# Patient Record
Sex: FEMALE | Race: WHITE | NOT HISPANIC OR LATINO | Employment: OTHER | URBAN - METROPOLITAN AREA
[De-identification: names, ages, dates, MRNs, and addresses within clinical notes are randomized per-mention and may not be internally consistent; named-entity substitution may affect disease eponyms.]

---

## 2023-05-30 ENCOUNTER — HOSPITAL ENCOUNTER (OUTPATIENT)
Dept: DATA CONVERSION | Facility: HOSPITAL | Age: 58
End: 2023-05-30
Attending: INTERNAL MEDICINE | Admitting: INTERNAL MEDICINE
Payer: COMMERCIAL

## 2023-05-30 DIAGNOSIS — R13.10 DYSPHAGIA, UNSPECIFIED: ICD-10-CM

## 2023-05-30 DIAGNOSIS — Z53.8 PROCEDURE AND TREATMENT NOT CARRIED OUT FOR OTHER REASONS: ICD-10-CM

## 2023-06-19 ENCOUNTER — HOSPITAL ENCOUNTER (OUTPATIENT)
Dept: DATA CONVERSION | Facility: HOSPITAL | Age: 58
End: 2023-06-19
Attending: INTERNAL MEDICINE | Admitting: INTERNAL MEDICINE
Payer: COMMERCIAL

## 2023-06-19 DIAGNOSIS — E78.5 HYPERLIPIDEMIA, UNSPECIFIED: ICD-10-CM

## 2023-06-19 DIAGNOSIS — Z79.82 LONG TERM (CURRENT) USE OF ASPIRIN: ICD-10-CM

## 2023-06-19 DIAGNOSIS — G47.33 OBSTRUCTIVE SLEEP APNEA (ADULT) (PEDIATRIC): ICD-10-CM

## 2023-06-19 DIAGNOSIS — I10 ESSENTIAL (PRIMARY) HYPERTENSION: ICD-10-CM

## 2023-06-19 DIAGNOSIS — R13.10 DYSPHAGIA, UNSPECIFIED: ICD-10-CM

## 2023-06-19 DIAGNOSIS — C16.0 MALIGNANT NEOPLASM OF CARDIA (MULTI): ICD-10-CM

## 2023-06-26 LAB
COMPLETE PATHOLOGY REPORT: NORMAL
CONVERTED ADDENDUM DIAGNOSIS 2: NORMAL
CONVERTED CLINICAL DIAGNOSIS-HISTORY: NORMAL
CONVERTED FINAL DIAGNOSIS: NORMAL
CONVERTED FINAL REPORT PDF LINK TO COPY AND PASTE: NORMAL
CONVERTED GROSS DESCRIPTION: NORMAL

## 2023-07-17 ENCOUNTER — HOSPITAL ENCOUNTER (OUTPATIENT)
Dept: DATA CONVERSION | Facility: HOSPITAL | Age: 58
End: 2023-07-17
Attending: STUDENT IN AN ORGANIZED HEALTH CARE EDUCATION/TRAINING PROGRAM | Admitting: STUDENT IN AN ORGANIZED HEALTH CARE EDUCATION/TRAINING PROGRAM
Payer: COMMERCIAL

## 2023-07-17 DIAGNOSIS — K76.89 OTHER SPECIFIED DISEASES OF LIVER: ICD-10-CM

## 2023-07-17 DIAGNOSIS — C15.9 MALIGNANT NEOPLASM OF ESOPHAGUS, UNSPECIFIED (MULTI): ICD-10-CM

## 2023-07-17 DIAGNOSIS — I10 ESSENTIAL (PRIMARY) HYPERTENSION: ICD-10-CM

## 2023-07-17 DIAGNOSIS — E78.00 PURE HYPERCHOLESTEROLEMIA, UNSPECIFIED: ICD-10-CM

## 2023-07-17 DIAGNOSIS — Z79.82 LONG TERM (CURRENT) USE OF ASPIRIN: ICD-10-CM

## 2023-07-17 DIAGNOSIS — R59.9 ENLARGED LYMPH NODES, UNSPECIFIED: ICD-10-CM

## 2023-07-17 DIAGNOSIS — K22.89 OTHER SPECIFIED DISEASE OF ESOPHAGUS: ICD-10-CM

## 2023-07-17 DIAGNOSIS — C16.0 MALIGNANT NEOPLASM OF CARDIA (MULTI): ICD-10-CM

## 2023-08-07 LAB
ALANINE AMINOTRANSFERASE (SGPT) (U/L) IN SER/PLAS: 16 U/L (ref 7–45)
ALBUMIN (G/DL) IN SER/PLAS: 4.3 G/DL (ref 3.4–5)
ALKALINE PHOSPHATASE (U/L) IN SER/PLAS: 113 U/L (ref 33–110)
ANION GAP IN SER/PLAS: 13 MMOL/L (ref 10–20)
ASPARTATE AMINOTRANSFERASE (SGOT) (U/L) IN SER/PLAS: 19 U/L (ref 9–39)
BILIRUBIN TOTAL (MG/DL) IN SER/PLAS: 0.5 MG/DL (ref 0–1.2)
CALCIUM (MG/DL) IN SER/PLAS: 9.7 MG/DL (ref 8.6–10.6)
CARBON DIOXIDE, TOTAL (MMOL/L) IN SER/PLAS: 28 MMOL/L (ref 21–32)
CHLORIDE (MMOL/L) IN SER/PLAS: 106 MMOL/L (ref 98–107)
CREATININE (MG/DL) IN SER/PLAS: 0.98 MG/DL (ref 0.5–1.05)
GFR FEMALE: 66 ML/MIN/1.73M2
GLUCOSE (MG/DL) IN SER/PLAS: 113 MG/DL (ref 74–99)
POTASSIUM (MMOL/L) IN SER/PLAS: 4.7 MMOL/L (ref 3.5–5.3)
PROTEIN TOTAL: 6.4 G/DL (ref 6.4–8.2)
SODIUM (MMOL/L) IN SER/PLAS: 142 MMOL/L (ref 136–145)
UREA NITROGEN (MG/DL) IN SER/PLAS: 15 MG/DL (ref 6–23)

## 2023-09-29 VITALS — BODY MASS INDEX: 45.23 KG/M2 | WEIGHT: 230.38 LBS | HEIGHT: 60 IN

## 2023-09-29 PROBLEM — C16.0 ADENOCARCINOMA OF GASTROESOPHAGEAL JUNCTION (MULTI): Status: ACTIVE | Noted: 2023-09-29

## 2023-09-29 RX ORDER — HEPARIN SODIUM,PORCINE/PF 10 UNIT/ML
50 SYRINGE (ML) INTRAVENOUS AS NEEDED
Status: CANCELLED | OUTPATIENT
Start: 2023-10-02

## 2023-09-29 RX ORDER — HEPARIN 100 UNIT/ML
500 SYRINGE INTRAVENOUS AS NEEDED
Status: CANCELLED | OUTPATIENT
Start: 2023-10-02

## 2023-10-02 RX ORDER — DULOXETIN HYDROCHLORIDE 60 MG/1
60 CAPSULE, DELAYED RELEASE ORAL
COMMUNITY
Start: 2023-09-02

## 2023-10-02 RX ORDER — ATORVASTATIN CALCIUM 20 MG/1
20 TABLET, FILM COATED ORAL NIGHTLY
COMMUNITY

## 2023-10-02 RX ORDER — ONDANSETRON HYDROCHLORIDE 8 MG/1
8 TABLET, FILM COATED ORAL EVERY 8 HOURS
COMMUNITY
Start: 2023-09-15 | End: 2023-11-06 | Stop reason: SDUPTHER

## 2023-10-02 RX ORDER — ONDANSETRON 4 MG/1
8 TABLET, ORALLY DISINTEGRATING ORAL EVERY 8 HOURS PRN
Status: ON HOLD | COMMUNITY
Start: 2023-06-26 | End: 2024-01-11 | Stop reason: ALTCHOICE

## 2023-10-02 RX ORDER — METOPROLOL SUCCINATE 50 MG/1
50 TABLET, EXTENDED RELEASE ORAL
COMMUNITY

## 2023-10-02 RX ORDER — METOCLOPRAMIDE 10 MG/1
1 TABLET ORAL
COMMUNITY
Start: 2023-09-25 | End: 2023-11-06 | Stop reason: ALTCHOICE

## 2023-10-02 RX ORDER — SPIRONOLACTONE 25 MG/1
25 TABLET ORAL
COMMUNITY

## 2023-10-02 RX ORDER — MIRTAZAPINE 15 MG/1
15 TABLET, FILM COATED ORAL NIGHTLY
COMMUNITY
Start: 2023-09-25 | End: 2023-10-02

## 2023-10-02 RX ORDER — ASPIRIN 81 MG/1
1 TABLET ORAL DAILY
COMMUNITY
Start: 2020-03-19 | End: 2023-11-06 | Stop reason: ALTCHOICE

## 2023-10-02 RX ORDER — MULTIVIT/IRON SULF/FOLIC ACID 15MG-0.4MG
1 TABLET ORAL DAILY
COMMUNITY
End: 2023-11-06 | Stop reason: SDUPTHER

## 2023-10-02 NOTE — TELEPHONE ENCOUNTER
Patient phoned stating that both the Reglan and Remeron are making her too sleepy. Advised to stop the nightly Remeron and cut down Reglan to 2 x daily instead of 3 x daily.  She will follow-up with me later this week and let me know how it's help, if at all.

## 2023-10-05 ENCOUNTER — NUTRITION (OUTPATIENT)
Dept: HEMATOLOGY/ONCOLOGY | Facility: CLINIC | Age: 58
End: 2023-10-05
Payer: MEDICAID

## 2023-10-05 ENCOUNTER — APPOINTMENT (OUTPATIENT)
Dept: RADIOLOGY | Facility: HOSPITAL | Age: 58
End: 2023-10-05
Payer: MEDICAID

## 2023-10-05 NOTE — PROGRESS NOTES
NUTRITION Follow-up NOTE  Reason for Visit:  Shruthi Ramos is a 58 y.o. 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 Plan is 5-FU, leucovorin, oxaliplatin, Taxotere with possible surgery.     RD consult for nutrition assessment and recommendations.     Visit Type: Clinical Nutrition, Oncology, Telephone Visit:  A telephone visit (audio only) between the patient (at the originating site) and the provider (at the distant site) was utilized to provide this telehealth service.    Verbal Consent for Encounter: Verbal consent was requested and obtained from patient on this date for a telehealth visit.    Problem List:        Additional Dx:   History of hysterectomy:    Adenocarcinoma of gastric cardia:    Abnormal findings on esophagogastroduodenoscopy (EGD):    Abnormal CT scan:    Adenocarcinoma of gastroesophageal junction:     Anthropometrics:    Wt Readings from Last 10 Encounters:   10/10/23 105 kg (231 lb 0.7 oz)   05/16/23 108 kg (239 lb)   05/26/20 122 kg (268 lb 15.4 oz)   03/19/20 122 kg (269 lb)   105.3kg on 7/7/23  104.8kg on 9/15/23    Labs:     Last CMP:     Lab Results   Component Value Date/Time    GLUCOSE 113 (H) 08/07/2023 1537     08/07/2023 1537    K 4.7 08/07/2023 1537     08/07/2023 1537    CO2 28 08/07/2023 1537    ANIONGAP 13 08/07/2023 1537    BUN 15 08/07/2023 1537    CREATININE 0.98 08/07/2023 1537    CALCIUM 9.7 08/07/2023 1537    ALBUMIN 4.3 08/07/2023 1537    ALKPHOS 113 (H) 08/07/2023 1537    PROT 6.4 08/07/2023 1537    AST 19 08/07/2023 1537    BILITOT 0.5 08/07/2023 1537    ALT 16 08/07/2023 1537          Food And Nutrient Intake:  Food and Nutrient History  Food and Nutrient History: Patient feels that appetite is improving. She was put on Remeron and Reglan, however, had to d/c remeron due to severe fatigue. Feels reglan is working well.  Fluid Intake: 2-30oz water bottles/day  Food Allergy: NKFA  GI Symptoms:  "nausea, early satiety (better with Remeron)  GI Symptoms greater than 2 weeks: intermittent  Oral Problems: dysphagia (cold sensitivities)     Food Intake  Meal 1: oatmeal  Meal 2: spaghetti (2 servings)  Meal 3: poptart  Snacks: chocolate covered pretzels    Food Preparation  Cooking: Patient  Grocery Shopping: Patient  Dining Out: 1 to 3 times a week      Micronutrient Intake  Vitamin Intake: Multivitamin    Food Supplement Intake  Oral Nutrition Supplements: Premier Protein (daily)    Medication and Complementary/Alternative Medicine Use  Prescription Medication Use: Reglan, zofran      Nutrition Focused Physical Exam:  Unable to assess 2/2 phone visit, have yet to meet patient in person.  Subcutaneous Fat Loss  Orbital Fat Pads: Defer  Buccal Fat Pads: Defer  Triceps: Defer  Ribs: Defer  Muscle Wasting  Temporalis: Defer  Pectoralis (Clavicular Region): Defer  Deltoid/Trapezius: Defer  Interosseous: Defer  Trapezius/Infraspinatus/Supraspinatus (Scapular Region): Defer  Quadriceps: Defer  Gastrocnemius: Defer        Energy Needs  Calculated Energy Needs Using Equations  Height: 155 cm (5' 1.02\")  Estimated Energy Needs  Total Energy Estimated Needs (kCal): 2119 kCal  Total Estimated Energy Need per Day (kCal/kg): 30 kCal/kg  Method for Estimating Needs: ABW  Estimated Fluid Needs  Total Fluid Estimated Needs (mL): 2119 mL  Total Fluid Estimated Needs (mL/kg): 30 mL/kg  Method for Estimating Needs: ABW  Estimated Protein Needs  Total Protein Estimated Needs (g): 99 g  Total Protein Estimated Needs (g/kg): 1.4 g/kg  Type of Protein Needed: ABW        Diagnosis      Nutrition Diagnosis  Patient has Nutrition Diagnosis: Yes  Diagnosis Status (1): Ongoing  Nutrition Diagnosis 1: Swallowing difficulity  Related to (1): GE junction tumor  As Evidenced by (1): patient reports of dysphagia with hard to swallow foods like bread/chicken  Additional Nutrition Diagnosis: Diagnosis 2  Diagnosis Status (2): Ongoing  Nutrition " Diagnosis 2: Inadequate oral intake (improving)  Related to (2): catabolic disease and upcoming treatment  As Evidenced by (2): PO intake meeting less than 75% estimated nutrient needs for greater that 1 week    Interventions/Recommendations   Food and Nutrition Delivery  Meals & Snacks: Energy-modified diet  Goals: increased nutrient needs in the setting of cancer  Medical Food Supplement: Premier Protein  Goals: BID  Nutrition Education  Nutrition Education Content: Content related nutrition education  Goals: patient will understand impact of diet on success during treatment, in the healing process, and for survivorship  Nutrition Counseling  Theoretical Basis/Approach: Nutrition counseling based on cognitive behavioral theory approach  Goals: contemplation-->action  Strategies: Nutrition counseling based on motivational interviewing strategy  Coordination of Nutrition Care by a Nutrition Professional  Collaboration and referral of nutrition care: Collaboration by nutrition professional with other providers  Team centered approach for best outcomes possible      Monitoring and Evaluation   Food/Nutrient Related History Monitoring  Monitoring and Evaluation Plan: Energy intake  Energy Intake: Estimated energy intake  Criteria: meet needs calculated by RD  Knowledge/Beliefs/Attitudes  Monitoring and Evaluation Plan: Food and nutrition knowledge  Food and nutrition knowledge: Nutrition knowledge of individual client  Criteria: will continue to work with patient on diet for cancer      Readiness to Change : Good  Level of Understanding : Good  Anticipated Compliant : Good

## 2023-10-10 ENCOUNTER — ANESTHESIA EVENT (OUTPATIENT)
Dept: OPERATING ROOM | Facility: HOSPITAL | Age: 58
End: 2023-10-10
Payer: MEDICAID

## 2023-10-10 VITALS — BODY MASS INDEX: 43.62 KG/M2 | HEIGHT: 61 IN | WEIGHT: 231.04 LBS

## 2023-10-10 NOTE — H&P
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 Today we discussed the general approach to gastric cancer which includes a sandwich treatment of chemotherapy, surgery, chemotherapy. In her case surgery would include a robotic total gastrectomy. We discussed this generally but not in detail. She is scheduled to start chemotherapy with Dr. Torres in mid October. I recommended that we [place] diagnostic [place] and [place] [facility] [place] After discussing the risk benefits the patient and her  elected to proceed. I will see her back following preoperative chemotherapy to discuss surgery.     Greater than 60 minutes were spent reviewing this case with the patient and other providers.     Zack Chavarria MD  Assistant Professor of Surgery  Division of Surgical Oncology  [email or web address]        Chief Complaint     GE junction cancer      History of Present IllnessShruthi is a 58-year-old woman who presents to our [facility] clinic today, referred by Ollie Torres for GE junction cancer.     The patient had an esophagram followed by EGD with diagnosis of a GE junction mass that returned poorly differentiated adenocarcinoma with signet ring cell features. She then underwent CT scan, PET, and EUS that suggest a T4N2 cancer starting at the GE junction and extending to the cardia with no evidence of metastatic disease.     All other systems have been reviewed and are negative except as noted in the HPI.     PMH is significant for hypertension, hyperlipidemia, anxiety and depression.  PSH is significant for hysterectomy, ankle surgery, cholecystectomy,  x2.     I personally reviewed all necessary laboratory results, pathology reports, and radiologic images for this patient.      Active Problems     · Dysphagia (787.20) (R13.10)   · Encounter for monitoring cardiotoxic  drug therapy (V58.83,V58.69) (Z51.81,Z79.899)   · H. pylori infection (041.86) (A04.8)   · Lower abdominal pain (789.09) (R10.30)   · Screening for colon cancer (V76.51) (Z12.11)     Allergies     · No Known Drug Allergies   Recorded By: Jeniffer Cabrera; 10/19/2018 12:57:06 PM     Current Meds     Medication Name Instruction   Aspir-Low 81 MG TBEC TAKE 1 TABLET DAILY.   Atorvastatin Calcium 20 MG Oral Tablet TAKE 1 TABLET BY MOUTH DAILY   B12-Active 1 MG Oral Tablet Chewable     Dicyclomine HCl - 10 MG Oral Capsule TAKE 1 CAPSULE EVERY 6 HOURS AS NEEDED.   Metoprolol Succinate ER 50 MG Oral Tablet Extended Release 24 Hour TAKE 1 TABLET BY MOUTH DAILY   Spironolactone 25 MG Oral Tablet TAKE 1 TABLET BY MOUTH DAILY   Tab-A-Tong/Iron Oral Tablet TAKE 1 TABLET ONCE DAILY.   Vitamin D2 50 MCG (2000 UT) Oral Tablet        Physical Exam  General: no acute distress, well-nourished  Eyes: intact EOM, no scleral icterus  ENT: hearing intact, no drainage  Respiratory: symmetric chest rise, no cough  Cardiovascular: intact distal pulses, no pitting edema  Abdominal: soft, nontender, nondistended  Musculoskeletal: no deformities, intact strength  Integumentary: warm, dry, no lymphadenopathy  Neuro: no focal deficits, sensation intact  Psych: normal mood and affect

## 2023-10-11 ENCOUNTER — HOSPITAL ENCOUNTER (OUTPATIENT)
Facility: HOSPITAL | Age: 58
Setting detail: OUTPATIENT SURGERY
Discharge: HOME | End: 2023-10-11
Attending: STUDENT IN AN ORGANIZED HEALTH CARE EDUCATION/TRAINING PROGRAM | Admitting: STUDENT IN AN ORGANIZED HEALTH CARE EDUCATION/TRAINING PROGRAM
Payer: MEDICAID

## 2023-10-11 ENCOUNTER — ANESTHESIA (OUTPATIENT)
Dept: OPERATING ROOM | Facility: HOSPITAL | Age: 58
End: 2023-10-11
Payer: MEDICAID

## 2023-10-11 ENCOUNTER — APPOINTMENT (OUTPATIENT)
Dept: HEMATOLOGY/ONCOLOGY | Facility: CLINIC | Age: 58
End: 2023-10-11
Payer: MEDICAID

## 2023-10-11 ENCOUNTER — APPOINTMENT (OUTPATIENT)
Dept: RADIOLOGY | Facility: HOSPITAL | Age: 58
End: 2023-10-11
Payer: MEDICAID

## 2023-10-11 ENCOUNTER — TELEPHONE (OUTPATIENT)
Dept: HEMATOLOGY/ONCOLOGY | Facility: CLINIC | Age: 58
End: 2023-10-11

## 2023-10-11 VITALS
OXYGEN SATURATION: 98 % | DIASTOLIC BLOOD PRESSURE: 62 MMHG | TEMPERATURE: 96.8 F | RESPIRATION RATE: 16 BRPM | HEART RATE: 60 BPM | BODY MASS INDEX: 43.58 KG/M2 | SYSTOLIC BLOOD PRESSURE: 118 MMHG | WEIGHT: 230.82 LBS | HEIGHT: 61 IN

## 2023-10-11 DIAGNOSIS — C16.0 MALIGNANT NEOPLASM OF CARDIA (MULTI): ICD-10-CM

## 2023-10-11 DIAGNOSIS — C16.0 ADENOCARCINOMA OF GASTROESOPHAGEAL JUNCTION (MULTI): Primary | ICD-10-CM

## 2023-10-11 PROBLEM — F41.9 ANXIETY: Status: ACTIVE | Noted: 2023-10-11

## 2023-10-11 PROBLEM — R11.2 PONV (POSTOPERATIVE NAUSEA AND VOMITING): Status: ACTIVE | Noted: 2023-10-11

## 2023-10-11 PROBLEM — F32.A DEPRESSION: Status: ACTIVE | Noted: 2023-10-11

## 2023-10-11 PROBLEM — Z98.890 PONV (POSTOPERATIVE NAUSEA AND VOMITING): Status: ACTIVE | Noted: 2023-10-11

## 2023-10-11 LAB
ABO GROUP (TYPE) IN BLOOD: NORMAL
ANTIBODY SCREEN: NORMAL
RH FACTOR (ANTIGEN D): NORMAL

## 2023-10-11 PROCEDURE — 2500000005 HC RX 250 GENERAL PHARMACY W/O HCPCS: Mod: SE

## 2023-10-11 PROCEDURE — 2500000005 HC RX 250 GENERAL PHARMACY W/O HCPCS: Mod: SE | Performed by: STUDENT IN AN ORGANIZED HEALTH CARE EDUCATION/TRAINING PROGRAM

## 2023-10-11 PROCEDURE — 7100000009 HC PHASE TWO TIME - INITIAL BASE CHARGE: Performed by: STUDENT IN AN ORGANIZED HEALTH CARE EDUCATION/TRAINING PROGRAM

## 2023-10-11 PROCEDURE — 36415 COLL VENOUS BLD VENIPUNCTURE: CPT | Performed by: STUDENT IN AN ORGANIZED HEALTH CARE EDUCATION/TRAINING PROGRAM

## 2023-10-11 PROCEDURE — 88112 CYTOPATH CELL ENHANCE TECH: CPT | Mod: TC,MCY,CMCLAB | Performed by: STUDENT IN AN ORGANIZED HEALTH CARE EDUCATION/TRAINING PROGRAM

## 2023-10-11 PROCEDURE — 3700000002 HC GENERAL ANESTHESIA TIME - EACH INCREMENTAL 1 MINUTE: Performed by: STUDENT IN AN ORGANIZED HEALTH CARE EDUCATION/TRAINING PROGRAM

## 2023-10-11 PROCEDURE — 7100000010 HC PHASE TWO TIME - EACH INCREMENTAL 1 MINUTE: Performed by: STUDENT IN AN ORGANIZED HEALTH CARE EDUCATION/TRAINING PROGRAM

## 2023-10-11 PROCEDURE — 2780000003 HC OR 278 NO HCPCS: Performed by: STUDENT IN AN ORGANIZED HEALTH CARE EDUCATION/TRAINING PROGRAM

## 2023-10-11 PROCEDURE — 49321 LAPAROSCOPY BIOPSY: CPT | Performed by: STUDENT IN AN ORGANIZED HEALTH CARE EDUCATION/TRAINING PROGRAM

## 2023-10-11 PROCEDURE — 3600000004 HC OR TIME - INITIAL BASE CHARGE - PROCEDURE LEVEL FOUR: Performed by: STUDENT IN AN ORGANIZED HEALTH CARE EDUCATION/TRAINING PROGRAM

## 2023-10-11 PROCEDURE — 2500000004 HC RX 250 GENERAL PHARMACY W/ HCPCS (ALT 636 FOR OP/ED): Mod: SE | Performed by: STUDENT IN AN ORGANIZED HEALTH CARE EDUCATION/TRAINING PROGRAM

## 2023-10-11 PROCEDURE — 3600000009 HC OR TIME - EACH INCREMENTAL 1 MINUTE - PROCEDURE LEVEL FOUR: Performed by: STUDENT IN AN ORGANIZED HEALTH CARE EDUCATION/TRAINING PROGRAM

## 2023-10-11 PROCEDURE — 96372 THER/PROPH/DIAG INJ SC/IM: CPT | Mod: 59 | Performed by: STUDENT IN AN ORGANIZED HEALTH CARE EDUCATION/TRAINING PROGRAM

## 2023-10-11 PROCEDURE — 2720000007 HC OR 272 NO HCPCS: Performed by: STUDENT IN AN ORGANIZED HEALTH CARE EDUCATION/TRAINING PROGRAM

## 2023-10-11 PROCEDURE — 3700000001 HC GENERAL ANESTHESIA TIME - INITIAL BASE CHARGE: Performed by: STUDENT IN AN ORGANIZED HEALTH CARE EDUCATION/TRAINING PROGRAM

## 2023-10-11 PROCEDURE — 71045 X-RAY EXAM CHEST 1 VIEW: CPT | Performed by: RADIOLOGY

## 2023-10-11 PROCEDURE — C1788 PORT, INDWELLING, IMP: HCPCS | Performed by: STUDENT IN AN ORGANIZED HEALTH CARE EDUCATION/TRAINING PROGRAM

## 2023-10-11 PROCEDURE — 88112 CYTOPATH CELL ENHANCE TECH: CPT | Performed by: PATHOLOGY

## 2023-10-11 PROCEDURE — 76000 FLUOROSCOPY <1 HR PHYS/QHP: CPT | Mod: 59

## 2023-10-11 PROCEDURE — A36561 PR INSERT TUNNELED CV CATH WITH PORT: Performed by: ANESTHESIOLOGY

## 2023-10-11 PROCEDURE — 36561 INSERT TUNNELED CV CATH: CPT | Performed by: STUDENT IN AN ORGANIZED HEALTH CARE EDUCATION/TRAINING PROGRAM

## 2023-10-11 PROCEDURE — 76937 US GUIDE VASCULAR ACCESS: CPT | Performed by: STUDENT IN AN ORGANIZED HEALTH CARE EDUCATION/TRAINING PROGRAM

## 2023-10-11 PROCEDURE — 7100000002 HC RECOVERY ROOM TIME - EACH INCREMENTAL 1 MINUTE: Performed by: STUDENT IN AN ORGANIZED HEALTH CARE EDUCATION/TRAINING PROGRAM

## 2023-10-11 PROCEDURE — 86901 BLOOD TYPING SEROLOGIC RH(D): CPT | Performed by: STUDENT IN AN ORGANIZED HEALTH CARE EDUCATION/TRAINING PROGRAM

## 2023-10-11 PROCEDURE — 7100000001 HC RECOVERY ROOM TIME - INITIAL BASE CHARGE: Performed by: STUDENT IN AN ORGANIZED HEALTH CARE EDUCATION/TRAINING PROGRAM

## 2023-10-11 PROCEDURE — A4217 STERILE WATER/SALINE, 500 ML: HCPCS | Mod: SE | Performed by: STUDENT IN AN ORGANIZED HEALTH CARE EDUCATION/TRAINING PROGRAM

## 2023-10-11 PROCEDURE — 76000 FLUOROSCOPY <1 HR PHYS/QHP: CPT | Performed by: STUDENT IN AN ORGANIZED HEALTH CARE EDUCATION/TRAINING PROGRAM

## 2023-10-11 PROCEDURE — 2580000001 HC RX 258 IV SOLUTIONS: Mod: SE | Performed by: STUDENT IN AN ORGANIZED HEALTH CARE EDUCATION/TRAINING PROGRAM

## 2023-10-11 PROCEDURE — 71045 X-RAY EXAM CHEST 1 VIEW: CPT | Mod: 59,FY

## 2023-10-11 DEVICE — POWERPORT SLIM IMPLANTABLE PORT WITH ATTACHABLE 6F CHRONOFLEX OPEN-ENDED SINGLE-LUMEN VENOUS CATHETER INTERMEDIATE KIT (WITHOUT SUTURE PLUGS)
Type: IMPLANTABLE DEVICE | Site: CHEST | Status: FUNCTIONAL
Brand: POWERPORT, CHRONOFLEX

## 2023-10-11 RX ORDER — SUCCINYLCHOLINE CHLORIDE 20 MG/ML
INJECTION INTRAMUSCULAR; INTRAVENOUS AS NEEDED
Status: DISCONTINUED | OUTPATIENT
Start: 2023-10-11 | End: 2023-10-11

## 2023-10-11 RX ORDER — ACETAMINOPHEN 325 MG/1
650 TABLET ORAL EVERY 4 HOURS PRN
Status: DISCONTINUED | OUTPATIENT
Start: 2023-10-11 | End: 2023-10-11 | Stop reason: HOSPADM

## 2023-10-11 RX ORDER — LIDOCAINE HYDROCHLORIDE 20 MG/ML
INJECTION, SOLUTION INFILTRATION; PERINEURAL AS NEEDED
Status: DISCONTINUED | OUTPATIENT
Start: 2023-10-11 | End: 2023-10-11

## 2023-10-11 RX ORDER — SODIUM CHLORIDE 0.9 G/100ML
IRRIGANT IRRIGATION AS NEEDED
Status: DISCONTINUED | OUTPATIENT
Start: 2023-10-11 | End: 2023-10-11 | Stop reason: HOSPADM

## 2023-10-11 RX ORDER — SODIUM CHLORIDE, SODIUM LACTATE, POTASSIUM CHLORIDE, CALCIUM CHLORIDE 600; 310; 30; 20 MG/100ML; MG/100ML; MG/100ML; MG/100ML
INJECTION, SOLUTION INTRAVENOUS CONTINUOUS PRN
Status: DISCONTINUED | OUTPATIENT
Start: 2023-10-11 | End: 2023-10-11

## 2023-10-11 RX ORDER — ONDANSETRON HYDROCHLORIDE 2 MG/ML
INJECTION, SOLUTION INTRAVENOUS AS NEEDED
Status: DISCONTINUED | OUTPATIENT
Start: 2023-10-11 | End: 2023-10-11

## 2023-10-11 RX ORDER — DEXAMETHASONE SODIUM PHOSPHATE 4 MG/ML
INJECTION, SOLUTION INTRA-ARTICULAR; INTRALESIONAL; INTRAMUSCULAR; INTRAVENOUS; SOFT TISSUE AS NEEDED
Status: DISCONTINUED | OUTPATIENT
Start: 2023-10-11 | End: 2023-10-11

## 2023-10-11 RX ORDER — CEFAZOLIN SODIUM 2 G/100ML
2 INJECTION, SOLUTION INTRAVENOUS EVERY 8 HOURS
Status: DISCONTINUED | OUTPATIENT
Start: 2023-10-11 | End: 2023-10-11 | Stop reason: HOSPADM

## 2023-10-11 RX ORDER — NEOSTIGMINE METHYLSULFATE 1 MG/ML
INJECTION, SOLUTION INTRAVENOUS AS NEEDED
Status: DISCONTINUED | OUTPATIENT
Start: 2023-10-11 | End: 2023-10-11

## 2023-10-11 RX ORDER — SODIUM CHLORIDE, SODIUM LACTATE, POTASSIUM CHLORIDE, CALCIUM CHLORIDE 600; 310; 30; 20 MG/100ML; MG/100ML; MG/100ML; MG/100ML
100 INJECTION, SOLUTION INTRAVENOUS CONTINUOUS
Status: DISCONTINUED | OUTPATIENT
Start: 2023-10-11 | End: 2023-10-11 | Stop reason: HOSPADM

## 2023-10-11 RX ORDER — MIDAZOLAM HYDROCHLORIDE 1 MG/ML
INJECTION INTRAMUSCULAR; INTRAVENOUS AS NEEDED
Status: DISCONTINUED | OUTPATIENT
Start: 2023-10-11 | End: 2023-10-11

## 2023-10-11 RX ORDER — FENTANYL CITRATE 50 UG/ML
INJECTION, SOLUTION INTRAMUSCULAR; INTRAVENOUS AS NEEDED
Status: DISCONTINUED | OUTPATIENT
Start: 2023-10-11 | End: 2023-10-11

## 2023-10-11 RX ORDER — BUPIVACAINE HYDROCHLORIDE 5 MG/ML
INJECTION, SOLUTION PERINEURAL AS NEEDED
Status: DISCONTINUED | OUTPATIENT
Start: 2023-10-11 | End: 2023-10-11 | Stop reason: HOSPADM

## 2023-10-11 RX ORDER — GLYCOPYRROLATE 0.2 MG/ML
INJECTION INTRAMUSCULAR; INTRAVENOUS AS NEEDED
Status: DISCONTINUED | OUTPATIENT
Start: 2023-10-11 | End: 2023-10-11

## 2023-10-11 RX ORDER — ROCURONIUM BROMIDE 10 MG/ML
INJECTION, SOLUTION INTRAVENOUS AS NEEDED
Status: DISCONTINUED | OUTPATIENT
Start: 2023-10-11 | End: 2023-10-11

## 2023-10-11 RX ORDER — TRAMADOL HYDROCHLORIDE 50 MG/1
50 TABLET ORAL 2 TIMES DAILY PRN
Qty: 5 TABLET | Refills: 0 | Status: SHIPPED | OUTPATIENT
Start: 2023-10-11 | End: 2023-10-16

## 2023-10-11 RX ORDER — ONDANSETRON HYDROCHLORIDE 2 MG/ML
4 INJECTION, SOLUTION INTRAVENOUS ONCE AS NEEDED
Status: DISCONTINUED | OUTPATIENT
Start: 2023-10-11 | End: 2023-10-11 | Stop reason: HOSPADM

## 2023-10-11 RX ORDER — HYDRALAZINE HYDROCHLORIDE 20 MG/ML
5 INJECTION INTRAMUSCULAR; INTRAVENOUS EVERY 30 MIN PRN
Status: DISCONTINUED | OUTPATIENT
Start: 2023-10-11 | End: 2023-10-11 | Stop reason: HOSPADM

## 2023-10-11 RX ORDER — TRAMADOL HYDROCHLORIDE 50 MG/1
50 TABLET ORAL 2 TIMES DAILY PRN
Qty: 5 TABLET | Refills: 0 | Status: SHIPPED | OUTPATIENT
Start: 2023-10-11 | End: 2023-10-11 | Stop reason: SDUPTHER

## 2023-10-11 RX ORDER — HEPARIN SODIUM (PORCINE) LOCK FLUSH IV SOLN 100 UNIT/ML 100 UNIT/ML
SOLUTION INTRAVENOUS AS NEEDED
Status: DISCONTINUED | OUTPATIENT
Start: 2023-10-11 | End: 2023-10-11 | Stop reason: HOSPADM

## 2023-10-11 RX ORDER — TRAMADOL HYDROCHLORIDE 50 MG/1
50 TABLET ORAL 2 TIMES DAILY PRN
Qty: 5 TABLET | Refills: 0 | Status: SHIPPED | OUTPATIENT
Start: 2023-10-11 | End: 2023-10-11 | Stop reason: HOSPADM

## 2023-10-11 RX ORDER — LIDOCAINE HYDROCHLORIDE 10 MG/ML
INJECTION INFILTRATION; PERINEURAL AS NEEDED
Status: DISCONTINUED | OUTPATIENT
Start: 2023-10-11 | End: 2023-10-11 | Stop reason: HOSPADM

## 2023-10-11 RX ORDER — PROPOFOL 10 MG/ML
INJECTION, EMULSION INTRAVENOUS AS NEEDED
Status: DISCONTINUED | OUTPATIENT
Start: 2023-10-11 | End: 2023-10-11

## 2023-10-11 RX ORDER — HEPARIN SODIUM 1000 [USP'U]/ML
INJECTION, SOLUTION INTRAVENOUS; SUBCUTANEOUS AS NEEDED
Status: DISCONTINUED | OUTPATIENT
Start: 2023-10-11 | End: 2023-10-11 | Stop reason: HOSPADM

## 2023-10-11 RX ADMIN — GLYCOPYRROLATE 0.4 MG: 0.2 INJECTION INTRAMUSCULAR; INTRAVENOUS at 10:09

## 2023-10-11 RX ADMIN — SODIUM CHLORIDE, POTASSIUM CHLORIDE, SODIUM LACTATE AND CALCIUM CHLORIDE: 600; 310; 30; 20 INJECTION, SOLUTION INTRAVENOUS at 08:35

## 2023-10-11 RX ADMIN — FENTANYL CITRATE 50 MCG: 50 INJECTION, SOLUTION INTRAMUSCULAR; INTRAVENOUS at 08:45

## 2023-10-11 RX ADMIN — SUCCINYLCHOLINE CHLORIDE 120 MG: 20 INJECTION, SOLUTION INTRAMUSCULAR; INTRAVENOUS at 08:45

## 2023-10-11 RX ADMIN — ROCURONIUM BROMIDE 10 MG: 10 INJECTION, SOLUTION INTRAVENOUS at 09:51

## 2023-10-11 RX ADMIN — ROCURONIUM BROMIDE 50 MG: 10 INJECTION, SOLUTION INTRAVENOUS at 08:52

## 2023-10-11 RX ADMIN — NEOSTIGMINE METHYLSULFATE 2 MG: 1 INJECTION INTRAVENOUS at 10:09

## 2023-10-11 RX ADMIN — LIDOCAINE HYDROCHLORIDE 40 MG: 20 INJECTION, SOLUTION INFILTRATION; PERINEURAL at 08:45

## 2023-10-11 RX ADMIN — PROPOFOL 150 MG: 10 INJECTION, EMULSION INTRAVENOUS at 08:45

## 2023-10-11 RX ADMIN — GLYCOPYRROLATE 1 MG: 0.2 INJECTION INTRAMUSCULAR; INTRAVENOUS at 10:01

## 2023-10-11 RX ADMIN — ONDANSETRON 4 MG: 2 INJECTION INTRAMUSCULAR; INTRAVENOUS at 10:00

## 2023-10-11 RX ADMIN — DEXAMETHASONE SODIUM PHOSPHATE 4 MG: 4 INJECTION, SOLUTION INTRA-ARTICULAR; INTRALESIONAL; INTRAMUSCULAR; INTRAVENOUS; SOFT TISSUE at 09:15

## 2023-10-11 RX ADMIN — NEOSTIGMINE METHYLSULFATE 5 MG: 1 INJECTION INTRAVENOUS at 10:01

## 2023-10-11 RX ADMIN — MIDAZOLAM HYDROCHLORIDE 2 MG: 1 INJECTION, SOLUTION INTRAMUSCULAR; INTRAVENOUS at 08:35

## 2023-10-11 RX ADMIN — FENTANYL CITRATE 50 MCG: 50 INJECTION, SOLUTION INTRAMUSCULAR; INTRAVENOUS at 09:55

## 2023-10-11 RX ADMIN — SODIUM CHLORIDE, POTASSIUM CHLORIDE, SODIUM LACTATE AND CALCIUM CHLORIDE 100 ML/HR: 600; 310; 30; 20 INJECTION, SOLUTION INTRAVENOUS at 10:54

## 2023-10-11 RX ADMIN — ACETAMINOPHEN 650 MG: 325 TABLET ORAL at 11:50

## 2023-10-11 RX ADMIN — FENTANYL CITRATE 50 MCG: 50 INJECTION, SOLUTION INTRAMUSCULAR; INTRAVENOUS at 09:14

## 2023-10-11 RX ADMIN — PROPOFOL 50 MG: 10 INJECTION, EMULSION INTRAVENOUS at 09:51

## 2023-10-11 RX ADMIN — SUGAMMADEX 200 MG: 100 INJECTION, SOLUTION INTRAVENOUS at 10:13

## 2023-10-11 RX ADMIN — CEFAZOLIN SODIUM 2 G: 2 INJECTION, SOLUTION INTRAVENOUS at 08:51

## 2023-10-11 ASSESSMENT — PAIN SCALES - GENERAL
PAIN_LEVEL: 0
PAINLEVEL_OUTOF10: 0 - NO PAIN
PAINLEVEL_OUTOF10: 2
PAINLEVEL_OUTOF10: 0 - NO PAIN
PAINLEVEL_OUTOF10: 2
PAINLEVEL_OUTOF10: 0 - NO PAIN

## 2023-10-11 ASSESSMENT — COLUMBIA-SUICIDE SEVERITY RATING SCALE - C-SSRS
2. HAVE YOU ACTUALLY HAD ANY THOUGHTS OF KILLING YOURSELF?: NO
1. IN THE PAST MONTH, HAVE YOU WISHED YOU WERE DEAD OR WISHED YOU COULD GO TO SLEEP AND NOT WAKE UP?: NO
6. HAVE YOU EVER DONE ANYTHING, STARTED TO DO ANYTHING, OR PREPARED TO DO ANYTHING TO END YOUR LIFE?: NO

## 2023-10-11 NOTE — ANESTHESIA POSTPROCEDURE EVALUATION
Patient: Shruthi Ramos    Procedure Summary       Date: 10/11/23 Room / Location: [facility] OR 22 / Virtual [facility] OR    Anesthesia Start: 0836 Anesthesia Stop: 1023    Procedures:       Port Placement (Chest)      Diagnostic Laparoscopy, Intraoperative Ultrasound (Abdomen) Diagnosis:       Malignant neoplasm of cardia (CMS/HCC)      (Malignant neoplasm of cardia (CMS/HCC) [C16.0])    Surgeons: Zack Chavarria MD Responsible Provider: Dnony Dubois MD    Anesthesia Type: general ASA Status: 3            Anesthesia Type: general    Vitals Value Taken Time   /78 10/11/23 1035   Temp 36 10/11/23 1035   Pulse 78 10/11/23 1027   Resp 13 10/11/23 1027   SpO2 100 % 10/11/23 1027   Vitals shown include unvalidated device data.    Anesthesia Post Evaluation    Patient location during evaluation: PACU  Patient participation: complete - patient participated  Level of consciousness: sleepy but conscious  Pain score: 0  Pain management: adequate  Cardiovascular status: acceptable and hemodynamically stable  Respiratory status: acceptable  Hydration status: acceptable        There were no known notable events for this encounter.    Earline Duque PGY-4, Anesthesia    Patient is somnolent but arousable.  No current complaints;  PACU nurse at bedside.  Pain reasonably controlled.  Euvolemic and hemodynamically stable.  Stable respiratory status;  no current nausea or emesis.  Indicated PACU care given.    Donny Dubois MD

## 2023-10-11 NOTE — ANESTHESIA PROCEDURE NOTES
Peripheral IV  Date/Time: 10/11/2023 8:10 AM  Inserted by: Earline Duque MD    Placement  Needle size: 20 G  Laterality: left  Location: hand  Local anesthetic: injectable  Site prep: alcohol  Technique: anatomical landmarks  Attempts: 1  Difficult Venous Access: Yes         Refill approved as requested.

## 2023-10-11 NOTE — ANESTHESIA PROCEDURE NOTES
Peripheral IV  Date/Time: 10/11/2023 8:48 AM  Inserted by: Earline Duque MD    Placement  Needle size: 18 G  Laterality: right  Location: hand  Local anesthetic: none  Site prep: alcohol  Technique: anatomical landmarks  Attempts: 3  Difficult Venous Access: Yes  Unsuccessful Attempt Location(s): right hand and right forearm

## 2023-10-11 NOTE — DISCHARGE INSTRUCTIONS
Outpatient Surgery Discharge Instructions    Diet  No diet restrictions***    Pain control  For baseline pain control: Tylenol (acetaminophen) 1,000 mg every 8 hours for one week  For mild pain: ibuprofen 600 mg every 8 hours with food as needed  For moderate to severe pain: Tramadol as prescribed    Activity  No specific lifting or activity restrictions / No lifting more than 5 pounds for 6 weeks after surgery  In general, listen to your body and do not overly stress your surgical sites    Incisions  Your incisions are covered with skin glue. This is a sterile dressing placed in the operating room. Do not pick or peel it off. This will fall off in 2-3 weeks.  No dressing changes or wound care is needed. Do not use any ointments or cleaning agents.  You have multiple layers of sutures under the skin that will dissolve.   If you have sutures through the skin they will be removed at your postoperative appointment in 3 weeks.  You may shower the day after surgery. Let soap and water wash over the incision and pat it dry.   No going underwater (bath, pool, lake, ocean, etc.) for 2 weeks.  If you notice any of the following, please call Belem Bhakta ([phone]) or Dr. Chavarria's office ([phone]).  Swelling under the incision like a golf ball or larger.  Redness of the skin that is spreading away from the incision.  Drainage from the incision.  Fevers, chills, or any other concerning symptoms.    Follow-up  Call Eduardo Castillo at [phone] for an office visit in 2 months near the end of chemotherapy.

## 2023-10-11 NOTE — OP NOTE
Port Placement, Diagnostic Laparoscopy, Intraoperative Ultrasound Operative Note     Date: 10/11/2023  OR Location: [facility] OR    Name: Shruthi Ramos, : 1965, Age: 58 y.o., MRN: [id number], Sex: female    Diagnosis  Pre-op Diagnosis     * Malignant neoplasm of cardia (CMS/HCC) [C16.0] Post-op Diagnosis     * Malignant neoplasm of cardia (CMS/HCC) [C16.0]     Procedures  Diagnostic laparoscopy and port placement    Surgeons      * Zack Chavarria - Primary    Resident/Fellow/Other Assistant:  No surgical staff documented.    Procedure Summary  Anesthesia: General  ASA: III  Anesthesia Staff: Anesthesiologist: Donny Dubois MD  Anesthesia Resident: Earline Duque MD  Estimated Blood Loss: 10mL  Intra-op Medications:   Medication Name Total Dose   ceFAZolin in dextrose (iso-os) (Ancef) IVPB 2 g 2 g              Anesthesia Record               Intraprocedure I/O Totals          Intake    Autologous Blood 0 mL    Cell Saver 0 mL    Total Intake 0 mL       Output    Urine 0 mL    Est. Blood Loss 5 mL    Total Output 5 mL       Net    Net Volume -5 mL          Specimen:   ID Type Source Tests Collected by Time   1 : PERITONEAL FLUID FOR CYTOLOGY Non-Gynecologic Cytology PERITONEAL WASH CYTOLOGY CONSULTATION (NON-GYNECOLOGIC) Zack Chavarria MD 10/11/2023 1006        Staff:   Circulator: Alice Pierce RN; Paulino Fernandes RN  Scrub Person: Josefina Haley               Implants:  Implants       Type Name Action Serial No.      Implant POWER PORT, SLIM, TI DEVICE W/6FR - [id number] Implanted                Findings: no macroscopic peritoneal disease    Indications for surgery: Shruthi Ramos is a 58 y.o. year old female who was diagnosed with gastric cancer. In clinic we discussed the treatment algorithm that includes perioperative chemotherapy and surgery. To complete staging I recommended a diagnostic laparoscopy to rule out peritoneal disease. After discussing the risks and benefits she  agreed to proceed with diagnostic laparoscopy and port placement.    Details of procedure: The patient arrived to the preoperative area at [facility] for the aforementioned procedure. History and physical was performed, consent was verified, questions were answered, and she was moved into the operating room. A timeout was performed and she was induced under general anesthesia. The chest and abdomen were prepped and draped in the usual sterile fashion.  An ultrasound was used to identify his right internal jugular vein.  Entry was obtained with an access needle and a wire was placed into the IJ and SVC with position confirmed with fluoroscopy.  A stab incision was made at this entry point.  A 3 cm incision was made on the right upper chest and a subcutaneous pocket was created for the port.  A 6F power port was used. The port and catheter were connected and flushed with saline.  The catheter was tunneled antegrade from the pocket to the access incision.  The port was secured in the pocket with two 3-0 Prolene sutures.  Under fluoroscopic guidance that she had and dilator were inserted into the IJ and SVC over the wire.  The wire and dilator were removed.  The catheter was measured and cut externally with the tip 2 vertebral bodies below the anthony.  This was then inserted through the sheath and the length was adjusted so that the tip was just below the anthony.  The sheath was then cracked, catheter advanced and stabilized, and sheath was removed.  Final position of the catheter was confirmed with fluoroscopy.  The catheter had a gentle curve at the top and the tip was 2 vertebral bodies below the anthony.  The port aspirated and flushed with saline easily and was then instilled with 2 mL of dilute heparinized saline.  The port incision was closed with deep dermal 3-0 Vicryl's.  Both incisions were dressed with Dermabond.    Next we performed diagnostic laparoscopy.  The left upper quadrant was entered with a  5 mm port using the direct Optiview technique at [place] [place].  The abdomen was insufflated and a separate 5 mm port was placed.  Upon inspection there was no evidence of metastatic disease.  1,000 mL of saline was irrigated throughout the abdomen.  The patient was rotated circumferentially.  The saline was aspirated and sent for cytology and 2 different traps.  The rest of the saline was removed.  The abdomen was desufflated.  The skin was closed with deep dermal 3-0 Vicryl's and dressed with Dermabond.  The patient was then awoken having tolerated procedure well and returned to the PACU where chest x-ray will be performed prior to discharge.  I was present and scrubbed and directed all operative decision-making.    All sponge and needle counts were correct at the end of the case. The patient was awoken from the procedure and moved to the PACU for recovery. I was present and scrubbed and directed all operative decision making.    Zack Chavarria MD  Assistant Professor of Surgery  Division of Surgical Oncology  [phone]  [email or web address]

## 2023-10-11 NOTE — ANESTHESIA PROCEDURE NOTES
Airway  Date/Time: 10/11/2023 8:46 AM  Urgency: elective    Airway not difficult    Staffing  Performed: resident   Authorized by: Donny Dubois MD    Performed by: Earline Duque MD  Patient location during procedure: OR    Indications and Patient Condition  Indications for airway management: anesthesia  Spontaneous Ventilation: absent  Preoxygenated: yes  Patient position: sniffing  MILS not maintained throughout  Mask difficulty assessment: 0 - not attempted  Planned trial extubation    Final Airway Details  Final airway type: endotracheal airway      Successful airway: ETT  Cuffed: yes   Successful intubation technique: direct laryngoscopy  Facilitating devices/methods: cricoid pressure  Endotracheal tube insertion site: oral  Blade: Stephanie  Blade size: #3  ETT size (mm): 7.5  Cormack-Lehane Classification: grade IIa - partial view of glottis  Placement verified by: capnometry   Measured from: lips  Number of attempts at approach: 1  Ventilation between attempts: none    Additional Comments  RSI  No emesis

## 2023-10-11 NOTE — ANESTHESIA PREPROCEDURE EVALUATION
Patient: Shruthi Ramos    Procedure Information       Date/Time: 10/11/23 0815    Procedures:       Port Placement      Diagnostic Laparoscopy, Intraoperative Ultrasound    Location: [facility] OR 22 / Virtual [facility] OR    Surgeons: Zack Chavarria MD            Relevant Problems   Anesthesia   (+) PONV (postoperative nausea and vomiting)      GI   (+) Adenocarcinoma of gastroesophageal junction (CMS/HCC)      /Renal (within normal limits)      Neuro/Psych   (+) Anxiety   (+) Depression      Pulmonary (within normal limits)      GI/Hepatic   (+) Adenocarcinoma of gastroesophageal junction (CMS/HCC)      Hematology (within normal limits)      Musculoskeletal (within normal limits)      Eyes, Ears, Nose, and Throat (within normal limits)      Infectious Disease (within normal limits)       Clinical information reviewed:   Tobacco  Allergies  Meds   Med Hx  Surg Hx  OB Status  Fam Hx  Soc   Hx        NPO Detail:  NPO/Void Status  Carbonhydrate Drink Given Prior to Surgery? : N  Date of Last Liquid: 10/11/23  Time of Last Liquid: 0500  Date of Last Solid: 10/10/23  Time of Last Solid: 2030  Last Intake Type: Clear fluids  Time of Last Void: 0600         Physical Exam    Airway  Mallampati: III  TM distance: >3 FB  Neck ROM: full     Cardiovascular   Rhythm: regular  Rate: normal  Comments: Distant heart sounds   Dental   (+) upper dentures, lower dentures  Comments: edentulous   Pulmonary   Breath sounds clear to auscultation     Abdominal   (+) obese             Anesthesia Plan    ASA 3     general     intravenous induction   Anesthetic plan and risks discussed with patient.  Use of blood products discussed with patient who consented to blood products.    Plan discussed with attending.    Earline Duque PGY-4 Anesthesia    Plan general endotracheal anesthesia with peripheral IV placement and ASA standard noninvasive monitors.  Plan RSI.  The possibility of blood product transfusion was also described  in detail.  Risks, benefits, alternatives of this plan were described in detail to the patient, who indicated understanding and agreed to proceed.    Donny Dubois MD

## 2023-10-11 NOTE — TELEPHONE ENCOUNTER
Message received from Dr. Chavarria / Dr. Torres to see if we could move up patient's treatment start date from 10.25.2023.  Discussed with charge RN and patient rescheduled to get blood draw on 10.13 and start treatment on 10.16.2023.  Rescheduled by Jessica Reyes; also emailed patient copy of the schedule at email address [email or web address] and left her a VM to please call me back and let me know she is aware of the change.

## 2023-10-12 ENCOUNTER — APPOINTMENT (OUTPATIENT)
Dept: HEMATOLOGY/ONCOLOGY | Facility: CLINIC | Age: 58
End: 2023-10-12
Payer: MEDICAID

## 2023-10-12 PROBLEM — N18.30 CKD (CHRONIC KIDNEY DISEASE) STAGE 3, GFR 30-59 ML/MIN (MULTI): Status: ACTIVE | Noted: 2023-10-12

## 2023-10-12 PROBLEM — R73.9 HYPERGLYCEMIA: Status: ACTIVE | Noted: 2023-10-12

## 2023-10-12 PROBLEM — N30.20 CHRONIC CYSTITIS: Status: ACTIVE | Noted: 2023-10-12

## 2023-10-12 PROBLEM — N39.0 RECURRENT UTI: Status: ACTIVE | Noted: 2023-10-12

## 2023-10-12 PROBLEM — G47.61 PERIODIC LIMB MOVEMENT DISORDER: Status: ACTIVE | Noted: 2017-01-04

## 2023-10-12 PROBLEM — R06.09 DOE (DYSPNEA ON EXERTION): Status: ACTIVE | Noted: 2023-10-12

## 2023-10-12 PROBLEM — E66.01 OBESITY, CLASS III, BMI 40-49.9 (MORBID OBESITY) (MULTI): Status: ACTIVE | Noted: 2022-05-13

## 2023-10-12 PROBLEM — G25.81 RLS (RESTLESS LEGS SYNDROME): Status: ACTIVE | Noted: 2023-10-12

## 2023-10-12 PROBLEM — E66.813 OBESITY, CLASS III, BMI 40-49.9 (MORBID OBESITY): Status: ACTIVE | Noted: 2022-05-13

## 2023-10-12 PROBLEM — F32.1 MODERATE MAJOR DEPRESSION (MULTI): Status: ACTIVE | Noted: 2023-10-12

## 2023-10-12 PROBLEM — N95.2 VAGINAL ATROPHY: Status: ACTIVE | Noted: 2023-10-12

## 2023-10-12 PROBLEM — G47.00 INSOMNIA: Status: ACTIVE | Noted: 2022-05-03

## 2023-10-12 PROBLEM — R13.10 DYSPHAGIA: Status: ACTIVE | Noted: 2023-10-12

## 2023-10-12 PROBLEM — M79.7 FIBROMYALGIA: Status: ACTIVE | Noted: 2023-10-12

## 2023-10-12 PROBLEM — E55.9 VITAMIN D DEFICIENCY: Status: ACTIVE | Noted: 2023-10-12

## 2023-10-12 PROBLEM — N35.919 URETHRAL MEATAL STENOSIS: Status: ACTIVE | Noted: 2023-10-12

## 2023-10-12 RX ORDER — PROCHLORPERAZINE EDISYLATE 5 MG/ML
10 INJECTION INTRAMUSCULAR; INTRAVENOUS EVERY 6 HOURS PRN
Status: CANCELLED | OUTPATIENT
Start: 2023-10-16

## 2023-10-12 RX ORDER — ALBUTEROL SULFATE 0.83 MG/ML
3 SOLUTION RESPIRATORY (INHALATION) AS NEEDED
Status: CANCELLED | OUTPATIENT
Start: 2023-10-20

## 2023-10-12 RX ORDER — VENLAFAXINE HYDROCHLORIDE 75 MG/1
75 CAPSULE, EXTENDED RELEASE ORAL DAILY
COMMUNITY
Start: 2018-12-11 | End: 2023-11-06 | Stop reason: ALTCHOICE

## 2023-10-12 RX ORDER — VENLAFAXINE HYDROCHLORIDE 150 MG/1
150 CAPSULE, EXTENDED RELEASE ORAL DAILY
COMMUNITY
Start: 2018-12-11 | End: 2023-11-06 | Stop reason: ALTCHOICE

## 2023-10-12 RX ORDER — ROPINIROLE 0.5 MG/1
0.25 TABLET, FILM COATED ORAL DAILY
COMMUNITY
Start: 2018-12-11 | End: 2023-11-06

## 2023-10-12 RX ORDER — FAMOTIDINE 10 MG/ML
20 INJECTION INTRAVENOUS ONCE AS NEEDED
Status: CANCELLED | OUTPATIENT
Start: 2023-10-16

## 2023-10-12 RX ORDER — PROCHLORPERAZINE MALEATE 10 MG
10 TABLET ORAL EVERY 6 HOURS PRN
Status: CANCELLED | OUTPATIENT
Start: 2023-10-16

## 2023-10-12 RX ORDER — LANOLIN ALCOHOL/MO/W.PET/CERES
1 CREAM (GRAM) TOPICAL DAILY
COMMUNITY
Start: 2022-03-30 | End: 2024-04-17

## 2023-10-12 RX ORDER — TRAZODONE HYDROCHLORIDE 50 MG/1
1 TABLET ORAL NIGHTLY
COMMUNITY
Start: 2022-05-03 | End: 2024-04-16 | Stop reason: ENTERED-IN-ERROR

## 2023-10-12 RX ORDER — CHOLECALCIFEROL (VITAMIN D3) 25 MCG
1000 TABLET ORAL EVERY MORNING
COMMUNITY
Start: 2022-03-30 | End: 2024-04-17

## 2023-10-12 RX ORDER — DIPHENHYDRAMINE HYDROCHLORIDE 50 MG/ML
50 INJECTION INTRAMUSCULAR; INTRAVENOUS AS NEEDED
Status: CANCELLED | OUTPATIENT
Start: 2023-10-20

## 2023-10-12 RX ORDER — ESTRADIOL 0.1 MG/G
3 CREAM VAGINAL
Status: ON HOLD | COMMUNITY
Start: 2022-09-19 | End: 2024-01-11 | Stop reason: WASHOUT

## 2023-10-12 RX ORDER — ALBUTEROL SULFATE 0.83 MG/ML
3 SOLUTION RESPIRATORY (INHALATION) AS NEEDED
Status: CANCELLED | OUTPATIENT
Start: 2023-10-16

## 2023-10-12 RX ORDER — TIZANIDINE 4 MG/1
4 TABLET ORAL DAILY PRN
COMMUNITY
End: 2023-11-06 | Stop reason: ALTCHOICE

## 2023-10-12 RX ORDER — PALONOSETRON 0.05 MG/ML
0.25 INJECTION, SOLUTION INTRAVENOUS ONCE
Status: CANCELLED | OUTPATIENT
Start: 2023-10-16

## 2023-10-12 RX ORDER — POLYETHYLENE GLYCOL 3350 17 G/17G
17 POWDER, FOR SOLUTION ORAL DAILY
COMMUNITY
Start: 2020-03-19 | End: 2023-11-06 | Stop reason: ALTCHOICE

## 2023-10-12 RX ORDER — FAMOTIDINE 10 MG/ML
20 INJECTION INTRAVENOUS ONCE AS NEEDED
Status: CANCELLED | OUTPATIENT
Start: 2023-10-20

## 2023-10-12 RX ORDER — LEVALBUTEROL TARTRATE 45 UG/1
AEROSOL, METERED ORAL
COMMUNITY
Start: 2022-05-11 | End: 2023-11-06 | Stop reason: ALTCHOICE

## 2023-10-12 RX ORDER — LORAZEPAM 2 MG/ML
1 INJECTION INTRAMUSCULAR AS NEEDED
Status: CANCELLED | OUTPATIENT
Start: 2023-10-16

## 2023-10-12 RX ORDER — FERROUS GLUCONATE 324(38)MG
324 TABLET ORAL DAILY
COMMUNITY
Start: 2023-06-26 | End: 2023-11-06 | Stop reason: ALTCHOICE

## 2023-10-12 RX ORDER — ALBUTEROL SULFATE 90 UG/1
2 AEROSOL, METERED RESPIRATORY (INHALATION) EVERY 4 HOURS PRN
COMMUNITY
End: 2023-11-06 | Stop reason: ALTCHOICE

## 2023-10-12 RX ORDER — EPINEPHRINE 0.3 MG/.3ML
0.3 INJECTION SUBCUTANEOUS EVERY 5 MIN PRN
Status: CANCELLED | OUTPATIENT
Start: 2023-10-20

## 2023-10-12 RX ORDER — DIPHENHYDRAMINE HYDROCHLORIDE 50 MG/ML
50 INJECTION INTRAMUSCULAR; INTRAVENOUS AS NEEDED
Status: CANCELLED | OUTPATIENT
Start: 2023-10-16

## 2023-10-12 RX ORDER — EPINEPHRINE 0.3 MG/.3ML
0.3 INJECTION SUBCUTANEOUS EVERY 5 MIN PRN
Status: CANCELLED | OUTPATIENT
Start: 2023-10-16

## 2023-10-12 RX ORDER — DIPHENHYDRAMINE HCL 25 MG
25 CAPSULE ORAL ONCE
Status: CANCELLED | OUTPATIENT
Start: 2023-10-16

## 2023-10-13 ENCOUNTER — APPOINTMENT (OUTPATIENT)
Dept: HEMATOLOGY/ONCOLOGY | Facility: CLINIC | Age: 58
End: 2023-10-13
Payer: MEDICAID

## 2023-10-13 ENCOUNTER — INFUSION (OUTPATIENT)
Dept: HEMATOLOGY/ONCOLOGY | Facility: CLINIC | Age: 58
End: 2023-10-13
Payer: MEDICAID

## 2023-10-13 DIAGNOSIS — C16.0 ADENOCARCINOMA OF GASTROESOPHAGEAL JUNCTION (MULTI): Primary | ICD-10-CM

## 2023-10-13 LAB
ALBUMIN SERPL BCP-MCNC: 3.9 G/DL (ref 3.4–5)
ALP SERPL-CCNC: 90 U/L (ref 33–110)
ALT SERPL W P-5'-P-CCNC: 11 U/L (ref 7–45)
ANION GAP SERPL CALC-SCNC: 12 MMOL/L (ref 10–20)
AST SERPL W P-5'-P-CCNC: 15 U/L (ref 9–39)
BASOPHILS # BLD AUTO: 0.07 X10*3/UL (ref 0–0.1)
BASOPHILS NFR BLD AUTO: 0.6 %
BILIRUB SERPL-MCNC: 0.5 MG/DL (ref 0–1.2)
BUN SERPL-MCNC: 18 MG/DL (ref 6–23)
CALCIUM SERPL-MCNC: 8.6 MG/DL (ref 8.6–10.3)
CHLORIDE SERPL-SCNC: 106 MMOL/L (ref 98–107)
CO2 SERPL-SCNC: 26 MMOL/L (ref 21–32)
CREAT SERPL-MCNC: 0.96 MG/DL (ref 0.5–1.05)
EOSINOPHIL # BLD AUTO: 0.34 X10*3/UL (ref 0–0.7)
EOSINOPHIL NFR BLD AUTO: 2.9 %
ERYTHROCYTE [DISTWIDTH] IN BLOOD BY AUTOMATED COUNT: 15.3 % (ref 11.5–14.5)
GFR SERPL CREATININE-BSD FRML MDRD: 68 ML/MIN/1.73M*2
GLUCOSE SERPL-MCNC: 69 MG/DL (ref 74–99)
HCT VFR BLD AUTO: 40.7 % (ref 36–46)
HGB BLD-MCNC: 12.8 G/DL (ref 12–16)
IMM GRANULOCYTES # BLD AUTO: 0.03 X10*3/UL (ref 0–0.7)
IMM GRANULOCYTES NFR BLD AUTO: 0.3 % (ref 0–0.9)
LYMPHOCYTES # BLD AUTO: 3.35 X10*3/UL (ref 1.2–4.8)
LYMPHOCYTES NFR BLD AUTO: 28.6 %
MCH RBC QN AUTO: 27.6 PG (ref 26–34)
MCHC RBC AUTO-ENTMCNC: 31.4 G/DL (ref 32–36)
MCV RBC AUTO: 88 FL (ref 80–100)
MONOCYTES # BLD AUTO: 1.11 X10*3/UL (ref 0.1–1)
MONOCYTES NFR BLD AUTO: 9.5 %
NEUTROPHILS # BLD AUTO: 6.82 X10*3/UL (ref 1.2–7.7)
NEUTROPHILS NFR BLD AUTO: 58.1 %
NRBC BLD-RTO: 0 /100 WBCS (ref 0–0)
PLATELET # BLD AUTO: 227 X10*3/UL (ref 150–450)
PMV BLD AUTO: 10.9 FL (ref 7.5–11.5)
POTASSIUM SERPL-SCNC: 4 MMOL/L (ref 3.5–5.3)
PROT SERPL-MCNC: 6.2 G/DL (ref 6.4–8.2)
RBC # BLD AUTO: 4.64 X10*6/UL (ref 4–5.2)
SODIUM SERPL-SCNC: 140 MMOL/L (ref 136–145)
WBC # BLD AUTO: 11.7 X10*3/UL (ref 4.4–11.3)

## 2023-10-13 PROCEDURE — 36593 DECLOT VASCULAR DEVICE: CPT

## 2023-10-13 PROCEDURE — 85025 COMPLETE CBC W/AUTO DIFF WBC: CPT

## 2023-10-13 PROCEDURE — 36591 DRAW BLOOD OFF VENOUS DEVICE: CPT

## 2023-10-13 PROCEDURE — 80053 COMPREHEN METABOLIC PANEL: CPT

## 2023-10-13 PROCEDURE — 2500000004 HC RX 250 GENERAL PHARMACY W/ HCPCS (ALT 636 FOR OP/ED): Mod: JZ | Performed by: INTERNAL MEDICINE

## 2023-10-13 PROCEDURE — 96523 IRRIG DRUG DELIVERY DEVICE: CPT

## 2023-10-13 RX ORDER — HEPARIN 100 UNIT/ML
500 SYRINGE INTRAVENOUS AS NEEDED
Status: DISCONTINUED | OUTPATIENT
Start: 2023-10-13 | End: 2023-10-13 | Stop reason: HOSPADM

## 2023-10-13 RX ORDER — HEPARIN 100 UNIT/ML
500 SYRINGE INTRAVENOUS AS NEEDED
Status: CANCELLED | OUTPATIENT
Start: 2023-10-13

## 2023-10-13 RX ORDER — HEPARIN SODIUM,PORCINE/PF 10 UNIT/ML
50 SYRINGE (ML) INTRAVENOUS AS NEEDED
Status: CANCELLED | OUTPATIENT
Start: 2023-10-13

## 2023-10-13 RX ORDER — HEPARIN SODIUM,PORCINE/PF 10 UNIT/ML
50 SYRINGE (ML) INTRAVENOUS AS NEEDED
Status: DISCONTINUED | OUTPATIENT
Start: 2023-10-13 | End: 2023-10-13 | Stop reason: HOSPADM

## 2023-10-13 RX ADMIN — ALTEPLASE 2 MG: 2.2 INJECTION, POWDER, LYOPHILIZED, FOR SOLUTION INTRAVENOUS at 10:45

## 2023-10-13 RX ADMIN — HEPARIN 500 UNITS: 100 SYRINGE at 10:07

## 2023-10-16 ENCOUNTER — NUTRITION (OUTPATIENT)
Dept: HEMATOLOGY/ONCOLOGY | Facility: CLINIC | Age: 58
End: 2023-10-16
Payer: MEDICAID

## 2023-10-16 ENCOUNTER — INFUSION (OUTPATIENT)
Dept: HEMATOLOGY/ONCOLOGY | Facility: CLINIC | Age: 58
End: 2023-10-16
Payer: MEDICAID

## 2023-10-16 ENCOUNTER — APPOINTMENT (OUTPATIENT)
Dept: HEMATOLOGY/ONCOLOGY | Facility: CLINIC | Age: 58
End: 2023-10-16
Payer: MEDICAID

## 2023-10-16 VITALS — HEIGHT: 61 IN | BODY MASS INDEX: 44.95 KG/M2 | WEIGHT: 238.1 LBS

## 2023-10-16 VITALS
WEIGHT: 237.88 LBS | RESPIRATION RATE: 18 BRPM | HEART RATE: 81 BPM | DIASTOLIC BLOOD PRESSURE: 96 MMHG | OXYGEN SATURATION: 98 % | BODY MASS INDEX: 44.95 KG/M2 | TEMPERATURE: 98.6 F | SYSTOLIC BLOOD PRESSURE: 141 MMHG

## 2023-10-16 DIAGNOSIS — C16.0 ADENOCARCINOMA OF GASTROESOPHAGEAL JUNCTION (MULTI): ICD-10-CM

## 2023-10-16 PROCEDURE — 2500000001 HC RX 250 WO HCPCS SELF ADMINISTERED DRUGS (ALT 637 FOR MEDICARE OP): Performed by: INTERNAL MEDICINE

## 2023-10-16 PROCEDURE — 96523 IRRIG DRUG DELIVERY DEVICE: CPT

## 2023-10-16 PROCEDURE — 96415 CHEMO IV INFUSION ADDL HR: CPT

## 2023-10-16 PROCEDURE — 96375 TX/PRO/DX INJ NEW DRUG ADDON: CPT

## 2023-10-16 PROCEDURE — 96413 CHEMO IV INFUSION 1 HR: CPT

## 2023-10-16 PROCEDURE — 96365 THER/PROPH/DIAG IV INF INIT: CPT | Mod: INF

## 2023-10-16 PROCEDURE — 96366 THER/PROPH/DIAG IV INF ADDON: CPT

## 2023-10-16 PROCEDURE — 96411 CHEMO IV PUSH ADDL DRUG: CPT

## 2023-10-16 PROCEDURE — 2500000004 HC RX 250 GENERAL PHARMACY W/ HCPCS (ALT 636 FOR OP/ED): Performed by: INTERNAL MEDICINE

## 2023-10-16 PROCEDURE — 96417 CHEMO IV INFUS EACH ADDL SEQ: CPT

## 2023-10-16 PROCEDURE — 96368 THER/DIAG CONCURRENT INF: CPT

## 2023-10-16 RX ORDER — LORAZEPAM 2 MG/ML
1 INJECTION INTRAMUSCULAR AS NEEDED
Status: DISCONTINUED | OUTPATIENT
Start: 2023-10-16 | End: 2023-10-16 | Stop reason: HOSPADM

## 2023-10-16 RX ORDER — PROCHLORPERAZINE MALEATE 10 MG
10 TABLET ORAL EVERY 6 HOURS PRN
Status: DISCONTINUED | OUTPATIENT
Start: 2023-10-16 | End: 2023-10-16 | Stop reason: HOSPADM

## 2023-10-16 RX ORDER — FAMOTIDINE 10 MG/ML
20 INJECTION INTRAVENOUS ONCE AS NEEDED
Status: DISCONTINUED | OUTPATIENT
Start: 2023-10-16 | End: 2023-10-16 | Stop reason: HOSPADM

## 2023-10-16 RX ORDER — HEPARIN SODIUM,PORCINE/PF 10 UNIT/ML
50 SYRINGE (ML) INTRAVENOUS AS NEEDED
Status: CANCELLED | OUTPATIENT
Start: 2023-10-16

## 2023-10-16 RX ORDER — DIPHENHYDRAMINE HYDROCHLORIDE 50 MG/ML
50 INJECTION INTRAMUSCULAR; INTRAVENOUS AS NEEDED
Status: DISCONTINUED | OUTPATIENT
Start: 2023-10-16 | End: 2023-10-16 | Stop reason: HOSPADM

## 2023-10-16 RX ORDER — DIPHENHYDRAMINE HCL 25 MG
25 CAPSULE ORAL ONCE
Status: COMPLETED | OUTPATIENT
Start: 2023-10-16 | End: 2023-10-16

## 2023-10-16 RX ORDER — HEPARIN 100 UNIT/ML
500 SYRINGE INTRAVENOUS AS NEEDED
Status: CANCELLED | OUTPATIENT
Start: 2023-10-16

## 2023-10-16 RX ORDER — PROCHLORPERAZINE EDISYLATE 5 MG/ML
10 INJECTION INTRAMUSCULAR; INTRAVENOUS EVERY 6 HOURS PRN
Status: DISCONTINUED | OUTPATIENT
Start: 2023-10-16 | End: 2023-10-16 | Stop reason: HOSPADM

## 2023-10-16 RX ORDER — PALONOSETRON 0.05 MG/ML
0.25 INJECTION, SOLUTION INTRAVENOUS ONCE
Status: COMPLETED | OUTPATIENT
Start: 2023-10-16 | End: 2023-10-16

## 2023-10-16 RX ORDER — HEPARIN 100 UNIT/ML
500 SYRINGE INTRAVENOUS AS NEEDED
Status: DISCONTINUED | OUTPATIENT
Start: 2023-10-16 | End: 2023-10-16 | Stop reason: HOSPADM

## 2023-10-16 RX ORDER — ALBUTEROL SULFATE 0.83 MG/ML
3 SOLUTION RESPIRATORY (INHALATION) AS NEEDED
Status: DISCONTINUED | OUTPATIENT
Start: 2023-10-16 | End: 2023-10-16 | Stop reason: HOSPADM

## 2023-10-16 RX ORDER — HEPARIN SODIUM,PORCINE/PF 10 UNIT/ML
50 SYRINGE (ML) INTRAVENOUS AS NEEDED
Status: DISCONTINUED | OUTPATIENT
Start: 2023-10-16 | End: 2023-10-16 | Stop reason: HOSPADM

## 2023-10-16 RX ORDER — EPINEPHRINE 0.3 MG/.3ML
0.3 INJECTION SUBCUTANEOUS EVERY 5 MIN PRN
Status: DISCONTINUED | OUTPATIENT
Start: 2023-10-16 | End: 2023-10-16 | Stop reason: HOSPADM

## 2023-10-16 RX ADMIN — OXALIPLATIN 180 MG: 5 INJECTION, SOLUTION INTRAVENOUS at 13:44

## 2023-10-16 RX ADMIN — DIPHENHYDRAMINE HYDROCHLORIDE 25 MG: 25 CAPSULE ORAL at 10:31

## 2023-10-16 RX ADMIN — FLUOROURACIL 5550 MG: 50 INJECTION, SOLUTION INTRAVENOUS at 16:07

## 2023-10-16 RX ADMIN — DOCETAXEL 110 MG: 20 INJECTION, SOLUTION, CONCENTRATE INTRAVENOUS at 11:56

## 2023-10-16 RX ADMIN — LEUCOVORIN CALCIUM 420 MG: 500 INJECTION, POWDER, LYOPHILIZED, FOR SOLUTION INTRAMUSCULAR; INTRAVENOUS at 13:40

## 2023-10-16 RX ADMIN — PALONOSETRON 250 MCG: 0.05 INJECTION, SOLUTION INTRAVENOUS at 10:32

## 2023-10-16 RX ADMIN — DEXAMETHASONE SODIUM PHOSPHATE 12 MG: 10 INJECTION, SOLUTION INTRAMUSCULAR; INTRAVENOUS at 10:37

## 2023-10-16 ASSESSMENT — PATIENT HEALTH QUESTIONNAIRE - PHQ9
2. FEELING DOWN, DEPRESSED, IRRITABLE, OR HOPELESS: NOT AT ALL
8. MOVING OR SPEAKING SO SLOWLY THAT OTHER PEOPLE COULD HAVE NOTICED. OR THE OPPOSITE, BEING SO FIGETY OR RESTLESS THAT YOU HAVE BEEN MOVING AROUND A LOT MORE THAN USUAL: NOT AT ALL
4. FEELING TIRED OR HAVING LITTLE ENERGY: NOT AT ALL
7. TROUBLE CONCENTRATING ON THINGS, SUCH AS READING THE NEWSPAPER OR WATCHING TELEVISION: NOT AT ALL
6. FEELING BAD ABOUT YOURSELF - OR THAT YOU ARE A FAILURE OR HAVE LET YOURSELF OR YOUR FAMILY DOWN: 0
9. THOUGHTS THAT YOU WOULD BE BETTER OFF DEAD, OR OF HURTING YOURSELF: 0
10. IF YOU CHECKED OFF ANY PROBLEMS, HOW DIFFICULT HAVE THESE PROBLEMS MADE IT FOR YOU TO DO YOUR WORK, TAKE CARE OF THINGS AT HOME, OR GET ALONG WITH OTHER PEOPLE: NOT DIFFICULT AT ALL
2. FEELING DOWN, DEPRESSED, IRRITABLE, OR HOPELESS: 0
4. FEELING TIRED OR HAVING LITTLE ENERGY: 0
2. FEELING DOWN, DEPRESSED, IRRITABLE, OR HOPELESS: NOT AT ALL
6. FEELING BAD ABOUT YOURSELF - OR THAT YOU ARE A FAILURE OR HAVE LET YOURSELF OR YOUR FAMILY DOWN: NOT AT ALL
2. FEELING DOWN, DEPRESSED OR HOPELESS: NOT AT ALL
SUM OF ALL RESPONSES TO PHQ9 QUESTIONS 1 AND 2: 0
3. TROUBLE FALLING OR STAYING ASLEEP OR SLEEPING TOO MUCH: NOT AT ALL
SUM OF ALL RESPONSES TO PHQ QUESTIONS 1-9: 0
SUM OF ALL RESPONSES TO PHQ QUESTIONS 1-9: 0
3. TROUBLE FALLING OR STAYING ASLEEP OR SLEEPING TOO MUCH: 0
7. TROUBLE CONCENTRATING ON THINGS, SUCH AS READING THE NEWSPAPER OR WATCHING TELEVISION: NOT AT ALL
5. POOR APPETITE OR OVEREATING: NOT AT ALL
10. IF YOU CHECKED OFF ANY PROBLEMS, HOW DIFFICULT HAVE THESE PROBLEMS MADE IT FOR YOU TO DO YOUR WORK, TAKE CARE OF THINGS AT HOME, OR GET ALONG WITH OTHER PEOPLE: NOT DIFFICULT AT ALL
9. THOUGHTS THAT YOU WOULD BE BETTER OFF DEAD, OR OF HURTING YOURSELF: NOT AT ALL
7. TROUBLE CONCENTRATING ON THINGS, SUCH AS READING THE NEWSPAPER OR WATCHING TELEVISION: 0
1. LITTLE INTEREST OR PLEASURE IN DOING THINGS: NOT AT ALL
8. MOVING OR SPEAKING SO SLOWLY THAT OTHER PEOPLE COULD HAVE NOTICED. OR THE OPPOSITE, BEING SO FIGETY OR RESTLESS THAT YOU HAVE BEEN MOVING AROUND A LOT MORE THAN USUAL: NOT AT ALL
8. MOVING OR SPEAKING SO SLOWLY THAT OTHER PEOPLE COULD HAVE NOTICED. OR THE OPPOSITE, BEING SO FIGETY OR RESTLESS THAT YOU HAVE BEEN MOVING AROUND A LOT MORE THAN USUAL: NOT AT ALL
9. THOUGHTS THAT YOU WOULD BE BETTER OFF DEAD, OR OF HURTING YOURSELF: NOT AT ALL
9. THOUGHTS THAT YOU WOULD BE BETTER OFF DEAD, OR OF HURTING YOURSELF: NOT AT ALL
2. FEELING DOWN, DEPRESSED OR HOPELESS: NOT AT ALL
3. TROUBLE FALLING OR STAYING ASLEEP OR SLEEPING TOO MUCH: NOT AT ALL
SUM OF ALL RESPONSES TO PHQ QUESTIONS 1-9: 0
9. THOUGHTS THAT YOU WOULD BE BETTER OFF DEAD, OR OF HURTING YOURSELF: 0
4. FEELING TIRED OR HAVING LITTLE ENERGY: NOT AT ALL
9. THOUGHTS THAT YOU WOULD BE BETTER OFF DEAD, OR OF HURTING YOURSELF: NOT AT ALL
10. IF YOU CHECKED OFF ANY PROBLEMS, HOW DIFFICULT HAVE THESE PROBLEMS MADE IT FOR YOU TO DO YOUR WORK, TAKE CARE OF THINGS AT HOME, OR GET ALONG WITH OTHER PEOPLE: NOT DIFFICULT AT ALL
5. POOR APPETITE OR OVEREATING: NOT AT ALL
8. MOVING OR SPEAKING SO SLOWLY THAT OTHER PEOPLE COULD HAVE NOTICED. OR THE OPPOSITE, BEING SO FIGETY OR RESTLESS THAT YOU HAVE BEEN MOVING AROUND A LOT MORE THAN USUAL: NOT AT ALL
3. TROUBLE FALLING OR STAYING ASLEEP OR SLEEPING TOO MUCH: 0
6. FEELING BAD ABOUT YOURSELF - OR THAT YOU ARE A FAILURE OR HAVE LET YOURSELF OR YOUR FAMILY DOWN: NOT AT ALL
5. POOR APPETITE OR OVEREATING: NOT AT ALL
8. MOVING OR SPEAKING SO SLOWLY THAT OTHER PEOPLE COULD HAVE NOTICED. OR THE OPPOSITE, BEING SO FIGETY OR RESTLESS THAT YOU HAVE BEEN MOVING AROUND A LOT MORE THAN USUAL: 0
8. MOVING OR SPEAKING SO SLOWLY THAT OTHER PEOPLE COULD HAVE NOTICED. OR THE OPPOSITE, BEING SO FIGETY OR RESTLESS THAT YOU HAVE BEEN MOVING AROUND A LOT MORE THAN USUAL: 0
5. POOR APPETITE OR OVEREATING: 0
4. FEELING TIRED OR HAVING LITTLE ENERGY: NOT AT ALL
6. FEELING BAD ABOUT YOURSELF - OR THAT YOU ARE A FAILURE OR HAVE LET YOURSELF OR YOUR FAMILY DOWN: 0
10. IF YOU CHECKED OFF ANY PROBLEMS, HOW DIFFICULT HAVE THESE PROBLEMS MADE IT FOR YOU TO DO YOUR WORK, TAKE CARE OF THINGS AT HOME, OR GET ALONG WITH OTHER PEOPLE: NOT DIFFICULT AT ALL
5. POOR APPETITE OR OVEREATING: NOT AT ALL
7. TROUBLE CONCENTRATING ON THINGS, SUCH AS READING THE NEWSPAPER OR WATCHING TELEVISION: NOT AT ALL
5. POOR APPETITE OR OVEREATING: 0
1. LITTLE INTEREST OR PLEASURE IN DOING THINGS: NOT AT ALL
1. LITTLE INTEREST OR PLEASURE IN DOING THINGS: NOT AT ALL
4. FEELING TIRED OR HAVING LITTLE ENERGY: NOT AT ALL
1. LITTLE INTEREST OR PLEASURE IN DOING THINGS: NOT AT ALL
2. FEELING DOWN, DEPRESSED, IRRITABLE, OR HOPELESS: 0
2. FEELING DOWN, DEPRESSED OR HOPELESS: NOT AT ALL
SUM OF ALL RESPONSES TO PHQ QUESTIONS 1-9: 0
1. LITTLE INTEREST OR PLEASURE IN DOING THINGS: 0
1. LITTLE INTEREST OR PLEASURE IN DOING THINGS: 0
3. TROUBLE FALLING OR STAYING ASLEEP OR SLEEPING TOO MUCH: NOT AT ALL
1. LITTLE INTEREST OR PLEASURE IN DOING THINGS: NOT AT ALL
6. FEELING BAD ABOUT YOURSELF - OR THAT YOU ARE A FAILURE OR HAVE LET YOURSELF OR YOUR FAMILY DOWN: NOT AT ALL
7. TROUBLE CONCENTRATING ON THINGS, SUCH AS READING THE NEWSPAPER OR WATCHING TELEVISION: 0
6. FEELING BAD ABOUT YOURSELF - OR THAT YOU ARE A FAILURE OR HAVE LET YOURSELF OR YOUR FAMILY DOWN: NOT AT ALL
7. TROUBLE CONCENTRATING ON THINGS, SUCH AS READING THE NEWSPAPER OR WATCHING TELEVISION: NOT AT ALL
4. FEELING TIRED OR HAVING LITTLE ENERGY: 0
3. TROUBLE FALLING OR STAYING ASLEEP OR SLEEPING TOO MUCH: NOT AT ALL

## 2023-10-16 ASSESSMENT — PAIN SCALES - GENERAL: PAINLEVEL: 0-NO PAIN

## 2023-10-16 ASSESSMENT — COLUMBIA-SUICIDE SEVERITY RATING SCALE - C-SSRS
6. HAVE YOU EVER DONE ANYTHING, STARTED TO DO ANYTHING, OR PREPARED TO DO ANYTHING TO END YOUR LIFE?: NO
2. HAVE YOU ACTUALLY HAD ANY THOUGHTS OF KILLING YOURSELF?: NO
1. IN THE PAST MONTH, HAVE YOU WISHED YOU WERE DEAD OR WISHED YOU COULD GO TO SLEEP AND NOT WAKE UP?: NO

## 2023-10-16 NOTE — SIGNIFICANT EVENT
10/16/23 0943   Prechemo Checklist   Has the patient been in the hospital, ED, or urgent care since last date of service No   Chemo/Immuno Consent Signed Yes   Protocol/Indications Verified Yes   Confirmed to previous date/time of medication N/A   Compared to previous dose N/A   All medications are dated accurately Yes   Pregnancy Test Negative Yes  (TSH BSO)   Parameters Met Yes   BSA/Weight-Height Verified Yes   Dose Calculations Verified Yes

## 2023-10-16 NOTE — PROGRESS NOTES
"NUTRITION Follow-up NOTE  Reason for Visit:  Shruthi Ramos is a 58 y.o. 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 Plan is 5-FU, leucovorin, oxaliplatin, Taxotere with possible surgery.     Follow up: Patient reports to doing very well. She has been trying to keep up with calories and protein. Was told she is getting too much protein due to port being troublesome, however, serum protein markers do not indicate this.     Problem List:        Additional Dx:   History of hysterectomy:    Adenocarcinoma of gastric cardia:    Abnormal findings on esophagogastroduodenoscopy (EGD):    Abnormal CT scan:    Adenocarcinoma of gastroesophageal junction:     Anthropometrics:    Wt Readings from Last 10 Encounters:   10/16/23 108 kg (238 lb 1.6 oz)   10/16/23 108 kg (237 lb 14 oz)   10/11/23 105 kg (230 lb 13.2 oz)   10/10/23 105 kg (231 lb 0.7 oz)   05/16/23 108 kg (239 lb)   05/26/20 122 kg (268 lb 15.4 oz)   03/19/20 122 kg (269 lb)     Height: 154.9 cm (5' 0.98\")  Weight: 108 kg (238 lb 1.6 oz)  BMI (Calculated): 45.01  Weight Change  Weight History / % Weight Change: weight clinically stable, did have slight weight loss before beginning treatment, but appears to have rebounded.  Significant Weight Loss: No    Labs:   Last CMP:     Lab Results   Component Value Date/Time    GLUCOSE 69 (L) 10/13/2023 1235     10/13/2023 1235    K 4.0 10/13/2023 1235     10/13/2023 1235    CO2 26 10/13/2023 1235    ANIONGAP 12 10/13/2023 1235    BUN 18 10/13/2023 1235    CREATININE 0.96 10/13/2023 1235    EGFR 68 10/13/2023 1235    CALCIUM 8.6 10/13/2023 1235    ALBUMIN 3.9 10/13/2023 1235    ALKPHOS 90 10/13/2023 1235    PROT 6.2 (L) 10/13/2023 1235    AST 15 10/13/2023 1235    BILITOT 0.5 10/13/2023 1235    ALT 11 10/13/2023 1235     Food And Nutrient Intake:  Food and Nutrient History  Food and Nutrient History: Patient with decreased intake previously due to mild " "dysphagia. States this is improving, she has been eating more normally and taking ONS BID. Goes between Muscle Milk and Ensure currently. Patient does a lot of home cristóbal, home cooking. Tries to eat meat from local vendors.  Energy Intake: Good > 75 %  Fluid Intake: Good, 64+oz/day  Food Allergy: NKFA  GI Symptoms: constipation (RN recommending Senna, patient accepting to trial)  Oral Problems: dysphagia   Food Intake  Amount of Food: Did not provide specific diet recall, but reports to eating better.  Food Preparation  Cooking: Patient, Spouse/Significant Other  Grocery Shopping: Patient, Spouse/Significant Other    Micronutrient Intake  Vitamin Intake: Multivitamin, B12, D  Medication and Complementary/Alternative Medicine Use  Prescription Medication Use: atrovastatin, metoprolol, ondansetron    Nutrition Focused Physical Exam:  Subcutaneous Fat Loss  Orbital Fat Pads: Well nourshed (slightly bulging fat pads)  Buccal Fat Pads: Well nourished (full, rounded cheeks)  Triceps: Well nourished (ample fat tissue)  Ribs: Well nourished (full chest, ribs do not protrude)  Muscle Wasting  Temporalis: Well nourished (well-defined muscle)  Pectoralis (Clavicular Region): Well nourished (clavicle not visible)  Deltoid/Trapezius: Well nourished (rounded appearance at arm, shoulder, neck)  Interosseous: Well nourished (muscle bulges)  Trapezius/Infraspinatus/Supraspinatus (Scapular Region): Well nourished (bones not prominent, muscle taut)  Quadriceps: Well nourished (well developed, well rounded)  Gastrocnemius: Well nourished (well developed bulbous muscle)    Estimated Daily Nutrient Needs:   Calculated Energy Needs Using Equations  Height: 154.9 cm (5' 0.98\")  Estimated Energy Needs  Total Energy Estimated Needs (kCal): 2119 kCal  Total Estimated Energy Need per Day (kCal/kg): 30 kCal/kg  Method for Estimating Needs: ABW  Estimated Fluid Needs  Total Fluid Estimated Needs (mL): 2119 mL  Total Fluid Estimated Needs " (mL/kg): 30 mL/kg  Method for Estimating Needs: ABW  Estimated Protein Needs  Total Protein Estimated Needs (g): 99 g  Total Protein Estimated Needs (g/kg): 1.4 g/kg  Type of Protein Needed: ABW     Diagnosis  Nutrition Diagnosis  Patient has Nutrition Diagnosis: Yes  Diagnosis Status (1): Ongoing  Nutrition Diagnosis 1: Swallowing difficulity  Related to (1): GE junction tumor  As Evidenced by (1): patient reports of dysphagia with hard to swallow foods like bread/chicken  Additional Nutrition Diagnosis: Diagnosis 2  Diagnosis Status (2): Ongoing  Nutrition Diagnosis 2: Inadequate oral intake (improving)  Related to (2): catabolic disease and upcoming treatment  As Evidenced by (2): PO intake meeting less than 75% estimated nutrient needs for greater that 1 week    Interventions/Recommendations (continue as previous)  Food and Nutrition Delivery  Meals & Snacks: Energy-modified diet  Goals: increased nutrient needs in the setting of cancer  Medical Food Supplement: Premier Protein  Goals: BID  Nutrition Education  Nutrition Education Content: Content related nutrition education  Goals: patient will understand impact of diet on success during treatment, in the healing process, and for survivorship  Nutrition Counseling  Theoretical Basis/Approach: Nutrition counseling based on cognitive behavioral theory approach  Goals: contemplation-->action  Strategies: Nutrition counseling based on motivational interviewing strategy  Coordination of Nutrition Care by a Nutrition Professional  Collaboration and referral of nutrition care: Collaboration by nutrition professional with other providers  Team centered approach for best outcomes possible      Monitoring and Evaluation (continue as previous)  Food/Nutrient Related History Monitoring  Monitoring and Evaluation Plan: Energy intake  Energy Intake: Estimated energy intake  Criteria: meet needs calculated by RD  Knowledge/Beliefs/Attitudes  Monitoring and Evaluation Plan:  Food and nutrition knowledge  Food and nutrition knowledge: Nutrition knowledge of individual client  Criteria: will continue to work with patient on diet for cancer      Readiness to Change : Good  Level of Understanding : Good  Anticipated Compliant : Good

## 2023-10-17 ENCOUNTER — INFUSION (OUTPATIENT)
Dept: HEMATOLOGY/ONCOLOGY | Facility: CLINIC | Age: 58
End: 2023-10-17
Payer: MEDICAID

## 2023-10-17 VITALS
TEMPERATURE: 98.2 F | OXYGEN SATURATION: 96 % | RESPIRATION RATE: 20 BRPM | DIASTOLIC BLOOD PRESSURE: 86 MMHG | HEART RATE: 75 BPM | SYSTOLIC BLOOD PRESSURE: 179 MMHG

## 2023-10-17 DIAGNOSIS — C16.0 ADENOCARCINOMA OF GASTROESOPHAGEAL JUNCTION (MULTI): ICD-10-CM

## 2023-10-17 LAB
LABORATORY COMMENT REPORT: NORMAL
LABORATORY COMMENT REPORT: NORMAL
PATH REPORT.FINAL DX SPEC: NORMAL
PATH REPORT.GROSS SPEC: NORMAL
PATH REPORT.RELEVANT HX SPEC: NORMAL
PATH REPORT.TOTAL CANCER: NORMAL

## 2023-10-17 PROCEDURE — 96372 THER/PROPH/DIAG INJ SC/IM: CPT

## 2023-10-17 PROCEDURE — 96523 IRRIG DRUG DELIVERY DEVICE: CPT | Mod: CCI

## 2023-10-17 PROCEDURE — 2500000004 HC RX 250 GENERAL PHARMACY W/ HCPCS (ALT 636 FOR OP/ED): Mod: JZ | Performed by: INTERNAL MEDICINE

## 2023-10-17 RX ORDER — HEPARIN 100 UNIT/ML
500 SYRINGE INTRAVENOUS AS NEEDED
Status: CANCELLED | OUTPATIENT
Start: 2023-10-17

## 2023-10-17 RX ORDER — HEPARIN 100 UNIT/ML
500 SYRINGE INTRAVENOUS AS NEEDED
Status: DISCONTINUED | OUTPATIENT
Start: 2023-10-17 | End: 2023-10-17 | Stop reason: HOSPADM

## 2023-10-17 RX ORDER — HEPARIN SODIUM,PORCINE/PF 10 UNIT/ML
50 SYRINGE (ML) INTRAVENOUS AS NEEDED
Status: CANCELLED | OUTPATIENT
Start: 2023-10-17

## 2023-10-17 RX ADMIN — PEGFILGRASTIM-CBQV 6 MG: 6 INJECTION, SOLUTION SUBCUTANEOUS at 16:20

## 2023-10-17 RX ADMIN — HEPARIN 500 UNITS: 100 SYRINGE at 16:15

## 2023-10-17 ASSESSMENT — COLUMBIA-SUICIDE SEVERITY RATING SCALE - C-SSRS
1. IN THE PAST MONTH, HAVE YOU WISHED YOU WERE DEAD OR WISHED YOU COULD GO TO SLEEP AND NOT WAKE UP?: NO
6. HAVE YOU EVER DONE ANYTHING, STARTED TO DO ANYTHING, OR PREPARED TO DO ANYTHING TO END YOUR LIFE?: NO
2. HAVE YOU ACTUALLY HAD ANY THOUGHTS OF KILLING YOURSELF?: NO

## 2023-10-17 ASSESSMENT — PATIENT HEALTH QUESTIONNAIRE - PHQ9
SUM OF ALL RESPONSES TO PHQ9 QUESTIONS 1 AND 2: 0
2. FEELING DOWN, DEPRESSED OR HOPELESS: NOT AT ALL
1. LITTLE INTEREST OR PLEASURE IN DOING THINGS: NOT AT ALL

## 2023-10-17 ASSESSMENT — PAIN SCALES - GENERAL: PAINLEVEL: 0-NO PAIN

## 2023-10-17 NOTE — PROGRESS NOTES
1615 Pt arrived, chemo pump disconnected & port flushed, pt aliza well.  1625 Pt disch amb w/her .

## 2023-10-25 ENCOUNTER — APPOINTMENT (OUTPATIENT)
Dept: HEMATOLOGY/ONCOLOGY | Facility: CLINIC | Age: 58
End: 2023-10-25
Payer: MEDICAID

## 2023-10-25 ENCOUNTER — TELEPHONE (OUTPATIENT)
Dept: HEMATOLOGY/ONCOLOGY | Facility: CLINIC | Age: 58
End: 2023-10-25
Payer: MEDICAID

## 2023-10-25 DIAGNOSIS — C16.0 ADENOCARCINOMA OF GASTROESOPHAGEAL JUNCTION (MULTI): Primary | ICD-10-CM

## 2023-10-25 RX ORDER — FAMOTIDINE 10 MG/ML
20 INJECTION INTRAVENOUS ONCE AS NEEDED
Status: CANCELLED | OUTPATIENT
Start: 2023-10-30

## 2023-10-25 RX ORDER — PROCHLORPERAZINE EDISYLATE 5 MG/ML
10 INJECTION INTRAMUSCULAR; INTRAVENOUS EVERY 6 HOURS PRN
Status: CANCELLED | OUTPATIENT
Start: 2023-10-30

## 2023-10-25 RX ORDER — EPINEPHRINE 0.3 MG/.3ML
0.3 INJECTION SUBCUTANEOUS EVERY 5 MIN PRN
Status: CANCELLED | OUTPATIENT
Start: 2023-10-30

## 2023-10-25 RX ORDER — PALONOSETRON 0.05 MG/ML
0.25 INJECTION, SOLUTION INTRAVENOUS ONCE
Status: CANCELLED | OUTPATIENT
Start: 2023-10-30

## 2023-10-25 RX ORDER — DIPHENHYDRAMINE HCL 25 MG
25 CAPSULE ORAL ONCE
Status: CANCELLED | OUTPATIENT
Start: 2023-10-30

## 2023-10-25 RX ORDER — ALBUTEROL SULFATE 0.83 MG/ML
3 SOLUTION RESPIRATORY (INHALATION) AS NEEDED
Status: CANCELLED | OUTPATIENT
Start: 2023-10-30

## 2023-10-25 RX ORDER — DIPHENHYDRAMINE HYDROCHLORIDE 50 MG/ML
50 INJECTION INTRAMUSCULAR; INTRAVENOUS AS NEEDED
Status: CANCELLED | OUTPATIENT
Start: 2023-10-31

## 2023-10-25 RX ORDER — FAMOTIDINE 10 MG/ML
20 INJECTION INTRAVENOUS ONCE AS NEEDED
Status: CANCELLED | OUTPATIENT
Start: 2023-10-31

## 2023-10-25 RX ORDER — EPINEPHRINE 0.3 MG/.3ML
0.3 INJECTION SUBCUTANEOUS EVERY 5 MIN PRN
Status: CANCELLED | OUTPATIENT
Start: 2023-10-31

## 2023-10-25 RX ORDER — PROCHLORPERAZINE MALEATE 10 MG
10 TABLET ORAL EVERY 6 HOURS PRN
Status: CANCELLED | OUTPATIENT
Start: 2023-10-30

## 2023-10-25 RX ORDER — LORAZEPAM 2 MG/ML
1 INJECTION INTRAMUSCULAR AS NEEDED
Status: CANCELLED | OUTPATIENT
Start: 2023-10-30

## 2023-10-25 RX ORDER — DIPHENHYDRAMINE HYDROCHLORIDE 50 MG/ML
50 INJECTION INTRAMUSCULAR; INTRAVENOUS AS NEEDED
Status: CANCELLED | OUTPATIENT
Start: 2023-10-30

## 2023-10-25 RX ORDER — ALBUTEROL SULFATE 0.83 MG/ML
3 SOLUTION RESPIRATORY (INHALATION) AS NEEDED
Status: CANCELLED | OUTPATIENT
Start: 2023-10-31

## 2023-10-25 NOTE — TELEPHONE ENCOUNTER
Pts spouse called to inform staff that pt has had diarrhea since  Thursday and today has had 5-6 diarrhea stools. Reviewed use of Imodium and BRAT diet. Dr. Torres aware and order obtained for IV hydration to be given tomorrow. Pts spouse aware and is agreeable.

## 2023-10-26 ENCOUNTER — INFUSION (OUTPATIENT)
Dept: HEMATOLOGY/ONCOLOGY | Facility: CLINIC | Age: 58
End: 2023-10-26
Payer: MEDICAID

## 2023-10-26 ENCOUNTER — LAB (OUTPATIENT)
Dept: LAB | Facility: CLINIC | Age: 58
End: 2023-10-26
Payer: MEDICAID

## 2023-10-26 ENCOUNTER — APPOINTMENT (OUTPATIENT)
Dept: HEMATOLOGY/ONCOLOGY | Facility: CLINIC | Age: 58
End: 2023-10-26
Payer: MEDICAID

## 2023-10-26 VITALS
TEMPERATURE: 98.1 F | RESPIRATION RATE: 18 BRPM | SYSTOLIC BLOOD PRESSURE: 106 MMHG | DIASTOLIC BLOOD PRESSURE: 75 MMHG | OXYGEN SATURATION: 97 % | HEART RATE: 84 BPM

## 2023-10-26 DIAGNOSIS — C16.0 ADENOCARCINOMA OF GASTROESOPHAGEAL JUNCTION (MULTI): ICD-10-CM

## 2023-10-26 LAB
ALBUMIN SERPL BCP-MCNC: 3.9 G/DL (ref 3.4–5)
ALP SERPL-CCNC: 115 U/L (ref 33–110)
ALT SERPL W P-5'-P-CCNC: 16 U/L (ref 7–45)
ANION GAP SERPL CALC-SCNC: 16 MMOL/L (ref 10–20)
AST SERPL W P-5'-P-CCNC: 21 U/L (ref 9–39)
BASOPHILS # BLD MANUAL: 0.07 X10*3/UL (ref 0–0.1)
BASOPHILS NFR BLD MANUAL: 1 %
BILIRUB SERPL-MCNC: 0.5 MG/DL (ref 0–1.2)
BUN SERPL-MCNC: 11 MG/DL (ref 6–23)
BURR CELLS BLD QL SMEAR: ABNORMAL
CALCIUM SERPL-MCNC: 8.7 MG/DL (ref 8.6–10.3)
CHLORIDE SERPL-SCNC: 102 MMOL/L (ref 98–107)
CO2 SERPL-SCNC: 23 MMOL/L (ref 21–32)
CREAT SERPL-MCNC: 1.01 MG/DL (ref 0.5–1.05)
EOSINOPHIL # BLD MANUAL: 0 X10*3/UL (ref 0–0.7)
EOSINOPHIL NFR BLD MANUAL: 0 %
ERYTHROCYTE [DISTWIDTH] IN BLOOD BY AUTOMATED COUNT: 15 % (ref 11.5–14.5)
GFR SERPL CREATININE-BSD FRML MDRD: 64 ML/MIN/1.73M*2
GLUCOSE SERPL-MCNC: 110 MG/DL (ref 74–99)
HCT VFR BLD AUTO: 44.9 % (ref 36–46)
HGB BLD-MCNC: 14.6 G/DL (ref 12–16)
IMM GRANULOCYTES # BLD AUTO: 0.66 X10*3/UL (ref 0–0.7)
IMM GRANULOCYTES NFR BLD AUTO: 9 % (ref 0–0.9)
LYMPHOCYTES # BLD MANUAL: 2.37 X10*3/UL (ref 1.2–4.8)
LYMPHOCYTES NFR BLD MANUAL: 32 %
MCH RBC QN AUTO: 27.5 PG (ref 26–34)
MCHC RBC AUTO-ENTMCNC: 32.5 G/DL (ref 32–36)
MCV RBC AUTO: 85 FL (ref 80–100)
MONOCYTES # BLD MANUAL: 0.96 X10*3/UL (ref 0.1–1)
MONOCYTES NFR BLD MANUAL: 13 %
MYELOCYTES # BLD MANUAL: 0.15 X10*3/UL
MYELOCYTES NFR BLD MANUAL: 2 %
NEUTROPHILS # BLD MANUAL: 3.85 X10*3/UL (ref 1.2–7.7)
NEUTS BAND # BLD MANUAL: 1.41 X10*3/UL (ref 0–0.7)
NEUTS BAND NFR BLD MANUAL: 19 %
NEUTS SEG # BLD MANUAL: 2.44 X10*3/UL (ref 1.2–7)
NEUTS SEG NFR BLD MANUAL: 33 %
NRBC BLD-RTO: 1.1 /100 WBCS (ref 0–0)
PLATELET # BLD AUTO: 214 X10*3/UL (ref 150–450)
PLATELET CLUMP BLD QL SMEAR: PRESENT
PMV BLD AUTO: 10.5 FL (ref 7.5–11.5)
POLYCHROMASIA BLD QL SMEAR: ABNORMAL
POTASSIUM SERPL-SCNC: 3.5 MMOL/L (ref 3.5–5.3)
PROT SERPL-MCNC: 5.9 G/DL (ref 6.4–8.2)
RBC # BLD AUTO: 5.3 X10*6/UL (ref 4–5.2)
RBC MORPH BLD: ABNORMAL
SODIUM SERPL-SCNC: 137 MMOL/L (ref 136–145)
TOTAL CELLS COUNTED BLD: 100
WBC # BLD AUTO: 7.4 X10*3/UL (ref 4.4–11.3)

## 2023-10-26 PROCEDURE — 80053 COMPREHEN METABOLIC PANEL: CPT

## 2023-10-26 PROCEDURE — 85007 BL SMEAR W/DIFF WBC COUNT: CPT

## 2023-10-26 PROCEDURE — 85027 COMPLETE CBC AUTOMATED: CPT | Performed by: INTERNAL MEDICINE

## 2023-10-26 PROCEDURE — 36415 COLL VENOUS BLD VENIPUNCTURE: CPT

## 2023-10-26 PROCEDURE — 85027 COMPLETE CBC AUTOMATED: CPT

## 2023-10-26 PROCEDURE — 85007 BL SMEAR W/DIFF WBC COUNT: CPT | Performed by: INTERNAL MEDICINE

## 2023-10-26 PROCEDURE — 96360 HYDRATION IV INFUSION INIT: CPT | Mod: INF

## 2023-10-26 PROCEDURE — 2500000004 HC RX 250 GENERAL PHARMACY W/ HCPCS (ALT 636 FOR OP/ED): Performed by: INTERNAL MEDICINE

## 2023-10-26 PROCEDURE — 96523 IRRIG DRUG DELIVERY DEVICE: CPT

## 2023-10-26 PROCEDURE — 36591 DRAW BLOOD OFF VENOUS DEVICE: CPT

## 2023-10-26 RX ORDER — DIPHENHYDRAMINE HYDROCHLORIDE 50 MG/ML
50 INJECTION INTRAMUSCULAR; INTRAVENOUS AS NEEDED
Status: CANCELLED | OUTPATIENT
Start: 2023-10-26

## 2023-10-26 RX ORDER — EPINEPHRINE 0.3 MG/.3ML
0.3 INJECTION SUBCUTANEOUS EVERY 5 MIN PRN
Status: CANCELLED | OUTPATIENT
Start: 2023-10-26

## 2023-10-26 RX ORDER — EPINEPHRINE 0.3 MG/.3ML
0.3 INJECTION SUBCUTANEOUS EVERY 5 MIN PRN
Status: DISCONTINUED | OUTPATIENT
Start: 2023-10-26 | End: 2023-10-26 | Stop reason: HOSPADM

## 2023-10-26 RX ORDER — FAMOTIDINE 10 MG/ML
20 INJECTION INTRAVENOUS ONCE AS NEEDED
Status: DISCONTINUED | OUTPATIENT
Start: 2023-10-26 | End: 2023-10-26 | Stop reason: HOSPADM

## 2023-10-26 RX ORDER — HEPARIN 100 UNIT/ML
500 SYRINGE INTRAVENOUS AS NEEDED
Status: DISCONTINUED | OUTPATIENT
Start: 2023-10-26 | End: 2023-10-26 | Stop reason: HOSPADM

## 2023-10-26 RX ORDER — HEPARIN SODIUM,PORCINE/PF 10 UNIT/ML
50 SYRINGE (ML) INTRAVENOUS AS NEEDED
Status: CANCELLED | OUTPATIENT
Start: 2023-10-26

## 2023-10-26 RX ORDER — FAMOTIDINE 10 MG/ML
20 INJECTION INTRAVENOUS ONCE AS NEEDED
Status: CANCELLED | OUTPATIENT
Start: 2023-10-26

## 2023-10-26 RX ORDER — DIPHENHYDRAMINE HYDROCHLORIDE 50 MG/ML
50 INJECTION INTRAMUSCULAR; INTRAVENOUS AS NEEDED
Status: DISCONTINUED | OUTPATIENT
Start: 2023-10-26 | End: 2023-10-26 | Stop reason: HOSPADM

## 2023-10-26 RX ORDER — ALBUTEROL SULFATE 0.83 MG/ML
3 SOLUTION RESPIRATORY (INHALATION) AS NEEDED
Status: CANCELLED | OUTPATIENT
Start: 2023-10-26

## 2023-10-26 RX ORDER — HEPARIN 100 UNIT/ML
500 SYRINGE INTRAVENOUS AS NEEDED
Status: CANCELLED | OUTPATIENT
Start: 2023-10-26

## 2023-10-26 RX ORDER — ALBUTEROL SULFATE 0.83 MG/ML
3 SOLUTION RESPIRATORY (INHALATION) AS NEEDED
Status: DISCONTINUED | OUTPATIENT
Start: 2023-10-26 | End: 2023-10-26 | Stop reason: HOSPADM

## 2023-10-26 RX ADMIN — SODIUM CHLORIDE 500 ML: 9 INJECTION, SOLUTION INTRAVENOUS at 10:46

## 2023-10-26 RX ADMIN — HEPARIN 500 UNITS: 100 SYRINGE at 11:40

## 2023-10-26 ASSESSMENT — PATIENT HEALTH QUESTIONNAIRE - PHQ9
8. MOVING OR SPEAKING SO SLOWLY THAT OTHER PEOPLE COULD HAVE NOTICED. OR THE OPPOSITE, BEING SO FIGETY OR RESTLESS THAT YOU HAVE BEEN MOVING AROUND A LOT MORE THAN USUAL: NOT AT ALL
5. POOR APPETITE OR OVEREATING: 0
5. POOR APPETITE OR OVEREATING: NOT AT ALL
5. POOR APPETITE OR OVEREATING: 0
SUM OF ALL RESPONSES TO PHQ QUESTIONS 1-9: 0
1. LITTLE INTEREST OR PLEASURE IN DOING THINGS: 0
9. THOUGHTS THAT YOU WOULD BE BETTER OFF DEAD, OR OF HURTING YOURSELF: NOT AT ALL
6. FEELING BAD ABOUT YOURSELF - OR THAT YOU ARE A FAILURE OR HAVE LET YOURSELF OR YOUR FAMILY DOWN: NOT AT ALL
7. TROUBLE CONCENTRATING ON THINGS, SUCH AS READING THE NEWSPAPER OR WATCHING TELEVISION: 0
10. IF YOU CHECKED OFF ANY PROBLEMS, HOW DIFFICULT HAVE THESE PROBLEMS MADE IT FOR YOU TO DO YOUR WORK, TAKE CARE OF THINGS AT HOME, OR GET ALONG WITH OTHER PEOPLE: NOT DIFFICULT AT ALL
4. FEELING TIRED OR HAVING LITTLE ENERGY: 0
SUM OF ALL RESPONSES TO PHQ QUESTIONS 1-9: 0
5. POOR APPETITE OR OVEREATING: NOT AT ALL
2. FEELING DOWN, DEPRESSED OR HOPELESS: NOT AT ALL
8. MOVING OR SPEAKING SO SLOWLY THAT OTHER PEOPLE COULD HAVE NOTICED. OR THE OPPOSITE, BEING SO FIGETY OR RESTLESS THAT YOU HAVE BEEN MOVING AROUND A LOT MORE THAN USUAL: 0
9. THOUGHTS THAT YOU WOULD BE BETTER OFF DEAD, OR OF HURTING YOURSELF: NOT AT ALL
4. FEELING TIRED OR HAVING LITTLE ENERGY: 0
7. TROUBLE CONCENTRATING ON THINGS, SUCH AS READING THE NEWSPAPER OR WATCHING TELEVISION: 0
3. TROUBLE FALLING OR STAYING ASLEEP OR SLEEPING TOO MUCH: 0
3. TROUBLE FALLING OR STAYING ASLEEP OR SLEEPING TOO MUCH: NOT AT ALL
7. TROUBLE CONCENTRATING ON THINGS, SUCH AS READING THE NEWSPAPER OR WATCHING TELEVISION: NOT AT ALL
6. FEELING BAD ABOUT YOURSELF - OR THAT YOU ARE A FAILURE OR HAVE LET YOURSELF OR YOUR FAMILY DOWN: NOT AT ALL
1. LITTLE INTEREST OR PLEASURE IN DOING THINGS: 0
10. IF YOU CHECKED OFF ANY PROBLEMS, HOW DIFFICULT HAVE THESE PROBLEMS MADE IT FOR YOU TO DO YOUR WORK, TAKE CARE OF THINGS AT HOME, OR GET ALONG WITH OTHER PEOPLE: NOT DIFFICULT AT ALL
4. FEELING TIRED OR HAVING LITTLE ENERGY: NOT AT ALL
10. IF YOU CHECKED OFF ANY PROBLEMS, HOW DIFFICULT HAVE THESE PROBLEMS MADE IT FOR YOU TO DO YOUR WORK, TAKE CARE OF THINGS AT HOME, OR GET ALONG WITH OTHER PEOPLE: NOT DIFFICULT AT ALL
7. TROUBLE CONCENTRATING ON THINGS, SUCH AS READING THE NEWSPAPER OR WATCHING TELEVISION: 0
4. FEELING TIRED OR HAVING LITTLE ENERGY: NOT AT ALL
4. FEELING TIRED OR HAVING LITTLE ENERGY: 0
2. FEELING DOWN, DEPRESSED, IRRITABLE, OR HOPELESS: 0
1. LITTLE INTEREST OR PLEASURE IN DOING THINGS: NOT AT ALL
9. THOUGHTS THAT YOU WOULD BE BETTER OFF DEAD, OR OF HURTING YOURSELF: NOT AT ALL
3. TROUBLE FALLING OR STAYING ASLEEP OR SLEEPING TOO MUCH: NOT AT ALL
7. TROUBLE CONCENTRATING ON THINGS, SUCH AS READING THE NEWSPAPER OR WATCHING TELEVISION: 0
7. TROUBLE CONCENTRATING ON THINGS, SUCH AS READING THE NEWSPAPER OR WATCHING TELEVISION: NOT AT ALL
2. FEELING DOWN, DEPRESSED OR HOPELESS: NOT AT ALL
7. TROUBLE CONCENTRATING ON THINGS, SUCH AS READING THE NEWSPAPER OR WATCHING TELEVISION: NOT AT ALL
8. MOVING OR SPEAKING SO SLOWLY THAT OTHER PEOPLE COULD HAVE NOTICED. OR THE OPPOSITE, BEING SO FIGETY OR RESTLESS THAT YOU HAVE BEEN MOVING AROUND A LOT MORE THAN USUAL: NOT AT ALL
7. TROUBLE CONCENTRATING ON THINGS, SUCH AS READING THE NEWSPAPER OR WATCHING TELEVISION: 0
6. FEELING BAD ABOUT YOURSELF - OR THAT YOU ARE A FAILURE OR HAVE LET YOURSELF OR YOUR FAMILY DOWN: 0
7. TROUBLE CONCENTRATING ON THINGS, SUCH AS READING THE NEWSPAPER OR WATCHING TELEVISION: NOT AT ALL
SUM OF ALL RESPONSES TO PHQ QUESTIONS 1-9: 0
5. POOR APPETITE OR OVEREATING: NOT AT ALL
9. THOUGHTS THAT YOU WOULD BE BETTER OFF DEAD, OR OF HURTING YOURSELF: 0
6. FEELING BAD ABOUT YOURSELF - OR THAT YOU ARE A FAILURE OR HAVE LET YOURSELF OR YOUR FAMILY DOWN: NOT AT ALL
6. FEELING BAD ABOUT YOURSELF - OR THAT YOU ARE A FAILURE OR HAVE LET YOURSELF OR YOUR FAMILY DOWN: NOT AT ALL
1. LITTLE INTEREST OR PLEASURE IN DOING THINGS: NOT AT ALL
8. MOVING OR SPEAKING SO SLOWLY THAT OTHER PEOPLE COULD HAVE NOTICED. OR THE OPPOSITE, BEING SO FIGETY OR RESTLESS THAT YOU HAVE BEEN MOVING AROUND A LOT MORE THAN USUAL: NOT AT ALL
9. THOUGHTS THAT YOU WOULD BE BETTER OFF DEAD, OR OF HURTING YOURSELF: NOT AT ALL
8. MOVING OR SPEAKING SO SLOWLY THAT OTHER PEOPLE COULD HAVE NOTICED. OR THE OPPOSITE, BEING SO FIGETY OR RESTLESS THAT YOU HAVE BEEN MOVING AROUND A LOT MORE THAN USUAL: 0
9. THOUGHTS THAT YOU WOULD BE BETTER OFF DEAD, OR OF HURTING YOURSELF: NOT AT ALL
6. FEELING BAD ABOUT YOURSELF - OR THAT YOU ARE A FAILURE OR HAVE LET YOURSELF OR YOUR FAMILY DOWN: NOT AT ALL
7. TROUBLE CONCENTRATING ON THINGS, SUCH AS READING THE NEWSPAPER OR WATCHING TELEVISION: NOT AT ALL
SUM OF ALL RESPONSES TO PHQ QUESTIONS 1-9: 0
4. FEELING TIRED OR HAVING LITTLE ENERGY: 0
9. THOUGHTS THAT YOU WOULD BE BETTER OFF DEAD, OR OF HURTING YOURSELF: NOT AT ALL
6. FEELING BAD ABOUT YOURSELF - OR THAT YOU ARE A FAILURE OR HAVE LET YOURSELF OR YOUR FAMILY DOWN: NOT AT ALL
5. POOR APPETITE OR OVEREATING: NOT AT ALL
3. TROUBLE FALLING OR STAYING ASLEEP OR SLEEPING TOO MUCH: NOT AT ALL
1. LITTLE INTEREST OR PLEASURE IN DOING THINGS: 0
1. LITTLE INTEREST OR PLEASURE IN DOING THINGS: NOT AT ALL
3. TROUBLE FALLING OR STAYING ASLEEP OR SLEEPING TOO MUCH: NOT AT ALL
1. LITTLE INTEREST OR PLEASURE IN DOING THINGS: NOT AT ALL
2. FEELING DOWN, DEPRESSED, IRRITABLE, OR HOPELESS: 0
3. TROUBLE FALLING OR STAYING ASLEEP OR SLEEPING TOO MUCH: 0
8. MOVING OR SPEAKING SO SLOWLY THAT OTHER PEOPLE COULD HAVE NOTICED. OR THE OPPOSITE, BEING SO FIGETY OR RESTLESS THAT YOU HAVE BEEN MOVING AROUND A LOT MORE THAN USUAL: 0
8. MOVING OR SPEAKING SO SLOWLY THAT OTHER PEOPLE COULD HAVE NOTICED. OR THE OPPOSITE, BEING SO FIGETY OR RESTLESS THAT YOU HAVE BEEN MOVING AROUND A LOT MORE THAN USUAL: NOT AT ALL
3. TROUBLE FALLING OR STAYING ASLEEP OR SLEEPING TOO MUCH: 0
9. THOUGHTS THAT YOU WOULD BE BETTER OFF DEAD, OR OF HURTING YOURSELF: 0
4. FEELING TIRED OR HAVING LITTLE ENERGY: NOT AT ALL
8. MOVING OR SPEAKING SO SLOWLY THAT OTHER PEOPLE COULD HAVE NOTICED. OR THE OPPOSITE, BEING SO FIGETY OR RESTLESS THAT YOU HAVE BEEN MOVING AROUND A LOT MORE THAN USUAL: NOT AT ALL
2. FEELING DOWN, DEPRESSED, IRRITABLE, OR HOPELESS: 0
2. FEELING DOWN, DEPRESSED OR HOPELESS: NOT AT ALL
10. IF YOU CHECKED OFF ANY PROBLEMS, HOW DIFFICULT HAVE THESE PROBLEMS MADE IT FOR YOU TO DO YOUR WORK, TAKE CARE OF THINGS AT HOME, OR GET ALONG WITH OTHER PEOPLE: NOT DIFFICULT AT ALL
5. POOR APPETITE OR OVEREATING: 0
3. TROUBLE FALLING OR STAYING ASLEEP OR SLEEPING TOO MUCH: NOT AT ALL
7. TROUBLE CONCENTRATING ON THINGS, SUCH AS READING THE NEWSPAPER OR WATCHING TELEVISION: NOT AT ALL
4. FEELING TIRED OR HAVING LITTLE ENERGY: 0
1. LITTLE INTEREST OR PLEASURE IN DOING THINGS: NOT AT ALL
1. LITTLE INTEREST OR PLEASURE IN DOING THINGS: NOT AT ALL
8. MOVING OR SPEAKING SO SLOWLY THAT OTHER PEOPLE COULD HAVE NOTICED. OR THE OPPOSITE, BEING SO FIGETY OR RESTLESS THAT YOU HAVE BEEN MOVING AROUND A LOT MORE THAN USUAL: NOT AT ALL
SUM OF ALL RESPONSES TO PHQ QUESTIONS 1-9: 0
9. THOUGHTS THAT YOU WOULD BE BETTER OFF DEAD, OR OF HURTING YOURSELF: NOT AT ALL
7. TROUBLE CONCENTRATING ON THINGS, SUCH AS READING THE NEWSPAPER OR WATCHING TELEVISION: NOT AT ALL
6. FEELING BAD ABOUT YOURSELF - OR THAT YOU ARE A FAILURE OR HAVE LET YOURSELF OR YOUR FAMILY DOWN: 0
8. MOVING OR SPEAKING SO SLOWLY THAT OTHER PEOPLE COULD HAVE NOTICED. OR THE OPPOSITE, BEING SO FIGETY OR RESTLESS THAT YOU HAVE BEEN MOVING AROUND A LOT MORE THAN USUAL: NOT AT ALL
SUM OF ALL RESPONSES TO PHQ QUESTIONS 1-9: 0
5. POOR APPETITE OR OVEREATING: 0
2. FEELING DOWN, DEPRESSED, IRRITABLE, OR HOPELESS: NOT AT ALL
8. MOVING OR SPEAKING SO SLOWLY THAT OTHER PEOPLE COULD HAVE NOTICED. OR THE OPPOSITE, BEING SO FIGETY OR RESTLESS THAT YOU HAVE BEEN MOVING AROUND A LOT MORE THAN USUAL: 0
6. FEELING BAD ABOUT YOURSELF - OR THAT YOU ARE A FAILURE OR HAVE LET YOURSELF OR YOUR FAMILY DOWN: 0
8. MOVING OR SPEAKING SO SLOWLY THAT OTHER PEOPLE COULD HAVE NOTICED. OR THE OPPOSITE, BEING SO FIGETY OR RESTLESS THAT YOU HAVE BEEN MOVING AROUND A LOT MORE THAN USUAL: NOT AT ALL
4. FEELING TIRED OR HAVING LITTLE ENERGY: NOT AT ALL
3. TROUBLE FALLING OR STAYING ASLEEP OR SLEEPING TOO MUCH: 0
2. FEELING DOWN, DEPRESSED, IRRITABLE, OR HOPELESS: NOT AT ALL
4. FEELING TIRED OR HAVING LITTLE ENERGY: NOT AT ALL
3. TROUBLE FALLING OR STAYING ASLEEP OR SLEEPING TOO MUCH: NOT AT ALL
5. POOR APPETITE OR OVEREATING: NOT AT ALL
5. POOR APPETITE OR OVEREATING: NOT AT ALL
2. FEELING DOWN, DEPRESSED, IRRITABLE, OR HOPELESS: NOT AT ALL
10. IF YOU CHECKED OFF ANY PROBLEMS, HOW DIFFICULT HAVE THESE PROBLEMS MADE IT FOR YOU TO DO YOUR WORK, TAKE CARE OF THINGS AT HOME, OR GET ALONG WITH OTHER PEOPLE: NOT DIFFICULT AT ALL
1. LITTLE INTEREST OR PLEASURE IN DOING THINGS: NOT AT ALL
7. TROUBLE CONCENTRATING ON THINGS, SUCH AS READING THE NEWSPAPER OR WATCHING TELEVISION: NOT AT ALL
7. TROUBLE CONCENTRATING ON THINGS, SUCH AS READING THE NEWSPAPER OR WATCHING TELEVISION: NOT AT ALL
9. THOUGHTS THAT YOU WOULD BE BETTER OFF DEAD, OR OF HURTING YOURSELF: 0
SUM OF ALL RESPONSES TO PHQ QUESTIONS 1-9: 0
9. THOUGHTS THAT YOU WOULD BE BETTER OFF DEAD, OR OF HURTING YOURSELF: NOT AT ALL
2. FEELING DOWN, DEPRESSED, IRRITABLE, OR HOPELESS: NOT AT ALL
6. FEELING BAD ABOUT YOURSELF - OR THAT YOU ARE A FAILURE OR HAVE LET YOURSELF OR YOUR FAMILY DOWN: 0
SUM OF ALL RESPONSES TO PHQ QUESTIONS 1-9: 0
SUM OF ALL RESPONSES TO PHQ9 QUESTIONS 1 AND 2: 0
5. POOR APPETITE OR OVEREATING: 0
1. LITTLE INTEREST OR PLEASURE IN DOING THINGS: NOT AT ALL
4. FEELING TIRED OR HAVING LITTLE ENERGY: NOT AT ALL
1. LITTLE INTEREST OR PLEASURE IN DOING THINGS: NOT AT ALL
2. FEELING DOWN, DEPRESSED OR HOPELESS: NOT AT ALL
3. TROUBLE FALLING OR STAYING ASLEEP OR SLEEPING TOO MUCH: NOT AT ALL
9. THOUGHTS THAT YOU WOULD BE BETTER OFF DEAD, OR OF HURTING YOURSELF: 0
9. THOUGHTS THAT YOU WOULD BE BETTER OFF DEAD, OR OF HURTING YOURSELF: 0
2. FEELING DOWN, DEPRESSED, IRRITABLE, OR HOPELESS: 0
8. MOVING OR SPEAKING SO SLOWLY THAT OTHER PEOPLE COULD HAVE NOTICED. OR THE OPPOSITE, BEING SO FIGETY OR RESTLESS THAT YOU HAVE BEEN MOVING AROUND A LOT MORE THAN USUAL: NOT AT ALL
10. IF YOU CHECKED OFF ANY PROBLEMS, HOW DIFFICULT HAVE THESE PROBLEMS MADE IT FOR YOU TO DO YOUR WORK, TAKE CARE OF THINGS AT HOME, OR GET ALONG WITH OTHER PEOPLE: NOT DIFFICULT AT ALL
6. FEELING BAD ABOUT YOURSELF - OR THAT YOU ARE A FAILURE OR HAVE LET YOURSELF OR YOUR FAMILY DOWN: 0
6. FEELING BAD ABOUT YOURSELF - OR THAT YOU ARE A FAILURE OR HAVE LET YOURSELF OR YOUR FAMILY DOWN: NOT AT ALL
5. POOR APPETITE OR OVEREATING: NOT AT ALL
9. THOUGHTS THAT YOU WOULD BE BETTER OFF DEAD, OR OF HURTING YOURSELF: NOT AT ALL
6. FEELING BAD ABOUT YOURSELF - OR THAT YOU ARE A FAILURE OR HAVE LET YOURSELF OR YOUR FAMILY DOWN: NOT AT ALL
4. FEELING TIRED OR HAVING LITTLE ENERGY: NOT AT ALL
9. THOUGHTS THAT YOU WOULD BE BETTER OFF DEAD, OR OF HURTING YOURSELF: NOT AT ALL
3. TROUBLE FALLING OR STAYING ASLEEP OR SLEEPING TOO MUCH: 0
7. TROUBLE CONCENTRATING ON THINGS, SUCH AS READING THE NEWSPAPER OR WATCHING TELEVISION: NOT AT ALL
1. LITTLE INTEREST OR PLEASURE IN DOING THINGS: NOT AT ALL
6. FEELING BAD ABOUT YOURSELF - OR THAT YOU ARE A FAILURE OR HAVE LET YOURSELF OR YOUR FAMILY DOWN: NOT AT ALL
1. LITTLE INTEREST OR PLEASURE IN DOING THINGS: 0
10. IF YOU CHECKED OFF ANY PROBLEMS, HOW DIFFICULT HAVE THESE PROBLEMS MADE IT FOR YOU TO DO YOUR WORK, TAKE CARE OF THINGS AT HOME, OR GET ALONG WITH OTHER PEOPLE: NOT DIFFICULT AT ALL
SUM OF ALL RESPONSES TO PHQ QUESTIONS 1-9: 0
SUM OF ALL RESPONSES TO PHQ QUESTIONS 1-9: 0
1. LITTLE INTEREST OR PLEASURE IN DOING THINGS: 0
2. FEELING DOWN, DEPRESSED OR HOPELESS: NOT AT ALL
6. FEELING BAD ABOUT YOURSELF - OR THAT YOU ARE A FAILURE OR HAVE LET YOURSELF OR YOUR FAMILY DOWN: NOT AT ALL
5. POOR APPETITE OR OVEREATING: NOT AT ALL
2. FEELING DOWN, DEPRESSED, IRRITABLE, OR HOPELESS: NOT AT ALL
10. IF YOU CHECKED OFF ANY PROBLEMS, HOW DIFFICULT HAVE THESE PROBLEMS MADE IT FOR YOU TO DO YOUR WORK, TAKE CARE OF THINGS AT HOME, OR GET ALONG WITH OTHER PEOPLE: NOT DIFFICULT AT ALL
8. MOVING OR SPEAKING SO SLOWLY THAT OTHER PEOPLE COULD HAVE NOTICED. OR THE OPPOSITE, BEING SO FIGETY OR RESTLESS THAT YOU HAVE BEEN MOVING AROUND A LOT MORE THAN USUAL: 0
2. FEELING DOWN, DEPRESSED, IRRITABLE, OR HOPELESS: 0
10. IF YOU CHECKED OFF ANY PROBLEMS, HOW DIFFICULT HAVE THESE PROBLEMS MADE IT FOR YOU TO DO YOUR WORK, TAKE CARE OF THINGS AT HOME, OR GET ALONG WITH OTHER PEOPLE: NOT DIFFICULT AT ALL
4. FEELING TIRED OR HAVING LITTLE ENERGY: NOT AT ALL
2. FEELING DOWN, DEPRESSED OR HOPELESS: NOT AT ALL
5. POOR APPETITE OR OVEREATING: NOT AT ALL
5. POOR APPETITE OR OVEREATING: NOT AT ALL
3. TROUBLE FALLING OR STAYING ASLEEP OR SLEEPING TOO MUCH: NOT AT ALL
8. MOVING OR SPEAKING SO SLOWLY THAT OTHER PEOPLE COULD HAVE NOTICED. OR THE OPPOSITE, BEING SO FIGETY OR RESTLESS THAT YOU HAVE BEEN MOVING AROUND A LOT MORE THAN USUAL: NOT AT ALL
1. LITTLE INTEREST OR PLEASURE IN DOING THINGS: NOT AT ALL
10. IF YOU CHECKED OFF ANY PROBLEMS, HOW DIFFICULT HAVE THESE PROBLEMS MADE IT FOR YOU TO DO YOUR WORK, TAKE CARE OF THINGS AT HOME, OR GET ALONG WITH OTHER PEOPLE: NOT DIFFICULT AT ALL

## 2023-10-26 ASSESSMENT — PAIN SCALES - GENERAL: PAINLEVEL: 0-NO PAIN

## 2023-10-27 ENCOUNTER — APPOINTMENT (OUTPATIENT)
Dept: HEMATOLOGY/ONCOLOGY | Facility: CLINIC | Age: 58
End: 2023-10-27
Payer: MEDICAID

## 2023-10-27 ENCOUNTER — OFFICE VISIT (OUTPATIENT)
Dept: HEMATOLOGY/ONCOLOGY | Facility: CLINIC | Age: 58
End: 2023-10-27
Payer: MEDICAID

## 2023-10-27 DIAGNOSIS — C16.0 ADENOCARCINOMA OF GASTROESOPHAGEAL JUNCTION (MULTI): ICD-10-CM

## 2023-10-27 PROCEDURE — 1036F TOBACCO NON-USER: CPT | Performed by: INTERNAL MEDICINE

## 2023-10-27 PROCEDURE — 99214 OFFICE O/P EST MOD 30 MIN: CPT | Mod: 95 | Performed by: INTERNAL MEDICINE

## 2023-10-27 PROCEDURE — 99214 OFFICE O/P EST MOD 30 MIN: CPT | Performed by: INTERNAL MEDICINE

## 2023-10-27 RX ORDER — MIRTAZAPINE 15 MG/1
TABLET, FILM COATED ORAL
COMMUNITY
Start: 2023-10-19 | End: 2023-11-06 | Stop reason: ALTCHOICE

## 2023-10-27 NOTE — PROGRESS NOTES
LOCATION:  Phone visit note - [facility].     HEMATOLOGY & ONCOLOGY PROBLEMS:  1.  Localized gastric adenocarcinoma.       a.  Neoadjuvant chemotherapy with FLOT beginning October 2023.    CHIEF COMPLAINT:    The patient had a telephonic visit for follow-up of GE junction adenocarcinoma and for continuation of treatment and management of therapy related effects.                   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  She was having problem with gradually worsening dysphagia with further GI work-up revealing gastric mass with the biopsy confirming  poorly differentiated adenocarcinoma in July 2023.  Prior to that she had a barium esophagogram which was essentially unremarkable.  Patient does have a previous history of GERD and had one time was treated for H. pylori gastritis.  EUS done by Dr. London on 7/17/2023 showed malignancy starting near the GE junction and measuring 2.2 x 1.5 cm towards the cardia along  the lesser curvature / anterior wall with malignant appearing features.  There is loss of interface between the mass and liver along a 7 mm region, concerning for probable  early hepatic involvement.  Enlarged lymph nodes were noted in the diaphragmatic region, and gastrohepatic ligament.  CT chest abdomen pelvis with contrast on 8/9/2023 showed thickening of the distal esophagus extending into the anterior portion of the  gastric cardia.  There is a small amount of liver present directly adjacent to the area of the tumor but there is no altered enhancement pattern in the liver parenchyma.  Slightly inferior to the cardia there  were lymph nodes visible in the adjacent mesentery that were not visible previously measuring up to 6 mm in short axis. PET/CT on 8/31/2023 showed a hypermetabolic focus at the GE junction  with extension into the gastric cardia, no evidence of hypermetabolic regional or distant metastatic disease. Brain MRI  negative for metastasis.  She is currently being treated with neoadjuvant chemotherapy with FLOT protocol beginning 2023.     INTERVAL HISTORY:  She had issues with persistent diarrhea after last dose of chemotherapy.  Symptoms are much improved with the use of Imodium.  She was also given IV hydration in the office.  Follow-up blood work from yesterday is all normal.  Today patient is feeling better and as per her she is all set to start her next round of chemo in the next few days.       PAST MEDICAL HISTORY:   1.  GE junction adenocarcinoma as detailed above.   2.  Hypertension   3.  Hyperlipidemia   4.  Anxiety/depression   5.  History of complete hysterectomy, ankle surgery, cholecystectomy and 2  procedures     SOCIAL HISTORY:    and lives in [facility].  Non-smoker and nonalcoholic.  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  Born and raised in [place].     FAMILY HISTORY:    Father  at age 76 from myocardial infarction mother  from stroke at age 78.  She had 8 siblings.  1 brother  from MI another committed suicide.  A sister also  from myocardial infarction.   She had 2 children.  Her son  from AML.  No other specific history of bleeding, clotting or malignant disorder in the family.     REVIEW OF SYSTEMS:  Pertinent finding as per the history above.  All other systems have been reviewed and generally negative and noncontributory.     PHYSICAL EXAMINATION:    Detailed physical examination not done      Allergies and Intolerances:       Allergies:         influenza virus vaccine, live: Drug, Rash, Active         Tape - Adhesive, Bandaids, Paper: Environment, Blistering Dis.,  Active       Intolerances:         morphine: Drug, Nausea/Vomiting, Active     Outpatient Medication Profile:  * Patient Currently Takes Medications as of 15-Sep-2023 16:03 documented in Structured Notes         ondansetron 8 mg oral tablet : 1 tab(s) orally every 8 hours , Start Date:  15-Sep-2023         Aspir-Low 81 mg oral delayed release tablet: Last Dose Taken:  , 1 tab(s)  orally once a day         atorvastatin 20 mg oral tablet: Last Dose Taken:  , 1 tab(s) orally once  a day         Metoprolol Succinate ER 50 mg oral tablet, extended release: Last Dose  Taken:  , 1 tab(s) orally once a day         spironolactone 25 mg oral tablet: Last Dose Taken:  , 1 tab(s) orally  once a day         Tab-A-Tong with Iron oral tablet: Last Dose Taken:  , 1 tab(s) orally  once a day         DULoxetine: Last Dose Taken:  , cap(s) orally once a day         Vitamin B12 1000 mcg oral tablet: Last Dose Taken:  , 1 tab(s) orally  once a day         Vitamin D3 50 mcg (2000 intl units) oral tablet: Last Dose Taken:  ,  1 tab(s) orally once a day         traZODone 50 mg oral tablet: Last Dose Taken:  , orally once a day (at  bedtime)     Lab Results:  CBC and CMP were unremarkable on 10/26/2023.     Radiology Result:   PET/CT Esophageal Initial [Aug 31 2023  1:12PM]  Impression:  1. Hypermetabolic focus localized at gastroesophageal junction with extension to the gastric cardia compatible with biopsy-proven gastric adenocarcinoma.  2. No evidence of hypermetabolic regional or distant metastatic disease.  3. Status post cholecystectomy with demonstration of localized pneumobilia.      MRI Brain w/wo Contrast [Aug 29 2023 12:28PM]   Impression:  There is no evidence of mass, infarction or hemorrhage.     Pathology Results:  Surgical Pathology [Jul 3 2023 5:08PM] ([id number])    Specimens: MASS AT GE JUNCTION, DISTAL TO ESOPHAGUS     Name KIRSTIN GARDINERVaughn     Accession #: [id number]   Pathologist: KEI YORK MD   Date of Procedure: 6/19/2023   Submitting Physician: ISRAEL HERNANDEZ MD   Location:  [facility]   Copy To/Referring/Attending:   BOB GRAHAM, DO Other External #     FINAL DIAGNOSIS   A. MASS AT GE JUNCTION, DISTAL TO ESOPHAGUS:   -- POORLY DIFFERENTIATED  ADENOCARCINOMA WITH SIGNET RING CELL  FEATURES. SEE   COMMENT.   Addendum Diagnosis   MISMATCH REPAIR PROTEIN EXPRESSION:     Tumor type/ specimen source: GE junction mass, distal  esophagus   Paraffin block number: [id number], A1     Protein: Result:   MLH-1 Intact Nuclear Expression   PMS-2 Intact Nuclear Expression   MSH-2 Intact Nuclear Expression   MSH-6 Intact Nuclear Expression     INTERPRETATION: Neoplasm with normal mismatch repair protein expression.      Assessment and Plan:   1.  GE junction poorly differentiated adenocarcinoma.  Please refer to the details as outlined above.  In summary patient with few month history  of gradually worsening dysphagia to both solid and liquid.  Initial barium esophagogram was normal but EGD showed a GE junction mass in July 2023.  Biopsy results are confirmatory of poorly differentiated adenocarcinoma with normal mismatch repair gene  expression. EUS revealed a GE junction lesion with concern regarding direct extension to liver and enlarged pathological lymph nodes.  CT scan showed stomach involvement with local regional lymphadenopathy but no distance organ involvement.  A follow-up  PET scan results were confirmatory of increased metabolic activity starting at the GE junction with extension into cardia.  There was no finding of any hypermetabolic activity in the liver or any other local or distant metastatic lesions.  Brain MRI was  unremarkable.  After multidisciplinary evaluation of tumor is considered more gastric than GE junction and she has been advised evaluation for neoadjuvant chemotherapy followed by surgical evaluation.  She is currently being treated with neoadjuvant chemotherapy with FLOT recall beginning October 2023.    She will continue with the treatment at the current dosing schedule. Probable side effect of myelosuppression, cold-induced neuropathy, weakness, fatigue, diarrhea, loss stomatitis etc. were explained to them in detail.  Informed  consent was obtained.  We will request  interventional radiology for emergent Mediport placement.  We will also try to schedule her for initial surgical evaluation with Dr. De Los Santos.       2.  Chemotherapy-induced diarrhea.  She is advised to take Imodium proactively.  If needed will also support her with intermittent IV hydration.    3.  Follow-up.  Follow-up with me in few weeks.  In the meantime she will come to the clinic for continuation of the treatment as per the protocol.  Advised to contact us immediately if there are any new questions or concerns.

## 2023-10-30 ENCOUNTER — INFUSION (OUTPATIENT)
Dept: HEMATOLOGY/ONCOLOGY | Facility: CLINIC | Age: 58
End: 2023-10-30
Payer: MEDICAID

## 2023-10-30 ENCOUNTER — APPOINTMENT (OUTPATIENT)
Dept: HEMATOLOGY/ONCOLOGY | Facility: CLINIC | Age: 58
End: 2023-10-30
Payer: MEDICAID

## 2023-10-30 ENCOUNTER — SOCIAL WORK (OUTPATIENT)
Dept: HEMATOLOGY/ONCOLOGY | Facility: CLINIC | Age: 58
End: 2023-10-30
Payer: MEDICAID

## 2023-10-30 ENCOUNTER — NUTRITION (OUTPATIENT)
Dept: HEMATOLOGY/ONCOLOGY | Facility: CLINIC | Age: 58
End: 2023-10-30
Payer: MEDICAID

## 2023-10-30 VITALS
OXYGEN SATURATION: 94 % | DIASTOLIC BLOOD PRESSURE: 71 MMHG | TEMPERATURE: 98.2 F | BODY MASS INDEX: 43.59 KG/M2 | RESPIRATION RATE: 18 BRPM | HEART RATE: 81 BPM | SYSTOLIC BLOOD PRESSURE: 128 MMHG | WEIGHT: 230.6 LBS

## 2023-10-30 VITALS — HEIGHT: 61 IN | WEIGHT: 230.6 LBS | BODY MASS INDEX: 43.54 KG/M2

## 2023-10-30 DIAGNOSIS — C16.0 ADENOCARCINOMA OF GASTROESOPHAGEAL JUNCTION (MULTI): ICD-10-CM

## 2023-10-30 PROCEDURE — 96416 CHEMO PROLONG INFUSE W/PUMP: CPT

## 2023-10-30 PROCEDURE — 2500000004 HC RX 250 GENERAL PHARMACY W/ HCPCS (ALT 636 FOR OP/ED): Performed by: INTERNAL MEDICINE

## 2023-10-30 PROCEDURE — 96368 THER/DIAG CONCURRENT INF: CPT

## 2023-10-30 PROCEDURE — 96375 TX/PRO/DX INJ NEW DRUG ADDON: CPT | Mod: INF

## 2023-10-30 PROCEDURE — 96415 CHEMO IV INFUSION ADDL HR: CPT

## 2023-10-30 PROCEDURE — 96417 CHEMO IV INFUS EACH ADDL SEQ: CPT

## 2023-10-30 PROCEDURE — 96366 THER/PROPH/DIAG IV INF ADDON: CPT | Mod: INF

## 2023-10-30 PROCEDURE — 96523 IRRIG DRUG DELIVERY DEVICE: CPT

## 2023-10-30 PROCEDURE — 96367 TX/PROPH/DG ADDL SEQ IV INF: CPT

## 2023-10-30 PROCEDURE — 96413 CHEMO IV INFUSION 1 HR: CPT

## 2023-10-30 PROCEDURE — 2500000001 HC RX 250 WO HCPCS SELF ADMINISTERED DRUGS (ALT 637 FOR MEDICARE OP): Performed by: INTERNAL MEDICINE

## 2023-10-30 RX ORDER — HEPARIN 100 UNIT/ML
500 SYRINGE INTRAVENOUS AS NEEDED
Status: DISCONTINUED | OUTPATIENT
Start: 2023-10-30 | End: 2023-10-30 | Stop reason: HOSPADM

## 2023-10-30 RX ORDER — HEPARIN SODIUM,PORCINE/PF 10 UNIT/ML
50 SYRINGE (ML) INTRAVENOUS AS NEEDED
Status: CANCELLED | OUTPATIENT
Start: 2023-10-30

## 2023-10-30 RX ORDER — ALBUTEROL SULFATE 0.83 MG/ML
3 SOLUTION RESPIRATORY (INHALATION) AS NEEDED
Status: DISCONTINUED | OUTPATIENT
Start: 2023-10-30 | End: 2023-10-30 | Stop reason: HOSPADM

## 2023-10-30 RX ORDER — LORAZEPAM 2 MG/ML
1 INJECTION INTRAMUSCULAR AS NEEDED
Status: DISCONTINUED | OUTPATIENT
Start: 2023-10-30 | End: 2023-10-30 | Stop reason: HOSPADM

## 2023-10-30 RX ORDER — FAMOTIDINE 10 MG/ML
20 INJECTION INTRAVENOUS ONCE AS NEEDED
Status: DISCONTINUED | OUTPATIENT
Start: 2023-10-30 | End: 2023-10-30 | Stop reason: HOSPADM

## 2023-10-30 RX ORDER — DIPHENHYDRAMINE HYDROCHLORIDE 50 MG/ML
50 INJECTION INTRAMUSCULAR; INTRAVENOUS AS NEEDED
Status: DISCONTINUED | OUTPATIENT
Start: 2023-10-30 | End: 2023-10-30 | Stop reason: HOSPADM

## 2023-10-30 RX ORDER — DIPHENHYDRAMINE HCL 25 MG
25 CAPSULE ORAL ONCE
Status: COMPLETED | OUTPATIENT
Start: 2023-10-30 | End: 2023-10-30

## 2023-10-30 RX ORDER — EPINEPHRINE 0.3 MG/.3ML
0.3 INJECTION SUBCUTANEOUS EVERY 5 MIN PRN
Status: DISCONTINUED | OUTPATIENT
Start: 2023-10-30 | End: 2023-10-30 | Stop reason: HOSPADM

## 2023-10-30 RX ORDER — PALONOSETRON 0.05 MG/ML
0.25 INJECTION, SOLUTION INTRAVENOUS ONCE
Status: COMPLETED | OUTPATIENT
Start: 2023-10-30 | End: 2023-10-30

## 2023-10-30 RX ORDER — HEPARIN 100 UNIT/ML
500 SYRINGE INTRAVENOUS AS NEEDED
Status: CANCELLED | OUTPATIENT
Start: 2023-10-30

## 2023-10-30 RX ADMIN — OXALIPLATIN 180 MG: 5 INJECTION, SOLUTION INTRAVENOUS at 12:48

## 2023-10-30 RX ADMIN — DIPHENHYDRAMINE HYDROCHLORIDE 25 MG: 25 CAPSULE ORAL at 10:45

## 2023-10-30 RX ADMIN — DEXAMETHASONE SODIUM PHOSPHATE 12 MG: 10 INJECTION, SOLUTION INTRAMUSCULAR; INTRAVENOUS at 10:47

## 2023-10-30 RX ADMIN — DOCETAXEL 110 MG: 20 INJECTION, SOLUTION, CONCENTRATE INTRAVENOUS at 11:35

## 2023-10-30 RX ADMIN — PALONOSETRON 250 MCG: 0.05 INJECTION, SOLUTION INTRAVENOUS at 10:43

## 2023-10-30 RX ADMIN — FLUOROURACIL 5550 MG: 50 INJECTION, SOLUTION INTRAVENOUS at 15:04

## 2023-10-30 RX ADMIN — LEUCOVORIN CALCIUM 430 MG: 500 INJECTION, POWDER, LYOPHILIZED, FOR SOLUTION INTRAMUSCULAR; INTRAVENOUS at 12:44

## 2023-10-30 ASSESSMENT — PATIENT HEALTH QUESTIONNAIRE - PHQ9
2. FEELING DOWN, DEPRESSED OR HOPELESS: NOT AT ALL
1. LITTLE INTEREST OR PLEASURE IN DOING THINGS: NOT AT ALL
SUM OF ALL RESPONSES TO PHQ9 QUESTIONS 1 AND 2: 0

## 2023-10-30 ASSESSMENT — PAIN SCALES - GENERAL: PAINLEVEL: 0-NO PAIN

## 2023-10-30 ASSESSMENT — COLUMBIA-SUICIDE SEVERITY RATING SCALE - C-SSRS
1. IN THE PAST MONTH, HAVE YOU WISHED YOU WERE DEAD OR WISHED YOU COULD GO TO SLEEP AND NOT WAKE UP?: NO
2. HAVE YOU ACTUALLY HAD ANY THOUGHTS OF KILLING YOURSELF?: NO
6. HAVE YOU EVER DONE ANYTHING, STARTED TO DO ANYTHING, OR PREPARED TO DO ANYTHING TO END YOUR LIFE?: NO

## 2023-10-30 NOTE — PROGRESS NOTES
NUTRITION Follow-up NOTE  Reason for Visit:  Shruthi Ramos is a 58 y.o. 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 Plan is 5-FU, leucovorin, oxaliplatin, Taxotere with possible surgery.     Follow up: Patient continues to do well. Brief visit, reports no nutrition concerns.     Problem List:        Additional Dx:   History of hysterectomy:    Adenocarcinoma of gastric cardia:    Abnormal findings on esophagogastroduodenoscopy (EGD):    Abnormal CT scan:    Adenocarcinoma of gastroesophageal junction:     Anthropometrics:    Wt Readings from Last 10 Encounters:   10/30/23 105 kg (230 lb 9.6 oz)   10/16/23 108 kg (238 lb 1.6 oz)   10/16/23 108 kg (237 lb 14 oz)   10/11/23 105 kg (230 lb 13.2 oz)   10/10/23 105 kg (231 lb 0.7 oz)   05/16/23 108 kg (239 lb)   05/26/20 122 kg (268 lb 15.4 oz)   03/19/20 122 kg (269 lb)       Labs:   Lab Results   Component Value Date/Time    GLUCOSE 110 (H) 10/26/2023 1158     10/26/2023 1158    K 3.5 10/26/2023 1158     10/26/2023 1158    CO2 23 10/26/2023 1158    ANIONGAP 16 10/26/2023 1158    BUN 11 10/26/2023 1158    CREATININE 1.01 10/26/2023 1158    EGFR 64 10/26/2023 1158    CALCIUM 8.7 10/26/2023 1158    ALBUMIN 3.9 10/26/2023 1158    ALKPHOS 115 (H) 10/26/2023 1158    PROT 5.9 (L) 10/26/2023 1158    AST 21 10/26/2023 1158    BILITOT 0.5 10/26/2023 1158    ALT 16 10/26/2023 1158     Food And Nutrient Intake:  Food and Nutrient History  Food and Nutrient History: Doing well now. Per previous note: Patient with decreased intake previously due to mild dysphagia. States this is improving, she has been eating more normally and taking ONS BID. Goes between Muscle Milk and Ensure currently. Patient does a lot of home cristóbal, home cooking. Tries to eat meat from local vendors.  Energy Intake: Good > 75 %  Fluid Intake: Good, 64+oz/day  Food Allergy: NKFA  GI Symptoms: constipation (RN recommending Senna,  "patient accepting to trial)  Oral Problems: dysphagia   Food Intake  Amount of Food: Did not provide specific diet recall, but reports to eating better.  Food Preparation  Cooking: Patient, Spouse/Significant Other  Grocery Shopping: Patient, Spouse/Significant Other    Micronutrient Intake  Vitamin Intake: Multivitamin, B12, D  Medication and Complementary/Alternative Medicine Use  Prescription Medication Use: atrovastatin, metoprolol, ondansetron    Nutrition Focused Physical Exam (10/16/23):  Subcutaneous Fat Loss  Orbital Fat Pads: Well nourshed (slightly bulging fat pads)  Buccal Fat Pads: Well nourished (full, rounded cheeks)  Triceps: Well nourished (ample fat tissue)  Ribs: Well nourished (full chest, ribs do not protrude)  Muscle Wasting  Temporalis: Well nourished (well-defined muscle)  Pectoralis (Clavicular Region): Well nourished (clavicle not visible)  Deltoid/Trapezius: Well nourished (rounded appearance at arm, shoulder, neck)  Interosseous: Well nourished (muscle bulges)  Trapezius/Infraspinatus/Supraspinatus (Scapular Region): Well nourished (bones not prominent, muscle taut)  Quadriceps: Well nourished (well developed, well rounded)  Gastrocnemius: Well nourished (well developed bulbous muscle)    Estimated Daily Nutrient Needs (Continue as previous):   Calculated Energy Needs Using Equations  Height: 154.9 cm (5' 0.98\")  Estimated Energy Needs  Total Energy Estimated Needs (kCal): 2119 kCal  Total Estimated Energy Need per Day (kCal/kg): 30 kCal/kg  Method for Estimating Needs: ABW  Estimated Fluid Needs  Total Fluid Estimated Needs (mL): 2119 mL  Total Fluid Estimated Needs (mL/kg): 30 mL/kg  Method for Estimating Needs: ABW  Estimated Protein Needs  Total Protein Estimated Needs (g): 99 g  Total Protein Estimated Needs (g/kg): 1.4 g/kg  Type of Protein Needed: ABW     Diagnosis  Nutrition Diagnosis  Patient has Nutrition Diagnosis: Yes  Diagnosis Status (1): Ongoing  Nutrition Diagnosis 1: " Swallowing difficulity  Related to (1): GE junction tumor  As Evidenced by (1): patient reports of dysphagia with hard to swallow foods like bread/chicken  Additional Nutrition Diagnosis: Diagnosis 2  Diagnosis Status (2): Ongoing  Nutrition Diagnosis 2: Inadequate oral intake (improving)  Related to (2): catabolic disease and upcoming treatment  As Evidenced by (2): PO intake meeting less than 75% estimated nutrient needs for greater that 1 week    Interventions/Recommendations (continue as previous)  Food and Nutrition Delivery  Meals & Snacks: Energy-modified diet  Goals: increased nutrient needs in the setting of cancer  Medical Food Supplement: Premier Protein  Goals: BID  Nutrition Education  Nutrition Education Content: Content related nutrition education  Goals: patient will understand impact of diet on success during treatment, in the healing process, and for survivorship  Nutrition Counseling  Theoretical Basis/Approach: Nutrition counseling based on cognitive behavioral theory approach  Goals: contemplation-->action  Strategies: Nutrition counseling based on motivational interviewing strategy  Coordination of Nutrition Care by a Nutrition Professional  Collaboration and referral of nutrition care: Collaboration by nutrition professional with other providers  Team centered approach for best outcomes possible      Monitoring and Evaluation (continue as previous)  Food/Nutrient Related History Monitoring  Monitoring and Evaluation Plan: Energy intake  Energy Intake: Estimated energy intake  Criteria: meet needs calculated by RD  Knowledge/Beliefs/Attitudes  Monitoring and Evaluation Plan: Food and nutrition knowledge  Food and nutrition knowledge: Nutrition knowledge of individual client  Criteria: will continue to work with patient on diet for cancer    Time Spent  Prep time on day of patient encounter: 15 minutes  Time spent directly with patient, family or caregiver: 15 minutes  Documentation Time: 5  minutes      Readiness to Change : Good  Level of Understanding : Good  Anticipated Compliant : Good

## 2023-10-30 NOTE — PROGRESS NOTES
ONCOLOGY/HEMATOLOGY PSYCHOSOCIAL ASSESSMENT     Demographic Information  Shruthi Ramos  1965  [id number]  Assessment Type:    Date of assessment: 10/30/23  Person(s) present during assessment: Patient and her   Did patient participate in assessment: yes  If no, why not:  Primary language: English  Interpretive services used: no  Medical Oncologist: Dr. Torres  Diagnosis: ge junction cancer                Marital Status / Family / Household  Status:    Support systems: Congregation and daughter  Primary caregiver:  self  Spirituality / Evangelical / Culture:   calls them daily to check in    history: No  background  Environmental Supports  Current living situation:   house  Patient's home environment: lives on one floor with no steps     Functional Status   Functional status:  Independent   What supports are in place to assist the patient: Pt has bath seat in shower area  What community resources have been offered: Congregation offering great deal of support  Transportation:   is driving her  Psychosocial risk factors impacting the patient:   Having some second thoughts about treatment after having severe diarrhea side effects after first treatment     Assistive Devices  Patient has the following equipment: no devices at this time  Patient currently ambulates:  independently     Finances/Insurance  Primary insurance: [facility] community plan  Secondary insurance:   Prescription insurance:  Does the patient have any pending insurance applications: No   Patient's current income source:    Does the patient have any financial concerns:  not at this time    Comments:      Advance Directives  Nothing on file     Adventist or Spiritual Identity  Comments: heavily involved in her Congregation in [facility]    Mental Health  Mental health history and status details:  none reported  How does the patient describe their current health status: coping after first treatment and with fibromyalgia  What does the  patient do for enjoyment: reads      Social Determinants of Health     Tobacco Use: Low Risk  (10/11/2023)    Patient History     Smoking Tobacco Use: Never     Smokeless Tobacco Use: Never     Passive Exposure: Not on file   Alcohol Use: Not on file   Financial Resource Strain: Not on file   Food Insecurity: Not on file   Transportation Needs: Not on file   Physical Activity: Not on file   Stress: Not on file   Social Connections: Not on file   Intimate Partner Violence: Not on file   Depression: Not at risk (10/30/2023)    PHQ-2     PHQ-2 Score: 0   Housing Stability: Not on file   Utilities: Not on file   Digital Equity: Not on file       Assessment/Plan    Pt is a 57 y/o female dx with  adenocarcinoma of the ge junction. Pt is receiving her second dose of taxotere and oxaliplatin and 5 fu pump today. Pt had trouble with diarrhea and dehydration after last treatment. Pt did come in for fluids and it seemed to help her. Pt has been managing at home but per  she does a great deal of sitting. Pt also has a hx of fibromyalgia. Pt has learned to adapt and deal with this as well over the past 10 years. Pt has a dtr who lives 4 hours away. Pt mentions she has siblings as well who would drive if she needed anything. Per couple no one lives really close in the area though. Pt's  indicated they have a dedicated family within their [employer] that is offering a great deal of support where they live in [place]. They also indicate their  checks in with them daily. Pt's  complains that pt does not get up and get things to drink of eat if he is not there. Pt's  indicated he is  for the [employer]. Pt does feel she can get up and grab something if she needed to. Pt's  seems to disagree with her. At this time pt has a bath seat in shower area and per  he is there to assist her for safety reasons if needed. Introduced Onc KIRBYW-S supportive role and reviewed resources. Pt is not  interested in massage program. Gave her supportive programs at [facility] and also the info on the [employer] salon. Pt cut her hair because it was falling out. Pt had very long hair she cut off and wants to donate it. Pt mentioned she is not used to having her shorter hair and is still grabbing for it. Left a card for them and told them to call with any needs. Let them know Onc MARGARITA would be checking in with them throughout care and treatment for pt to ensure she is coping and managing well.

## 2023-10-30 NOTE — SIGNIFICANT EVENT
10/30/23 1005   Prechemo Checklist   Has the patient been in the hospital, ED, or urgent care since last date of service No   Chemo/Immuno Consent Signed Yes   Protocol/Indications Verified Yes   Confirmed to previous date/time of medication Yes   Compared to previous dose Yes   All medications are dated accurately Yes   Pregnancy Test Negative Not applicable   Parameters Met Yes   BSA/Weight-Height Verified Yes   Dose Calculations Verified Yes

## 2023-10-31 ENCOUNTER — INFUSION (OUTPATIENT)
Dept: HEMATOLOGY/ONCOLOGY | Facility: CLINIC | Age: 58
End: 2023-10-31
Payer: MEDICAID

## 2023-10-31 VITALS
RESPIRATION RATE: 20 BRPM | TEMPERATURE: 99.7 F | SYSTOLIC BLOOD PRESSURE: 157 MMHG | HEART RATE: 74 BPM | DIASTOLIC BLOOD PRESSURE: 80 MMHG | OXYGEN SATURATION: 97 %

## 2023-10-31 DIAGNOSIS — C16.0 ADENOCARCINOMA OF GASTROESOPHAGEAL JUNCTION (MULTI): ICD-10-CM

## 2023-10-31 PROCEDURE — 96372 THER/PROPH/DIAG INJ SC/IM: CPT

## 2023-10-31 PROCEDURE — 96523 IRRIG DRUG DELIVERY DEVICE: CPT

## 2023-10-31 PROCEDURE — 2500000004 HC RX 250 GENERAL PHARMACY W/ HCPCS (ALT 636 FOR OP/ED): Mod: JZ | Performed by: INTERNAL MEDICINE

## 2023-10-31 RX ORDER — HEPARIN 100 UNIT/ML
500 SYRINGE INTRAVENOUS AS NEEDED
Status: CANCELLED | OUTPATIENT
Start: 2023-10-31

## 2023-10-31 RX ORDER — HEPARIN SODIUM,PORCINE/PF 10 UNIT/ML
50 SYRINGE (ML) INTRAVENOUS AS NEEDED
Status: CANCELLED | OUTPATIENT
Start: 2023-10-31

## 2023-10-31 RX ORDER — HEPARIN 100 UNIT/ML
500 SYRINGE INTRAVENOUS AS NEEDED
Status: DISCONTINUED | OUTPATIENT
Start: 2023-10-31 | End: 2023-10-31 | Stop reason: HOSPADM

## 2023-10-31 RX ORDER — HEPARIN SODIUM,PORCINE/PF 10 UNIT/ML
50 SYRINGE (ML) INTRAVENOUS AS NEEDED
Status: DISCONTINUED | OUTPATIENT
Start: 2023-10-31 | End: 2023-10-31 | Stop reason: HOSPADM

## 2023-10-31 RX ADMIN — HEPARIN 500 UNITS: 100 SYRINGE at 16:04

## 2023-10-31 RX ADMIN — PEGFILGRASTIM-CBQV 6 MG: 6 INJECTION, SOLUTION SUBCUTANEOUS at 15:40

## 2023-10-31 ASSESSMENT — PATIENT HEALTH QUESTIONNAIRE - PHQ9
2. FEELING DOWN, DEPRESSED OR HOPELESS: NOT AT ALL
SUM OF ALL RESPONSES TO PHQ9 QUESTIONS 1 AND 2: 0
1. LITTLE INTEREST OR PLEASURE IN DOING THINGS: NOT AT ALL

## 2023-10-31 ASSESSMENT — PAIN SCALES - GENERAL: PAINLEVEL: 0-NO PAIN

## 2023-10-31 NOTE — PROGRESS NOTES
Pt presented this pm at 1500 for pump discontinuation after 24 hr 5 FU home infusion,  states pump was alarming at 1 pm for occlusion, pt called technical support and was instructed to stop the pump.  20 ml remains in the bag at this hour for 2 hr infusion at 10 ml/hr    Per dr Gray it is OK to discontinue pump now and discharge pt home

## 2023-11-01 ENCOUNTER — APPOINTMENT (OUTPATIENT)
Dept: HEMATOLOGY/ONCOLOGY | Facility: CLINIC | Age: 58
End: 2023-11-01
Payer: MEDICAID

## 2023-11-06 ENCOUNTER — HOSPITAL ENCOUNTER (INPATIENT)
Facility: HOSPITAL | Age: 58
LOS: 1 days | Discharge: HOME | End: 2023-11-12
Attending: STUDENT IN AN ORGANIZED HEALTH CARE EDUCATION/TRAINING PROGRAM | Admitting: INTERNAL MEDICINE
Payer: MEDICAID

## 2023-11-06 ENCOUNTER — APPOINTMENT (OUTPATIENT)
Dept: RADIOLOGY | Facility: HOSPITAL | Age: 58
End: 2023-11-06
Payer: MEDICAID

## 2023-11-06 ENCOUNTER — TELEPHONE (OUTPATIENT)
Dept: HEMATOLOGY/ONCOLOGY | Facility: CLINIC | Age: 58
End: 2023-11-06
Payer: MEDICAID

## 2023-11-06 DIAGNOSIS — E86.0 DEHYDRATION: ICD-10-CM

## 2023-11-06 DIAGNOSIS — R00.0 TACHYCARDIA: ICD-10-CM

## 2023-11-06 DIAGNOSIS — R11.2 NAUSEA AND VOMITING, UNSPECIFIED VOMITING TYPE: Primary | ICD-10-CM

## 2023-11-06 DIAGNOSIS — N39.0 URINARY TRACT INFECTION WITHOUT HEMATURIA, SITE UNSPECIFIED: ICD-10-CM

## 2023-11-06 DIAGNOSIS — R19.7 DIARRHEA, UNSPECIFIED TYPE: ICD-10-CM

## 2023-11-06 DIAGNOSIS — E87.6 HYPOKALEMIA: ICD-10-CM

## 2023-11-06 DIAGNOSIS — C16.0 ADENOCARCINOMA OF GASTROESOPHAGEAL JUNCTION (MULTI): ICD-10-CM

## 2023-11-06 LAB
ALBUMIN SERPL BCP-MCNC: 3.5 G/DL (ref 3.4–5)
ALP SERPL-CCNC: 107 U/L (ref 33–110)
ALT SERPL W P-5'-P-CCNC: 48 U/L (ref 7–45)
ANION GAP SERPL CALC-SCNC: 13 MMOL/L (ref 10–20)
APPEARANCE UR: ABNORMAL
AST SERPL W P-5'-P-CCNC: 50 U/L (ref 9–39)
BACTERIA #/AREA URNS AUTO: ABNORMAL /HPF
BASOPHILS # BLD MANUAL: 0.11 X10*3/UL (ref 0–0.1)
BASOPHILS NFR BLD MANUAL: 6 %
BILIRUB SERPL-MCNC: 1.3 MG/DL (ref 0–1.2)
BILIRUB UR STRIP.AUTO-MCNC: NEGATIVE MG/DL
BUN SERPL-MCNC: 11 MG/DL (ref 6–23)
BURR CELLS BLD QL SMEAR: ABNORMAL
CALCIUM SERPL-MCNC: 7.9 MG/DL (ref 8.6–10.3)
CHLORIDE SERPL-SCNC: 104 MMOL/L (ref 98–107)
CO2 SERPL-SCNC: 23 MMOL/L (ref 21–32)
COLOR UR: ABNORMAL
CREAT SERPL-MCNC: 0.59 MG/DL (ref 0.5–1.05)
DACRYOCYTES BLD QL SMEAR: ABNORMAL
EOSINOPHIL # BLD MANUAL: 0 X10*3/UL (ref 0–0.7)
EOSINOPHIL NFR BLD MANUAL: 0 %
ERYTHROCYTE [DISTWIDTH] IN BLOOD BY AUTOMATED COUNT: 15.8 % (ref 11.5–14.5)
GFR SERPL CREATININE-BSD FRML MDRD: >90 ML/MIN/1.73M*2
GLUCOSE SERPL-MCNC: 125 MG/DL (ref 74–99)
GLUCOSE UR STRIP.AUTO-MCNC: NEGATIVE MG/DL
HCT VFR BLD AUTO: 42.5 % (ref 36–46)
HGB BLD-MCNC: 13.9 G/DL (ref 12–16)
IMM GRANULOCYTES # BLD AUTO: 0.01 X10*3/UL (ref 0–0.7)
IMM GRANULOCYTES NFR BLD AUTO: 0.6 % (ref 0–0.9)
KETONES UR STRIP.AUTO-MCNC: ABNORMAL MG/DL
LACTATE SERPL-SCNC: 1.9 MMOL/L (ref 0.4–2)
LEUKOCYTE ESTERASE UR QL STRIP.AUTO: ABNORMAL
LYMPHOCYTES # BLD MANUAL: 0.92 X10*3/UL (ref 1.2–4.8)
LYMPHOCYTES NFR BLD MANUAL: 51 %
MAGNESIUM SERPL-MCNC: 1.76 MG/DL (ref 1.6–2.4)
MCH RBC QN AUTO: 27.9 PG (ref 26–34)
MCHC RBC AUTO-ENTMCNC: 32.7 G/DL (ref 32–36)
MCV RBC AUTO: 85 FL (ref 80–100)
MONOCYTES # BLD MANUAL: 0.4 X10*3/UL (ref 0.1–1)
MONOCYTES NFR BLD MANUAL: 22 %
MUCOUS THREADS #/AREA URNS AUTO: ABNORMAL /LPF
NEUTROPHILS # BLD MANUAL: 0.36 X10*3/UL (ref 1.2–7.7)
NEUTS BAND # BLD MANUAL: 0.04 X10*3/UL (ref 0–0.7)
NEUTS BAND NFR BLD MANUAL: 2 %
NEUTS SEG # BLD MANUAL: 0.32 X10*3/UL (ref 1.2–7)
NEUTS SEG NFR BLD MANUAL: 18 %
NITRITE UR QL STRIP.AUTO: POSITIVE
NRBC BLD-RTO: 0 /100 WBCS (ref 0–0)
PH UR STRIP.AUTO: 5 [PH]
PLATELET # BLD AUTO: 87 X10*3/UL (ref 150–450)
POTASSIUM SERPL-SCNC: 3.2 MMOL/L (ref 3.5–5.3)
PROT SERPL-MCNC: 5.4 G/DL (ref 6.4–8.2)
PROT UR STRIP.AUTO-MCNC: ABNORMAL MG/DL
RBC # BLD AUTO: 4.99 X10*6/UL (ref 4–5.2)
RBC # UR STRIP.AUTO: ABNORMAL /UL
RBC #/AREA URNS AUTO: ABNORMAL /HPF
RBC MORPH BLD: ABNORMAL
SARS-COV-2 RNA RESP QL NAA+PROBE: NOT DETECTED
SCHISTOCYTES BLD QL SMEAR: ABNORMAL
SODIUM SERPL-SCNC: 137 MMOL/L (ref 136–145)
SP GR UR STRIP.AUTO: 1.03
SQUAMOUS #/AREA URNS AUTO: ABNORMAL /HPF
TOTAL CELLS COUNTED BLD: 100
UROBILINOGEN UR STRIP.AUTO-MCNC: <2 MG/DL
VARIANT LYMPHS # BLD MANUAL: 0.02 X10*3/UL (ref 0–0.5)
VARIANT LYMPHS NFR BLD: 1 %
WBC # BLD AUTO: 1.8 X10*3/UL (ref 4.4–11.3)
WBC #/AREA URNS AUTO: ABNORMAL /HPF

## 2023-11-06 PROCEDURE — 85007 BL SMEAR W/DIFF WBC COUNT: CPT | Performed by: STUDENT IN AN ORGANIZED HEALTH CARE EDUCATION/TRAINING PROGRAM

## 2023-11-06 PROCEDURE — 96365 THER/PROPH/DIAG IV INF INIT: CPT

## 2023-11-06 PROCEDURE — 83735 ASSAY OF MAGNESIUM: CPT | Performed by: STUDENT IN AN ORGANIZED HEALTH CARE EDUCATION/TRAINING PROGRAM

## 2023-11-06 PROCEDURE — 87086 URINE CULTURE/COLONY COUNT: CPT | Mod: PARLAB | Performed by: NURSE PRACTITIONER

## 2023-11-06 PROCEDURE — 99222 1ST HOSP IP/OBS MODERATE 55: CPT | Performed by: NURSE PRACTITIONER

## 2023-11-06 PROCEDURE — 85027 COMPLETE CBC AUTOMATED: CPT | Performed by: STUDENT IN AN ORGANIZED HEALTH CARE EDUCATION/TRAINING PROGRAM

## 2023-11-06 PROCEDURE — 96366 THER/PROPH/DIAG IV INF ADDON: CPT

## 2023-11-06 PROCEDURE — 80053 COMPREHEN METABOLIC PANEL: CPT | Performed by: STUDENT IN AN ORGANIZED HEALTH CARE EDUCATION/TRAINING PROGRAM

## 2023-11-06 PROCEDURE — 71046 X-RAY EXAM CHEST 2 VIEWS: CPT | Performed by: RADIOLOGY

## 2023-11-06 PROCEDURE — G0378 HOSPITAL OBSERVATION PER HR: HCPCS

## 2023-11-06 PROCEDURE — 71046 X-RAY EXAM CHEST 2 VIEWS: CPT | Mod: FY

## 2023-11-06 PROCEDURE — 87635 SARS-COV-2 COVID-19 AMP PRB: CPT | Performed by: STUDENT IN AN ORGANIZED HEALTH CARE EDUCATION/TRAINING PROGRAM

## 2023-11-06 PROCEDURE — 83605 ASSAY OF LACTIC ACID: CPT | Performed by: STUDENT IN AN ORGANIZED HEALTH CARE EDUCATION/TRAINING PROGRAM

## 2023-11-06 PROCEDURE — 2500000004 HC RX 250 GENERAL PHARMACY W/ HCPCS (ALT 636 FOR OP/ED): Performed by: STUDENT IN AN ORGANIZED HEALTH CARE EDUCATION/TRAINING PROGRAM

## 2023-11-06 PROCEDURE — 99285 EMERGENCY DEPT VISIT HI MDM: CPT | Mod: 25 | Performed by: STUDENT IN AN ORGANIZED HEALTH CARE EDUCATION/TRAINING PROGRAM

## 2023-11-06 PROCEDURE — C9113 INJ PANTOPRAZOLE SODIUM, VIA: HCPCS | Performed by: NURSE PRACTITIONER

## 2023-11-06 PROCEDURE — 2500000004 HC RX 250 GENERAL PHARMACY W/ HCPCS (ALT 636 FOR OP/ED): Performed by: NURSE PRACTITIONER

## 2023-11-06 PROCEDURE — 96376 TX/PRO/DX INJ SAME DRUG ADON: CPT

## 2023-11-06 PROCEDURE — 81001 URINALYSIS AUTO W/SCOPE: CPT | Performed by: NURSE PRACTITIONER

## 2023-11-06 PROCEDURE — 87186 SC STD MICRODIL/AGAR DIL: CPT | Mod: PARLAB | Performed by: NURSE PRACTITIONER

## 2023-11-06 PROCEDURE — 96375 TX/PRO/DX INJ NEW DRUG ADDON: CPT

## 2023-11-06 RX ORDER — ACETAMINOPHEN 650 MG/1
650 SUPPOSITORY RECTAL EVERY 4 HOURS PRN
Status: DISCONTINUED | OUTPATIENT
Start: 2023-11-06 | End: 2023-11-12 | Stop reason: HOSPADM

## 2023-11-06 RX ORDER — POTASSIUM CHLORIDE 14.9 MG/ML
20 INJECTION INTRAVENOUS ONCE
Status: COMPLETED | OUTPATIENT
Start: 2023-11-06 | End: 2023-11-06

## 2023-11-06 RX ORDER — LANOLIN ALCOHOL/MO/W.PET/CERES
1000 CREAM (GRAM) TOPICAL DAILY
Status: DISCONTINUED | OUTPATIENT
Start: 2023-11-06 | End: 2023-11-12 | Stop reason: HOSPADM

## 2023-11-06 RX ORDER — SODIUM CHLORIDE 9 MG/ML
100 INJECTION, SOLUTION INTRAVENOUS CONTINUOUS
Status: DISCONTINUED | OUTPATIENT
Start: 2023-11-06 | End: 2023-11-12

## 2023-11-06 RX ORDER — DULOXETIN HYDROCHLORIDE 30 MG/1
60 CAPSULE, DELAYED RELEASE ORAL
Status: DISCONTINUED | OUTPATIENT
Start: 2023-11-07 | End: 2023-11-12 | Stop reason: HOSPADM

## 2023-11-06 RX ORDER — CHOLECALCIFEROL (VITAMIN D3) 25 MCG
1000 TABLET ORAL EVERY MORNING
Status: DISCONTINUED | OUTPATIENT
Start: 2023-11-07 | End: 2023-11-12 | Stop reason: HOSPADM

## 2023-11-06 RX ORDER — MAGNESIUM SULFATE HEPTAHYDRATE 40 MG/ML
2 INJECTION, SOLUTION INTRAVENOUS ONCE
Status: COMPLETED | OUTPATIENT
Start: 2023-11-06 | End: 2023-11-06

## 2023-11-06 RX ORDER — ESTRADIOL 0.1 MG/G
3 CREAM VAGINAL
Status: DISCONTINUED | OUTPATIENT
Start: 2023-11-06 | End: 2023-11-12 | Stop reason: HOSPADM

## 2023-11-06 RX ORDER — TRAZODONE HYDROCHLORIDE 50 MG/1
50 TABLET ORAL NIGHTLY
Status: DISCONTINUED | OUTPATIENT
Start: 2023-11-06 | End: 2023-11-12 | Stop reason: HOSPADM

## 2023-11-06 RX ORDER — ACETAMINOPHEN 325 MG/1
650 TABLET ORAL EVERY 4 HOURS PRN
Status: DISCONTINUED | OUTPATIENT
Start: 2023-11-06 | End: 2023-11-12 | Stop reason: HOSPADM

## 2023-11-06 RX ORDER — METOPROLOL SUCCINATE 50 MG/1
50 TABLET, EXTENDED RELEASE ORAL
Status: DISCONTINUED | OUTPATIENT
Start: 2023-11-07 | End: 2023-11-12 | Stop reason: HOSPADM

## 2023-11-06 RX ORDER — ACETAMINOPHEN 160 MG/5ML
650 SOLUTION ORAL EVERY 4 HOURS PRN
Status: DISCONTINUED | OUTPATIENT
Start: 2023-11-06 | End: 2023-11-12 | Stop reason: HOSPADM

## 2023-11-06 RX ORDER — ATORVASTATIN CALCIUM 20 MG/1
20 TABLET, FILM COATED ORAL NIGHTLY
Status: DISCONTINUED | OUTPATIENT
Start: 2023-11-06 | End: 2023-11-12 | Stop reason: HOSPADM

## 2023-11-06 RX ORDER — MULTIVIT-MIN/IRON FUM/FOLIC AC 7.5 MG-4
1 TABLET ORAL DAILY
Status: DISCONTINUED | OUTPATIENT
Start: 2023-11-06 | End: 2023-11-12 | Stop reason: HOSPADM

## 2023-11-06 RX ORDER — PANTOPRAZOLE SODIUM 40 MG/10ML
40 INJECTION, POWDER, LYOPHILIZED, FOR SOLUTION INTRAVENOUS DAILY
Status: DISCONTINUED | OUTPATIENT
Start: 2023-11-06 | End: 2023-11-12 | Stop reason: HOSPADM

## 2023-11-06 RX ORDER — ONDANSETRON HYDROCHLORIDE 2 MG/ML
4 INJECTION, SOLUTION INTRAVENOUS EVERY 6 HOURS PRN
Status: DISCONTINUED | OUTPATIENT
Start: 2023-11-06 | End: 2023-11-12 | Stop reason: HOSPADM

## 2023-11-06 RX ORDER — L. ACIDOPHILUS/L.BULGARICUS 1MM CELL
1 TABLET ORAL DAILY
Status: DISCONTINUED | OUTPATIENT
Start: 2023-11-07 | End: 2023-11-12 | Stop reason: HOSPADM

## 2023-11-06 RX ADMIN — POTASSIUM CHLORIDE 20 MEQ: 14.9 INJECTION, SOLUTION INTRAVENOUS at 17:05

## 2023-11-06 RX ADMIN — SODIUM CHLORIDE 100 ML/HR: 9 INJECTION, SOLUTION INTRAVENOUS at 19:34

## 2023-11-06 RX ADMIN — PANTOPRAZOLE SODIUM 40 MG: 40 INJECTION, POWDER, FOR SOLUTION INTRAVENOUS at 19:27

## 2023-11-06 RX ADMIN — ONDANSETRON 4 MG: 2 INJECTION INTRAMUSCULAR; INTRAVENOUS at 21:24

## 2023-11-06 RX ADMIN — MAGNESIUM SULFATE HEPTAHYDRATE 2 G: 40 INJECTION, SOLUTION INTRAVENOUS at 19:27

## 2023-11-06 RX ADMIN — SODIUM CHLORIDE 1000 ML: 9 INJECTION, SOLUTION INTRAVENOUS at 12:45

## 2023-11-06 SDOH — SOCIAL STABILITY: SOCIAL INSECURITY: DO YOU FEEL ANYONE HAS EXPLOITED OR TAKEN ADVANTAGE OF YOU FINANCIALLY OR OF YOUR PERSONAL PROPERTY?: NO

## 2023-11-06 SDOH — SOCIAL STABILITY: SOCIAL INSECURITY: DO YOU FEEL UNSAFE GOING BACK TO THE PLACE WHERE YOU ARE LIVING?: NO

## 2023-11-06 SDOH — SOCIAL STABILITY: SOCIAL INSECURITY: ARE THERE ANY APPARENT SIGNS OF INJURIES/BEHAVIORS THAT COULD BE RELATED TO ABUSE/NEGLECT?: NO

## 2023-11-06 SDOH — SOCIAL STABILITY: SOCIAL INSECURITY: ABUSE: ADULT

## 2023-11-06 SDOH — SOCIAL STABILITY: SOCIAL INSECURITY: WERE YOU ABLE TO COMPLETE ALL THE BEHAVIORAL HEALTH SCREENINGS?: YES

## 2023-11-06 SDOH — SOCIAL STABILITY: SOCIAL INSECURITY: HAVE YOU HAD THOUGHTS OF HARMING ANYONE ELSE?: NO

## 2023-11-06 SDOH — SOCIAL STABILITY: SOCIAL INSECURITY: ARE YOU OR HAVE YOU BEEN THREATENED OR ABUSED PHYSICALLY, EMOTIONALLY, OR SEXUALLY BY ANYONE?: NO

## 2023-11-06 SDOH — SOCIAL STABILITY: SOCIAL INSECURITY: DOES ANYONE TRY TO KEEP YOU FROM HAVING/CONTACTING OTHER FRIENDS OR DOING THINGS OUTSIDE YOUR HOME?: NO

## 2023-11-06 SDOH — SOCIAL STABILITY: SOCIAL INSECURITY: HAS ANYONE EVER THREATENED TO HURT YOUR FAMILY OR YOUR PETS?: NO

## 2023-11-06 ASSESSMENT — COLUMBIA-SUICIDE SEVERITY RATING SCALE - C-SSRS
1. IN THE PAST MONTH, HAVE YOU WISHED YOU WERE DEAD OR WISHED YOU COULD GO TO SLEEP AND NOT WAKE UP?: NO
2. HAVE YOU ACTUALLY HAD ANY THOUGHTS OF KILLING YOURSELF?: NO
2. HAVE YOU ACTUALLY HAD ANY THOUGHTS OF KILLING YOURSELF?: NO
6. HAVE YOU EVER DONE ANYTHING, STARTED TO DO ANYTHING, OR PREPARED TO DO ANYTHING TO END YOUR LIFE?: NO
6. HAVE YOU EVER DONE ANYTHING, STARTED TO DO ANYTHING, OR PREPARED TO DO ANYTHING TO END YOUR LIFE?: NO
1. IN THE PAST MONTH, HAVE YOU WISHED YOU WERE DEAD OR WISHED YOU COULD GO TO SLEEP AND NOT WAKE UP?: NO

## 2023-11-06 ASSESSMENT — LIFESTYLE VARIABLES
SKIP TO QUESTIONS 9-10: 1
HOW MANY STANDARD DRINKS CONTAINING ALCOHOL DO YOU HAVE ON A TYPICAL DAY: PATIENT DOES NOT DRINK
HOW OFTEN DO YOU HAVE A DRINK CONTAINING ALCOHOL: NEVER
AUDIT-C TOTAL SCORE: 0
SUBSTANCE_ABUSE_PAST_12_MONTHS: NO
HOW OFTEN DO YOU HAVE 6 OR MORE DRINKS ON ONE OCCASION: NEVER
AUDIT-C TOTAL SCORE: 0
PRESCIPTION_ABUSE_PAST_12_MONTHS: NO

## 2023-11-06 ASSESSMENT — ACTIVITIES OF DAILY LIVING (ADL)
DRESSING YOURSELF: INDEPENDENT
TOILETING: INDEPENDENT
WALKS IN HOME: NEEDS ASSISTANCE
JUDGMENT_ADEQUATE_SAFELY_COMPLETE_DAILY_ACTIVITIES: YES
HEARING - LEFT EAR: FUNCTIONAL
GROOMING: INDEPENDENT
ADEQUATE_TO_COMPLETE_ADL: YES
LACK_OF_TRANSPORTATION: NO
BATHING: INDEPENDENT
HEARING - RIGHT EAR: FUNCTIONAL
PATIENT'S MEMORY ADEQUATE TO SAFELY COMPLETE DAILY ACTIVITIES?: YES
FEEDING YOURSELF: INDEPENDENT

## 2023-11-06 ASSESSMENT — PATIENT HEALTH QUESTIONNAIRE - PHQ9
SUM OF ALL RESPONSES TO PHQ9 QUESTIONS 1 & 2: 0
2. FEELING DOWN, DEPRESSED OR HOPELESS: NOT AT ALL
1. LITTLE INTEREST OR PLEASURE IN DOING THINGS: NOT AT ALL

## 2023-11-06 ASSESSMENT — COGNITIVE AND FUNCTIONAL STATUS - GENERAL
CLIMB 3 TO 5 STEPS WITH RAILING: A LITTLE
PATIENT BASELINE BEDBOUND: NO
DAILY ACTIVITIY SCORE: 24
MOBILITY SCORE: 22
WALKING IN HOSPITAL ROOM: A LITTLE
WALKING IN HOSPITAL ROOM: A LITTLE
MOBILITY SCORE: 22
CLIMB 3 TO 5 STEPS WITH RAILING: A LITTLE
DAILY ACTIVITIY SCORE: 24

## 2023-11-06 ASSESSMENT — PAIN SCALES - GENERAL
PAINLEVEL_OUTOF10: 0 - NO PAIN
PAINLEVEL_OUTOF10: 2

## 2023-11-06 ASSESSMENT — PAIN DESCRIPTION - LOCATION: LOCATION: THROAT

## 2023-11-06 ASSESSMENT — PAIN - FUNCTIONAL ASSESSMENT
PAIN_FUNCTIONAL_ASSESSMENT: 0-10
PAIN_FUNCTIONAL_ASSESSMENT: 0-10

## 2023-11-06 NOTE — H&P
History Of Present Illness  Shruthi Ramos is a 58 y.o. female with pmhx HTN, HLD, gerd with previous h. Pylori, anxiety/depression, and adenocarcinoma of GE junction currently on chemo who presented to the ED with c/o nausea, vomiting, and diarrhea.  Patient's  is at bedside.  Patient is currently on chemotherapy with last sessions on 10/30 and 10/31/2023.  Patient states she woke up yesterday and felt nauseated and vomited after drinking water.  States she did not eat much for the rest of the day.  States today she woke up to having diarrhea.  States she tried some Imodium and Zofran however that did not help and she had several episodes of diarrhea and emesis before presenting to the emergency department.  She denies hematochezia, melena or hematemesis.  States she had a fever around 99 today.  She denies chest pain.  States she is short of breath chronically short of breath however it is no worse than normal. She complains of dysuria without hematuria.  PCP is Dr. Cheung.  Oncologist is Dr. Torres.      In the emergency department today, lab work revealed sodium 137, potassium 3.2, chloride 104, bicarbonate 23, BUN 11, creatinine 0.59, glucose 125.  Calcium 7.9, albumin 3.5, total protein 5.4.  Alkaline phosphatase 107, ALT 48, AST 50, total bilirubin 1.3.  Magnesium 1.76.  Lactic 1.9.  WBC 8.1, H/H13.9/42.5, platelet 87.  COVID-19 not detected.  Patient received potassium and normal saline in the emergency department.    Patient will be observed on the medical floor by Dr. Gaudencio Burks who will continue to care for this patient.  I was asked to do the initial orders and H&P.  Consult to oncology, Dr. Torres, for chemo related n/v/d, leukopenia and thrombocytopenia.      Past Medical History  Past Medical History:   Diagnosis Date    Depression     Hyperlipidemia     Hypertension     Osteoarthritis     PONV (postoperative nausea and vomiting)     Status post cholecystectomy        Surgical  "History  Past Surgical History:   Procedure Laterality Date    ANKLE FRACTURE SURGERY       SECTION, CLASSIC      x2        Social History  She reports that she has never smoked. She has never used smokeless tobacco. She reports that she does not drink alcohol and does not use drugs.  Denies etoh or tobacco  Lives at home with     Family History  No family history on file.   Cancer, CAD     Allergies  Morphine, Sutures, and Other    Review of Systems  10 point ROS negative except what is states in ROS.     Physical Exam  HENT:      Head: Normocephalic and atraumatic.      Nose: Nose normal.      Mouth/Throat:      Mouth: Mucous membranes are dry.   Eyes:      Conjunctiva/sclera: Conjunctivae normal.      Pupils: Pupils are equal, round, and reactive to light.   Cardiovascular:      Rate and Rhythm: Tachycardia present.      Pulses: Normal pulses.      Comments: Mildy tachycardia  Bedside tele show -110s  Pulmonary:      Effort: Pulmonary effort is normal.      Breath sounds: Normal breath sounds.      Comments: Dim t/o, non labored breaths   Abdominal:      General: Bowel sounds are normal.      Palpations: Abdomen is soft.      Tenderness: There is no abdominal tenderness.   Genitourinary:     Comments: defer  Musculoskeletal:         General: No swelling or tenderness. Normal range of motion.      Cervical back: Normal range of motion.   Skin:     General: Skin is warm and dry.   Neurological:      General: No focal deficit present.      Mental Status: She is alert and oriented to person, place, and time. Mental status is at baseline.   Psychiatric:         Mood and Affect: Mood normal.         Behavior: Behavior normal.         Thought Content: Thought content normal.         Judgment: Judgment normal.            Last Recorded Vitals  Blood pressure 166/90, pulse (!) 112, temperature 37.6 °C (99.6 °F), temperature source Oral, resp. rate 22, height 1.549 m (5' 1\"), weight 102 kg (225 lb), " SpO2 99 %.    Medications   Scheduled medications  magnesium sulfate, 2 g, intravenous, Once  potassium chloride, 20 mEq, intravenous, Once      Continuous medications     PRN medications     Relevant Results  Results for orders placed or performed during the hospital encounter of 11/06/23 (from the past 24 hour(s))   Lactate   Result Value Ref Range    Lactate 1.9 0.4 - 2.0 mmol/L   SARS-CoV-2 RT PCR   Result Value Ref Range    Coronavirus 2019, PCR Not Detected Not Detected   Comprehensive metabolic panel   Result Value Ref Range    Glucose 125 (H) 74 - 99 mg/dL    Sodium 137 136 - 145 mmol/L    Potassium 3.2 (L) 3.5 - 5.3 mmol/L    Chloride 104 98 - 107 mmol/L    Bicarbonate 23 21 - 32 mmol/L    Anion Gap 13 10 - 20 mmol/L    Urea Nitrogen 11 6 - 23 mg/dL    Creatinine 0.59 0.50 - 1.05 mg/dL    eGFR >90 >60 mL/min/1.73m*2    Calcium 7.9 (L) 8.6 - 10.3 mg/dL    Albumin 3.5 3.4 - 5.0 g/dL    Alkaline Phosphatase 107 33 - 110 U/L    Total Protein 5.4 (L) 6.4 - 8.2 g/dL    AST 50 (H) 9 - 39 U/L    Bilirubin, Total 1.3 (H) 0.0 - 1.2 mg/dL    ALT 48 (H) 7 - 45 U/L   Magnesium   Result Value Ref Range    Magnesium 1.76 1.60 - 2.40 mg/dL   CBC and Auto Differential   Result Value Ref Range    WBC 1.8 (L) 4.4 - 11.3 x10*3/uL    nRBC 0.0 0.0 - 0.0 /100 WBCs    RBC 4.99 4.00 - 5.20 x10*6/uL    Hemoglobin 13.9 12.0 - 16.0 g/dL    Hematocrit 42.5 36.0 - 46.0 %    MCV 85 80 - 100 fL    MCH 27.9 26.0 - 34.0 pg    MCHC 32.7 32.0 - 36.0 g/dL    RDW 15.8 (H) 11.5 - 14.5 %    Platelets 87 (L) 150 - 450 x10*3/uL    Immature Granulocytes %, Automated 0.6 0.0 - 0.9 %    Immature Granulocytes Absolute, Automated 0.01 0.00 - 0.70 x10*3/uL   Manual Differential   Result Value Ref Range    Neutrophils %, Manual 18.0 40.0 - 80.0 %    Bands %, Manual 2.0 0.0 - 5.0 %    Lymphocytes %, Manual 51.0 13.0 - 44.0 %    Monocytes %, Manual 22.0 2.0 - 10.0 %    Eosinophils %, Manual 0.0 0.0 - 6.0 %    Basophils %, Manual 6.0 0.0 - 2.0 %    Atypical  Lymphocytes %, Manual 1.0 0.0 - 2.0 %    Seg Neutrophils Absolute, Manual 0.32 (L) 1.20 - 7.00 x10*3/uL    Bands Absolute, Manual 0.04 0.00 - 0.70 x10*3/uL    Lymphocytes Absolute, Manual 0.92 (L) 1.20 - 4.80 x10*3/uL    Monocytes Absolute, Manual 0.40 0.10 - 1.00 x10*3/uL    Eosinophils Absolute, Manual 0.00 0.00 - 0.70 x10*3/uL    Basophils Absolute, Manual 0.11 (H) 0.00 - 0.10 x10*3/uL    Atypical Lymphs Absolute, Manual 0.02 0.00 - 0.50 x10*3/uL    Total Cells Counted 100     Neutrophils Absolute, Manual 0.36 (L) 1.20 - 7.70 x10*3/uL    RBC Morphology See Below     RBC Fragments Few     Teardrop Cells Few     Olden Cells Few       No imaging results found.          Assessment/Plan   Principal Problem:    Nausea & vomiting  Nausea, vomiting, diarrhea, dehydration   Labs reviewed  -observe on RMF  -check cxr  -con't NS @ 100cc/hr  -PRN zofran  -Protonix 40mg IV daily   -CLD if can tolerate   -check UA  -cbc, cmp, mag in am    Abnormal electrolytes  K 3.2  Mag 1.73  -kcl given in ED  -give mag 2gram IV  -cmp, mag in am    Leukopenia, thrombocytopenia   WBC 1.8, previously WNL  Ptls 87, previously WNL  Last chemo 10/30 + 10/31  - Oncology consult as above     Mildly elevated LFTs  -monitor    HTN, HLD  -con't home meds: Atorvastatin and Toprol-XL  -hold spironolactone 2/2 #1    DVT prophylaxis   -oob with assist  -scds  -no AC d/t thrombocytopenia        I spent 45 minutes in the professional and overall care of this patient.      Geovanni Holder, APRN-CNP

## 2023-11-06 NOTE — ED TRIAGE NOTES
Pt undergoing tx for esophageal/gastric CA, pt last chemo tx was Monday/Tuesday. Pt with nausea and diarrhea since, worsening since tx. Pt took dose of immodium at home with some relief to diarrhea. Pt tachycardic in triage, BP normotensive. Pt denies any abdominal pain, pt endorses pain to throat.

## 2023-11-06 NOTE — TELEPHONE ENCOUNTER
Dr. Torres patient. Patients  called stating that patient had her 2nd treatment of chemo and for the last three days can't keep any water or food down and wanted to know what to do. I talked to the nurse partner  Rosa and under the direction of the nurse told the  to take the patient to the emergency room.  voiced understanding and will take her now.

## 2023-11-06 NOTE — ED PROVIDER NOTES
HPI   Chief Complaint   Patient presents with    Nausea    Diarrhea       58-year-old female past medical history of no carcinoma of the gastroesophageal junction, hypertension and hyperglycemia chronic kidney disease presented to the emergency department for nausea vomiting and not being able to tolerate oral intake.  She states that this occurred after she started her chemotherapy infusion.  This is her second round.  She denies any chest pain abdominal pain fever chills or any other muscle aches and pains she has no other history provided at this time.                          Dragan Coma Scale Score: 15                  Patient History   Past Medical History:   Diagnosis Date    Depression     Hyperlipidemia     Hypertension     Osteoarthritis     PONV (postoperative nausea and vomiting)     Status post cholecystectomy      Past Surgical History:   Procedure Laterality Date    ANKLE FRACTURE SURGERY       SECTION, CLASSIC      x2     No family history on file.  Social History     Tobacco Use    Smoking status: Never    Smokeless tobacco: Never   Vaping Use    Vaping Use: Not on file   Substance Use Topics    Alcohol use: Never    Drug use: Never       Physical Exam   ED Triage Vitals [23 1108]   Temp Heart Rate Resp BP   36.3 °C (97.3 °F) (!) 140 18 118/90      SpO2 Temp Source Heart Rate Source Patient Position   96 % Temporal -- --      BP Location FiO2 (%)     -- --       Physical Exam  Vitals reviewed.   Constitutional:       Appearance: Normal appearance.   HENT:      Head: Normocephalic and atraumatic.      Nose: Nose normal.      Mouth/Throat:      Mouth: Mucous membranes are dry.   Eyes:      Extraocular Movements: Extraocular movements intact.      Conjunctiva/sclera: Conjunctivae normal.   Cardiovascular:      Rate and Rhythm: Normal rate and regular rhythm.      Pulses: Normal pulses.      Heart sounds: Normal heart sounds.   Pulmonary:      Effort: Pulmonary effort is normal.       Breath sounds: Normal breath sounds.   Abdominal:      General: Abdomen is flat. Bowel sounds are normal.      Palpations: Abdomen is soft.   Musculoskeletal:         General: Normal range of motion.   Skin:     General: Skin is warm and dry.      Capillary Refill: Capillary refill takes less than 2 seconds.   Neurological:      Mental Status: She is alert and oriented to person, place, and time. Mental status is at baseline.      Cranial Nerves: Cranial nerves 2-12 are intact.      Sensory: Sensation is intact.      Motor: Motor function is intact.   Psychiatric:         Mood and Affect: Mood normal.         ED Course & MDM   ED Course as of 11/06/23 1650 Mon Nov 06, 2023   1244 58-year-old history of adenocarcinoma of the gastroesophageal junction, hypertension hyperglycemia, chronic kidney disease presenting to the emergency department for nausea vomiting.  On examination patient was tachycardic in the 120s otherwise hemodynamically stable.  She is afebrile.  Equal breath sounds bilaterally abdomen nontender dry mucous membranes.  Differential diagnosis includes electrolyte abnormality such as low potassium, low magnesium, dehydration.  IV fluids will be given and patient will be routinely reevaluate. [ZS]   1550 CMP shows mildly decreased potassium 3.2 lactate 1.9 mag 1.7 tachycardia is improving with IV fluids patient will be admitted CBC still pending awaiting that result for admission. [ZS]   1649 Patient has low blood cell count was 1.8 which is consistent with her recent chemotherapy.  She will be admitted to medicine at this time [ZS]      ED Course User Index  [ZS] Macho Hammond MD         Diagnoses as of 11/06/23 1650   Nausea and vomiting, unspecified vomiting type   Dehydration   Hypokalemia   Tachycardia     EKG shows sinus tachycardia ventricular 146  QRS 60 Cutimed to C 532.  Medical Decision Making      Procedure  Procedures     Macho Hammond MD  11/06/23 1700       Macho Hammond,  MD  12/13/23 0809

## 2023-11-07 LAB
ALBUMIN SERPL BCP-MCNC: 3.3 G/DL (ref 3.4–5)
ALP SERPL-CCNC: 100 U/L (ref 33–110)
ALT SERPL W P-5'-P-CCNC: 53 U/L (ref 7–45)
ANION GAP SERPL CALC-SCNC: 16 MMOL/L (ref 10–20)
AST SERPL W P-5'-P-CCNC: 49 U/L (ref 9–39)
BILIRUB SERPL-MCNC: 1.1 MG/DL (ref 0–1.2)
BUN SERPL-MCNC: 7 MG/DL (ref 6–23)
CALCIUM SERPL-MCNC: 7.9 MG/DL (ref 8.6–10.3)
CHLORIDE SERPL-SCNC: 108 MMOL/L (ref 98–107)
CO2 SERPL-SCNC: 19 MMOL/L (ref 21–32)
CREAT SERPL-MCNC: 0.78 MG/DL (ref 0.5–1.05)
ERYTHROCYTE [DISTWIDTH] IN BLOOD BY AUTOMATED COUNT: 16 % (ref 11.5–14.5)
GFR SERPL CREATININE-BSD FRML MDRD: 88 ML/MIN/1.73M*2
GLUCOSE SERPL-MCNC: 117 MG/DL (ref 74–99)
HCT VFR BLD AUTO: 41.2 % (ref 36–46)
HGB BLD-MCNC: 13.1 G/DL (ref 12–16)
MAGNESIUM SERPL-MCNC: 2.1 MG/DL (ref 1.6–2.4)
MCH RBC QN AUTO: 27.6 PG (ref 26–34)
MCHC RBC AUTO-ENTMCNC: 31.8 G/DL (ref 32–36)
MCV RBC AUTO: 87 FL (ref 80–100)
NRBC BLD-RTO: 0 /100 WBCS (ref 0–0)
PLATELET # BLD AUTO: 126 X10*3/UL (ref 150–450)
POTASSIUM SERPL-SCNC: 4 MMOL/L (ref 3.5–5.3)
PROT SERPL-MCNC: 4.7 G/DL (ref 6.4–8.2)
RBC # BLD AUTO: 4.75 X10*6/UL (ref 4–5.2)
SODIUM SERPL-SCNC: 139 MMOL/L (ref 136–145)
WBC # BLD AUTO: 2.7 X10*3/UL (ref 4.4–11.3)

## 2023-11-07 PROCEDURE — C9113 INJ PANTOPRAZOLE SODIUM, VIA: HCPCS | Performed by: NURSE PRACTITIONER

## 2023-11-07 PROCEDURE — 87493 C DIFF AMPLIFIED PROBE: CPT | Mod: PARLAB

## 2023-11-07 PROCEDURE — 2500000001 HC RX 250 WO HCPCS SELF ADMINISTERED DRUGS (ALT 637 FOR MEDICARE OP): Performed by: NURSE PRACTITIONER

## 2023-11-07 PROCEDURE — 85027 COMPLETE CBC AUTOMATED: CPT | Performed by: INTERNAL MEDICINE

## 2023-11-07 PROCEDURE — 80053 COMPREHEN METABOLIC PANEL: CPT | Performed by: INTERNAL MEDICINE

## 2023-11-07 PROCEDURE — 2500000001 HC RX 250 WO HCPCS SELF ADMINISTERED DRUGS (ALT 637 FOR MEDICARE OP): Performed by: INTERNAL MEDICINE

## 2023-11-07 PROCEDURE — 97165 OT EVAL LOW COMPLEX 30 MIN: CPT | Mod: GO

## 2023-11-07 PROCEDURE — 2500000004 HC RX 250 GENERAL PHARMACY W/ HCPCS (ALT 636 FOR OP/ED): Performed by: NURSE PRACTITIONER

## 2023-11-07 PROCEDURE — 2500000005 HC RX 250 GENERAL PHARMACY W/O HCPCS

## 2023-11-07 PROCEDURE — G0378 HOSPITAL OBSERVATION PER HR: HCPCS

## 2023-11-07 PROCEDURE — 83735 ASSAY OF MAGNESIUM: CPT | Performed by: INTERNAL MEDICINE

## 2023-11-07 PROCEDURE — 87506 IADNA-DNA/RNA PROBE TQ 6-11: CPT

## 2023-11-07 RX ORDER — METOPROLOL TARTRATE 1 MG/ML
5 INJECTION, SOLUTION INTRAVENOUS ONCE
Status: COMPLETED | OUTPATIENT
Start: 2023-11-07 | End: 2023-11-07

## 2023-11-07 RX ADMIN — ONDANSETRON 4 MG: 2 INJECTION INTRAMUSCULAR; INTRAVENOUS at 05:20

## 2023-11-07 RX ADMIN — METOPROLOL TARTRATE 5 MG: 5 INJECTION INTRAVENOUS at 05:32

## 2023-11-07 RX ADMIN — CHOLECALCIFEROL TAB 25 MCG (1000 UNIT) 1000 UNITS: 25 TAB at 09:00

## 2023-11-07 RX ADMIN — Medication 1 TABLET: at 09:00

## 2023-11-07 RX ADMIN — PANTOPRAZOLE SODIUM 40 MG: 40 INJECTION, POWDER, FOR SOLUTION INTRAVENOUS at 09:00

## 2023-11-07 RX ADMIN — ATORVASTATIN CALCIUM 20 MG: 20 TABLET, FILM COATED ORAL at 20:42

## 2023-11-07 RX ADMIN — ONDANSETRON 4 MG: 2 INJECTION INTRAMUSCULAR; INTRAVENOUS at 16:02

## 2023-11-07 RX ADMIN — DULOXETINE HYDROCHLORIDE 60 MG: 30 CAPSULE, DELAYED RELEASE ORAL at 09:00

## 2023-11-07 RX ADMIN — ONDANSETRON 4 MG: 2 INJECTION INTRAMUSCULAR; INTRAVENOUS at 22:06

## 2023-11-07 RX ADMIN — TRAZODONE HYDROCHLORIDE 50 MG: 50 TABLET ORAL at 20:41

## 2023-11-07 RX ADMIN — METOPROLOL SUCCINATE 50 MG: 50 TABLET, EXTENDED RELEASE ORAL at 09:00

## 2023-11-07 SDOH — SOCIAL STABILITY: SOCIAL INSECURITY: ABUSE: ADULT

## 2023-11-07 SDOH — SOCIAL STABILITY: SOCIAL INSECURITY: WERE YOU ABLE TO COMPLETE ALL THE BEHAVIORAL HEALTH SCREENINGS?: YES

## 2023-11-07 SDOH — SOCIAL STABILITY: SOCIAL INSECURITY: DO YOU FEEL ANYONE HAS EXPLOITED OR TAKEN ADVANTAGE OF YOU FINANCIALLY OR OF YOUR PERSONAL PROPERTY?: NO

## 2023-11-07 SDOH — SOCIAL STABILITY: SOCIAL INSECURITY: DO YOU FEEL UNSAFE GOING BACK TO THE PLACE WHERE YOU ARE LIVING?: NO

## 2023-11-07 SDOH — SOCIAL STABILITY: SOCIAL INSECURITY: DOES ANYONE TRY TO KEEP YOU FROM HAVING/CONTACTING OTHER FRIENDS OR DOING THINGS OUTSIDE YOUR HOME?: NO

## 2023-11-07 SDOH — SOCIAL STABILITY: SOCIAL INSECURITY: ARE THERE ANY APPARENT SIGNS OF INJURIES/BEHAVIORS THAT COULD BE RELATED TO ABUSE/NEGLECT?: NO

## 2023-11-07 SDOH — SOCIAL STABILITY: SOCIAL INSECURITY: HAS ANYONE EVER THREATENED TO HURT YOUR FAMILY OR YOUR PETS?: NO

## 2023-11-07 SDOH — SOCIAL STABILITY: SOCIAL INSECURITY: HAVE YOU HAD THOUGHTS OF HARMING ANYONE ELSE?: NO

## 2023-11-07 SDOH — SOCIAL STABILITY: SOCIAL INSECURITY: ARE YOU OR HAVE YOU BEEN THREATENED OR ABUSED PHYSICALLY, EMOTIONALLY, OR SEXUALLY BY ANYONE?: NO

## 2023-11-07 ASSESSMENT — COGNITIVE AND FUNCTIONAL STATUS - GENERAL
MOBILITY SCORE: 24
DAILY ACTIVITIY SCORE: 24
DAILY ACTIVITIY SCORE: 24
MOBILITY SCORE: 24

## 2023-11-07 ASSESSMENT — PATIENT HEALTH QUESTIONNAIRE - PHQ9
2. FEELING DOWN, DEPRESSED OR HOPELESS: NOT AT ALL
SUM OF ALL RESPONSES TO PHQ9 QUESTIONS 1 & 2: 0
1. LITTLE INTEREST OR PLEASURE IN DOING THINGS: NOT AT ALL

## 2023-11-07 ASSESSMENT — LIFESTYLE VARIABLES
HOW OFTEN DO YOU HAVE 6 OR MORE DRINKS ON ONE OCCASION: NEVER
AUDIT-C TOTAL SCORE: 0
HOW MANY STANDARD DRINKS CONTAINING ALCOHOL DO YOU HAVE ON A TYPICAL DAY: PATIENT DOES NOT DRINK
HOW OFTEN DO YOU HAVE A DRINK CONTAINING ALCOHOL: NEVER
SKIP TO QUESTIONS 9-10: 1
AUDIT-C TOTAL SCORE: 0

## 2023-11-07 ASSESSMENT — PAIN SCALES - GENERAL
PAINLEVEL_OUTOF10: 0 - NO PAIN

## 2023-11-07 NOTE — PROGRESS NOTES
TCC Note: Reviewed Therapy notes. Recommendation is Home no needs. Discharge plan remains to return home with Spouse with no needs. Will continue to follow pt for any change in status or adjustment to discharge plan. Thea Oreilly, MSN, RN, TCC.

## 2023-11-07 NOTE — PROGRESS NOTES
Care transitions assessment completed via bedside with patient and spouse, Jhon. TCC introduced self and explained role. Demographics verified. Patient from home with spouse.  Independent PTA. Denies use of assistive devices. Denies SW needs at this time. Patient anticipates no skilled needs at discharge. Dispo pending hospital course and PT/OT evals. Patient's family to transport home at time of discharge. TCC to continue to follow for discharge planning needs.      PCP: Sebastian Cheung Last seen: 6 months ago        11/06/23 2001   Discharge Planning   Living Arrangements Spouse/significant other   Support Systems Spouse/significant other   Assistance Needed Independent   Type of Residence Private residence   Number of Stairs to Enter Residence 5   Do you have animals or pets at home? No   Home or Post Acute Services None   Patient expects to be discharged to: Home   Does the patient need discharge transport arranged? No   Financial Resource Strain   How hard is it for you to pay for the very basics like food, housing, medical care, and heating? Not hard   Housing Stability   In the last 12 months, was there a time when you were not able to pay the mortgage or rent on time? N   In the last 12 months, how many places have you lived? 1   In the last 12 months, was there a time when you did not have a steady place to sleep or slept in a shelter (including now)? N   Transportation Needs   In the past 12 months, has lack of transportation kept you from medical appointments or from getting medications? no   In the past 12 months, has lack of transportation kept you from meetings, work, or from getting things needed for daily living? No          Bailey Espana RN ED TCC

## 2023-11-07 NOTE — PROGRESS NOTES
Occupational Therapy    Occupational Therapy    Evaluation    Patient Name: Shruthi Ramos  MRN: [id number]  Today's Date: 11/7/2023  Time Calculation  Start Time: 1013  Stop Time: 1024  Time Calculation (min): 11 min    Assessment  IP OT Assessment  OT Assessment: pt. at her baseline for adls. no skilled OT needs.  End of Session Patient Position:  (pt. in bathroom on commode with call light in reach.)    Plan:  No Skilled OT: Independent with ADLs  OT - OK to Discharge: Yes (to next level of care once cleared by medical team)    Subjective   General:  General  Reason for Referral: OT eval and treat for adls- Pt. admitted on 11/6/23 for dehydration, hypokalemia, tachycardia, nausea, vomiting, diarrhea. DX: leukopenia, thrombocytopenia, nausea/vomiting/diarrhea chemo related.  Referred By: Aris Portillo  Past Medical History Relevant to Rehab: htn, hld, gerd, anxiety, depression, adenocarcinoma of GE junction, on chemo, chronic sob, oa, dayanna ankle fx/sx.  Prior to Session Communication: Bedside nurse  Patient Position Received: Bed, 2 rail up  General Comment: pt. plesant and cooperative for therapy evaluation.    Precautions:  Medical Precautions: Infection precautions  Precautions Comment: contact plus  Pain:  Pain Assessment  Pain Score: 0 - No pain    Objective     Cognition:  Overall Cognitive Status: Within Functional Limits  Home Living:  Home Living Comments: pt. lives with spouse who is retired and able to assist pt. 1 floor home with 1st floor laundry. 5 step entry with railing. shower stall with seat/hhs. elevated toilet with vanity for support, adjustable bed     Prior Function:  Hand Dominance: Right  Prior Function Comments: ind. with toileting/dressing/ sba/supervision with showering. pt. ind. with ambulation without device. no assistive devices.    IADL History:  IADL Comments: shared homemaking with spouse. pt. and spouse drive.    CURRENT ADL:  ADL Comments: pt. ind. with  feeding/grooming/donning/doffing socks at eob. pt. ind. ambulating to bathroom. ind. with toileting.  Bed Mobility/Transfers:   Bed Mobility  Bed Mobility:  (ind. in/out bed with head of bed elevated)  Transfers  Transfer:  (ind. sit to stand without device.)    Ambulation/Gait Training:  Ambulation/Gait Training  Ambulation/Gait Training Performed:  (ind. in room without device. no lob noted. pt. has been walking to and from bathroom by herself.)  Strength:  Strength Comments: bue arom and strength wfl  Coordination:  Coordination Comment: wfl   Outcome Measures: [facility] Daily Activity  Putting on and taking off regular lower body clothing: None  Bathing (including washing, rinsing, drying): None  Putting on and taking off regular upper body clothing: None  Toileting, which includes using toilet, bedpan or urinal: None  Taking care of personal grooming such as brushing teeth: None  Eating Meals: None  Daily Activity - Total Score: 24

## 2023-11-07 NOTE — PROGRESS NOTES
Physical Therapy    Physical Therapy Evaluation    Patient Name: Shruthi Ramos  MRN: [id number]  Today's Date: 11/7/2023        Assessment/Plan   PT Assessment  Evaluation/Treatment Tolerance: Patient tolerated treatment well  Assessment Comment: Pt is functioning independently, no skilled PT needs identified  End of Session Patient Position: Up in bathroom (call light in reach)  IP OR SWING BED PT PLAN  Inpatient or Swing Bed: Inpatient  PT Plan  PT Plan: PT Eval only  PT Eval Only Reason: At baseline function  PT Discharge Recommendations: No PT needed after discharge, No further acute PT (24hr support as prior to admit due to medical status)  PT - OK to Discharge: Yes (once cleared by medical team)    Subjective     Current Problem:  Patient Active Problem List   Diagnosis    Adenocarcinoma of gastroesophageal junction (CMS/HCC)    PONV (postoperative nausea and vomiting)    Anxiety    Depression    Vitamin D deficiency    Vaginal atrophy    Urethral meatal stenosis    RLS (restless legs syndrome)    Periodic limb movement disorder    Recurrent UTI    LORENA (obstructive sleep apnea)    Obesity, Class III, BMI 40-49.9 (morbid obesity) (CMS/HCC)    Moderate major depression (CMS/HCC)    Insomnia    Hyperglycemia    HTN (hypertension)    Hypercholesteremia    Fibromyalgia    Dysphagia    YUN (dyspnea on exertion)    CKD (chronic kidney disease) stage 3, GFR 30-59 ml/min (CMS/HCC)    Chronic cystitis    Nausea & vomiting       General Visit Information:  General  Reason for Referral: PT eval & treat/impaired mobility (11/6/23 c/o n/v/d  DX: leukopenia, thrombocytopenia, n/v/d chemo related)  Caregiver Feedback: Per conference w/ RN patient stable to participate in therapy  General Comment: Pleasant & cooperative, receptive to mobility& instructions    Home Living:  Home Living  Lives With:  (w/ spouse who is home & able to assist; 5steps w/ rail to enter [facility] set up including laundry; raised toilet near skink used  of support; stall shower w/ seat & hand held shower; adjustable bed w/o rail)    Prior Level of Function:  Prior Function Per Pt/Caregiver Report  Level of [place]:  (independent mobility w/o use of assistive device, no h/o falls; sba of spouse prn for adl's, shared iadls as pt able to participate depending on how she is feeling; pt & spouse both drive)  Hand Dominance: Right    Precautions:  Precautions  Precautions Comment: contact +    Vital Signs:     Objective     Pain:  Pain Assessment  Pain Assessment:  (denies pain, numbness/tingling)  Functional Assessments:     Bed Mobility  Bed Mobility:  (modified independent supine<>sit hob elevated)  Transfers  Transfer:  (independent sit<>stand w/o assistive device)  Ambulation/Gait Training  Ambulation/Gait Training Performed:  (independent w/o assistive device 40ft performed changes in diretion & maneuver of environmental obstacles, mild instability w/o acute loss of balance, pt notes gait is at base line)   Extremity/Trunk Assessments:     RLE   RLE :  (end rom tightness hamstrings & heelcord, but wfl; strength wfl)  LLE   LLE :  (end rom tightness hamstrings & heelcord, but wfl; strength wfl)  Outcome Measures:  [facility] Basic Mobility  Turning from your back to your side while in a flat bed without using bedrails: None  Moving from lying on your back to sitting on the side of a flat bed without using bedrails: None  Moving to and from bed to chair (including a wheelchair): None  Standing up from a chair using your arms (e.g. wheelchair or bedside chair): None  To walk in hospital room: None  Climbing 3-5 steps with railing: None  Basic Mobility - Total Score: 24   Education Documentation  No documentation found.  Education Comments  Safety, activity progression

## 2023-11-08 ENCOUNTER — APPOINTMENT (OUTPATIENT)
Dept: HEMATOLOGY/ONCOLOGY | Facility: CLINIC | Age: 58
End: 2023-11-08
Payer: MEDICAID

## 2023-11-08 LAB
ALBUMIN SERPL BCP-MCNC: 2.6 G/DL (ref 3.4–5)
ALP SERPL-CCNC: 73 U/L (ref 33–110)
ALT SERPL W P-5'-P-CCNC: 44 U/L (ref 7–45)
ANION GAP SERPL CALC-SCNC: 11 MMOL/L (ref 10–20)
AST SERPL W P-5'-P-CCNC: 37 U/L (ref 9–39)
BASOPHILS # BLD MANUAL: 0.04 X10*3/UL (ref 0–0.1)
BASOPHILS NFR BLD MANUAL: 1 %
BILIRUB SERPL-MCNC: 0.8 MG/DL (ref 0–1.2)
BUN SERPL-MCNC: 6 MG/DL (ref 6–23)
C DIF TOX TCDA+TCDB STL QL NAA+PROBE: NOT DETECTED
CALCIUM SERPL-MCNC: 7.7 MG/DL (ref 8.6–10.3)
CHLORIDE SERPL-SCNC: 108 MMOL/L (ref 98–107)
CO2 SERPL-SCNC: 24 MMOL/L (ref 21–32)
CREAT SERPL-MCNC: 0.57 MG/DL (ref 0.5–1.05)
DACRYOCYTES BLD QL SMEAR: ABNORMAL
EOSINOPHIL # BLD MANUAL: 0.09 X10*3/UL (ref 0–0.7)
EOSINOPHIL NFR BLD MANUAL: 2 %
ERYTHROCYTE [DISTWIDTH] IN BLOOD BY AUTOMATED COUNT: 15.8 % (ref 11.5–14.5)
GFR SERPL CREATININE-BSD FRML MDRD: >90 ML/MIN/1.73M*2
GIANT PLATELETS BLD QL SMEAR: ABNORMAL
GLUCOSE SERPL-MCNC: 73 MG/DL (ref 74–99)
HCT VFR BLD AUTO: 35.7 % (ref 36–46)
HGB BLD-MCNC: 11.5 G/DL (ref 12–16)
IMM GRANULOCYTES # BLD AUTO: 0.12 X10*3/UL (ref 0–0.7)
IMM GRANULOCYTES NFR BLD AUTO: 2.8 % (ref 0–0.9)
LYMPHOCYTES # BLD MANUAL: 1.68 X10*3/UL (ref 1.2–4.8)
LYMPHOCYTES NFR BLD MANUAL: 39 %
MAGNESIUM SERPL-MCNC: 2 MG/DL (ref 1.6–2.4)
MCH RBC QN AUTO: 27.4 PG (ref 26–34)
MCHC RBC AUTO-ENTMCNC: 32.2 G/DL (ref 32–36)
MCV RBC AUTO: 85 FL (ref 80–100)
METAMYELOCYTES # BLD MANUAL: 0.13 X10*3/UL
METAMYELOCYTES NFR BLD MANUAL: 3 %
MONOCYTES # BLD MANUAL: 0.86 X10*3/UL (ref 0.1–1)
MONOCYTES NFR BLD MANUAL: 20 %
NEUTROPHILS # BLD MANUAL: 1.51 X10*3/UL (ref 1.2–7.7)
NEUTS BAND # BLD MANUAL: 0.95 X10*3/UL (ref 0–0.7)
NEUTS BAND NFR BLD MANUAL: 22 %
NEUTS SEG # BLD MANUAL: 0.56 X10*3/UL (ref 1.2–7)
NEUTS SEG NFR BLD MANUAL: 13 %
NEUTS VAC BLD QL SMEAR: PRESENT
NRBC BLD-RTO: 0.9 /100 WBCS (ref 0–0)
OVALOCYTES BLD QL SMEAR: ABNORMAL
PLATELET # BLD AUTO: 142 X10*3/UL (ref 150–450)
POLYCHROMASIA BLD QL SMEAR: ABNORMAL
POTASSIUM SERPL-SCNC: 3.4 MMOL/L (ref 3.5–5.3)
PROT SERPL-MCNC: 4.2 G/DL (ref 6.4–8.2)
RBC # BLD AUTO: 4.19 X10*6/UL (ref 4–5.2)
RBC MORPH BLD: ABNORMAL
SODIUM SERPL-SCNC: 140 MMOL/L (ref 136–145)
TARGETS BLD QL SMEAR: ABNORMAL
TOTAL CELLS COUNTED BLD: 100
WBC # BLD AUTO: 4.3 X10*3/UL (ref 4.4–11.3)

## 2023-11-08 PROCEDURE — 2500000004 HC RX 250 GENERAL PHARMACY W/ HCPCS (ALT 636 FOR OP/ED): Performed by: NURSE PRACTITIONER

## 2023-11-08 PROCEDURE — 2500000004 HC RX 250 GENERAL PHARMACY W/ HCPCS (ALT 636 FOR OP/ED)

## 2023-11-08 PROCEDURE — 80053 COMPREHEN METABOLIC PANEL: CPT | Performed by: INTERNAL MEDICINE

## 2023-11-08 PROCEDURE — 85007 BL SMEAR W/DIFF WBC COUNT: CPT | Performed by: INTERNAL MEDICINE

## 2023-11-08 PROCEDURE — 85027 COMPLETE CBC AUTOMATED: CPT | Performed by: INTERNAL MEDICINE

## 2023-11-08 PROCEDURE — 2500000001 HC RX 250 WO HCPCS SELF ADMINISTERED DRUGS (ALT 637 FOR MEDICARE OP): Performed by: INTERNAL MEDICINE

## 2023-11-08 PROCEDURE — C9113 INJ PANTOPRAZOLE SODIUM, VIA: HCPCS | Performed by: NURSE PRACTITIONER

## 2023-11-08 PROCEDURE — 83735 ASSAY OF MAGNESIUM: CPT | Performed by: INTERNAL MEDICINE

## 2023-11-08 PROCEDURE — 2500000001 HC RX 250 WO HCPCS SELF ADMINISTERED DRUGS (ALT 637 FOR MEDICARE OP): Performed by: NURSE PRACTITIONER

## 2023-11-08 PROCEDURE — G0378 HOSPITAL OBSERVATION PER HR: HCPCS

## 2023-11-08 PROCEDURE — 99222 1ST HOSP IP/OBS MODERATE 55: CPT | Performed by: INTERNAL MEDICINE

## 2023-11-08 PROCEDURE — 2500000004 HC RX 250 GENERAL PHARMACY W/ HCPCS (ALT 636 FOR OP/ED): Performed by: INTERNAL MEDICINE

## 2023-11-08 RX ORDER — PETROLATUM 420 MG/G
OINTMENT TOPICAL
Status: DISCONTINUED | OUTPATIENT
Start: 2023-11-08 | End: 2023-11-12 | Stop reason: HOSPADM

## 2023-11-08 RX ORDER — POTASSIUM CHLORIDE 14.9 MG/ML
20 INJECTION INTRAVENOUS
Status: COMPLETED | OUTPATIENT
Start: 2023-11-08 | End: 2023-11-08

## 2023-11-08 RX ORDER — LOPERAMIDE HYDROCHLORIDE 2 MG/1
4 CAPSULE ORAL 3 TIMES DAILY PRN
Status: DISCONTINUED | OUTPATIENT
Start: 2023-11-08 | End: 2023-11-09

## 2023-11-08 RX ORDER — ONDANSETRON HYDROCHLORIDE 2 MG/ML
4 INJECTION, SOLUTION INTRAVENOUS ONCE
Status: COMPLETED | OUTPATIENT
Start: 2023-11-08 | End: 2023-11-08

## 2023-11-08 RX ORDER — METOCLOPRAMIDE HYDROCHLORIDE 5 MG/ML
5 INJECTION INTRAMUSCULAR; INTRAVENOUS EVERY 8 HOURS PRN
Status: DISCONTINUED | OUTPATIENT
Start: 2023-11-08 | End: 2023-11-12 | Stop reason: HOSPADM

## 2023-11-08 RX ADMIN — DULOXETINE HYDROCHLORIDE 60 MG: 30 CAPSULE, DELAYED RELEASE ORAL at 09:11

## 2023-11-08 RX ADMIN — ONDANSETRON 4 MG: 2 INJECTION INTRAMUSCULAR; INTRAVENOUS at 17:44

## 2023-11-08 RX ADMIN — ATORVASTATIN CALCIUM 20 MG: 20 TABLET, FILM COATED ORAL at 20:06

## 2023-11-08 RX ADMIN — ONDANSETRON 4 MG: 2 INJECTION INTRAMUSCULAR; INTRAVENOUS at 09:11

## 2023-11-08 RX ADMIN — PANTOPRAZOLE SODIUM 40 MG: 40 INJECTION, POWDER, FOR SOLUTION INTRAVENOUS at 09:11

## 2023-11-08 RX ADMIN — MULTIPLE VITAMINS W/ MINERALS TAB 1 TABLET: TAB at 09:17

## 2023-11-08 RX ADMIN — TRAZODONE HYDROCHLORIDE 50 MG: 50 TABLET ORAL at 20:06

## 2023-11-08 RX ADMIN — ONDANSETRON 4 MG: 2 INJECTION INTRAMUSCULAR; INTRAVENOUS at 03:36

## 2023-11-08 RX ADMIN — Medication 1 TABLET: at 09:17

## 2023-11-08 RX ADMIN — METOPROLOL SUCCINATE 50 MG: 50 TABLET, EXTENDED RELEASE ORAL at 09:17

## 2023-11-08 RX ADMIN — LOPERAMIDE HYDROCHLORIDE 4 MG: 2 CAPSULE ORAL at 18:43

## 2023-11-08 RX ADMIN — POTASSIUM CHLORIDE 20 MEQ: 14.9 INJECTION, SOLUTION INTRAVENOUS at 10:47

## 2023-11-08 RX ADMIN — METOCLOPRAMIDE HYDROCHLORIDE 5 MG: 5 INJECTION INTRAMUSCULAR; INTRAVENOUS at 13:55

## 2023-11-08 RX ADMIN — WHITE PETROLATUM: 1.75 OINTMENT TOPICAL at 18:47

## 2023-11-08 RX ADMIN — CYANOCOBALAMIN TAB 1000 MCG 1000 MCG: 1000 TAB at 09:17

## 2023-11-08 RX ADMIN — POTASSIUM CHLORIDE 20 MEQ: 14.9 INJECTION, SOLUTION INTRAVENOUS at 12:54

## 2023-11-08 RX ADMIN — CHOLECALCIFEROL TAB 25 MCG (1000 UNIT) 1000 UNITS: 25 TAB at 09:17

## 2023-11-08 ASSESSMENT — PAIN SCALES - GENERAL
PAINLEVEL_OUTOF10: 0 - NO PAIN
PAINLEVEL_OUTOF10: 0 - NO PAIN

## 2023-11-08 ASSESSMENT — COGNITIVE AND FUNCTIONAL STATUS - GENERAL
MOBILITY SCORE: 24
DAILY ACTIVITIY SCORE: 24

## 2023-11-08 NOTE — PROGRESS NOTES
Shruthi Ramos is a 58 y.o. female on day 0 of admission presenting with Nausea & vomiting.      Subjective   Pt with PMHx including HTN, HLD, GERD, and AdenoCa of GE Junction (on Recent Chemo Tx) presented to [facility] with N/V/D.       Objective     Last Recorded Vitals  /61 (BP Location: Right arm, Patient Position: Lying)   Pulse 95   Temp 36.7 °C (98.1 °F) (Temporal)   Resp 18   Wt 102 kg (225 lb)   SpO2 96%   Intake/Output last 3 Shifts:  No intake or output data in the 24 hours ending 11/08/23 0035    Admission Weight  Weight: 102 kg (225 lb) (11/06/23 1108)    Daily Weight  11/06/23 : 102 kg (225 lb)    Image Results  XR chest 2 views  Narrative: Interpreted By:  Reese Mcfarland,   STUDY:  XR CHEST 2 VIEWS;  11/6/2023 6:59 pm      INDICATION:  Signs/Symptoms:r/o pna.      COMPARISON:  10/11/2020      ACCESSION NUMBER(S):  [id number]      ORDERING CLINICIAN:  JENNA MORAES      FINDINGS:      Right-sided implanted port is in stable position. The cardiac  silhouette is unremarkable. Costophrenic angles are sharp. Lungs are  clear. The trachea is midline. There is no pneumothorax. No acute  osseous abnormality is seen.      Impression: 1.  No active cardiopulmonary process.          Signed by: Reese Mcfarland 11/6/2023 7:16 PM  Dictation workstation:   [id number]      Physical Exam  Gen - NAD  ENT - NC  Neck - No JVD  H - S1S2  L - Decreased BS  Abd - Soft  Ext - W & D  Neuro - Awake and Alert  Relevant Results    Scheduled medications  atorvastatin, 20 mg, oral, Nightly  cholecalciferol, 1,000 Units, oral, q AM  cyanocobalamin, 1,000 mcg, oral, Daily  DULoxetine, 60 mg, oral, Daily before breakfast  estradiol, 3 g, vaginal, q3 days  lactobacillus acidophilus, 1 tablet, oral, Daily  metoprolol succinate XL, 50 mg, oral, Daily before breakfast  multivitamin with minerals, 1 tablet, oral, Daily  pantoprazole, 40 mg, intravenous, Daily  traZODone, 50 mg, oral, Nightly      Continuous  medications  sodium chloride 0.9%, 100 mL/hr, Last Rate: 100 mL/hr (11/06/23 1537)      PRN medications  PRN medications: acetaminophen **OR** acetaminophen **OR** acetaminophen, ondansetron  Results for orders placed or performed during the hospital encounter of 11/06/23 (from the past 96 hour(s))   Lactate   Result Value Ref Range    Lactate 1.9 0.4 - 2.0 mmol/L   SARS-CoV-2 RT PCR   Result Value Ref Range    Coronavirus 2019, PCR Not Detected Not Detected   Comprehensive metabolic panel   Result Value Ref Range    Glucose 125 (H) 74 - 99 mg/dL    Sodium 137 136 - 145 mmol/L    Potassium 3.2 (L) 3.5 - 5.3 mmol/L    Chloride 104 98 - 107 mmol/L    Bicarbonate 23 21 - 32 mmol/L    Anion Gap 13 10 - 20 mmol/L    Urea Nitrogen 11 6 - 23 mg/dL    Creatinine 0.59 0.50 - 1.05 mg/dL    eGFR >90 >60 mL/min/1.73m*2    Calcium 7.9 (L) 8.6 - 10.3 mg/dL    Albumin 3.5 3.4 - 5.0 g/dL    Alkaline Phosphatase 107 33 - 110 U/L    Total Protein 5.4 (L) 6.4 - 8.2 g/dL    AST 50 (H) 9 - 39 U/L    Bilirubin, Total 1.3 (H) 0.0 - 1.2 mg/dL    ALT 48 (H) 7 - 45 U/L   Magnesium   Result Value Ref Range    Magnesium 1.76 1.60 - 2.40 mg/dL   CBC and Auto Differential   Result Value Ref Range    WBC 1.8 (L) 4.4 - 11.3 x10*3/uL    nRBC 0.0 0.0 - 0.0 /100 WBCs    RBC 4.99 4.00 - 5.20 x10*6/uL    Hemoglobin 13.9 12.0 - 16.0 g/dL    Hematocrit 42.5 36.0 - 46.0 %    MCV 85 80 - 100 fL    MCH 27.9 26.0 - 34.0 pg    MCHC 32.7 32.0 - 36.0 g/dL    RDW 15.8 (H) 11.5 - 14.5 %    Platelets 87 (L) 150 - 450 x10*3/uL    Immature Granulocytes %, Automated 0.6 0.0 - 0.9 %    Immature Granulocytes Absolute, Automated 0.01 0.00 - 0.70 x10*3/uL   Manual Differential   Result Value Ref Range    Neutrophils %, Manual 18.0 40.0 - 80.0 %    Bands %, Manual 2.0 0.0 - 5.0 %    Lymphocytes %, Manual 51.0 13.0 - 44.0 %    Monocytes %, Manual 22.0 2.0 - 10.0 %    Eosinophils %, Manual 0.0 0.0 - 6.0 %    Basophils %, Manual 6.0 0.0 - 2.0 %    Atypical Lymphocytes %,  Manual 1.0 0.0 - 2.0 %    Seg Neutrophils Absolute, Manual 0.32 (L) 1.20 - 7.00 x10*3/uL    Bands Absolute, Manual 0.04 0.00 - 0.70 x10*3/uL    Lymphocytes Absolute, Manual 0.92 (L) 1.20 - 4.80 x10*3/uL    Monocytes Absolute, Manual 0.40 0.10 - 1.00 x10*3/uL    Eosinophils Absolute, Manual 0.00 0.00 - 0.70 x10*3/uL    Basophils Absolute, Manual 0.11 (H) 0.00 - 0.10 x10*3/uL    Atypical Lymphs Absolute, Manual 0.02 0.00 - 0.50 x10*3/uL    Total Cells Counted 100     Neutrophils Absolute, Manual 0.36 (L) 1.20 - 7.70 x10*3/uL    RBC Morphology See Below     RBC Fragments Few     Teardrop Cells Few     Yenny Cells Few    Urinalysis with Reflex Microscopic and Culture   Result Value Ref Range    Color, Urine [place] (N) Straw, Yellow    Appearance, Urine Hazy (N) Clear    Specific Gravity, Urine 1.026 1.005 - 1.035    pH, Urine 5.0 5.0, 5.5, 6.0, 6.5, 7.0, 7.5, 8.0    Protein, Urine 30 (1+) (N) NEGATIVE mg/dL    Glucose, Urine NEGATIVE NEGATIVE mg/dL    Blood, Urine SMALL (1+) (A) NEGATIVE    Ketones, Urine 20 (1+) (A) NEGATIVE mg/dL    Bilirubin, Urine NEGATIVE NEGATIVE    Urobilinogen, Urine <2.0 <2.0 mg/dL    Nitrite, Urine POSITIVE (A) NEGATIVE    Leukocyte Esterase, Urine TRACE (A) NEGATIVE   Microscopic Only, Urine   Result Value Ref Range    WBC, Urine 1-5 1-5, NONE /HPF    RBC, Urine 1-2 NONE, 1-2, 3-5 /HPF    Squamous Epithelial Cells, Urine 1-9 (SPARSE) Reference range not established. /HPF    Bacteria, Urine 4+ (A) NONE SEEN /HPF    Mucus, Urine 4+ Reference range not established. /LPF   Urine Culture    Specimen: Clean Catch/Voided; Urine   Result Value Ref Range    Urine Culture >100,000 Enteric bacilli (A)    CBC   Result Value Ref Range    WBC 2.7 (L) 4.4 - 11.3 x10*3/uL    nRBC 0.0 0.0 - 0.0 /100 WBCs    RBC 4.75 4.00 - 5.20 x10*6/uL    Hemoglobin 13.1 12.0 - 16.0 g/dL    Hematocrit 41.2 36.0 - 46.0 %    MCV 87 80 - 100 fL    MCH 27.6 26.0 - 34.0 pg    MCHC 31.8 (L) 32.0 - 36.0 g/dL    RDW 16.0 (H) 11.5 -  14.5 %    Platelets 126 (L) 150 - 450 x10*3/uL   Comprehensive metabolic panel   Result Value Ref Range    Glucose 117 (H) 74 - 99 mg/dL    Sodium 139 136 - 145 mmol/L    Potassium 4.0 3.5 - 5.3 mmol/L    Chloride 108 (H) 98 - 107 mmol/L    Bicarbonate 19 (L) 21 - 32 mmol/L    Anion Gap 16 10 - 20 mmol/L    Urea Nitrogen 7 6 - 23 mg/dL    Creatinine 0.78 0.50 - 1.05 mg/dL    eGFR 88 >60 mL/min/1.73m*2    Calcium 7.9 (L) 8.6 - 10.3 mg/dL    Albumin 3.3 (L) 3.4 - 5.0 g/dL    Alkaline Phosphatase 100 33 - 110 U/L    Total Protein 4.7 (L) 6.4 - 8.2 g/dL    AST 49 (H) 9 - 39 U/L    Bilirubin, Total 1.1 0.0 - 1.2 mg/dL    ALT 53 (H) 7 - 45 U/L   Magnesium   Result Value Ref Range    Magnesium 2.10 1.60 - 2.40 mg/dL               Assessment/Plan      HTN  HLD  AdenoCa of GE Junction  N/V/D - Recent Chemo Tx  Continue IV Fluids and Antiemetics and Tx              Principal Problem:    Nausea & vomiting                  Gaudencio Burks DO

## 2023-11-08 NOTE — CARE PLAN
The patient's goals for the shift include  to demonstrate using call light and appropriate safety measures to prevent falls and injury    The clinical goals for the shift include patient will not experience nausea and vomiting with the use of antiemetics

## 2023-11-08 NOTE — CONSULTS
Reason For Consult  1.  History of gastric cancer.  2.  Chemotherapy-induced nausea, vomiting and diarrhea    History Of Present Illness  Ms Ramos is a 58-year-old female with GE junction adenocarcinoma.  She was having problem with gradually worsening dysphagia with further GI work-up revealing gastric mass with the biopsy confirming  poorly differentiated adenocarcinoma in July 2023.  Prior to that she had a barium esophagogram which was essentially unremarkable.  Patient does have a previous history of GERD and had one time was treated for H. pylori gastritis.  EUS done by Dr. London on 7/17/2023 showed malignancy starting near the GE junction and measuring 2.2 x 1.5 cm towards the cardia along  the lesser curvature / anterior wall with malignant appearing features.  There is loss of interface between the mass and liver along a 7 mm region, concerning for probable  early hepatic involvement.  Enlarged lymph nodes were noted in the diaphragmatic region, and gastrohepatic ligament.  CT chest abdomen pelvis with contrast on 8/9/2023 showed thickening of the distal esophagus extending into the anterior portion of the  gastric cardia.  There is a small amount of liver present directly adjacent to the area of the tumor but there is no altered enhancement pattern in the liver parenchyma.  Slightly inferior to the cardia there  were lymph nodes visible in the adjacent mesentery that were not visible previously measuring up to 6 mm in short axis. PET/CT on 8/31/2023 showed a hypermetabolic focus at the GE junction  with extension into the gastric cardia, no evidence of hypermetabolic regional or distant metastatic disease. Brain MRI negative for metastasis.  She is currently being treated with neoadjuvant chemotherapy with FLOT protocol beginning October 2023.     She developed gradually worsening nausea and vomiting and diarrhea 2 days after the start of the chemotherapy.  Symptoms continued to get worse despite oral  "nausea and diarrhea medications and conservative management at home necessitating hospitalization.  Work-up done in the hospital so far is unremarkable.  Initially she was noted to be slightly leukopenic and thrombocytopenic but those numbers are better today.  Metabolic profile and electrolytes are unremarkable.     PAST MEDICAL HISTORY:   1.  GE junction adenocarcinoma as detailed above.   2.  Hypertension   3.  Hyperlipidemia   4.  Anxiety/depression   5.  History of complete hysterectomy, ankle surgery, cholecystectomy and 2  procedures     SOCIAL HISTORY:    and lives in [facility].  Non-smoker and nonalcoholic.  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  Born and raised in [place].     FAMILY HISTORY:    Father  at age 76 from myocardial infarction mother  from stroke at age 78.  She had 8 siblings.  1 brother  from MI another committed suicide.  A sister also  from myocardial infarction.   She had 2 children.  Her son  from AML.  No other specific history of bleeding, clotting or malignant disorder in the family.     REVIEW OF SYSTEMS:  Pertinent finding as per the history above.  All other systems have been reviewed and generally negative and noncontributory.     PHYSICAL EXAMINATION:    Detailed physical examination not done     Allergies  Morphine, Sutures, and Other    Last Recorded Vitals  Blood pressure 128/63, pulse 83, temperature 36.1 °C (97 °F), resp. rate 18, height 1.549 m (5' 1\"), weight 102 kg (225 lb), SpO2 98 %.    Relevant Results  Latest CBC and metabolic profile results were reviewed and have been detailed in the history above.     Assessment/Plan   1.  Gastric cancer.  Please refer to the details as above.  In view of issues with nausea and diarrhea after first cycle of chemotherapy.  She will need dose modification and much more aggressive supportive care going forward.    2.  Nausea/vomiting/diarrhea.  Overall recommend continuing with the " supportive care as already initiated.  Diarrhea remain persistent in some time patient benefit from trial of octreotide for chemotherapy-induced refractory diarrheal symptoms.    We will follow her along.    I spent 60 minutes in the professional and overall care of this patient.      Ollie Torres MD

## 2023-11-09 ENCOUNTER — APPOINTMENT (OUTPATIENT)
Dept: HEMATOLOGY/ONCOLOGY | Facility: CLINIC | Age: 58
End: 2023-11-09
Payer: MEDICAID

## 2023-11-09 LAB
ALBUMIN SERPL BCP-MCNC: 2.7 G/DL (ref 3.4–5)
ALP SERPL-CCNC: 77 U/L (ref 33–110)
ALT SERPL W P-5'-P-CCNC: 42 U/L (ref 7–45)
ANION GAP SERPL CALC-SCNC: 16 MMOL/L (ref 10–20)
AST SERPL W P-5'-P-CCNC: 41 U/L (ref 9–39)
BASOPHILS # BLD MANUAL: 0 X10*3/UL (ref 0–0.1)
BASOPHILS NFR BLD MANUAL: 0 %
BILIRUB SERPL-MCNC: 0.7 MG/DL (ref 0–1.2)
BUN SERPL-MCNC: 6 MG/DL (ref 6–23)
CALCIUM SERPL-MCNC: 7.6 MG/DL (ref 8.6–10.3)
CHLORIDE SERPL-SCNC: 108 MMOL/L (ref 98–107)
CO2 SERPL-SCNC: 16 MMOL/L (ref 21–32)
CREAT SERPL-MCNC: 0.57 MG/DL (ref 0.5–1.05)
DACRYOCYTES BLD QL SMEAR: ABNORMAL
EOSINOPHIL # BLD MANUAL: 0 X10*3/UL (ref 0–0.7)
EOSINOPHIL NFR BLD MANUAL: 0 %
ERYTHROCYTE [DISTWIDTH] IN BLOOD BY AUTOMATED COUNT: 16 % (ref 11.5–14.5)
GFR SERPL CREATININE-BSD FRML MDRD: >90 ML/MIN/1.73M*2
GLUCOSE SERPL-MCNC: 73 MG/DL (ref 74–99)
HCT VFR BLD AUTO: 36.1 % (ref 36–46)
HGB BLD-MCNC: 11.7 G/DL (ref 12–16)
IMM GRANULOCYTES # BLD AUTO: 1.02 X10*3/UL (ref 0–0.7)
IMM GRANULOCYTES NFR BLD AUTO: 11.5 % (ref 0–0.9)
LYMPHOCYTES # BLD MANUAL: 0.98 X10*3/UL (ref 1.2–4.8)
LYMPHOCYTES NFR BLD MANUAL: 11 %
MAGNESIUM SERPL-MCNC: 1.87 MG/DL (ref 1.6–2.4)
MCH RBC QN AUTO: 27.5 PG (ref 26–34)
MCHC RBC AUTO-ENTMCNC: 32.4 G/DL (ref 32–36)
MCV RBC AUTO: 85 FL (ref 80–100)
MONOCYTES # BLD MANUAL: 1.34 X10*3/UL (ref 0.1–1)
MONOCYTES NFR BLD MANUAL: 15 %
NEUTROPHILS # BLD MANUAL: 6.58 X10*3/UL (ref 1.2–7.7)
NEUTS BAND # BLD MANUAL: 0.71 X10*3/UL (ref 0–0.7)
NEUTS BAND NFR BLD MANUAL: 8 %
NEUTS SEG # BLD MANUAL: 5.87 X10*3/UL (ref 1.2–7)
NEUTS SEG NFR BLD MANUAL: 66 %
NRBC BLD-RTO: 0.9 /100 WBCS (ref 0–0)
OVALOCYTES BLD QL SMEAR: ABNORMAL
PLATELET # BLD AUTO: 150 X10*3/UL (ref 150–450)
POTASSIUM SERPL-SCNC: 3.3 MMOL/L (ref 3.5–5.3)
PROT SERPL-MCNC: 4.2 G/DL (ref 6.4–8.2)
RBC # BLD AUTO: 4.26 X10*6/UL (ref 4–5.2)
RBC MORPH BLD: ABNORMAL
SODIUM SERPL-SCNC: 137 MMOL/L (ref 136–145)
TOTAL CELLS COUNTED BLD: 100
WBC # BLD AUTO: 8.9 X10*3/UL (ref 4.4–11.3)

## 2023-11-09 PROCEDURE — C9113 INJ PANTOPRAZOLE SODIUM, VIA: HCPCS | Performed by: NURSE PRACTITIONER

## 2023-11-09 PROCEDURE — 83735 ASSAY OF MAGNESIUM: CPT | Performed by: INTERNAL MEDICINE

## 2023-11-09 PROCEDURE — G0378 HOSPITAL OBSERVATION PER HR: HCPCS

## 2023-11-09 PROCEDURE — 2500000001 HC RX 250 WO HCPCS SELF ADMINISTERED DRUGS (ALT 637 FOR MEDICARE OP): Performed by: INTERNAL MEDICINE

## 2023-11-09 PROCEDURE — 80053 COMPREHEN METABOLIC PANEL: CPT | Performed by: INTERNAL MEDICINE

## 2023-11-09 PROCEDURE — 2500000001 HC RX 250 WO HCPCS SELF ADMINISTERED DRUGS (ALT 637 FOR MEDICARE OP): Performed by: NURSE PRACTITIONER

## 2023-11-09 PROCEDURE — 85027 COMPLETE CBC AUTOMATED: CPT | Performed by: INTERNAL MEDICINE

## 2023-11-09 PROCEDURE — 2500000004 HC RX 250 GENERAL PHARMACY W/ HCPCS (ALT 636 FOR OP/ED): Performed by: NURSE PRACTITIONER

## 2023-11-09 PROCEDURE — 85007 BL SMEAR W/DIFF WBC COUNT: CPT | Performed by: INTERNAL MEDICINE

## 2023-11-09 PROCEDURE — 2500000004 HC RX 250 GENERAL PHARMACY W/ HCPCS (ALT 636 FOR OP/ED): Performed by: INTERNAL MEDICINE

## 2023-11-09 RX ORDER — SCOLOPAMINE TRANSDERMAL SYSTEM 1 MG/1
1 PATCH, EXTENDED RELEASE TRANSDERMAL
Status: DISCONTINUED | OUTPATIENT
Start: 2023-11-09 | End: 2023-11-12 | Stop reason: HOSPADM

## 2023-11-09 RX ORDER — LOPERAMIDE HYDROCHLORIDE 2 MG/1
4 CAPSULE ORAL 4 TIMES DAILY PRN
Status: DISCONTINUED | OUTPATIENT
Start: 2023-11-09 | End: 2023-11-12 | Stop reason: HOSPADM

## 2023-11-09 RX ADMIN — ATORVASTATIN CALCIUM 20 MG: 20 TABLET, FILM COATED ORAL at 20:22

## 2023-11-09 RX ADMIN — MULTIPLE VITAMINS W/ MINERALS TAB 1 TABLET: TAB at 09:03

## 2023-11-09 RX ADMIN — METOCLOPRAMIDE HYDROCHLORIDE 5 MG: 5 INJECTION INTRAMUSCULAR; INTRAVENOUS at 05:38

## 2023-11-09 RX ADMIN — Medication 1 TABLET: at 09:03

## 2023-11-09 RX ADMIN — SCOLOPAMINE TRANSDERMAL SYSTEM 1 PATCH: 1 PATCH, EXTENDED RELEASE TRANSDERMAL at 13:53

## 2023-11-09 RX ADMIN — SODIUM CHLORIDE 100 ML/HR: 9 INJECTION, SOLUTION INTRAVENOUS at 12:59

## 2023-11-09 RX ADMIN — ESTRADIOL 3 G: 0.1 CREAM VAGINAL at 20:22

## 2023-11-09 RX ADMIN — LOPERAMIDE HYDROCHLORIDE 4 MG: 2 CAPSULE ORAL at 11:27

## 2023-11-09 RX ADMIN — TRAZODONE HYDROCHLORIDE 50 MG: 50 TABLET ORAL at 20:22

## 2023-11-09 RX ADMIN — LOPERAMIDE HYDROCHLORIDE 4 MG: 2 CAPSULE ORAL at 22:03

## 2023-11-09 RX ADMIN — DULOXETINE HYDROCHLORIDE 60 MG: 30 CAPSULE, DELAYED RELEASE ORAL at 09:04

## 2023-11-09 RX ADMIN — CHOLECALCIFEROL TAB 25 MCG (1000 UNIT) 1000 UNITS: 25 TAB at 09:03

## 2023-11-09 RX ADMIN — CYANOCOBALAMIN TAB 1000 MCG 1000 MCG: 1000 TAB at 09:04

## 2023-11-09 RX ADMIN — PANTOPRAZOLE SODIUM 40 MG: 40 INJECTION, POWDER, FOR SOLUTION INTRAVENOUS at 09:03

## 2023-11-09 RX ADMIN — METOPROLOL SUCCINATE 50 MG: 50 TABLET, EXTENDED RELEASE ORAL at 09:03

## 2023-11-09 ASSESSMENT — PAIN - FUNCTIONAL ASSESSMENT: PAIN_FUNCTIONAL_ASSESSMENT: 0-10

## 2023-11-09 ASSESSMENT — COGNITIVE AND FUNCTIONAL STATUS - GENERAL
DAILY ACTIVITIY SCORE: 24
MOBILITY SCORE: 24

## 2023-11-09 ASSESSMENT — PAIN SCALES - GENERAL
PAINLEVEL_OUTOF10: 0 - NO PAIN
PAINLEVEL_OUTOF10: 0 - NO PAIN

## 2023-11-09 NOTE — PROGRESS NOTES
Shruthi Ramos is a 58 y.o. female on day 0 of admission presenting with Nausea & vomiting.      Subjective   Pt still with Intractable N/V. Diarrhea somewhat improved.       Objective     Last Recorded Vitals  /69 (BP Location: Right arm, Patient Position: Lying)   Pulse 85   Temp 36.3 °C (97.3 °F) (Temporal)   Resp 18   Wt 102 kg (225 lb)   SpO2 98%   Intake/Output last 3 Shifts:    Intake/Output Summary (Last 24 hours) at 11/8/2023 2320  Last data filed at 11/8/2023 2237  Gross per 24 hour   Intake 2618.71 ml   Output --   Net 2618.71 ml       Admission Weight  Weight: 102 kg (225 lb) (11/06/23 1108)    Daily Weight  11/06/23 : 102 kg (225 lb)    Image Results  XR chest 2 views  Narrative: Interpreted By:  Reese Mcfarland,   STUDY:  XR CHEST 2 VIEWS;  11/6/2023 6:59 pm      INDICATION:  Signs/Symptoms:r/o pna.      COMPARISON:  10/11/2020      ACCESSION NUMBER(S):  [id number]      ORDERING CLINICIAN:  JENNA MORAES      FINDINGS:      Right-sided implanted port is in stable position. The cardiac  silhouette is unremarkable. Costophrenic angles are sharp. Lungs are  clear. The trachea is midline. There is no pneumothorax. No acute  osseous abnormality is seen.      Impression: 1.  No active cardiopulmonary process.          Signed by: Reese Mcfarland 11/6/2023 7:16 PM  Dictation workstation:   [id number]      Physical Exam  Gen - NAD  ENT - NC  Neck - No JVD  H - S1S2  L - Decreased BS  Abd - Soft  Ext - W & D  Neuro - Awake and Responsive  Relevant Results    Scheduled medications  atorvastatin, 20 mg, oral, Nightly  cholecalciferol, 1,000 Units, oral, q AM  cyanocobalamin, 1,000 mcg, oral, Daily  DULoxetine, 60 mg, oral, Daily before breakfast  estradiol, 3 g, vaginal, q3 days  lactobacillus acidophilus, 1 tablet, oral, Daily  metoprolol succinate XL, 50 mg, oral, Daily before breakfast  multivitamin with minerals, 1 tablet, oral, Daily  pantoprazole, 40 mg, intravenous, Daily  traZODone, 50  mg, oral, Nightly      Continuous medications  sodium chloride 0.9%, 100 mL/hr, Last Rate: 100 mL/hr (11/08/23 7867)      PRN medications  PRN medications: acetaminophen **OR** acetaminophen **OR** acetaminophen, loperamide, metoclopramide, ondansetron, promethazine, white petrolatum  Results for orders placed or performed during the hospital encounter of 11/06/23 (from the past 24 hour(s))   CBC and Auto Differential   Result Value Ref Range    WBC 4.3 (L) 4.4 - 11.3 x10*3/uL    nRBC 0.9 (H) 0.0 - 0.0 /100 WBCs    RBC 4.19 4.00 - 5.20 x10*6/uL    Hemoglobin 11.5 (L) 12.0 - 16.0 g/dL    Hematocrit 35.7 (L) 36.0 - 46.0 %    MCV 85 80 - 100 fL    MCH 27.4 26.0 - 34.0 pg    MCHC 32.2 32.0 - 36.0 g/dL    RDW 15.8 (H) 11.5 - 14.5 %    Platelets 142 (L) 150 - 450 x10*3/uL    Immature Granulocytes %, Automated 2.8 (H) 0.0 - 0.9 %    Immature Granulocytes Absolute, Automated 0.12 0.00 - 0.70 x10*3/uL   Manual Differential   Result Value Ref Range    Neutrophils %, Manual 13.0 40.0 - 80.0 %    Bands %, Manual 22.0 0.0 - 5.0 %    Lymphocytes %, Manual 39.0 13.0 - 44.0 %    Monocytes %, Manual 20.0 2.0 - 10.0 %    Eosinophils %, Manual 2.0 0.0 - 6.0 %    Basophils %, Manual 1.0 0.0 - 2.0 %    Metamyelocytes %, Manual 3.0 0.0 - 0.0 %    Seg Neutrophils Absolute, Manual 0.56 (L) 1.20 - 7.00 x10*3/uL    Bands Absolute, Manual 0.95 (H) 0.00 - 0.70 x10*3/uL    Lymphocytes Absolute, Manual 1.68 1.20 - 4.80 x10*3/uL    Monocytes Absolute, Manual 0.86 0.10 - 1.00 x10*3/uL    Eosinophils Absolute, Manual 0.09 0.00 - 0.70 x10*3/uL    Basophils Absolute, Manual 0.04 0.00 - 0.10 x10*3/uL    Metamyelocytes Absolute, Manual 0.13 0.00 - 0.00 x10*3/uL    Total Cells Counted 100     Neutrophils Absolute, Manual 1.51 1.20 - 7.70 x10*3/uL    RBC Morphology See Below     Polychromasia Mild     Target Cells Few     Ovalocytes Few     Teardrop Cells Few     Vacuolated Neutrophils Present     Giant Platelets Few    Comprehensive metabolic panel    Result Value Ref Range    Glucose 73 (L) 74 - 99 mg/dL    Sodium 140 136 - 145 mmol/L    Potassium 3.4 (L) 3.5 - 5.3 mmol/L    Chloride 108 (H) 98 - 107 mmol/L    Bicarbonate 24 21 - 32 mmol/L    Anion Gap 11 10 - 20 mmol/L    Urea Nitrogen 6 6 - 23 mg/dL    Creatinine 0.57 0.50 - 1.05 mg/dL    eGFR >90 >60 mL/min/1.73m*2    Calcium 7.7 (L) 8.6 - 10.3 mg/dL    Albumin 2.6 (L) 3.4 - 5.0 g/dL    Alkaline Phosphatase 73 33 - 110 U/L    Total Protein 4.2 (L) 6.4 - 8.2 g/dL    AST 37 9 - 39 U/L    Bilirubin, Total 0.8 0.0 - 1.2 mg/dL    ALT 44 7 - 45 U/L   Magnesium   Result Value Ref Range    Magnesium 2.00 1.60 - 2.40 mg/dL                Assessment/Plan        HTN  HLD  Gastric Ca  N/V/D - Add Imodium and Reglan to help with Nausea  Continue Tx and IV Fluids  Oncology Appreciated          Principal Problem:    Nausea & vomiting                  Gaudencio Burks DO

## 2023-11-09 NOTE — CARE PLAN
The patient's goals for the shift include to minimize nausea and vomiting     The clinical goals for the shift include Patient will report improved N/V

## 2023-11-09 NOTE — CARE PLAN
The patient's goals for the shift include      The clinical goals for the shift include Patient will report improved nausea and vomiting    Over the shift, the patient did not make progress toward the following goals. Barriers to progression include reporting nausea early. Recommendations to address these barriers include assess and remind pt to inform nurse of n/v/d.

## 2023-11-10 ENCOUNTER — APPOINTMENT (OUTPATIENT)
Dept: HEMATOLOGY/ONCOLOGY | Facility: CLINIC | Age: 58
End: 2023-11-10
Payer: MEDICAID

## 2023-11-10 LAB
ACANTHOCYTES BLD QL SMEAR: ABNORMAL
ALBUMIN SERPL BCP-MCNC: 1.9 G/DL (ref 3.4–5)
ALP SERPL-CCNC: 55 U/L (ref 33–110)
ALT SERPL W P-5'-P-CCNC: 28 U/L (ref 7–45)
ANION GAP SERPL CALC-SCNC: 12 MMOL/L (ref 10–20)
AST SERPL W P-5'-P-CCNC: 29 U/L (ref 9–39)
BACTERIA UR CULT: ABNORMAL
BASOPHILS # BLD MANUAL: 0 X10*3/UL (ref 0–0.1)
BASOPHILS NFR BLD MANUAL: 0 %
BILIRUB SERPL-MCNC: 0.5 MG/DL (ref 0–1.2)
BUN SERPL-MCNC: 4 MG/DL (ref 6–23)
CALCIUM SERPL-MCNC: 5.7 MG/DL (ref 8.6–10.3)
CHLORIDE SERPL-SCNC: 113 MMOL/L (ref 98–107)
CO2 SERPL-SCNC: 18 MMOL/L (ref 21–32)
CREAT SERPL-MCNC: 0.42 MG/DL (ref 0.5–1.05)
EOSINOPHIL # BLD MANUAL: 0.11 X10*3/UL (ref 0–0.7)
EOSINOPHIL NFR BLD MANUAL: 1 %
ERYTHROCYTE [DISTWIDTH] IN BLOOD BY AUTOMATED COUNT: 16.2 % (ref 11.5–14.5)
GFR SERPL CREATININE-BSD FRML MDRD: >90 ML/MIN/1.73M*2
GLUCOSE SERPL-MCNC: 56 MG/DL (ref 74–99)
HCT VFR BLD AUTO: 35 % (ref 36–46)
HGB BLD-MCNC: 11.3 G/DL (ref 12–16)
IMM GRANULOCYTES # BLD AUTO: 1.72 X10*3/UL (ref 0–0.7)
IMM GRANULOCYTES NFR BLD AUTO: 15.2 % (ref 0–0.9)
LYMPHOCYTES # BLD MANUAL: 1.13 X10*3/UL (ref 1.2–4.8)
LYMPHOCYTES NFR BLD MANUAL: 10 %
MAGNESIUM SERPL-MCNC: 1.4 MG/DL (ref 1.6–2.4)
MCH RBC QN AUTO: 27.3 PG (ref 26–34)
MCHC RBC AUTO-ENTMCNC: 32.3 G/DL (ref 32–36)
MCV RBC AUTO: 85 FL (ref 80–100)
METAMYELOCYTES # BLD MANUAL: 0.68 X10*3/UL
METAMYELOCYTES NFR BLD MANUAL: 6 %
MONOCYTES # BLD MANUAL: 1.81 X10*3/UL (ref 0.1–1)
MONOCYTES NFR BLD MANUAL: 16 %
NEUTROPHILS # BLD MANUAL: 7.58 X10*3/UL (ref 1.2–7.7)
NEUTS BAND # BLD MANUAL: 2.83 X10*3/UL (ref 0–0.7)
NEUTS BAND NFR BLD MANUAL: 25 %
NEUTS SEG # BLD MANUAL: 4.75 X10*3/UL (ref 1.2–7)
NEUTS SEG NFR BLD MANUAL: 42 %
NRBC BLD-RTO: 1.1 /100 WBCS (ref 0–0)
OVALOCYTES BLD QL SMEAR: ABNORMAL
PLATELET # BLD AUTO: 147 X10*3/UL (ref 150–450)
POLYCHROMASIA BLD QL SMEAR: ABNORMAL
POTASSIUM SERPL-SCNC: 2.5 MMOL/L (ref 3.5–5.3)
PROT SERPL-MCNC: 3 G/DL (ref 6.4–8.2)
RBC # BLD AUTO: 4.14 X10*6/UL (ref 4–5.2)
RBC MORPH BLD: ABNORMAL
SODIUM SERPL-SCNC: 140 MMOL/L (ref 136–145)
TOTAL CELLS COUNTED BLD: 100
WBC # BLD AUTO: 11.3 X10*3/UL (ref 4.4–11.3)

## 2023-11-10 PROCEDURE — 85027 COMPLETE CBC AUTOMATED: CPT | Performed by: INTERNAL MEDICINE

## 2023-11-10 PROCEDURE — 2500000001 HC RX 250 WO HCPCS SELF ADMINISTERED DRUGS (ALT 637 FOR MEDICARE OP): Performed by: NURSE PRACTITIONER

## 2023-11-10 PROCEDURE — 2500000004 HC RX 250 GENERAL PHARMACY W/ HCPCS (ALT 636 FOR OP/ED): Performed by: NURSE PRACTITIONER

## 2023-11-10 PROCEDURE — 83735 ASSAY OF MAGNESIUM: CPT | Performed by: INTERNAL MEDICINE

## 2023-11-10 PROCEDURE — 80053 COMPREHEN METABOLIC PANEL: CPT | Performed by: INTERNAL MEDICINE

## 2023-11-10 PROCEDURE — G0378 HOSPITAL OBSERVATION PER HR: HCPCS

## 2023-11-10 PROCEDURE — C9113 INJ PANTOPRAZOLE SODIUM, VIA: HCPCS | Performed by: NURSE PRACTITIONER

## 2023-11-10 PROCEDURE — 99233 SBSQ HOSP IP/OBS HIGH 50: CPT | Performed by: INTERNAL MEDICINE

## 2023-11-10 PROCEDURE — 2500000001 HC RX 250 WO HCPCS SELF ADMINISTERED DRUGS (ALT 637 FOR MEDICARE OP): Performed by: INTERNAL MEDICINE

## 2023-11-10 PROCEDURE — 85007 BL SMEAR W/DIFF WBC COUNT: CPT | Performed by: INTERNAL MEDICINE

## 2023-11-10 PROCEDURE — 2500000004 HC RX 250 GENERAL PHARMACY W/ HCPCS (ALT 636 FOR OP/ED): Performed by: INTERNAL MEDICINE

## 2023-11-10 RX ORDER — POTASSIUM CHLORIDE 29.8 MG/ML
40 INJECTION INTRAVENOUS ONCE
Status: COMPLETED | OUTPATIENT
Start: 2023-11-10 | End: 2023-11-10

## 2023-11-10 RX ORDER — MAGNESIUM SULFATE HEPTAHYDRATE 40 MG/ML
4 INJECTION, SOLUTION INTRAVENOUS ONCE
Status: COMPLETED | OUTPATIENT
Start: 2023-11-10 | End: 2023-11-10

## 2023-11-10 RX ORDER — CIPROFLOXACIN 2 MG/ML
400 INJECTION, SOLUTION INTRAVENOUS EVERY 12 HOURS
Status: DISCONTINUED | OUTPATIENT
Start: 2023-11-10 | End: 2023-11-12 | Stop reason: HOSPADM

## 2023-11-10 RX ADMIN — Medication 1 TABLET: at 08:11

## 2023-11-10 RX ADMIN — LOPERAMIDE HYDROCHLORIDE 4 MG: 2 CAPSULE ORAL at 18:32

## 2023-11-10 RX ADMIN — CHOLECALCIFEROL TAB 25 MCG (1000 UNIT) 1000 UNITS: 25 TAB at 08:11

## 2023-11-10 RX ADMIN — MAGNESIUM SULFATE HEPTAHYDRATE 4 G: 40 INJECTION, SOLUTION INTRAVENOUS at 12:02

## 2023-11-10 RX ADMIN — CIPROFLOXACIN 400 MG: 400 INJECTION, SOLUTION INTRAVENOUS at 16:05

## 2023-11-10 RX ADMIN — POTASSIUM CHLORIDE 40 MEQ: 400 INJECTION, SOLUTION INTRAVENOUS at 11:53

## 2023-11-10 RX ADMIN — METOCLOPRAMIDE HYDROCHLORIDE 5 MG: 5 INJECTION INTRAMUSCULAR; INTRAVENOUS at 11:52

## 2023-11-10 RX ADMIN — DULOXETINE HYDROCHLORIDE 60 MG: 30 CAPSULE, DELAYED RELEASE ORAL at 08:12

## 2023-11-10 RX ADMIN — METOPROLOL SUCCINATE 50 MG: 50 TABLET, EXTENDED RELEASE ORAL at 08:12

## 2023-11-10 RX ADMIN — TRAZODONE HYDROCHLORIDE 50 MG: 50 TABLET ORAL at 20:13

## 2023-11-10 RX ADMIN — CYANOCOBALAMIN TAB 1000 MCG 1000 MCG: 1000 TAB at 08:11

## 2023-11-10 RX ADMIN — ONDANSETRON 4 MG: 2 INJECTION INTRAMUSCULAR; INTRAVENOUS at 18:32

## 2023-11-10 RX ADMIN — PANTOPRAZOLE SODIUM 40 MG: 40 INJECTION, POWDER, FOR SOLUTION INTRAVENOUS at 08:11

## 2023-11-10 RX ADMIN — MULTIPLE VITAMINS W/ MINERALS TAB 1 TABLET: TAB at 08:12

## 2023-11-10 RX ADMIN — SODIUM CHLORIDE 100 ML/HR: 9 INJECTION, SOLUTION INTRAVENOUS at 08:16

## 2023-11-10 RX ADMIN — ATORVASTATIN CALCIUM 20 MG: 20 TABLET, FILM COATED ORAL at 20:13

## 2023-11-10 ASSESSMENT — COGNITIVE AND FUNCTIONAL STATUS - GENERAL
DAILY ACTIVITIY SCORE: 24
MOBILITY SCORE: 24
DAILY ACTIVITIY SCORE: 24
MOBILITY SCORE: 24

## 2023-11-10 ASSESSMENT — PAIN SCALES - GENERAL
PAINLEVEL_OUTOF10: 0 - NO PAIN
PAINLEVEL_OUTOF10: 0 - NO PAIN

## 2023-11-10 ASSESSMENT — PAIN - FUNCTIONAL ASSESSMENT: PAIN_FUNCTIONAL_ASSESSMENT: 0-10

## 2023-11-10 NOTE — CARE PLAN
The patient's goals for the shift include  control nausea and vomiting    The clinical goals for the shift include improvement of n/v/d this shift

## 2023-11-10 NOTE — CARE PLAN
The patient's goals for the shift include no nausea/vomiting or diarrhea    The clinical goals for the shift include Improvement of nausea, vomiting and diarrhea this shift.    Over the shift, the patient did not make progress toward the following goals. Barriers to progression include reporting n/v/d early. Recommendations to address these barriers include assess for n/v/d frequently.

## 2023-11-11 PROBLEM — R11.2 NAUSEA AND VOMITING, UNSPECIFIED VOMITING TYPE: Status: ACTIVE | Noted: 2023-11-11

## 2023-11-11 LAB
ALBUMIN SERPL BCP-MCNC: 2.4 G/DL (ref 3.4–5)
ALP SERPL-CCNC: 67 U/L (ref 33–110)
ALT SERPL W P-5'-P-CCNC: 39 U/L (ref 7–45)
ANION GAP SERPL CALC-SCNC: 10 MMOL/L (ref 10–20)
AST SERPL W P-5'-P-CCNC: 42 U/L (ref 9–39)
BASOPHILS # BLD MANUAL: 0 X10*3/UL (ref 0–0.1)
BASOPHILS NFR BLD MANUAL: 0 %
BILIRUB SERPL-MCNC: 0.5 MG/DL (ref 0–1.2)
BUN SERPL-MCNC: 5 MG/DL (ref 6–23)
C COLI+JEJ+UPSA DNA STL QL NAA+PROBE: NOT DETECTED
CALCIUM SERPL-MCNC: 7.2 MG/DL (ref 8.6–10.3)
CHLORIDE SERPL-SCNC: 106 MMOL/L (ref 98–107)
CO2 SERPL-SCNC: 24 MMOL/L (ref 21–32)
CREAT SERPL-MCNC: 0.48 MG/DL (ref 0.5–1.05)
DACRYOCYTES BLD QL SMEAR: ABNORMAL
EC STX1 GENE STL QL NAA+PROBE: NOT DETECTED
EC STX2 GENE STL QL NAA+PROBE: NOT DETECTED
EOSINOPHIL # BLD MANUAL: 0 X10*3/UL (ref 0–0.7)
EOSINOPHIL NFR BLD MANUAL: 0 %
ERYTHROCYTE [DISTWIDTH] IN BLOOD BY AUTOMATED COUNT: 16.1 % (ref 11.5–14.5)
GFR SERPL CREATININE-BSD FRML MDRD: >90 ML/MIN/1.73M*2
GLUCOSE SERPL-MCNC: 73 MG/DL (ref 74–99)
HCT VFR BLD AUTO: 33.9 % (ref 36–46)
HGB BLD-MCNC: 11 G/DL (ref 12–16)
IMM GRANULOCYTES # BLD AUTO: 1.67 X10*3/UL (ref 0–0.7)
IMM GRANULOCYTES NFR BLD AUTO: 14.2 % (ref 0–0.9)
LYMPHOCYTES # BLD MANUAL: 1.99 X10*3/UL (ref 1.2–4.8)
LYMPHOCYTES NFR BLD MANUAL: 17 %
MAGNESIUM SERPL-MCNC: 2.11 MG/DL (ref 1.6–2.4)
MCH RBC QN AUTO: 27.4 PG (ref 26–34)
MCHC RBC AUTO-ENTMCNC: 32.4 G/DL (ref 32–36)
MCV RBC AUTO: 85 FL (ref 80–100)
MONOCYTES # BLD MANUAL: 2.69 X10*3/UL (ref 0.1–1)
MONOCYTES NFR BLD MANUAL: 23 %
NEUTROPHILS # BLD MANUAL: 7.02 X10*3/UL (ref 1.2–7.7)
NEUTS BAND # BLD MANUAL: 3.51 X10*3/UL (ref 0–0.7)
NEUTS BAND NFR BLD MANUAL: 30 %
NEUTS SEG # BLD MANUAL: 3.51 X10*3/UL (ref 1.2–7)
NEUTS SEG NFR BLD MANUAL: 30 %
NOROVIRUS GI + GII RNA STL NAA+PROBE: NOT DETECTED
NRBC BLD-RTO: 1.1 /100 WBCS (ref 0–0)
OVALOCYTES BLD QL SMEAR: ABNORMAL
PLATELET # BLD AUTO: 132 X10*3/UL (ref 150–450)
POLYCHROMASIA BLD QL SMEAR: ABNORMAL
POTASSIUM SERPL-SCNC: 3 MMOL/L (ref 3.5–5.3)
PROT SERPL-MCNC: 3.7 G/DL (ref 6.4–8.2)
RBC # BLD AUTO: 4.01 X10*6/UL (ref 4–5.2)
RBC MORPH BLD: ABNORMAL
RV RNA STL NAA+PROBE: NOT DETECTED
SALMONELLA DNA STL QL NAA+PROBE: NOT DETECTED
SHIGELLA DNA SPEC QL NAA+PROBE: NOT DETECTED
SODIUM SERPL-SCNC: 137 MMOL/L (ref 136–145)
TOTAL CELLS COUNTED BLD: 100
V CHOLERAE DNA STL QL NAA+PROBE: NOT DETECTED
WBC # BLD AUTO: 11.7 X10*3/UL (ref 4.4–11.3)
Y ENTEROCOL DNA STL QL NAA+PROBE: NOT DETECTED

## 2023-11-11 PROCEDURE — 85027 COMPLETE CBC AUTOMATED: CPT | Performed by: INTERNAL MEDICINE

## 2023-11-11 PROCEDURE — 94760 N-INVAS EAR/PLS OXIMETRY 1: CPT

## 2023-11-11 PROCEDURE — 1200000002 HC GENERAL ROOM WITH TELEMETRY DAILY

## 2023-11-11 PROCEDURE — 2500000001 HC RX 250 WO HCPCS SELF ADMINISTERED DRUGS (ALT 637 FOR MEDICARE OP): Performed by: NURSE PRACTITIONER

## 2023-11-11 PROCEDURE — 80053 COMPREHEN METABOLIC PANEL: CPT | Performed by: INTERNAL MEDICINE

## 2023-11-11 PROCEDURE — 2500000004 HC RX 250 GENERAL PHARMACY W/ HCPCS (ALT 636 FOR OP/ED): Performed by: INTERNAL MEDICINE

## 2023-11-11 PROCEDURE — 83735 ASSAY OF MAGNESIUM: CPT | Performed by: INTERNAL MEDICINE

## 2023-11-11 PROCEDURE — 85007 BL SMEAR W/DIFF WBC COUNT: CPT | Performed by: INTERNAL MEDICINE

## 2023-11-11 PROCEDURE — 2500000001 HC RX 250 WO HCPCS SELF ADMINISTERED DRUGS (ALT 637 FOR MEDICARE OP): Performed by: INTERNAL MEDICINE

## 2023-11-11 PROCEDURE — C9113 INJ PANTOPRAZOLE SODIUM, VIA: HCPCS | Performed by: NURSE PRACTITIONER

## 2023-11-11 PROCEDURE — 2500000004 HC RX 250 GENERAL PHARMACY W/ HCPCS (ALT 636 FOR OP/ED): Performed by: NURSE PRACTITIONER

## 2023-11-11 RX ADMIN — CYANOCOBALAMIN TAB 1000 MCG 1000 MCG: 1000 TAB at 08:43

## 2023-11-11 RX ADMIN — CIPROFLOXACIN 400 MG: 400 INJECTION, SOLUTION INTRAVENOUS at 16:43

## 2023-11-11 RX ADMIN — MULTIPLE VITAMINS W/ MINERALS TAB 1 TABLET: TAB at 08:43

## 2023-11-11 RX ADMIN — SODIUM CHLORIDE 100 ML/HR: 9 INJECTION, SOLUTION INTRAVENOUS at 13:33

## 2023-11-11 RX ADMIN — METOPROLOL SUCCINATE 50 MG: 50 TABLET, EXTENDED RELEASE ORAL at 06:39

## 2023-11-11 RX ADMIN — TRAZODONE HYDROCHLORIDE 50 MG: 50 TABLET ORAL at 21:25

## 2023-11-11 RX ADMIN — ONDANSETRON 4 MG: 2 INJECTION INTRAMUSCULAR; INTRAVENOUS at 13:38

## 2023-11-11 RX ADMIN — CIPROFLOXACIN 400 MG: 400 INJECTION, SOLUTION INTRAVENOUS at 03:33

## 2023-11-11 RX ADMIN — DULOXETINE HYDROCHLORIDE 60 MG: 30 CAPSULE, DELAYED RELEASE ORAL at 06:39

## 2023-11-11 RX ADMIN — PANTOPRAZOLE SODIUM 40 MG: 40 INJECTION, POWDER, FOR SOLUTION INTRAVENOUS at 08:43

## 2023-11-11 RX ADMIN — CHOLECALCIFEROL TAB 25 MCG (1000 UNIT) 1000 UNITS: 25 TAB at 08:43

## 2023-11-11 RX ADMIN — ATORVASTATIN CALCIUM 20 MG: 20 TABLET, FILM COATED ORAL at 21:25

## 2023-11-11 RX ADMIN — Medication 1 TABLET: at 08:43

## 2023-11-11 ASSESSMENT — COGNITIVE AND FUNCTIONAL STATUS - GENERAL
DAILY ACTIVITIY SCORE: 24
MOBILITY SCORE: 24
MOBILITY SCORE: 24
DAILY ACTIVITIY SCORE: 24

## 2023-11-11 ASSESSMENT — PAIN SCALES - GENERAL: PAINLEVEL_OUTOF10: 0 - NO PAIN

## 2023-11-11 ASSESSMENT — PAIN SCALES - WONG BAKER: WONGBAKER_NUMERICALRESPONSE: NO HURT

## 2023-11-11 NOTE — CARE PLAN
The patient's goals for the shift include no nausea/vomiting or diarrhea    The clinical goals for the shift include patient will have improved symptoms of nausea, vomiting and diarrhea

## 2023-11-11 NOTE — CARE PLAN
The patient's goals for the shift include no nausea/vomiting or diarrhea    The clinical goals for the shift include patient will have improved sysmptom of N,V,D this shift

## 2023-11-11 NOTE — PROGRESS NOTES
Shruthi Ramos is a 58 y.o. female on day 0 of admission presenting with Nausea & vomiting.      Subjective   Felt a little better earlier - even asked for regular food instead of clear liquids. After lunch started with nausea again. Diarrhea a little improved.       Objective     Last Recorded Vitals  /68 (BP Location: Right arm, Patient Position: Lying)   Pulse 73   Temp 35.9 °C (96.6 °F) (Temporal)   Resp 18   Wt 102 kg (225 lb)   SpO2 97%   Intake/Output last 3 Shifts:    Intake/Output Summary (Last 24 hours) at 11/10/2023 2246  Last data filed at 11/10/2023 2134  Gross per 24 hour   Intake 1458.17 ml   Output --   Net 1458.17 ml       Admission Weight  Weight: 102 kg (225 lb) (11/06/23 1108)    Daily Weight  11/06/23 : 102 kg (225 lb)    Image Results  XR chest 2 views  Narrative: Interpreted By:  Reese Mcfarland,   STUDY:  XR CHEST 2 VIEWS;  11/6/2023 6:59 pm      INDICATION:  Signs/Symptoms:r/o pna.      COMPARISON:  10/11/2020      ACCESSION NUMBER(S):  [id number]      ORDERING CLINICIAN:  JENNA MORAES      FINDINGS:      Right-sided implanted port is in stable position. The cardiac  silhouette is unremarkable. Costophrenic angles are sharp. Lungs are  clear. The trachea is midline. There is no pneumothorax. No acute  osseous abnormality is seen.      Impression: 1.  No active cardiopulmonary process.          Signed by: Reese Mcfarland 11/6/2023 7:16 PM  Dictation workstation:   [id number]      Physical Exam  Gen - NAD  eNT - NC  Neck - No JVD  H - S1S2  L - Decreased BS  Abd - Soft  Ext - W & D  Neuro - Awake and Alert  Relevant Results    Scheduled medications  atorvastatin, 20 mg, oral, Nightly  cholecalciferol, 1,000 Units, oral, q AM  ciprofloxacin, 400 mg, intravenous, q12h  cyanocobalamin, 1,000 mcg, oral, Daily  DULoxetine, 60 mg, oral, Daily before breakfast  estradiol, 3 g, vaginal, q3 days  lactobacillus acidophilus, 1 tablet, oral, Daily  metoprolol succinate XL, 50 mg,  oral, Daily before breakfast  multivitamin with minerals, 1 tablet, oral, Daily  pantoprazole, 40 mg, intravenous, Daily  scopolamine, 1 patch, transdermal, q72h  traZODone, 50 mg, oral, Nightly      Continuous medications  sodium chloride 0.9%, 100 mL/hr, Last Rate: 100 mL/hr (11/10/23 0161)      PRN medications  PRN medications: acetaminophen **OR** acetaminophen **OR** acetaminophen, loperamide, metoclopramide, ondansetron, promethazine, white petrolatum  Results for orders placed or performed during the hospital encounter of 11/06/23 (from the past 24 hour(s))   Comprehensive metabolic panel   Result Value Ref Range    Glucose 56 (L) 74 - 99 mg/dL    Sodium 140 136 - 145 mmol/L    Potassium 2.5 (LL) 3.5 - 5.3 mmol/L    Chloride 113 (H) 98 - 107 mmol/L    Bicarbonate 18 (L) 21 - 32 mmol/L    Anion Gap 12 10 - 20 mmol/L    Urea Nitrogen 4 (L) 6 - 23 mg/dL    Creatinine 0.42 (L) 0.50 - 1.05 mg/dL    eGFR >90 >60 mL/min/1.73m*2    Calcium 5.7 (L) 8.6 - 10.3 mg/dL    Albumin 1.9 (L) 3.4 - 5.0 g/dL    Alkaline Phosphatase 55 33 - 110 U/L    Total Protein 3.0 (L) 6.4 - 8.2 g/dL    AST 29 9 - 39 U/L    Bilirubin, Total 0.5 0.0 - 1.2 mg/dL    ALT 28 7 - 45 U/L   CBC and Auto Differential   Result Value Ref Range    WBC 11.3 4.4 - 11.3 x10*3/uL    nRBC 1.1 (H) 0.0 - 0.0 /100 WBCs    RBC 4.14 4.00 - 5.20 x10*6/uL    Hemoglobin 11.3 (L) 12.0 - 16.0 g/dL    Hematocrit 35.0 (L) 36.0 - 46.0 %    MCV 85 80 - 100 fL    MCH 27.3 26.0 - 34.0 pg    MCHC 32.3 32.0 - 36.0 g/dL    RDW 16.2 (H) 11.5 - 14.5 %    Platelets 147 (L) 150 - 450 x10*3/uL    Immature Granulocytes %, Automated 15.2 (H) 0.0 - 0.9 %    Immature Granulocytes Absolute, Automated 1.72 (H) 0.00 - 0.70 x10*3/uL   Magnesium   Result Value Ref Range    Magnesium 1.40 (L) 1.60 - 2.40 mg/dL   Manual Differential   Result Value Ref Range    Neutrophils %, Manual 42.0 40.0 - 80.0 %    Bands %, Manual 25.0 0.0 - 5.0 %    Lymphocytes %, Manual 10.0 13.0 - 44.0 %    Monocytes  %, Manual 16.0 2.0 - 10.0 %    Eosinophils %, Manual 1.0 0.0 - 6.0 %    Basophils %, Manual 0.0 0.0 - 2.0 %    Metamyelocytes %, Manual 6.0 0.0 - 0.0 %    Seg Neutrophils Absolute, Manual 4.75 1.20 - 7.00 x10*3/uL    Bands Absolute, Manual 2.83 (H) 0.00 - 0.70 x10*3/uL    Lymphocytes Absolute, Manual 1.13 (L) 1.20 - 4.80 x10*3/uL    Monocytes Absolute, Manual 1.81 (H) 0.10 - 1.00 x10*3/uL    Eosinophils Absolute, Manual 0.11 0.00 - 0.70 x10*3/uL    Basophils Absolute, Manual 0.00 0.00 - 0.10 x10*3/uL    Metamyelocytes Absolute, Manual 0.68 0.00 - 0.00 x10*3/uL    Total Cells Counted 100     Neutrophils Absolute, Manual 7.58 1.20 - 7.70 x10*3/uL    RBC Morphology See Below     Polychromasia Mild     Ovalocytes Few     Acanthocytes Few                 Assessment/Plan        HTN  HLD  Gastric Ca  N/V/D  Continue Tx and IV Fluids          Principal Problem:    Nausea & vomiting                  Gaudencio Burks, DO

## 2023-11-11 NOTE — PROGRESS NOTES
"She is starting to feel better.  Diarrhea is improving.  No more episodes of vomiting.  Still feels extremely weak and tired.       Physical Exam  Detailed physical examination not done    Last Recorded Vitals  Blood pressure 140/83, pulse 80, temperature 36 °C (96.8 °F), resp. rate 18, height 1.549 m (5' 1\"), weight 102 kg (225 lb), SpO2 97 %.  Intake/Output last 3 Shifts:  I/O last 3 completed shifts:  In: 1358.2 (13.3 mL/kg) [I.V.:1358.2 (13.3 mL/kg)]  Out: - (0 mL/kg)   Weight: 102.1 kg     Relevant Results  Blood work results are essentially unremarkable other than low potassium which is being replaced.  CBC is unremarkable.    Assessment/Plan   1.  Nausea/vomiting/diarrhea.  Mainly secondary to ongoing chemotherapy.  Appreciate everybody's prompt help.  Symptoms are gradually improving.  For now we will recommend continuing with the supportive care as already initiated.  Hopefully she will be discharged soon and will follow-up with us at cancer center.  With her subsequent cycles of chemotherapy we will add more aggressive premedication and we will also optimize the dose to cut down the risk of GI toxicities.      Ollie Torres MD      "

## 2023-11-12 VITALS
SYSTOLIC BLOOD PRESSURE: 130 MMHG | TEMPERATURE: 97.7 F | HEART RATE: 69 BPM | RESPIRATION RATE: 18 BRPM | HEIGHT: 61 IN | WEIGHT: 225 LBS | OXYGEN SATURATION: 96 % | DIASTOLIC BLOOD PRESSURE: 74 MMHG | BODY MASS INDEX: 42.48 KG/M2

## 2023-11-12 PROBLEM — R11.2 NAUSEA & VOMITING: Status: RESOLVED | Noted: 2023-11-06 | Resolved: 2023-11-12

## 2023-11-12 PROBLEM — R11.2 NAUSEA AND VOMITING, UNSPECIFIED VOMITING TYPE: Status: RESOLVED | Noted: 2023-11-11 | Resolved: 2023-11-12

## 2023-11-12 LAB
ACANTHOCYTES BLD QL SMEAR: ABNORMAL
ALBUMIN SERPL BCP-MCNC: 2.4 G/DL (ref 3.4–5)
ALP SERPL-CCNC: 73 U/L (ref 33–110)
ALT SERPL W P-5'-P-CCNC: 45 U/L (ref 7–45)
ANION GAP SERPL CALC-SCNC: 11 MMOL/L (ref 10–20)
AST SERPL W P-5'-P-CCNC: 46 U/L (ref 9–39)
BASOPHILS # BLD MANUAL: 0 X10*3/UL (ref 0–0.1)
BASOPHILS NFR BLD MANUAL: 0 %
BILIRUB SERPL-MCNC: 0.5 MG/DL (ref 0–1.2)
BUN SERPL-MCNC: 5 MG/DL (ref 6–23)
BURR CELLS BLD QL SMEAR: ABNORMAL
CALCIUM SERPL-MCNC: 7.4 MG/DL (ref 8.6–10.3)
CHLORIDE SERPL-SCNC: 104 MMOL/L (ref 98–107)
CO2 SERPL-SCNC: 25 MMOL/L (ref 21–32)
CREAT SERPL-MCNC: 0.57 MG/DL (ref 0.5–1.05)
EOSINOPHIL # BLD MANUAL: 0 X10*3/UL (ref 0–0.7)
EOSINOPHIL NFR BLD MANUAL: 0 %
ERYTHROCYTE [DISTWIDTH] IN BLOOD BY AUTOMATED COUNT: 16.2 % (ref 11.5–14.5)
GFR SERPL CREATININE-BSD FRML MDRD: >90 ML/MIN/1.73M*2
GLUCOSE SERPL-MCNC: 73 MG/DL (ref 74–99)
HCT VFR BLD AUTO: 34.2 % (ref 36–46)
HGB BLD-MCNC: 11.2 G/DL (ref 12–16)
IMM GRANULOCYTES # BLD AUTO: 1.42 X10*3/UL (ref 0–0.7)
IMM GRANULOCYTES NFR BLD AUTO: 12.1 % (ref 0–0.9)
LYMPHOCYTES # BLD MANUAL: 2.48 X10*3/UL (ref 1.2–4.8)
LYMPHOCYTES NFR BLD MANUAL: 21 %
MAGNESIUM SERPL-MCNC: 1.91 MG/DL (ref 1.6–2.4)
MCH RBC QN AUTO: 27.3 PG (ref 26–34)
MCHC RBC AUTO-ENTMCNC: 32.7 G/DL (ref 32–36)
MCV RBC AUTO: 83 FL (ref 80–100)
MONOCYTES # BLD MANUAL: 0.59 X10*3/UL (ref 0.1–1)
MONOCYTES NFR BLD MANUAL: 5 %
NEUTROPHILS # BLD MANUAL: 8.74 X10*3/UL (ref 1.2–7.7)
NEUTS BAND # BLD MANUAL: 4.37 X10*3/UL (ref 0–0.7)
NEUTS BAND NFR BLD MANUAL: 37 %
NEUTS SEG # BLD MANUAL: 4.37 X10*3/UL (ref 1.2–7)
NEUTS SEG NFR BLD MANUAL: 37 %
NRBC BLD-RTO: 0.9 /100 WBCS (ref 0–0)
OVALOCYTES BLD QL SMEAR: ABNORMAL
PLATELET # BLD AUTO: 133 X10*3/UL (ref 150–450)
POLYCHROMASIA BLD QL SMEAR: ABNORMAL
POTASSIUM SERPL-SCNC: 2.8 MMOL/L (ref 3.5–5.3)
PROT SERPL-MCNC: 3.8 G/DL (ref 6.4–8.2)
RBC # BLD AUTO: 4.1 X10*6/UL (ref 4–5.2)
RBC MORPH BLD: ABNORMAL
SODIUM SERPL-SCNC: 137 MMOL/L (ref 136–145)
TOTAL CELLS COUNTED BLD: 100
WBC # BLD AUTO: 11.8 X10*3/UL (ref 4.4–11.3)

## 2023-11-12 PROCEDURE — 2500000001 HC RX 250 WO HCPCS SELF ADMINISTERED DRUGS (ALT 637 FOR MEDICARE OP): Performed by: INTERNAL MEDICINE

## 2023-11-12 PROCEDURE — 2500000004 HC RX 250 GENERAL PHARMACY W/ HCPCS (ALT 636 FOR OP/ED): Performed by: NURSE PRACTITIONER

## 2023-11-12 PROCEDURE — 2500000001 HC RX 250 WO HCPCS SELF ADMINISTERED DRUGS (ALT 637 FOR MEDICARE OP): Performed by: NURSE PRACTITIONER

## 2023-11-12 PROCEDURE — 2500000004 HC RX 250 GENERAL PHARMACY W/ HCPCS (ALT 636 FOR OP/ED): Performed by: INTERNAL MEDICINE

## 2023-11-12 PROCEDURE — 85007 BL SMEAR W/DIFF WBC COUNT: CPT | Performed by: INTERNAL MEDICINE

## 2023-11-12 PROCEDURE — C9113 INJ PANTOPRAZOLE SODIUM, VIA: HCPCS | Performed by: NURSE PRACTITIONER

## 2023-11-12 PROCEDURE — 83735 ASSAY OF MAGNESIUM: CPT | Performed by: INTERNAL MEDICINE

## 2023-11-12 PROCEDURE — 85027 COMPLETE CBC AUTOMATED: CPT | Performed by: INTERNAL MEDICINE

## 2023-11-12 PROCEDURE — 80053 COMPREHEN METABOLIC PANEL: CPT | Performed by: INTERNAL MEDICINE

## 2023-11-12 RX ORDER — PANTOPRAZOLE SODIUM 40 MG/1
40 TABLET, DELAYED RELEASE ORAL DAILY
Qty: 30 TABLET | Refills: 1 | Status: SHIPPED | OUTPATIENT
Start: 2023-11-12 | End: 2024-01-24 | Stop reason: HOSPADM

## 2023-11-12 RX ORDER — POTASSIUM CHLORIDE 750 MG/1
10 TABLET, FILM COATED, EXTENDED RELEASE ORAL DAILY
Qty: 10 TABLET | Refills: 0 | Status: SHIPPED | OUTPATIENT
Start: 2023-11-13 | End: 2023-11-13 | Stop reason: SDUPTHER

## 2023-11-12 RX ORDER — LOPERAMIDE HYDROCHLORIDE 2 MG/1
4 CAPSULE ORAL 4 TIMES DAILY PRN
Qty: 30 CAPSULE | Refills: 0 | Status: SHIPPED | OUTPATIENT
Start: 2023-11-12 | End: 2024-04-10 | Stop reason: SDUPTHER

## 2023-11-12 RX ORDER — HEPARIN SODIUM (PORCINE) LOCK FLUSH IV SOLN 100 UNIT/ML 100 UNIT/ML
5 SOLUTION INTRAVENOUS AS NEEDED
Status: DISCONTINUED | OUTPATIENT
Start: 2023-11-12 | End: 2023-11-12 | Stop reason: HOSPADM

## 2023-11-12 RX ORDER — POTASSIUM CHLORIDE 14.9 MG/ML
20 INJECTION INTRAVENOUS
Status: COMPLETED | OUTPATIENT
Start: 2023-11-12 | End: 2023-11-12

## 2023-11-12 RX ORDER — ACETAMINOPHEN 325 MG/1
650 TABLET ORAL EVERY 4 HOURS PRN
Qty: 30 TABLET | Refills: 0
Start: 2023-11-12 | End: 2024-04-17

## 2023-11-12 RX ORDER — PETROLATUM 420 MG/G
1 OINTMENT TOPICAL
Qty: 30 G | Refills: 0 | Status: SHIPPED | OUTPATIENT
Start: 2023-11-12 | End: 2024-04-17

## 2023-11-12 RX ORDER — CIPROFLOXACIN 250 MG/1
250 TABLET, FILM COATED ORAL 2 TIMES DAILY
Qty: 10 TABLET | Refills: 0 | Status: SHIPPED | OUTPATIENT
Start: 2023-11-12 | End: 2023-11-13 | Stop reason: SDUPTHER

## 2023-11-12 RX ORDER — POTASSIUM CHLORIDE 1.5 G/1.58G
40 POWDER, FOR SOLUTION ORAL ONCE
Status: COMPLETED | OUTPATIENT
Start: 2023-11-12 | End: 2023-11-12

## 2023-11-12 RX ORDER — SCOLOPAMINE TRANSDERMAL SYSTEM 1 MG/1
1 PATCH, EXTENDED RELEASE TRANSDERMAL
Qty: 10 PATCH | Refills: 1 | Status: SHIPPED | OUTPATIENT
Start: 2023-11-15 | End: 2023-11-13 | Stop reason: SDUPTHER

## 2023-11-12 RX ORDER — POTASSIUM CHLORIDE 750 MG/1
10 TABLET, FILM COATED, EXTENDED RELEASE ORAL DAILY
Status: DISCONTINUED | OUTPATIENT
Start: 2023-11-12 | End: 2023-11-12 | Stop reason: HOSPADM

## 2023-11-12 RX ORDER — L. ACIDOPHILUS/L.BULGARICUS 1MM CELL
1 TABLET ORAL DAILY
Qty: 30 TABLET | Refills: 1 | Status: SHIPPED | OUTPATIENT
Start: 2023-11-13 | End: 2023-11-13 | Stop reason: SDUPTHER

## 2023-11-12 RX ADMIN — Medication 1 TABLET: at 09:46

## 2023-11-12 RX ADMIN — POTASSIUM CHLORIDE 10 MEQ: 10 TABLET, EXTENDED RELEASE ORAL at 09:00

## 2023-11-12 RX ADMIN — CYANOCOBALAMIN TAB 1000 MCG 1000 MCG: 1000 TAB at 09:46

## 2023-11-12 RX ADMIN — POTASSIUM CHLORIDE 20 MEQ: 14.9 INJECTION, SOLUTION INTRAVENOUS at 07:22

## 2023-11-12 RX ADMIN — POTASSIUM CHLORIDE 40 MEQ: 1.5 FOR SOLUTION ORAL at 08:45

## 2023-11-12 RX ADMIN — METOPROLOL SUCCINATE 50 MG: 50 TABLET, EXTENDED RELEASE ORAL at 09:46

## 2023-11-12 RX ADMIN — PANTOPRAZOLE SODIUM 40 MG: 40 INJECTION, POWDER, FOR SOLUTION INTRAVENOUS at 09:00

## 2023-11-12 RX ADMIN — DULOXETINE HYDROCHLORIDE 60 MG: 30 CAPSULE, DELAYED RELEASE ORAL at 09:46

## 2023-11-12 RX ADMIN — MULTIPLE VITAMINS W/ MINERALS TAB 1 TABLET: TAB at 09:46

## 2023-11-12 RX ADMIN — CIPROFLOXACIN 400 MG: 400 INJECTION, SOLUTION INTRAVENOUS at 04:43

## 2023-11-12 RX ADMIN — CHOLECALCIFEROL TAB 25 MCG (1000 UNIT) 1000 UNITS: 25 TAB at 09:46

## 2023-11-12 RX ADMIN — POTASSIUM CHLORIDE 20 MEQ: 14.9 INJECTION, SOLUTION INTRAVENOUS at 09:33

## 2023-11-12 RX ADMIN — SCOLOPAMINE TRANSDERMAL SYSTEM 1 PATCH: 1 PATCH, EXTENDED RELEASE TRANSDERMAL at 11:45

## 2023-11-12 ASSESSMENT — COGNITIVE AND FUNCTIONAL STATUS - GENERAL
MOBILITY SCORE: 24
DAILY ACTIVITIY SCORE: 24

## 2023-11-12 ASSESSMENT — PAIN - FUNCTIONAL ASSESSMENT: PAIN_FUNCTIONAL_ASSESSMENT: 0-10

## 2023-11-12 ASSESSMENT — PAIN SCALES - GENERAL
PAINLEVEL_OUTOF10: 0 - NO PAIN
PAINLEVEL_OUTOF10: 0 - NO PAIN

## 2023-11-12 NOTE — NURSING NOTE
Lab called with a critical K of 2.8 - message sent to dr. Marc     0640 dr marc paged at this time in regards to K and previous message in regards to iv fluids     0650 dr marc called back - see new orders - will cont to monitor as needed - pt updated of plan of care

## 2023-11-12 NOTE — CARE PLAN
The patient's goals for the shift include no nausea/vomiting or diarrhea    The clinical goals for the shift include patient will report no GI discomfort and no bouts of N & V this shift.

## 2023-11-12 NOTE — PROGRESS NOTES
Shruthi Ramos is a 58 y.o. female on day 0 of admission presenting with Nausea & vomiting.      Subjective   Feeling better. Less Nausea and Diarrhea.        Objective     Last Recorded Vitals  /66 (BP Location: Right arm, Patient Position: Lying)   Pulse 77   Temp 36.8 °C (98.2 °F) (Temporal)   Resp 18   Wt 102 kg (225 lb)   SpO2 97%   Intake/Output last 3 Shifts:    Intake/Output Summary (Last 24 hours) at 11/11/2023 2137  Last data filed at 11/11/2023 2100  Gross per 24 hour   Intake 600 ml   Output --   Net 600 ml       Admission Weight  Weight: 102 kg (225 lb) (11/06/23 1108)    Daily Weight  11/06/23 : 102 kg (225 lb)    Image Results  XR chest 2 views  Narrative: Interpreted By:  Reese Mcfarland,   STUDY:  XR CHEST 2 VIEWS;  11/6/2023 6:59 pm      INDICATION:  Signs/Symptoms:r/o pna.      COMPARISON:  10/11/2020      ACCESSION NUMBER(S):  [id number]      ORDERING CLINICIAN:  JENNA MORAES      FINDINGS:      Right-sided implanted port is in stable position. The cardiac  silhouette is unremarkable. Costophrenic angles are sharp. Lungs are  clear. The trachea is midline. There is no pneumothorax. No acute  osseous abnormality is seen.      Impression: 1.  No active cardiopulmonary process.          Signed by: Reese Mcfarland 11/6/2023 7:16 PM  Dictation workstation:   [id number]      Physical Exam  Gen - NAD  ENT - NC  Neck - No JVD  H - S1S2  L - Decreased BS  Abd - Soft  Ext - W & D  Neuro - Awake and Alert  Relevant Results    Scheduled medications  atorvastatin, 20 mg, oral, Nightly  cholecalciferol, 1,000 Units, oral, q AM  ciprofloxacin, 400 mg, intravenous, q12h  cyanocobalamin, 1,000 mcg, oral, Daily  DULoxetine, 60 mg, oral, Daily before breakfast  estradiol, 3 g, vaginal, q3 days  lactobacillus acidophilus, 1 tablet, oral, Daily  metoprolol succinate XL, 50 mg, oral, Daily before breakfast  multivitamin with minerals, 1 tablet, oral, Daily  pantoprazole, 40 mg, intravenous,  Daily  scopolamine, 1 patch, transdermal, q72h  traZODone, 50 mg, oral, Nightly      Continuous medications  sodium chloride 0.9%, 100 mL/hr, Last Rate: 100 mL/hr (11/11/23 1333)      PRN medications  PRN medications: acetaminophen **OR** acetaminophen **OR** acetaminophen, loperamide, metoclopramide, ondansetron, promethazine, white petrolatum  Results for orders placed or performed during the hospital encounter of 11/06/23 (from the past 24 hour(s))   Comprehensive metabolic panel   Result Value Ref Range    Glucose 73 (L) 74 - 99 mg/dL    Sodium 137 136 - 145 mmol/L    Potassium 3.0 (L) 3.5 - 5.3 mmol/L    Chloride 106 98 - 107 mmol/L    Bicarbonate 24 21 - 32 mmol/L    Anion Gap 10 10 - 20 mmol/L    Urea Nitrogen 5 (L) 6 - 23 mg/dL    Creatinine 0.48 (L) 0.50 - 1.05 mg/dL    eGFR >90 >60 mL/min/1.73m*2    Calcium 7.2 (L) 8.6 - 10.3 mg/dL    Albumin 2.4 (L) 3.4 - 5.0 g/dL    Alkaline Phosphatase 67 33 - 110 U/L    Total Protein 3.7 (L) 6.4 - 8.2 g/dL    AST 42 (H) 9 - 39 U/L    Bilirubin, Total 0.5 0.0 - 1.2 mg/dL    ALT 39 7 - 45 U/L   CBC and Auto Differential   Result Value Ref Range    WBC 11.7 (H) 4.4 - 11.3 x10*3/uL    nRBC 1.1 (H) 0.0 - 0.0 /100 WBCs    RBC 4.01 4.00 - 5.20 x10*6/uL    Hemoglobin 11.0 (L) 12.0 - 16.0 g/dL    Hematocrit 33.9 (L) 36.0 - 46.0 %    MCV 85 80 - 100 fL    MCH 27.4 26.0 - 34.0 pg    MCHC 32.4 32.0 - 36.0 g/dL    RDW 16.1 (H) 11.5 - 14.5 %    Platelets 132 (L) 150 - 450 x10*3/uL    Immature Granulocytes %, Automated 14.2 (H) 0.0 - 0.9 %    Immature Granulocytes Absolute, Automated 1.67 (H) 0.00 - 0.70 x10*3/uL   Magnesium   Result Value Ref Range    Magnesium 2.11 1.60 - 2.40 mg/dL   Manual Differential   Result Value Ref Range    Neutrophils %, Manual 30.0 40.0 - 80.0 %    Bands %, Manual 30.0 0.0 - 5.0 %    Lymphocytes %, Manual 17.0 13.0 - 44.0 %    Monocytes %, Manual 23.0 2.0 - 10.0 %    Eosinophils %, Manual 0.0 0.0 - 6.0 %    Basophils %, Manual 0.0 0.0 - 2.0 %    Seg  Neutrophils Absolute, Manual 3.51 1.20 - 7.00 x10*3/uL    Bands Absolute, Manual 3.51 (H) 0.00 - 0.70 x10*3/uL    Lymphocytes Absolute, Manual 1.99 1.20 - 4.80 x10*3/uL    Monocytes Absolute, Manual 2.69 (H) 0.10 - 1.00 x10*3/uL    Eosinophils Absolute, Manual 0.00 0.00 - 0.70 x10*3/uL    Basophils Absolute, Manual 0.00 0.00 - 0.10 x10*3/uL    Total Cells Counted 100     Neutrophils Absolute, Manual 7.02 1.20 - 7.70 x10*3/uL    RBC Morphology See Below     Polychromasia Mild     Ovalocytes Few     Teardrop Cells Few                 Assessment/Plan      HTN  HLD  Gastric Ca  N/V/D - Improved  Continue Tx - Diet Advanced  ? Discharge Soon ?            Principal Problem:    Nausea & vomiting  Active Problems:    Nausea and vomiting, unspecified vomiting type                  Gaudencio Burks, DO

## 2023-11-12 NOTE — CARE PLAN
The patient's goals for the shift include no nausea/vomiting or diarrhea    The clinical goals for the shift include to not fall    Over the shift, the patient did not make progress toward the following goals. Barriers to progression include . Recommendations to address these barriers include .

## 2023-11-13 ENCOUNTER — APPOINTMENT (OUTPATIENT)
Dept: HEMATOLOGY/ONCOLOGY | Facility: CLINIC | Age: 58
End: 2023-11-13
Payer: MEDICAID

## 2023-11-13 DIAGNOSIS — R19.7 DIARRHEA, UNSPECIFIED TYPE: ICD-10-CM

## 2023-11-13 DIAGNOSIS — E87.6 HYPOKALEMIA: ICD-10-CM

## 2023-11-13 DIAGNOSIS — R11.2 NAUSEA AND VOMITING, UNSPECIFIED VOMITING TYPE: ICD-10-CM

## 2023-11-13 DIAGNOSIS — N39.0 URINARY TRACT INFECTION WITHOUT HEMATURIA, SITE UNSPECIFIED: ICD-10-CM

## 2023-11-13 RX ORDER — ALBUTEROL SULFATE 0.83 MG/ML
3 SOLUTION RESPIRATORY (INHALATION) AS NEEDED
Status: CANCELLED | OUTPATIENT
Start: 2023-11-27

## 2023-11-13 RX ORDER — DIPHENHYDRAMINE HYDROCHLORIDE 50 MG/ML
50 INJECTION INTRAMUSCULAR; INTRAVENOUS AS NEEDED
Status: CANCELLED | OUTPATIENT
Start: 2023-11-28

## 2023-11-13 RX ORDER — DIPHENHYDRAMINE HCL 25 MG
25 CAPSULE ORAL ONCE
Status: CANCELLED | OUTPATIENT
Start: 2023-11-27

## 2023-11-13 RX ORDER — EPINEPHRINE 0.3 MG/.3ML
0.3 INJECTION SUBCUTANEOUS EVERY 5 MIN PRN
Status: CANCELLED | OUTPATIENT
Start: 2023-11-28

## 2023-11-13 RX ORDER — DIPHENHYDRAMINE HYDROCHLORIDE 50 MG/ML
50 INJECTION INTRAMUSCULAR; INTRAVENOUS AS NEEDED
Status: CANCELLED | OUTPATIENT
Start: 2023-11-27

## 2023-11-13 RX ORDER — PROCHLORPERAZINE EDISYLATE 5 MG/ML
10 INJECTION INTRAMUSCULAR; INTRAVENOUS EVERY 6 HOURS PRN
Status: CANCELLED | OUTPATIENT
Start: 2023-11-27

## 2023-11-13 RX ORDER — POTASSIUM CHLORIDE 750 MG/1
10 TABLET, FILM COATED, EXTENDED RELEASE ORAL DAILY
Qty: 5 TABLET | Refills: 0 | Status: SHIPPED | OUTPATIENT
Start: 2023-11-13 | End: 2023-11-18

## 2023-11-13 RX ORDER — CIPROFLOXACIN 250 MG/1
250 TABLET, FILM COATED ORAL 2 TIMES DAILY
Qty: 10 TABLET | Refills: 0 | Status: SHIPPED | OUTPATIENT
Start: 2023-11-13 | End: 2023-11-18

## 2023-11-13 RX ORDER — FAMOTIDINE 10 MG/ML
20 INJECTION INTRAVENOUS ONCE AS NEEDED
Status: CANCELLED | OUTPATIENT
Start: 2023-11-28

## 2023-11-13 RX ORDER — EPINEPHRINE 0.3 MG/.3ML
0.3 INJECTION SUBCUTANEOUS EVERY 5 MIN PRN
Status: CANCELLED | OUTPATIENT
Start: 2023-11-27

## 2023-11-13 RX ORDER — PALONOSETRON 0.05 MG/ML
0.25 INJECTION, SOLUTION INTRAVENOUS ONCE
Status: CANCELLED | OUTPATIENT
Start: 2023-11-27

## 2023-11-13 RX ORDER — PROCHLORPERAZINE MALEATE 10 MG
10 TABLET ORAL EVERY 6 HOURS PRN
Status: CANCELLED | OUTPATIENT
Start: 2023-11-27

## 2023-11-13 RX ORDER — L. ACIDOPHILUS/L.BULGARICUS 1MM CELL
1 TABLET ORAL DAILY
Qty: 30 TABLET | Refills: 1 | Status: SHIPPED | OUTPATIENT
Start: 2023-11-13 | End: 2023-11-16 | Stop reason: SDUPTHER

## 2023-11-13 RX ORDER — ALBUTEROL SULFATE 0.83 MG/ML
3 SOLUTION RESPIRATORY (INHALATION) AS NEEDED
Status: CANCELLED | OUTPATIENT
Start: 2023-11-28

## 2023-11-13 RX ORDER — LORAZEPAM 2 MG/ML
1 INJECTION INTRAMUSCULAR AS NEEDED
Status: CANCELLED | OUTPATIENT
Start: 2023-11-27

## 2023-11-13 RX ORDER — SCOLOPAMINE TRANSDERMAL SYSTEM 1 MG/1
1 PATCH, EXTENDED RELEASE TRANSDERMAL
Qty: 10 PATCH | Refills: 1 | Status: SHIPPED | OUTPATIENT
Start: 2023-11-15 | End: 2024-01-24 | Stop reason: HOSPADM

## 2023-11-13 RX ORDER — FAMOTIDINE 10 MG/ML
20 INJECTION INTRAVENOUS ONCE AS NEEDED
Status: CANCELLED | OUTPATIENT
Start: 2023-11-27

## 2023-11-14 ENCOUNTER — APPOINTMENT (OUTPATIENT)
Dept: HEMATOLOGY/ONCOLOGY | Facility: CLINIC | Age: 58
End: 2023-11-14
Payer: MEDICAID

## 2023-11-15 RX ORDER — MIRTAZAPINE 15 MG/1
TABLET, FILM COATED ORAL
COMMUNITY
Start: 2023-11-11 | End: 2023-12-06 | Stop reason: SDUPTHER

## 2023-11-16 ENCOUNTER — TELEPHONE (OUTPATIENT)
Dept: HEMATOLOGY/ONCOLOGY | Facility: CLINIC | Age: 58
End: 2023-11-16
Payer: MEDICAID

## 2023-11-16 DIAGNOSIS — E86.0 DEHYDRATION: ICD-10-CM

## 2023-11-16 DIAGNOSIS — C16.0 ADENOCARCINOMA OF GASTROESOPHAGEAL JUNCTION (MULTI): ICD-10-CM

## 2023-11-16 RX ORDER — ALBUTEROL SULFATE 0.83 MG/ML
3 SOLUTION RESPIRATORY (INHALATION) AS NEEDED
Status: CANCELLED | OUTPATIENT
Start: 2023-11-17

## 2023-11-16 RX ORDER — DIPHENHYDRAMINE HYDROCHLORIDE 50 MG/ML
50 INJECTION INTRAMUSCULAR; INTRAVENOUS AS NEEDED
Status: CANCELLED | OUTPATIENT
Start: 2023-11-17

## 2023-11-16 RX ORDER — EPINEPHRINE 0.3 MG/.3ML
0.3 INJECTION SUBCUTANEOUS EVERY 5 MIN PRN
Status: CANCELLED | OUTPATIENT
Start: 2023-11-17

## 2023-11-16 RX ORDER — FAMOTIDINE 10 MG/ML
20 INJECTION INTRAVENOUS ONCE AS NEEDED
Status: CANCELLED | OUTPATIENT
Start: 2023-11-17

## 2023-11-16 RX ORDER — IBUPROFEN 200 MG
1 TABLET ORAL DAILY
COMMUNITY
Start: 2023-11-13 | End: 2024-01-04 | Stop reason: ALTCHOICE

## 2023-11-16 NOTE — TELEPHONE ENCOUNTER
Patient called stating she was discharged from the hospital Sunday and is still feeling a bit dehydrated and like she's not drinking enough fluids.  Scheduled for tomorrow and Tuesday for IVF and aware she will have labs on 11.24 with phone visit on 11.27 with Dr. Torres and possible treatment.  Advised her I'm unsure at this point if he will treat her and she verbalized understanding.  She was also advised to really push po fluids.

## 2023-11-17 ENCOUNTER — INFUSION (OUTPATIENT)
Dept: HEMATOLOGY/ONCOLOGY | Facility: CLINIC | Age: 58
End: 2023-11-17
Payer: MEDICAID

## 2023-11-17 VITALS
SYSTOLIC BLOOD PRESSURE: 131 MMHG | DIASTOLIC BLOOD PRESSURE: 77 MMHG | WEIGHT: 225.09 LBS | OXYGEN SATURATION: 99 % | HEART RATE: 87 BPM | TEMPERATURE: 97 F | BODY MASS INDEX: 42.53 KG/M2 | RESPIRATION RATE: 20 BRPM

## 2023-11-17 DIAGNOSIS — C16.0 ADENOCARCINOMA OF GASTROESOPHAGEAL JUNCTION (MULTI): ICD-10-CM

## 2023-11-17 PROCEDURE — 2500000004 HC RX 250 GENERAL PHARMACY W/ HCPCS (ALT 636 FOR OP/ED): Performed by: NURSE PRACTITIONER

## 2023-11-17 PROCEDURE — 96361 HYDRATE IV INFUSION ADD-ON: CPT | Mod: INF

## 2023-11-17 PROCEDURE — 2500000004 HC RX 250 GENERAL PHARMACY W/ HCPCS (ALT 636 FOR OP/ED): Performed by: INTERNAL MEDICINE

## 2023-11-17 PROCEDURE — 96360 HYDRATION IV INFUSION INIT: CPT | Mod: INF

## 2023-11-17 PROCEDURE — 96523 IRRIG DRUG DELIVERY DEVICE: CPT

## 2023-11-17 RX ORDER — ALBUTEROL SULFATE 0.83 MG/ML
3 SOLUTION RESPIRATORY (INHALATION) AS NEEDED
Status: CANCELLED | OUTPATIENT
Start: 2023-11-17

## 2023-11-17 RX ORDER — EPINEPHRINE 0.3 MG/.3ML
0.3 INJECTION SUBCUTANEOUS EVERY 5 MIN PRN
Status: CANCELLED | OUTPATIENT
Start: 2023-11-17

## 2023-11-17 RX ORDER — FAMOTIDINE 10 MG/ML
20 INJECTION INTRAVENOUS ONCE AS NEEDED
Status: CANCELLED | OUTPATIENT
Start: 2023-11-17

## 2023-11-17 RX ORDER — DIPHENHYDRAMINE HYDROCHLORIDE 50 MG/ML
50 INJECTION INTRAMUSCULAR; INTRAVENOUS AS NEEDED
Status: CANCELLED | OUTPATIENT
Start: 2023-11-17

## 2023-11-17 RX ORDER — HEPARIN SODIUM,PORCINE/PF 10 UNIT/ML
50 SYRINGE (ML) INTRAVENOUS AS NEEDED
Status: DISCONTINUED | OUTPATIENT
Start: 2023-11-17 | End: 2023-11-17 | Stop reason: HOSPADM

## 2023-11-17 RX ORDER — HEPARIN 100 UNIT/ML
500 SYRINGE INTRAVENOUS AS NEEDED
Status: CANCELLED | OUTPATIENT
Start: 2023-11-17

## 2023-11-17 RX ORDER — HEPARIN 100 UNIT/ML
500 SYRINGE INTRAVENOUS AS NEEDED
Status: DISCONTINUED | OUTPATIENT
Start: 2023-11-17 | End: 2023-11-17 | Stop reason: HOSPADM

## 2023-11-17 RX ORDER — HEPARIN SODIUM,PORCINE/PF 10 UNIT/ML
50 SYRINGE (ML) INTRAVENOUS AS NEEDED
Status: CANCELLED | OUTPATIENT
Start: 2023-11-17

## 2023-11-17 RX ADMIN — HEPARIN 500 UNITS: 100 SYRINGE at 13:03

## 2023-11-17 RX ADMIN — SODIUM CHLORIDE 1000 ML: 9 INJECTION, SOLUTION INTRAVENOUS at 12:11

## 2023-11-17 ASSESSMENT — PAIN SCALES - GENERAL: PAINLEVEL: 0-NO PAIN

## 2023-11-21 ENCOUNTER — INFUSION (OUTPATIENT)
Dept: HEMATOLOGY/ONCOLOGY | Facility: CLINIC | Age: 58
End: 2023-11-21
Payer: MEDICAID

## 2023-11-21 ENCOUNTER — NUTRITION (OUTPATIENT)
Dept: HEMATOLOGY/ONCOLOGY | Facility: CLINIC | Age: 58
End: 2023-11-21

## 2023-11-21 VITALS
DIASTOLIC BLOOD PRESSURE: 74 MMHG | SYSTOLIC BLOOD PRESSURE: 122 MMHG | TEMPERATURE: 96.8 F | RESPIRATION RATE: 16 BRPM | WEIGHT: 221.34 LBS | HEART RATE: 74 BPM | OXYGEN SATURATION: 97 % | BODY MASS INDEX: 41.82 KG/M2

## 2023-11-21 DIAGNOSIS — C16.0 ADENOCARCINOMA OF GASTROESOPHAGEAL JUNCTION (MULTI): ICD-10-CM

## 2023-11-21 PROCEDURE — 2500000004 HC RX 250 GENERAL PHARMACY W/ HCPCS (ALT 636 FOR OP/ED): Performed by: NURSE PRACTITIONER

## 2023-11-21 PROCEDURE — 96523 IRRIG DRUG DELIVERY DEVICE: CPT

## 2023-11-21 PROCEDURE — 96374 THER/PROPH/DIAG INJ IV PUSH: CPT | Mod: INF

## 2023-11-21 PROCEDURE — 2500000004 HC RX 250 GENERAL PHARMACY W/ HCPCS (ALT 636 FOR OP/ED): Performed by: INTERNAL MEDICINE

## 2023-11-21 PROCEDURE — 96361 HYDRATE IV INFUSION ADD-ON: CPT | Mod: INF

## 2023-11-21 PROCEDURE — 96360 HYDRATION IV INFUSION INIT: CPT | Mod: INF

## 2023-11-21 RX ORDER — HEPARIN 100 UNIT/ML
500 SYRINGE INTRAVENOUS AS NEEDED
Status: DISCONTINUED | OUTPATIENT
Start: 2023-11-21 | End: 2023-11-21 | Stop reason: HOSPADM

## 2023-11-21 RX ORDER — HEPARIN SODIUM,PORCINE/PF 10 UNIT/ML
50 SYRINGE (ML) INTRAVENOUS AS NEEDED
Status: DISCONTINUED | OUTPATIENT
Start: 2023-11-21 | End: 2023-11-21 | Stop reason: HOSPADM

## 2023-11-21 RX ORDER — HEPARIN 100 UNIT/ML
500 SYRINGE INTRAVENOUS AS NEEDED
Status: CANCELLED | OUTPATIENT
Start: 2023-11-21

## 2023-11-21 RX ORDER — HEPARIN SODIUM,PORCINE/PF 10 UNIT/ML
50 SYRINGE (ML) INTRAVENOUS AS NEEDED
Status: CANCELLED | OUTPATIENT
Start: 2023-11-21

## 2023-11-21 RX ADMIN — SODIUM CHLORIDE 1000 ML: 9 INJECTION, SOLUTION INTRAVENOUS at 10:30

## 2023-11-21 RX ADMIN — HEPARIN 500 UNITS: 100 SYRINGE at 12:12

## 2023-11-21 ASSESSMENT — COLUMBIA-SUICIDE SEVERITY RATING SCALE - C-SSRS
6. HAVE YOU EVER DONE ANYTHING, STARTED TO DO ANYTHING, OR PREPARED TO DO ANYTHING TO END YOUR LIFE?: NO
1. IN THE PAST MONTH, HAVE YOU WISHED YOU WERE DEAD OR WISHED YOU COULD GO TO SLEEP AND NOT WAKE UP?: NO
2. HAVE YOU ACTUALLY HAD ANY THOUGHTS OF KILLING YOURSELF?: NO

## 2023-11-21 ASSESSMENT — PAIN SCALES - GENERAL: PAINLEVEL: 0-NO PAIN

## 2023-11-21 NOTE — PROGRESS NOTES
"NUTRITION Follow-up NOTE  Reason for Visit:  Shruthi Ramos is a 58 y.o. 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 Currently undergoing neoadjuvant chemotherapy with FLOT protocol (Docetaxel, oxaliplatin, leucovorin, and 5-fluorouracil).    RD was able to meet with patient at treatment. She reports recent hospitalization for nausea and vomiting, dehydration. Patient has always admitted she is not the best fluid drinker, but tries to meet needs. We discussed intermittent order for IV fluids should she need it. She will let us know. Otherwise, patient has no acute concerns at this time.     Lab Results   Component Value Date/Time    GLUCOSE 100 (H) 11/24/2023 1012     11/24/2023 1012    K 4.3 11/24/2023 1012     11/24/2023 1012    CO2 25 11/24/2023 1012    ANIONGAP 13 11/24/2023 1012    BUN 9 11/24/2023 1012    CREATININE 0.88 11/24/2023 1012    EGFR 76 11/24/2023 1012    CALCIUM 8.9 11/24/2023 1012    ALBUMIN 3.5 11/24/2023 1012    ALKPHOS 90 11/24/2023 1012    PROT 6.0 (L) 11/24/2023 1012    AST 34 11/24/2023 1012    BILITOT 0.7 11/24/2023 1012    ALT 32 11/24/2023 1012       Nutrition Assessment     Anthropometrics:  Anthropometrics  Height: 154.9 cm (5' 0.98\")  Weight: 102 kg (224 lb 13.9 oz)  BMI (Calculated): 42.51    Wt Readings from Last 10 Encounters:   11/27/23 102 kg (224 lb 3.3 oz)   11/28/23 102 kg (224 lb 13.9 oz)   11/21/23 100 kg (221 lb 5.5 oz)   11/17/23 102 kg (225 lb 1.4 oz)   11/06/23 102 kg (225 lb)   10/30/23 105 kg (230 lb 9.6 oz)   10/30/23 105 kg (230 lb 9.6 oz)   10/16/23 108 kg (238 lb 1.6 oz)   10/16/23 108 kg (237 lb 14 oz)   10/11/23 105 kg (230 lb 13.2 oz)   5.5% weight loss x 6 weeks, significant, suspect this is partially due to dehydration     Food And Nutrient Intake:  Food and Nutrient History  Food and Nutrient History: Dysphagia continues to improve, but patient does have intermittent nausea which is limiting " "intake more than previous. Discussed addition of ONS  Energy Intake: Good > 75 %, Fair 50-75 %  Fluid Intake: less than 64oz/day  Food Allergy: NKFA  GI Symptoms: constipation (intermittent)  Oral Problems: dysphagia     Food Intake  Amount of Food: Did not provide    Food Preparation  Cooking: Patient, Spouse/Significant Other  Grocery Shopping: Patient, Spouse/Significant Other    Micronutrient Intake  Vitamin Intake: Multivitamin, B12, D    Food Supplement Intake  Oral Nutrition Supplements: Boost Plus (RD recommending Boost Plus BID to assist in meeting nutrient and fluid needs.)    Medication and Complementary/Alternative Medicine Use  Prescription Medication Use: atrovastatin, metoprolol, ondansetron    Nutrition Focused Physical Exam:  Subcutaneous Fat Loss  Orbital Fat Pads: Well nourshed (slightly bulging fat pads)  Buccal Fat Pads: Well nourished (full, rounded cheeks)  Triceps: Well nourished (ample fat tissue)  Ribs: Well nourished (full chest, ribs do not protrude)  Muscle Wasting  Temporalis: Well nourished (well-defined muscle)  Pectoralis (Clavicular Region): Well nourished (clavicle not visible)  Deltoid/Trapezius: Well nourished (rounded appearance at arm, shoulder, neck)  Interosseous: Well nourished (muscle bulges)  Trapezius/Infraspinatus/Supraspinatus (Scapular Region): Well nourished (bones not prominent, muscle taut)  Quadriceps: Well nourished (well developed, well rounded)  Gastrocnemius: Well nourished (well developed bulbous muscle)  Edema  Edema: none  Physical Findings (Nutrition Deficiency/Toxicity)  Hair: Negative  Eyes: Negative  Mouth: Negative  Nails: Negative  Skin: Negative    Energy Needs  Calculated Energy Needs Using Equations  Height: 154.9 cm (5' 0.98\")  Daily Nutrient Recommendations (continue as previous):   kcals/day: 4886-4305   gms protein/day: 71-99   mL fluid/day: 1mL/kcal or per MD   kcals/kg/day: 25-30ABW   gm protein/kg/day: 1-1.4ABW     Nutrition Diagnosis "   Malnutrition Diagnosis  Patient has Malnutrition Diagnosis: No  Nutrition Diagnosis  Patient has Nutrition Diagnosis: Yes  Diagnosis Status (1): Ongoing  Nutrition Diagnosis 1: Swallowing difficulity  Related to (1): GE junction tumor  As Evidenced by (1): patient reports of dysphagia, especially with dry foods  Additional Nutrition Diagnosis: Diagnosis 2  Diagnosis Status (2): Ongoing  Nutrition Diagnosis 2: Inadequate oral intake  Related to (2): catabolic disease and treatment  As Evidenced by (2): PO intake not consistently meeting 75% estimated nutrient needs, patient reports of continued struggles with PO intake    Nutrition Interventions/Recommendations   Food and Nutrition Delivery  Meals & Snacks: Energy-modified diet  Goals: increased nutrient needs in the setting of cancer  Medical Food Supplement: Premier Protein  Goals: BID  Nutrition Education  Nutrition Education Content: Content related nutrition education  Goals: patient will understand impact of diet on success during treatment, in the healing process, and for survivorship  Nutrition Counseling  Theoretical Basis/Approach: Nutrition counseling based on cognitive behavioral theory approach  Goals: contemplation-->action  Strategies: Nutrition counseling based on motivational interviewing strategy  Coordination of Nutrition Care by a Nutrition Professional  Collaboration and referral of nutrition care: Collaboration by nutrition professional with other providers    Nutrition Monitoring and Evaluation   Food/Nutrient Related History Monitoring  Monitoring and Evaluation Plan: Energy intake  Energy Intake: Estimated energy intake  Criteria: meet needs calculated by RD  Knowledge/Beliefs/Attitudes  Monitoring and Evaluation Plan: Food and nutrition knowledge  Food and nutrition knowledge: Nutrition knowledge of individual client  Criteria: will continue to work with patient on diet for cancer    Readiness to Change : Good  Level of Understanding :  Good  Anticipated Compliant : Good    Time Spent  Prep time on day of patient encounter: 15 minutes  Time spent directly with patient, family or caregiver: 15 minutes  Documentation Time: 5 minutes

## 2023-11-24 ENCOUNTER — INFUSION (OUTPATIENT)
Dept: HEMATOLOGY/ONCOLOGY | Facility: CLINIC | Age: 58
End: 2023-11-24
Payer: MEDICAID

## 2023-11-24 ENCOUNTER — APPOINTMENT (OUTPATIENT)
Dept: HEMATOLOGY/ONCOLOGY | Facility: CLINIC | Age: 58
End: 2023-11-24
Payer: MEDICAID

## 2023-11-24 DIAGNOSIS — C16.0 ADENOCARCINOMA OF GASTROESOPHAGEAL JUNCTION (MULTI): Primary | ICD-10-CM

## 2023-11-24 DIAGNOSIS — C16.0 ADENOCARCINOMA OF GASTROESOPHAGEAL JUNCTION (MULTI): ICD-10-CM

## 2023-11-24 LAB
ALBUMIN SERPL BCP-MCNC: 3.5 G/DL (ref 3.4–5)
ALP SERPL-CCNC: 90 U/L (ref 33–110)
ALT SERPL W P-5'-P-CCNC: 32 U/L (ref 7–45)
ANION GAP SERPL CALC-SCNC: 13 MMOL/L (ref 10–20)
AST SERPL W P-5'-P-CCNC: 34 U/L (ref 9–39)
BASOPHILS # BLD AUTO: 0.1 X10*3/UL (ref 0–0.1)
BASOPHILS NFR BLD AUTO: 1.2 %
BILIRUB SERPL-MCNC: 0.7 MG/DL (ref 0–1.2)
BUN SERPL-MCNC: 9 MG/DL (ref 6–23)
CALCIUM SERPL-MCNC: 8.9 MG/DL (ref 8.6–10.3)
CHLORIDE SERPL-SCNC: 105 MMOL/L (ref 98–107)
CO2 SERPL-SCNC: 25 MMOL/L (ref 21–32)
CREAT SERPL-MCNC: 0.88 MG/DL (ref 0.5–1.05)
EOSINOPHIL # BLD AUTO: 0.07 X10*3/UL (ref 0–0.7)
EOSINOPHIL NFR BLD AUTO: 0.8 %
ERYTHROCYTE [DISTWIDTH] IN BLOOD BY AUTOMATED COUNT: 18.6 % (ref 11.5–14.5)
GFR SERPL CREATININE-BSD FRML MDRD: 76 ML/MIN/1.73M*2
GLUCOSE SERPL-MCNC: 100 MG/DL (ref 74–99)
HCT VFR BLD AUTO: 40.8 % (ref 36–46)
HGB BLD-MCNC: 12.6 G/DL (ref 12–16)
IMM GRANULOCYTES # BLD AUTO: 0.06 X10*3/UL (ref 0–0.7)
IMM GRANULOCYTES NFR BLD AUTO: 0.7 % (ref 0–0.9)
LYMPHOCYTES # BLD AUTO: 2.29 X10*3/UL (ref 1.2–4.8)
LYMPHOCYTES NFR BLD AUTO: 27.3 %
MAGNESIUM SERPL-MCNC: 1.94 MG/DL (ref 1.6–2.4)
MCH RBC QN AUTO: 27.5 PG (ref 26–34)
MCHC RBC AUTO-ENTMCNC: 30.9 G/DL (ref 32–36)
MCV RBC AUTO: 89 FL (ref 80–100)
MONOCYTES # BLD AUTO: 1.41 X10*3/UL (ref 0.1–1)
MONOCYTES NFR BLD AUTO: 16.8 %
NEUTROPHILS # BLD AUTO: 4.47 X10*3/UL (ref 1.2–7.7)
NEUTROPHILS NFR BLD AUTO: 53.2 %
NRBC BLD-RTO: 0 /100 WBCS (ref 0–0)
PLATELET # BLD AUTO: 306 X10*3/UL (ref 150–450)
POTASSIUM SERPL-SCNC: 4.3 MMOL/L (ref 3.5–5.3)
PROT SERPL-MCNC: 6 G/DL (ref 6.4–8.2)
RBC # BLD AUTO: 4.58 X10*6/UL (ref 4–5.2)
SODIUM SERPL-SCNC: 139 MMOL/L (ref 136–145)
WBC # BLD AUTO: 8.4 X10*3/UL (ref 4.4–11.3)

## 2023-11-24 PROCEDURE — 85025 COMPLETE CBC W/AUTO DIFF WBC: CPT

## 2023-11-24 PROCEDURE — 80053 COMPREHEN METABOLIC PANEL: CPT

## 2023-11-24 PROCEDURE — 36591 DRAW BLOOD OFF VENOUS DEVICE: CPT

## 2023-11-24 PROCEDURE — 83735 ASSAY OF MAGNESIUM: CPT

## 2023-11-24 PROCEDURE — 96523 IRRIG DRUG DELIVERY DEVICE: CPT

## 2023-11-24 PROCEDURE — 2500000004 HC RX 250 GENERAL PHARMACY W/ HCPCS (ALT 636 FOR OP/ED): Performed by: INTERNAL MEDICINE

## 2023-11-24 RX ORDER — HEPARIN SODIUM,PORCINE/PF 10 UNIT/ML
50 SYRINGE (ML) INTRAVENOUS AS NEEDED
Status: CANCELLED | OUTPATIENT
Start: 2023-11-24

## 2023-11-24 RX ORDER — HEPARIN 100 UNIT/ML
500 SYRINGE INTRAVENOUS AS NEEDED
Status: CANCELLED | OUTPATIENT
Start: 2023-11-24

## 2023-11-24 RX ORDER — HEPARIN 100 UNIT/ML
500 SYRINGE INTRAVENOUS AS NEEDED
Status: DISCONTINUED | OUTPATIENT
Start: 2023-11-24 | End: 2023-11-24 | Stop reason: HOSPADM

## 2023-11-24 RX ORDER — HEPARIN SODIUM,PORCINE/PF 10 UNIT/ML
50 SYRINGE (ML) INTRAVENOUS AS NEEDED
Status: DISCONTINUED | OUTPATIENT
Start: 2023-11-24 | End: 2023-11-24 | Stop reason: HOSPADM

## 2023-11-24 RX ADMIN — HEPARIN 500 UNITS: 100 SYRINGE at 10:24

## 2023-11-27 ENCOUNTER — OFFICE VISIT (OUTPATIENT)
Dept: HEMATOLOGY/ONCOLOGY | Facility: CLINIC | Age: 58
End: 2023-11-27
Payer: MEDICAID

## 2023-11-27 ENCOUNTER — APPOINTMENT (OUTPATIENT)
Dept: HEMATOLOGY/ONCOLOGY | Facility: CLINIC | Age: 58
End: 2023-11-27
Payer: MEDICAID

## 2023-11-27 ENCOUNTER — INFUSION (OUTPATIENT)
Dept: HEMATOLOGY/ONCOLOGY | Facility: CLINIC | Age: 58
End: 2023-11-27
Payer: MEDICAID

## 2023-11-27 ENCOUNTER — SOCIAL WORK (OUTPATIENT)
Dept: HEMATOLOGY/ONCOLOGY | Facility: CLINIC | Age: 58
End: 2023-11-27

## 2023-11-27 VITALS
BODY MASS INDEX: 42.36 KG/M2 | WEIGHT: 224.21 LBS | TEMPERATURE: 96.1 F | DIASTOLIC BLOOD PRESSURE: 88 MMHG | OXYGEN SATURATION: 97 % | RESPIRATION RATE: 18 BRPM | SYSTOLIC BLOOD PRESSURE: 133 MMHG | HEART RATE: 92 BPM

## 2023-11-27 DIAGNOSIS — C16.0 ADENOCARCINOMA OF GASTROESOPHAGEAL JUNCTION (MULTI): Primary | ICD-10-CM

## 2023-11-27 PROCEDURE — 96417 CHEMO IV INFUS EACH ADDL SEQ: CPT

## 2023-11-27 PROCEDURE — 96375 TX/PRO/DX INJ NEW DRUG ADDON: CPT | Mod: INF

## 2023-11-27 PROCEDURE — 96413 CHEMO IV INFUSION 1 HR: CPT

## 2023-11-27 PROCEDURE — 2500000001 HC RX 250 WO HCPCS SELF ADMINISTERED DRUGS (ALT 637 FOR MEDICARE OP): Performed by: INTERNAL MEDICINE

## 2023-11-27 PROCEDURE — 2500000004 HC RX 250 GENERAL PHARMACY W/ HCPCS (ALT 636 FOR OP/ED): Performed by: INTERNAL MEDICINE

## 2023-11-27 PROCEDURE — 99214 OFFICE O/P EST MOD 30 MIN: CPT | Performed by: INTERNAL MEDICINE

## 2023-11-27 PROCEDURE — 96367 TX/PROPH/DG ADDL SEQ IV INF: CPT

## 2023-11-27 PROCEDURE — 99214 OFFICE O/P EST MOD 30 MIN: CPT | Mod: 95 | Performed by: INTERNAL MEDICINE

## 2023-11-27 PROCEDURE — 96411 CHEMO IV PUSH ADDL DRUG: CPT

## 2023-11-27 PROCEDURE — 96415 CHEMO IV INFUSION ADDL HR: CPT

## 2023-11-27 PROCEDURE — 96366 THER/PROPH/DIAG IV INF ADDON: CPT | Mod: INF

## 2023-11-27 PROCEDURE — 1036F TOBACCO NON-USER: CPT | Performed by: INTERNAL MEDICINE

## 2023-11-27 PROCEDURE — 96368 THER/DIAG CONCURRENT INF: CPT

## 2023-11-27 RX ORDER — DIPHENHYDRAMINE HCL 25 MG
25 CAPSULE ORAL ONCE
Status: CANCELLED | OUTPATIENT
Start: 2023-11-27

## 2023-11-27 RX ORDER — EPINEPHRINE 0.3 MG/.3ML
0.3 INJECTION SUBCUTANEOUS EVERY 5 MIN PRN
Status: CANCELLED | OUTPATIENT
Start: 2023-11-27

## 2023-11-27 RX ORDER — PROCHLORPERAZINE MALEATE 10 MG
10 TABLET ORAL EVERY 6 HOURS PRN
Status: DISCONTINUED | OUTPATIENT
Start: 2023-11-27 | End: 2023-11-27 | Stop reason: HOSPADM

## 2023-11-27 RX ORDER — DIPHENHYDRAMINE HYDROCHLORIDE 50 MG/ML
50 INJECTION INTRAMUSCULAR; INTRAVENOUS AS NEEDED
Status: CANCELLED | OUTPATIENT
Start: 2023-11-27

## 2023-11-27 RX ORDER — PROCHLORPERAZINE MALEATE 5 MG
10 TABLET ORAL EVERY 6 HOURS PRN
Status: CANCELLED | OUTPATIENT
Start: 2023-11-27

## 2023-11-27 RX ORDER — FAMOTIDINE 10 MG/ML
20 INJECTION INTRAVENOUS ONCE AS NEEDED
Status: CANCELLED | OUTPATIENT
Start: 2023-11-27

## 2023-11-27 RX ORDER — FAMOTIDINE 10 MG/ML
20 INJECTION INTRAVENOUS ONCE AS NEEDED
Status: DISCONTINUED | OUTPATIENT
Start: 2023-11-27 | End: 2023-11-27 | Stop reason: HOSPADM

## 2023-11-27 RX ORDER — ALBUTEROL SULFATE 0.83 MG/ML
3 SOLUTION RESPIRATORY (INHALATION) AS NEEDED
Status: CANCELLED | OUTPATIENT
Start: 2023-11-27

## 2023-11-27 RX ORDER — PROCHLORPERAZINE EDISYLATE 5 MG/ML
10 INJECTION INTRAMUSCULAR; INTRAVENOUS EVERY 6 HOURS PRN
Status: DISCONTINUED | OUTPATIENT
Start: 2023-11-27 | End: 2023-11-27 | Stop reason: HOSPADM

## 2023-11-27 RX ORDER — ATROPINE SULFATE 0.1 MG/ML
0.4 INJECTION INTRAVENOUS ONCE
Status: CANCELLED
Start: 2023-11-28

## 2023-11-27 RX ORDER — ATROPINE SULFATE 0.1 MG/ML
0.4 INJECTION INTRAVENOUS ONCE
Status: DISCONTINUED | OUTPATIENT
Start: 2023-11-27 | End: 2023-11-27 | Stop reason: RX

## 2023-11-27 RX ORDER — LORAZEPAM 2 MG/ML
1 INJECTION INTRAMUSCULAR AS NEEDED
Status: DISCONTINUED | OUTPATIENT
Start: 2023-11-27 | End: 2023-11-27 | Stop reason: HOSPADM

## 2023-11-27 RX ORDER — LORAZEPAM 2 MG/ML
1 INJECTION INTRAMUSCULAR AS NEEDED
Status: CANCELLED | OUTPATIENT
Start: 2023-11-27

## 2023-11-27 RX ORDER — ATROPINE SULFATE 0.4 MG/ML
0.4 INJECTION, SOLUTION ENDOTRACHEAL; INTRAMEDULLARY; INTRAMUSCULAR; INTRAVENOUS; SUBCUTANEOUS ONCE
Status: COMPLETED | OUTPATIENT
Start: 2023-11-27 | End: 2023-11-27

## 2023-11-27 RX ORDER — HEPARIN 100 UNIT/ML
500 SYRINGE INTRAVENOUS AS NEEDED
Status: CANCELLED | OUTPATIENT
Start: 2023-11-27

## 2023-11-27 RX ORDER — EPINEPHRINE 0.3 MG/.3ML
0.3 INJECTION SUBCUTANEOUS EVERY 5 MIN PRN
Status: DISCONTINUED | OUTPATIENT
Start: 2023-11-27 | End: 2023-11-27 | Stop reason: HOSPADM

## 2023-11-27 RX ORDER — HEPARIN SODIUM,PORCINE/PF 10 UNIT/ML
50 SYRINGE (ML) INTRAVENOUS AS NEEDED
Status: CANCELLED | OUTPATIENT
Start: 2023-11-27

## 2023-11-27 RX ORDER — DIPHENHYDRAMINE HCL 25 MG
25 CAPSULE ORAL ONCE
Status: COMPLETED | OUTPATIENT
Start: 2023-11-27 | End: 2023-11-27

## 2023-11-27 RX ORDER — ALBUTEROL SULFATE 0.83 MG/ML
3 SOLUTION RESPIRATORY (INHALATION) AS NEEDED
Status: DISCONTINUED | OUTPATIENT
Start: 2023-11-27 | End: 2023-11-27 | Stop reason: HOSPADM

## 2023-11-27 RX ORDER — PALONOSETRON 0.05 MG/ML
0.25 INJECTION, SOLUTION INTRAVENOUS ONCE
Status: CANCELLED | OUTPATIENT
Start: 2023-11-27

## 2023-11-27 RX ORDER — DIPHENHYDRAMINE HYDROCHLORIDE 50 MG/ML
50 INJECTION INTRAMUSCULAR; INTRAVENOUS AS NEEDED
Status: DISCONTINUED | OUTPATIENT
Start: 2023-11-27 | End: 2023-11-27 | Stop reason: HOSPADM

## 2023-11-27 RX ORDER — ATROPINE SULFATE 0.1 MG/ML
0.4 INJECTION INTRAVENOUS ONCE
Status: CANCELLED
Start: 2023-11-27

## 2023-11-27 RX ORDER — PROCHLORPERAZINE EDISYLATE 5 MG/ML
10 INJECTION INTRAMUSCULAR; INTRAVENOUS EVERY 6 HOURS PRN
Status: CANCELLED | OUTPATIENT
Start: 2023-11-27

## 2023-11-27 RX ORDER — PALONOSETRON 0.05 MG/ML
0.25 INJECTION, SOLUTION INTRAVENOUS ONCE
Status: COMPLETED | OUTPATIENT
Start: 2023-11-27 | End: 2023-11-27

## 2023-11-27 RX ADMIN — DEXAMETHASONE SODIUM PHOSPHATE 12 MG: 10 INJECTION, SOLUTION INTRAMUSCULAR; INTRAVENOUS at 10:29

## 2023-11-27 RX ADMIN — LEUCOVORIN CALCIUM 320 MG: 350 INJECTION, POWDER, LYOPHILIZED, FOR SOLUTION INTRAMUSCULAR; INTRAVENOUS at 11:28

## 2023-11-27 RX ADMIN — ATROPINE SULFATE 0.4 MG: 0.4 INJECTION, SOLUTION INTRAVENOUS at 11:23

## 2023-11-27 RX ADMIN — DIPHENHYDRAMINE HYDROCHLORIDE 25 MG: 25 CAPSULE ORAL at 11:24

## 2023-11-27 RX ADMIN — FOSAPREPITANT 150 MG: 150 INJECTION, POWDER, LYOPHILIZED, FOR SOLUTION INTRAVENOUS at 10:55

## 2023-11-27 RX ADMIN — FLUOROURACIL 4150 MG: 50 INJECTION, SOLUTION INTRAVENOUS at 14:59

## 2023-11-27 RX ADMIN — OXALIPLATIN 135 MG: 100 INJECTION, SOLUTION, CONCENTRATE INTRAVENOUS at 11:26

## 2023-11-27 RX ADMIN — PALONOSETRON 250 MCG: 0.05 INJECTION, SOLUTION INTRAVENOUS at 11:22

## 2023-11-27 RX ADMIN — DOCETAXEL 80 MG: 20 INJECTION, SOLUTION, CONCENTRATE INTRAVENOUS at 13:50

## 2023-11-27 ASSESSMENT — PAIN SCALES - GENERAL: PAINLEVEL: 0-NO PAIN

## 2023-11-27 ASSESSMENT — PATIENT HEALTH QUESTIONNAIRE - PHQ9
SUM OF ALL RESPONSES TO PHQ9 QUESTIONS 1 AND 2: 0
1. LITTLE INTEREST OR PLEASURE IN DOING THINGS: NOT AT ALL
2. FEELING DOWN, DEPRESSED OR HOPELESS: NOT AT ALL

## 2023-11-27 NOTE — PROGRESS NOTES
LOCATION:  Phone visit note - [facility] at [facility].     HEMATOLOGY & ONCOLOGY PROBLEMS:  1.  Localized gastric adenocarcinoma.       a.  Neoadjuvant chemotherapy with FLOT beginning October 2023.    CHIEF COMPLAINT:    The patient had a telephonic visit for follow-up of GE junction adenocarcinoma and for continuation of treatment and management of therapy related effects.                   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  She was having problem with gradually worsening dysphagia with further GI work-up revealing gastric mass with the biopsy confirming  poorly differentiated adenocarcinoma in July 2023.  Prior to that she had a barium esophagogram which was essentially unremarkable.  Patient does have a previous history of GERD and had one time was treated for H. pylori gastritis.  EUS done by Dr. London on 7/17/2023 showed malignancy starting near the GE junction and measuring 2.2 x 1.5 cm towards the cardia along  the lesser curvature / anterior wall with malignant appearing features.  There is loss of interface between the mass and liver along a 7 mm region, concerning for probable  early hepatic involvement.  Enlarged lymph nodes were noted in the diaphragmatic region, and gastrohepatic ligament.  CT chest abdomen pelvis with contrast on 8/9/2023 showed thickening of the distal esophagus extending into the anterior portion of the  gastric cardia.  There is a small amount of liver present directly adjacent to the area of the tumor but there is no altered enhancement pattern in the liver parenchyma.  Slightly inferior to the cardia there  were lymph nodes visible in the adjacent mesentery that were not visible previously measuring up to 6 mm in short axis. PET/CT on 8/31/2023 showed a hypermetabolic focus at the GE junction  with extension into the gastric cardia, no evidence of hypermetabolic regional or distant metastatic disease. Brain MRI  negative for metastasis.  She is currently being treated with neoadjuvant chemotherapy with FLOT protocol beginning 2023.     INTERVAL HISTORY:  She had issues with persistent diarrhea and nausea after last dose of chemotherapy.  She was also admitted in the hospital for few days for symptomatic care.  Follow-up blood work from yesterday is all normal.  Today patient is feeling better and as per her she is ready to start her next round of chemo in the next few days.       PAST MEDICAL HISTORY:   1.  GE junction adenocarcinoma as detailed above.   2.  Hypertension   3.  Hyperlipidemia   4.  Anxiety/depression   5.  History of complete hysterectomy, ankle surgery, cholecystectomy and 2  procedures     SOCIAL HISTORY:    and lives in [facility].  Non-smoker and nonalcoholic.  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  Born and raised in [place].     FAMILY HISTORY:    Father  at age 76 from myocardial infarction mother  from stroke at age 78.  She had 8 siblings.  1 brother  from MI another committed suicide.  A sister also  from myocardial infarction.   She had 2 children.  Her son  from AML.  No other specific history of bleeding, clotting or malignant disorder in the family.     REVIEW OF SYSTEMS:  Pertinent finding as per the history above.  All other systems have been reviewed and generally negative and noncontributory.     PHYSICAL EXAMINATION:    Detailed physical examination not done      Allergies and Intolerances:       Allergies:         influenza virus vaccine, live: Drug, Rash, Active         Tape - Adhesive, Bandaids, Paper: Environment, Blistering Dis.,  Active       Intolerances:         morphine: Drug, Nausea/Vomiting, Active     Outpatient Medication Profile:  * Patient Currently Takes Medications as of 15-Sep-2023 16:03 documented in Structured Notes         ondansetron 8 mg oral tablet : 1 tab(s) orally every 8 hours , Start Date: 15-Sep-2023          Aspir-Low 81 mg oral delayed release tablet: Last Dose Taken:  , 1 tab(s)  orally once a day         atorvastatin 20 mg oral tablet: Last Dose Taken:  , 1 tab(s) orally once  a day         Metoprolol Succinate ER 50 mg oral tablet, extended release: Last Dose  Taken:  , 1 tab(s) orally once a day         spironolactone 25 mg oral tablet: Last Dose Taken:  , 1 tab(s) orally  once a day         Tab-A-Tong with Iron oral tablet: Last Dose Taken:  , 1 tab(s) orally  once a day         DULoxetine: Last Dose Taken:  , cap(s) orally once a day         Vitamin B12 1000 mcg oral tablet: Last Dose Taken:  , 1 tab(s) orally  once a day         Vitamin D3 50 mcg (2000 intl units) oral tablet: Last Dose Taken:  ,  1 tab(s) orally once a day         traZODone 50 mg oral tablet: Last Dose Taken:  , orally once a day (at  bedtime)     Lab Results:  CBC and CMP were unremarkable on 11/24/2023.  Last 3 sets of blood work were reviewed and the trend was noted.     Radiology Result:   PET/CT Esophageal Initial [Aug 31 2023  1:12PM]  Impression:  1. Hypermetabolic focus localized at gastroesophageal junction with xtension to the gastric cardia compatible with biopsy-proven gastric adenocarcinoma.  2. No evidence of hypermetabolic regional or distant metastatic disease.  3. Status post cholecystectomy with demonstration of localized pneumobilia.     MRI Brain w/wo Contrast [Aug 29 2023 12:28PM]   Impression:  There is no evidence of mass, infarction or hemorrhage.     Pathology Results:  Surgical Pathology [Jul 3 2023 5:08PM] ([id number])  Specimens: MASS AT GE JUNCTION, DISTAL TO ESOPHAGUS   Name KIRSTIN GARDINER     Accession #: [id number]   Pathologist: KEI YORK MD   Date of Procedure: 6/19/2023   Submitting Physician: ISRAEL HERNANDEZ MD   Location:  [facility]   Copy To/Referring/Attending:   BOB GRAHAM, DO Other External #     FINAL DIAGNOSIS   A. MASS AT GE JUNCTION, DISTAL TO ESOPHAGUS:   -- POORLY DIFFERENTIATED   ADENOCARCINOMA WITH SIGNET RING CELL FEATURES. SEE   COMMENT.   Addendum Diagnosis   MISMATCH REPAIR PROTEIN EXPRESSION:     Tumor type/ specimen source: GE junction mass, distal  esophagus   Paraffin block number: [id number], A1     Protein: Result:   MLH-1 Intact Nuclear Expression   PMS-2 Intact Nuclear Expression   MSH-2 Intact Nuclear Expression   MSH-6 Intact Nuclear Expression     INTERPRETATION: Neoplasm with normal mismatch repair protein expression.      Assessment and Plan:   1.  GE junction poorly differentiated adenocarcinoma.  Please refer to the details as outlined above.  In summary patient with few month history  of gradually worsening dysphagia to both solid and liquid.  Initial barium esophagogram was normal but EGD showed a GE junction mass in July 2023.  Biopsy results are confirmatory of poorly differentiated adenocarcinoma with normal mismatch repair gene  expression. EUS revealed a GE junction lesion with concern regarding direct extension to liver and enlarged pathological lymph nodes.  CT scan showed stomach involvement with local regional lymphadenopathy but no distance organ involvement.  A follow-up  PET scan results were confirmatory of increased metabolic activity starting at the GE junction with extension into cardia.  There was no finding of any hypermetabolic activity in the liver or any other local or distant metastatic lesions.  Brain MRI was  unremarkable.  After multidisciplinary evaluation of tumor is considered more gastric than GE junction and she has been advised evaluation for neoadjuvant chemotherapy followed by surgical evaluation.  She is currently being treated with neoadjuvant chemotherapy with FLOT recall beginning October 2023.    She will continue with the treatment, but I will cut down the dose by 25% in view of issues with severe diarrhea/nausea leading to dehydration and hospitalization after the last cycle of chemotherapy.  We will also try to maximally prophylax  her for nausea and diarrhea as much as possible.   Probable side effect of myelosuppression, cold-induced neuropathy, weakness, fatigue, diarrhea, loss stomatitis etc. were explained to them in detail.  Informed  consent was obtained.  This is her normal plan for cycles of neoadjuvant chemotherapy.  I also advised her to schedule surgical follow-up  With Dr. De Los Santos next month.       2.  Chemotherapy-induced diarrhea.  She is advised to take Imodium proactively.  We will also support her with aggressive preventative intermittent IV hydration.    3.  Follow-up.  Follow-up with me in few weeks.  In the meantime she will come to the clinic for continuation of the treatment as per the protocol.  Advised to contact us immediately if there are any new questions or concerns.    This note has been transcribed using Dragon voice recognition system and there is a possibility of unintentional typing misprints.     Informed consent was obtained today for telephonic visit.

## 2023-11-27 NOTE — SIGNIFICANT EVENT
11/27/23 0952   Prechemo Checklist   Has the patient been in the hospital, ED, or urgent care since last date of service No   Chemo/Immuno Consent Signed Yes   Confirmed to previous date/time of medication Yes   Compared to previous dose Yes   All medications are dated accurately Yes   Pregnancy Test Negative Not applicable   Parameters Met Yes   BSA/Weight-Height Verified Yes   Dose Calculations Verified Yes

## 2023-11-27 NOTE — PROGRESS NOTES
Followed up with pt and her . Pt all in all seems to be doing better. Talked to  about his past concerns and worries about her eating and drinking when he was out. Pt's  feels she is doing much better. Pt's  explains she has been eating more and able to swallow it more easily. Pt's  feels the treatment must be working. Pt is not as sure as her  on this. Pt does agree she is more confident with the process and now seems to be hitting her stride. Pt now knows what to do and has proven to herself she can do this. The couple make thanksgiving on their own and enjoyed their meal. They even had someone bring them another large meal to eat. Pt seems more upbeat today knowing she can do this.

## 2023-11-28 ENCOUNTER — APPOINTMENT (OUTPATIENT)
Dept: HEMATOLOGY/ONCOLOGY | Facility: CLINIC | Age: 58
End: 2023-11-28
Payer: MEDICAID

## 2023-11-28 ENCOUNTER — INFUSION (OUTPATIENT)
Dept: HEMATOLOGY/ONCOLOGY | Facility: CLINIC | Age: 58
End: 2023-11-28
Payer: MEDICAID

## 2023-11-28 VITALS — HEIGHT: 61 IN | WEIGHT: 224.87 LBS | BODY MASS INDEX: 42.46 KG/M2

## 2023-11-28 VITALS
RESPIRATION RATE: 18 BRPM | HEART RATE: 100 BPM | OXYGEN SATURATION: 100 % | SYSTOLIC BLOOD PRESSURE: 142 MMHG | TEMPERATURE: 98.6 F | DIASTOLIC BLOOD PRESSURE: 87 MMHG

## 2023-11-28 DIAGNOSIS — C16.0 ADENOCARCINOMA OF GASTROESOPHAGEAL JUNCTION (MULTI): ICD-10-CM

## 2023-11-28 PROCEDURE — 2500000004 HC RX 250 GENERAL PHARMACY W/ HCPCS (ALT 636 FOR OP/ED)

## 2023-11-28 PROCEDURE — 2500000004 HC RX 250 GENERAL PHARMACY W/ HCPCS (ALT 636 FOR OP/ED): Mod: JZ | Performed by: INTERNAL MEDICINE

## 2023-11-28 PROCEDURE — 96372 THER/PROPH/DIAG INJ SC/IM: CPT

## 2023-11-28 PROCEDURE — 96523 IRRIG DRUG DELIVERY DEVICE: CPT

## 2023-11-28 RX ORDER — HEPARIN 100 UNIT/ML
500 SYRINGE INTRAVENOUS AS NEEDED
Status: CANCELLED | OUTPATIENT
Start: 2023-11-28

## 2023-11-28 RX ORDER — HEPARIN 100 UNIT/ML
SYRINGE INTRAVENOUS
Status: COMPLETED
Start: 2023-11-28 | End: 2023-11-28

## 2023-11-28 RX ORDER — HEPARIN SODIUM,PORCINE/PF 10 UNIT/ML
50 SYRINGE (ML) INTRAVENOUS AS NEEDED
Status: CANCELLED | OUTPATIENT
Start: 2023-11-28

## 2023-11-28 RX ADMIN — PEGFILGRASTIM-CBQV 6 MG: 6 INJECTION, SOLUTION SUBCUTANEOUS at 15:33

## 2023-11-28 RX ADMIN — HEPARIN 500 UNITS: 100 SYRINGE at 15:36

## 2023-11-28 ASSESSMENT — PATIENT HEALTH QUESTIONNAIRE - PHQ9
1. LITTLE INTEREST OR PLEASURE IN DOING THINGS: NOT AT ALL
SUM OF ALL RESPONSES TO PHQ9 QUESTIONS 1 AND 2: 0
2. FEELING DOWN, DEPRESSED OR HOPELESS: NOT AT ALL

## 2023-11-28 ASSESSMENT — PAIN SCALES - GENERAL: PAINLEVEL: 0-NO PAIN

## 2023-12-04 ENCOUNTER — INFUSION (OUTPATIENT)
Dept: HEMATOLOGY/ONCOLOGY | Facility: CLINIC | Age: 58
End: 2023-12-04
Payer: MEDICAID

## 2023-12-04 VITALS
SYSTOLIC BLOOD PRESSURE: 126 MMHG | HEART RATE: 97 BPM | OXYGEN SATURATION: 97 % | TEMPERATURE: 96.8 F | DIASTOLIC BLOOD PRESSURE: 65 MMHG | RESPIRATION RATE: 18 BRPM

## 2023-12-04 DIAGNOSIS — C16.0 ADENOCARCINOMA OF GASTROESOPHAGEAL JUNCTION (MULTI): ICD-10-CM

## 2023-12-04 PROCEDURE — 96360 HYDRATION IV INFUSION INIT: CPT | Mod: INF

## 2023-12-04 PROCEDURE — 2500000004 HC RX 250 GENERAL PHARMACY W/ HCPCS (ALT 636 FOR OP/ED): Performed by: INTERNAL MEDICINE

## 2023-12-04 PROCEDURE — 2500000004 HC RX 250 GENERAL PHARMACY W/ HCPCS (ALT 636 FOR OP/ED): Performed by: NURSE PRACTITIONER

## 2023-12-04 RX ORDER — ALBUTEROL SULFATE 0.83 MG/ML
3 SOLUTION RESPIRATORY (INHALATION) AS NEEDED
Status: DISCONTINUED | OUTPATIENT
Start: 2023-12-04 | End: 2023-12-04 | Stop reason: HOSPADM

## 2023-12-04 RX ORDER — ALBUTEROL SULFATE 0.83 MG/ML
3 SOLUTION RESPIRATORY (INHALATION) AS NEEDED
Status: CANCELLED | OUTPATIENT
Start: 2023-12-04

## 2023-12-04 RX ORDER — HEPARIN 100 UNIT/ML
500 SYRINGE INTRAVENOUS AS NEEDED
Status: CANCELLED | OUTPATIENT
Start: 2023-12-04

## 2023-12-04 RX ORDER — FAMOTIDINE 10 MG/ML
20 INJECTION INTRAVENOUS ONCE AS NEEDED
Status: DISCONTINUED | OUTPATIENT
Start: 2023-12-04 | End: 2023-12-04 | Stop reason: HOSPADM

## 2023-12-04 RX ORDER — HEPARIN SODIUM,PORCINE/PF 10 UNIT/ML
50 SYRINGE (ML) INTRAVENOUS AS NEEDED
Status: CANCELLED | OUTPATIENT
Start: 2023-12-04

## 2023-12-04 RX ORDER — DIPHENHYDRAMINE HYDROCHLORIDE 50 MG/ML
50 INJECTION INTRAMUSCULAR; INTRAVENOUS AS NEEDED
Status: CANCELLED | OUTPATIENT
Start: 2023-12-04

## 2023-12-04 RX ORDER — EPINEPHRINE 0.3 MG/.3ML
0.3 INJECTION SUBCUTANEOUS EVERY 5 MIN PRN
Status: DISCONTINUED | OUTPATIENT
Start: 2023-12-04 | End: 2023-12-04 | Stop reason: HOSPADM

## 2023-12-04 RX ORDER — FAMOTIDINE 10 MG/ML
20 INJECTION INTRAVENOUS ONCE AS NEEDED
Status: CANCELLED | OUTPATIENT
Start: 2023-12-04

## 2023-12-04 RX ORDER — HEPARIN 100 UNIT/ML
500 SYRINGE INTRAVENOUS AS NEEDED
Status: DISCONTINUED | OUTPATIENT
Start: 2023-12-04 | End: 2023-12-04 | Stop reason: HOSPADM

## 2023-12-04 RX ORDER — EPINEPHRINE 0.3 MG/.3ML
0.3 INJECTION SUBCUTANEOUS EVERY 5 MIN PRN
Status: CANCELLED | OUTPATIENT
Start: 2023-12-04

## 2023-12-04 RX ORDER — DIPHENHYDRAMINE HYDROCHLORIDE 50 MG/ML
50 INJECTION INTRAMUSCULAR; INTRAVENOUS AS NEEDED
Status: DISCONTINUED | OUTPATIENT
Start: 2023-12-04 | End: 2023-12-04 | Stop reason: HOSPADM

## 2023-12-04 RX ADMIN — SODIUM CHLORIDE 500 ML: 9 INJECTION, SOLUTION INTRAVENOUS at 13:23

## 2023-12-04 RX ADMIN — HEPARIN 500 UNITS: 100 SYRINGE at 14:36

## 2023-12-04 ASSESSMENT — PAIN SCALES - GENERAL
PAINLEVEL_OUTOF10: 0-NO PAIN
PAINLEVEL: 0-NO PAIN

## 2023-12-04 NOTE — DISCHARGE SUMMARY
Discharge Diagnosis  Nausea & vomiting    Issues Requiring Follow-Up  HTN, HLD, GERD/+ H pylori, Gastric (GE Junction) AdenoCa, N/V/D and Dysphagia    Discharge Meds     Your medication list        START taking these medications        Instructions Last Dose Given Next Dose Due   acetaminophen 325 mg tablet  Commonly known as: Tylenol      Take 2 tablets (650 mg) by mouth every 4 hours if needed for fever (temp greater than 38.0 C) (greater than or equal to 38 C).       ciprofloxacin 250 mg tablet  Commonly known as: Cipro      Take 1 tablet (250 mg) by mouth 2 times a day for 5 days.       lactobacillus acidophilus tablet tablet  Start taking on: November 13, 2023      Take 1 tablet by mouth once daily. Do not start before November 13, 2023.       loperamide 2 mg capsule  Commonly known as: Imodium      Take 2 capsules (4 mg) by mouth 4 times a day as needed for diarrhea.       pantoprazole 40 mg EC tablet  Commonly known as: ProtoNix      Take 1 tablet (40 mg) by mouth once daily. Do not crush, chew, or split.       potassium chloride CR 10 mEq ER tablet  Commonly known as: Klor-Con  Start taking on: November 13, 2023      Take 1 tablet (10 mEq) by mouth once daily. Do not crush, chew, or split. Do not start before November 13, 2023.       scopolamine 1 mg over 3 days patch 3 day  Commonly known as: Transderm-Scop  Start taking on: November 15, 2023      Place 1 patch over 72 hours on the skin every 3rd day. Do not start before November 15, 2023.       white petrolatum 41 % ointment ointment  Commonly known as: Aquaphor      Apply 1 Application topically every 1 hour if needed (Dry Skin/Itching).              CONTINUE taking these medications        Instructions Last Dose Given Next Dose Due   atorvastatin 20 mg tablet  Commonly known as: Lipitor           DULoxetine 60 mg DR capsule  Commonly known as: Cymbalta           estradiol 0.01 % (0.1 mg/gram) vaginal cream  Commonly known as: Estrace           metoprolol  succinate XL 50 mg 24 hr tablet  Commonly known as: Toprol-XL           MULTIVITAMIN WITH IRON ORAL           ondansetron ODT 4 mg disintegrating tablet  Commonly known as: Zofran-ODT           spironolactone 25 mg tablet  Commonly known as: Aldactone           traZODone 50 mg tablet  Commonly known as: Desyrel           Vitamin B-12 1,000 mcg tablet  Generic drug: cyanocobalamin           Vitamin D3 25 MCG (1000 UT) tablet  Generic drug: cholecalciferol                     Where to Get Your Medications        These medications were sent to [facility]/pharmacy #[id number] - [address]  [address] [facility] [address]      Phone: [phone]   ciprofloxacin 250 mg tablet  lactobacillus acidophilus tablet tablet  loperamide 2 mg capsule  pantoprazole 40 mg EC tablet  potassium chloride CR 10 mEq ER tablet  scopolamine 1 mg over 3 days patch 3 day  white petrolatum 41 % ointment ointment       Information about where to get these medications is not yet available    Ask your nurse or doctor about these medications  acetaminophen 325 mg tablet         Test Results Pending At Discharge  Pending Labs       Order Current Status    Extra Urine Gray Tube Collected (11/06/23 2033)    Urinalysis with Reflex Microscopic and Culture In process            Hospital Course   Pt with PMHx including HTN, HLD, GERD, and Gastric (GE Junction) AdenoCa - on ChemoTx -[facility] with + N/V/D and Dysphagia. Pt admitted and monitored and started on IV Fluids and Anti Emetics and Anti Diarrheals. Scopolamine Added. Oncology consulted and recommendations appreciated. Electrolytes replaced. + UTI and Antibiotics added. N/V/D slowly improved. Pt stabilized and Discharged to Home (with Cipro for UTI and Rx's for Antiemetics/Scopolamine) to Follow up with Oncology and Own PCP.    Pertinent Physical Exam At Time of Discharge  Physical Exam  Gen - NAD  ENT - NC  Neck - No JVD  H - S1S2  L - Decreased BS  Abd - Soft  Ext - W  & D  Neuro - Awake and Alert  Outpatient Follow-Up  Future Appointments   Date Time Provider Department Center   12/8/2023  1:00 PM INF 03 PARMA [id number] [place]   12/11/2023  9:00 AM Ollie Torres MD [id number] [place]   12/11/2023  9:30 AM INF 02 PARMA [id number] [place]   12/12/2023 10:00 AM INF 03 PARMA [id number] [place]   12/19/2023 10:30 AM Zack Chavarria MD [id number] [place]   12/22/2023  2:30 PM INF 03 PARMA [id number] [place]   12/22/2023  3:00 PM Ollie Torres MD [id number] [place]   12/26/2023  9:30 AM INF 01 PARMA [id number] [place]   12/27/2023 10:00 AM INF 03 PARMA [id number] [place]         Gaudencio Burks DO

## 2023-12-06 DIAGNOSIS — C16.0 ADENOCARCINOMA OF GASTROESOPHAGEAL JUNCTION (MULTI): ICD-10-CM

## 2023-12-06 RX ORDER — MIRTAZAPINE 15 MG/1
TABLET, FILM COATED ORAL
Qty: 30 TABLET | Refills: 5 | Status: SHIPPED | OUTPATIENT
Start: 2023-12-06 | End: 2024-01-05

## 2023-12-08 ENCOUNTER — INFUSION (OUTPATIENT)
Dept: HEMATOLOGY/ONCOLOGY | Facility: CLINIC | Age: 58
End: 2023-12-08
Payer: MEDICAID

## 2023-12-08 DIAGNOSIS — C16.0 ADENOCARCINOMA OF GASTROESOPHAGEAL JUNCTION (MULTI): ICD-10-CM

## 2023-12-08 LAB
ALBUMIN SERPL BCP-MCNC: 3.4 G/DL (ref 3.4–5)
ALP SERPL-CCNC: 148 U/L (ref 33–110)
ALT SERPL W P-5'-P-CCNC: 30 U/L (ref 7–45)
ANION GAP SERPL CALC-SCNC: 16 MMOL/L (ref 10–20)
AST SERPL W P-5'-P-CCNC: 33 U/L (ref 9–39)
BASOPHILS # BLD AUTO: 0.07 X10*3/UL (ref 0–0.1)
BASOPHILS NFR BLD AUTO: 0.6 %
BILIRUB SERPL-MCNC: 0.7 MG/DL (ref 0–1.2)
BUN SERPL-MCNC: 10 MG/DL (ref 6–23)
CALCIUM SERPL-MCNC: 8.7 MG/DL (ref 8.6–10.3)
CHLORIDE SERPL-SCNC: 106 MMOL/L (ref 98–107)
CO2 SERPL-SCNC: 23 MMOL/L (ref 21–32)
CREAT SERPL-MCNC: 0.71 MG/DL (ref 0.5–1.05)
EOSINOPHIL # BLD AUTO: 0.06 X10*3/UL (ref 0–0.7)
EOSINOPHIL NFR BLD AUTO: 0.5 %
ERYTHROCYTE [DISTWIDTH] IN BLOOD BY AUTOMATED COUNT: 20.2 % (ref 11.5–14.5)
GFR SERPL CREATININE-BSD FRML MDRD: >90 ML/MIN/1.73M*2
GLUCOSE SERPL-MCNC: 101 MG/DL (ref 74–99)
HCT VFR BLD AUTO: 38.5 % (ref 36–46)
HGB BLD-MCNC: 12.1 G/DL (ref 12–16)
IMM GRANULOCYTES # BLD AUTO: 0.32 X10*3/UL (ref 0–0.7)
IMM GRANULOCYTES NFR BLD AUTO: 2.7 % (ref 0–0.9)
LYMPHOCYTES # BLD AUTO: 2.89 X10*3/UL (ref 1.2–4.8)
LYMPHOCYTES NFR BLD AUTO: 24 %
MCH RBC QN AUTO: 28.4 PG (ref 26–34)
MCHC RBC AUTO-ENTMCNC: 31.4 G/DL (ref 32–36)
MCV RBC AUTO: 90 FL (ref 80–100)
MONOCYTES # BLD AUTO: 1.09 X10*3/UL (ref 0.1–1)
MONOCYTES NFR BLD AUTO: 9 %
NEUTROPHILS # BLD AUTO: 7.62 X10*3/UL (ref 1.2–7.7)
NEUTROPHILS NFR BLD AUTO: 63.2 %
NRBC BLD-RTO: 0.5 /100 WBCS (ref 0–0)
OVALOCYTES BLD QL SMEAR: NORMAL
PLATELET # BLD AUTO: 110 X10*3/UL (ref 150–450)
POLYCHROMASIA BLD QL SMEAR: NORMAL
POTASSIUM SERPL-SCNC: 3.4 MMOL/L (ref 3.5–5.3)
PROT SERPL-MCNC: 5.7 G/DL (ref 6.4–8.2)
RBC # BLD AUTO: 4.26 X10*6/UL (ref 4–5.2)
RBC MORPH BLD: NORMAL
SODIUM SERPL-SCNC: 142 MMOL/L (ref 136–145)
WBC # BLD AUTO: 12.1 X10*3/UL (ref 4.4–11.3)

## 2023-12-08 PROCEDURE — 36591 DRAW BLOOD OFF VENOUS DEVICE: CPT | Mod: 59

## 2023-12-08 PROCEDURE — 36591 DRAW BLOOD OFF VENOUS DEVICE: CPT

## 2023-12-08 PROCEDURE — 85025 COMPLETE CBC W/AUTO DIFF WBC: CPT

## 2023-12-08 PROCEDURE — 2500000004 HC RX 250 GENERAL PHARMACY W/ HCPCS (ALT 636 FOR OP/ED): Mod: JZ | Performed by: INTERNAL MEDICINE

## 2023-12-08 PROCEDURE — 96523 IRRIG DRUG DELIVERY DEVICE: CPT

## 2023-12-08 PROCEDURE — 80053 COMPREHEN METABOLIC PANEL: CPT

## 2023-12-08 PROCEDURE — 36593 DECLOT VASCULAR DEVICE: CPT

## 2023-12-08 RX ORDER — HEPARIN 100 UNIT/ML
500 SYRINGE INTRAVENOUS AS NEEDED
Status: CANCELLED | OUTPATIENT
Start: 2023-12-08

## 2023-12-08 RX ORDER — ALBUTEROL SULFATE 0.83 MG/ML
3 SOLUTION RESPIRATORY (INHALATION) AS NEEDED
Status: CANCELLED | OUTPATIENT
Start: 2023-12-12

## 2023-12-08 RX ORDER — DIPHENHYDRAMINE HYDROCHLORIDE 50 MG/ML
50 INJECTION INTRAMUSCULAR; INTRAVENOUS AS NEEDED
Status: CANCELLED | OUTPATIENT
Start: 2023-12-12

## 2023-12-08 RX ORDER — EPINEPHRINE 0.3 MG/.3ML
0.3 INJECTION SUBCUTANEOUS EVERY 5 MIN PRN
Status: CANCELLED | OUTPATIENT
Start: 2023-12-12

## 2023-12-08 RX ORDER — FAMOTIDINE 10 MG/ML
20 INJECTION INTRAVENOUS ONCE AS NEEDED
Status: CANCELLED | OUTPATIENT
Start: 2023-12-12

## 2023-12-08 RX ORDER — HEPARIN 100 UNIT/ML
500 SYRINGE INTRAVENOUS AS NEEDED
Status: DISCONTINUED | OUTPATIENT
Start: 2023-12-08 | End: 2023-12-08 | Stop reason: HOSPADM

## 2023-12-08 RX ORDER — HEPARIN SODIUM,PORCINE/PF 10 UNIT/ML
50 SYRINGE (ML) INTRAVENOUS AS NEEDED
Status: CANCELLED | OUTPATIENT
Start: 2023-12-08

## 2023-12-08 RX ORDER — ATROPINE SULFATE 0.1 MG/ML
0.4 INJECTION INTRAVENOUS ONCE
Status: CANCELLED
Start: 2023-12-12 | End: 2023-12-12

## 2023-12-08 RX ADMIN — HEPARIN 500 UNITS: 100 SYRINGE at 16:08

## 2023-12-08 RX ADMIN — ALTEPLASE 2 MG: 2.2 INJECTION, POWDER, LYOPHILIZED, FOR SOLUTION INTRAVENOUS at 14:05

## 2023-12-11 ENCOUNTER — INFUSION (OUTPATIENT)
Dept: HEMATOLOGY/ONCOLOGY | Facility: CLINIC | Age: 58
End: 2023-12-11
Payer: MEDICAID

## 2023-12-11 ENCOUNTER — OFFICE VISIT (OUTPATIENT)
Dept: HEMATOLOGY/ONCOLOGY | Facility: CLINIC | Age: 58
End: 2023-12-11
Payer: MEDICAID

## 2023-12-11 ENCOUNTER — NUTRITION (OUTPATIENT)
Dept: RADIATION ONCOLOGY | Facility: CLINIC | Age: 58
End: 2023-12-11

## 2023-12-11 ENCOUNTER — APPOINTMENT (OUTPATIENT)
Dept: HEMATOLOGY/ONCOLOGY | Facility: CLINIC | Age: 58
End: 2023-12-11
Payer: MEDICAID

## 2023-12-11 VITALS
TEMPERATURE: 96.6 F | SYSTOLIC BLOOD PRESSURE: 119 MMHG | RESPIRATION RATE: 20 BRPM | DIASTOLIC BLOOD PRESSURE: 73 MMHG | HEART RATE: 78 BPM | BODY MASS INDEX: 42.83 KG/M2 | HEIGHT: 61 IN | WEIGHT: 226.85 LBS | OXYGEN SATURATION: 99 %

## 2023-12-11 VITALS
OXYGEN SATURATION: 96 % | RESPIRATION RATE: 16 BRPM | DIASTOLIC BLOOD PRESSURE: 74 MMHG | SYSTOLIC BLOOD PRESSURE: 118 MMHG | HEART RATE: 71 BPM | TEMPERATURE: 95.9 F

## 2023-12-11 DIAGNOSIS — C16.0 ADENOCARCINOMA OF GASTROESOPHAGEAL JUNCTION (MULTI): Primary | ICD-10-CM

## 2023-12-11 PROCEDURE — 1036F TOBACCO NON-USER: CPT | Performed by: INTERNAL MEDICINE

## 2023-12-11 PROCEDURE — 2500000001 HC RX 250 WO HCPCS SELF ADMINISTERED DRUGS (ALT 637 FOR MEDICARE OP): Performed by: INTERNAL MEDICINE

## 2023-12-11 PROCEDURE — 2500000004 HC RX 250 GENERAL PHARMACY W/ HCPCS (ALT 636 FOR OP/ED): Performed by: INTERNAL MEDICINE

## 2023-12-11 PROCEDURE — 96413 CHEMO IV INFUSION 1 HR: CPT

## 2023-12-11 PROCEDURE — 3074F SYST BP LT 130 MM HG: CPT | Performed by: INTERNAL MEDICINE

## 2023-12-11 PROCEDURE — 96375 TX/PRO/DX INJ NEW DRUG ADDON: CPT | Mod: INF

## 2023-12-11 PROCEDURE — 99214 OFFICE O/P EST MOD 30 MIN: CPT | Performed by: INTERNAL MEDICINE

## 2023-12-11 PROCEDURE — 96366 THER/PROPH/DIAG IV INF ADDON: CPT | Mod: INF

## 2023-12-11 PROCEDURE — 96368 THER/DIAG CONCURRENT INF: CPT

## 2023-12-11 PROCEDURE — 96367 TX/PROPH/DG ADDL SEQ IV INF: CPT

## 2023-12-11 PROCEDURE — 99214 OFFICE O/P EST MOD 30 MIN: CPT | Mod: 25 | Performed by: INTERNAL MEDICINE

## 2023-12-11 PROCEDURE — 96417 CHEMO IV INFUS EACH ADDL SEQ: CPT

## 2023-12-11 PROCEDURE — 3078F DIAST BP <80 MM HG: CPT | Performed by: INTERNAL MEDICINE

## 2023-12-11 PROCEDURE — 96415 CHEMO IV INFUSION ADDL HR: CPT

## 2023-12-11 PROCEDURE — 96523 IRRIG DRUG DELIVERY DEVICE: CPT

## 2023-12-11 RX ORDER — ALBUTEROL SULFATE 0.83 MG/ML
3 SOLUTION RESPIRATORY (INHALATION) AS NEEDED
Status: DISCONTINUED | OUTPATIENT
Start: 2023-12-11 | End: 2023-12-11 | Stop reason: HOSPADM

## 2023-12-11 RX ORDER — DIPHENHYDRAMINE HCL 25 MG
25 CAPSULE ORAL ONCE
Status: COMPLETED | OUTPATIENT
Start: 2023-12-11 | End: 2023-12-11

## 2023-12-11 RX ORDER — PROCHLORPERAZINE EDISYLATE 5 MG/ML
10 INJECTION INTRAMUSCULAR; INTRAVENOUS EVERY 6 HOURS PRN
Status: DISCONTINUED | OUTPATIENT
Start: 2023-12-11 | End: 2023-12-11 | Stop reason: HOSPADM

## 2023-12-11 RX ORDER — DIPHENHYDRAMINE HYDROCHLORIDE 50 MG/ML
50 INJECTION INTRAMUSCULAR; INTRAVENOUS AS NEEDED
Status: DISCONTINUED | OUTPATIENT
Start: 2023-12-11 | End: 2023-12-11 | Stop reason: HOSPADM

## 2023-12-11 RX ORDER — EPINEPHRINE 0.3 MG/.3ML
0.3 INJECTION SUBCUTANEOUS EVERY 5 MIN PRN
Status: DISCONTINUED | OUTPATIENT
Start: 2023-12-11 | End: 2023-12-11 | Stop reason: HOSPADM

## 2023-12-11 RX ORDER — PROCHLORPERAZINE MALEATE 10 MG
10 TABLET ORAL EVERY 6 HOURS PRN
Status: DISCONTINUED | OUTPATIENT
Start: 2023-12-11 | End: 2023-12-11 | Stop reason: HOSPADM

## 2023-12-11 RX ORDER — FAMOTIDINE 10 MG/ML
20 INJECTION INTRAVENOUS ONCE AS NEEDED
Status: DISCONTINUED | OUTPATIENT
Start: 2023-12-11 | End: 2023-12-11 | Stop reason: HOSPADM

## 2023-12-11 RX ORDER — LORAZEPAM 2 MG/ML
1 INJECTION INTRAMUSCULAR AS NEEDED
Status: DISCONTINUED | OUTPATIENT
Start: 2023-12-11 | End: 2023-12-11 | Stop reason: HOSPADM

## 2023-12-11 RX ORDER — HEPARIN 100 UNIT/ML
500 SYRINGE INTRAVENOUS AS NEEDED
Status: CANCELLED | OUTPATIENT
Start: 2023-12-11

## 2023-12-11 RX ORDER — ATROPINE SULFATE 0.1 MG/ML
0.4 INJECTION INTRAVENOUS ONCE
Status: COMPLETED | OUTPATIENT
Start: 2023-12-11 | End: 2023-12-11

## 2023-12-11 RX ORDER — HEPARIN SODIUM,PORCINE/PF 10 UNIT/ML
50 SYRINGE (ML) INTRAVENOUS AS NEEDED
Status: CANCELLED | OUTPATIENT
Start: 2023-12-11

## 2023-12-11 RX ORDER — PALONOSETRON 0.05 MG/ML
0.25 INJECTION, SOLUTION INTRAVENOUS ONCE
Status: COMPLETED | OUTPATIENT
Start: 2023-12-11 | End: 2023-12-11

## 2023-12-11 RX ADMIN — OXALIPLATIN 135 MG: 5 INJECTION, SOLUTION INTRAVENOUS at 13:15

## 2023-12-11 RX ADMIN — PALONOSETRON 250 MCG: 0.05 INJECTION, SOLUTION INTRAVENOUS at 10:44

## 2023-12-11 RX ADMIN — SODIUM CHLORIDE 150 MG: 9 INJECTION, SOLUTION INTRAVENOUS at 10:45

## 2023-12-11 RX ADMIN — LEUCOVORIN CALCIUM 320 MG: 500 INJECTION, POWDER, LYOPHILIZED, FOR SOLUTION INTRAMUSCULAR; INTRAVENOUS at 13:13

## 2023-12-11 RX ADMIN — DEXAMETHASONE SODIUM PHOSPHATE 12 MG: 10 INJECTION, SOLUTION INTRAMUSCULAR; INTRAVENOUS at 11:24

## 2023-12-11 RX ADMIN — DOCETAXEL 80 MG: 20 INJECTION, SOLUTION INTRAVENOUS at 11:56

## 2023-12-11 RX ADMIN — FLUOROURACIL 4150 MG: 50 INJECTION, SOLUTION INTRAVENOUS at 15:24

## 2023-12-11 RX ADMIN — DIPHENHYDRAMINE HYDROCHLORIDE 25 MG: 25 CAPSULE ORAL at 10:44

## 2023-12-11 RX ADMIN — ATROPINE SULFATE 0.4 MG: 0.1 INJECTION, SOLUTION INTRAVENOUS at 10:30

## 2023-12-11 ASSESSMENT — PAIN SCALES - GENERAL: PAINLEVEL: 0-NO PAIN

## 2023-12-11 NOTE — PROGRESS NOTES
Patient and  flagged down RD during treatment. They have been using Enterade for ~2 weeks and patient is feeling much better. Nausea and diarrhea have greatly improved. They will continue to keep me updated on how she is doing.

## 2023-12-11 NOTE — PROGRESS NOTES
LOCATION:  [facility].     HEMATOLOGY & ONCOLOGY PROBLEMS:  1.  Localized gastric adenocarcinoma.       a.  Neoadjuvant chemotherapy with FLOT beginning October 2023.    CHIEF COMPLAINT:    The patient is in the clinic today for follow-up of GE junction adenocarcinoma and for continuation of treatment and management of therapy related effects.                   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  She was having problem with gradually worsening dysphagia with further GI work-up revealing gastric mass with the biopsy confirming  poorly differentiated adenocarcinoma in July 2023.  Prior to that she had a barium esophagogram which was essentially unremarkable.  Patient does have a previous history of GERD and had one time was treated for H. pylori gastritis.  EUS done by Dr. London on 7/17/2023 showed malignancy starting near the GE junction and measuring 2.2 x 1.5 cm towards the cardia along  the lesser curvature / anterior wall with malignant appearing features.  There is loss of interface between the mass and liver along a 7 mm region, concerning for probable  early hepatic involvement.  Enlarged lymph nodes were noted in the diaphragmatic region, and gastrohepatic ligament.  CT chest abdomen pelvis with contrast on 8/9/2023 showed thickening of the distal esophagus extending into the anterior portion of the  gastric cardia.  There is a small amount of liver present directly adjacent to the area of the tumor but there is no altered enhancement pattern in the liver parenchyma.  Slightly inferior to the cardia there  were lymph nodes visible in the adjacent mesentery that were not visible previously measuring up to 6 mm in short axis. PET/CT on 8/31/2023 showed a hypermetabolic focus at the GE junction  with extension into the gastric cardia, no evidence of hypermetabolic regional or distant metastatic disease. Brain MRI negative for metastasis.   She is currently being treated with neoadjuvant chemotherapy with FLOT protocol beginning 2023.     INTERVAL HISTORY:  She she did much better with her last cycle of chemotherapy.  Of note dose was decreased by 25% and we also added maximum nausea and diarrhea prophylactic medications.   Follow-up blood work from 2023 is all normal.  Starting last planned neoadjuvant cycle #4 today.  She already has appointment with the surgery team later this month.    PAST MEDICAL HISTORY:   1.  GE junction adenocarcinoma as detailed above.   2.  Hypertension   3.  Hyperlipidemia   4.  Anxiety/depression   5.  History of complete hysterectomy, ankle surgery, cholecystectomy and 2  procedures     SOCIAL HISTORY:    and lives in [facility].  Non-smoker and nonalcoholic.  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  Born and raised in [place].     FAMILY HISTORY:    Father  at age 76 from myocardial infarction mother  from stroke at age 78.  She had 8 siblings.  1 brother  from MI another committed suicide.  A sister also  from myocardial infarction.   She had 2 children.  Her son  from AML.  No other specific history of bleeding, clotting or malignant disorder in the family.     REVIEW OF SYSTEMS:  Pertinent finding as per the history above.  All other systems have been reviewed and generally negative and noncontributory.     PHYSICAL EXAMINATION:    Detailed physical examination not done      Allergies and Intolerances:       Allergies:         influenza virus vaccine, live: Drug, Rash, Active         Tape - Adhesive, Bandaids, Paper: Environment, Blistering Dis.,  Active       Intolerances:         morphine: Drug, Nausea/Vomiting, Active     LAB RESULTS:  CBC and CMP were unremarkable on 2023.  Last 3 sets of blood work were reviewed and the trend was noted.     RADIOLOGY RESULT:   PET/CT Esophageal Initial [Aug 31 2023  1:12PM]  Impression:  1. Hypermetabolic focus  localized at gastroesophageal junction with xtension to the gastric cardia compatible with biopsy-proven gastric adenocarcinoma.  2. No evidence of hypermetabolic regional or distant metastatic disease.  3. Status post cholecystectomy with demonstration of localized pneumobilia.     MRI Brain w/wo Contrast [Aug 29 2023 12:28PM]   Impression:  There is no evidence of mass, infarction or hemorrhage.     PATHOLOGY RESULTS:  Surgical Pathology [Jul 3 2023 5:08PM] ([id number])  Specimens: MASS AT GE JUNCTION, DISTAL TO ESOPHAGUS   Name KIRSTIN GARDINER     Accession #: [id number]   Pathologist: KEI YORK MD   Date of Procedure: 6/19/2023   Submitting Physician: ISRAEL HERNANDEZ MD   Location:  [facility]   Copy To/Referring/Attending:   BOB GRAHAM, DO Other External #     FINAL DIAGNOSIS   A. MASS AT GE JUNCTION, DISTAL TO ESOPHAGUS:   -- POORLY DIFFERENTIATED  ADENOCARCINOMA WITH SIGNET RING CELL FEATURES. SEE   COMMENT.   Addendum Diagnosis   MISMATCH REPAIR PROTEIN EXPRESSION:     Tumor type/ specimen source: GE junction mass, distal  esophagus   Paraffin block number: [id number], A1     Protein: Result:   MLH-1 Intact Nuclear Expression   PMS-2 Intact Nuclear Expression   MSH-2 Intact Nuclear Expression   MSH-6 Intact Nuclear Expression     INTERPRETATION: Neoplasm with normal mismatch repair protein expression.      Assessment and Plan:   1.  GE junction poorly differentiated adenocarcinoma.  Please refer to the details as outlined above.  In summary patient with few month history  of gradually worsening dysphagia to both solid and liquid.  Initial barium esophagogram was normal but EGD showed a GE junction mass in July 2023. Biopsy results are confirmatory of poorly differentiated adenocarcinoma with normal mismatch repair gene  expression. EUS revealed a GE junction lesion with concern regarding direct extension to liver and enlarged pathological lymph nodes.  CT scan showed stomach involvement with  local regional lymphadenopathy but no distance organ involvement.  A follow-up  PET scan results were confirmatory of increased metabolic activity starting at the GE junction with extension into cardia.  There was no finding of any hypermetabolic activity in the liver or any other local or distant metastatic lesions.  Brain MRI was  unremarkable.  After multidisciplinary evaluation of tumor is considered more gastric than GE junction and she has been advised evaluation for neoadjuvant chemotherapy followed by surgical evaluation.  She is currently being treated with neoadjuvant chemotherapy with FLOT recall beginning October 2023.    She will continue with the treatment, at the current dose and schedule - of note dose was reduced by 25% in view of issues with severe diarrhea/nausea leading to dehydration and hospitalization after second  cycle of chemotherapy.  Probable side effect of myelosuppression, cold-induced neuropathy, weakness, fatigue, diarrhea, loss stomatitis etc. were explained to them in detail.  Informed  consent was obtained.  She will continue to follow-up with the surgery team in the next few weeks as already scheduled.     2.  Chemotherapy-induced diarrhea.  She is advised to take Imodium proactively.  We will also support her with aggressive preventative intermittent IV hydration as needed.    3.  Follow-up.  Follow-up with me in few weeks.  In the meantime she will come to the clinic for continuation of the treatment as per the protocol.  Advised to contact us immediately if there are any new questions or concerns.    This note has been transcribed using Dragon voice recognition system and there is a possibility of unintentional typing misprints.

## 2023-12-12 ENCOUNTER — INFUSION (OUTPATIENT)
Dept: HEMATOLOGY/ONCOLOGY | Facility: CLINIC | Age: 58
End: 2023-12-12
Payer: MEDICAID

## 2023-12-12 ENCOUNTER — APPOINTMENT (OUTPATIENT)
Dept: HEMATOLOGY/ONCOLOGY | Facility: CLINIC | Age: 58
End: 2023-12-12
Payer: MEDICAID

## 2023-12-12 VITALS
OXYGEN SATURATION: 98 % | SYSTOLIC BLOOD PRESSURE: 133 MMHG | TEMPERATURE: 98.2 F | RESPIRATION RATE: 18 BRPM | HEART RATE: 92 BPM | DIASTOLIC BLOOD PRESSURE: 85 MMHG

## 2023-12-12 DIAGNOSIS — C16.0 ADENOCARCINOMA OF GASTROESOPHAGEAL JUNCTION (MULTI): ICD-10-CM

## 2023-12-12 PROCEDURE — 96523 IRRIG DRUG DELIVERY DEVICE: CPT

## 2023-12-12 PROCEDURE — 96416 CHEMO PROLONG INFUSE W/PUMP: CPT

## 2023-12-12 PROCEDURE — 2500000004 HC RX 250 GENERAL PHARMACY W/ HCPCS (ALT 636 FOR OP/ED): Mod: JZ | Performed by: INTERNAL MEDICINE

## 2023-12-12 PROCEDURE — 96372 THER/PROPH/DIAG INJ SC/IM: CPT | Mod: 59

## 2023-12-12 RX ORDER — HEPARIN 100 UNIT/ML
500 SYRINGE INTRAVENOUS AS NEEDED
Status: DISCONTINUED | OUTPATIENT
Start: 2023-12-12 | End: 2023-12-12 | Stop reason: HOSPADM

## 2023-12-12 RX ORDER — HEPARIN SODIUM,PORCINE/PF 10 UNIT/ML
50 SYRINGE (ML) INTRAVENOUS AS NEEDED
Status: CANCELLED | OUTPATIENT
Start: 2023-12-12

## 2023-12-12 RX ORDER — HEPARIN 100 UNIT/ML
500 SYRINGE INTRAVENOUS AS NEEDED
Status: CANCELLED | OUTPATIENT
Start: 2023-12-12

## 2023-12-12 RX ADMIN — HEPARIN 500 UNITS: 100 SYRINGE at 15:49

## 2023-12-12 RX ADMIN — PEGFILGRASTIM-CBQV 6 MG: 6 INJECTION, SOLUTION SUBCUTANEOUS at 15:41

## 2023-12-12 ASSESSMENT — PAIN SCALES - GENERAL: PAINLEVEL: 0-NO PAIN

## 2023-12-18 ENCOUNTER — TELEPHONE (OUTPATIENT)
Dept: HEMATOLOGY/ONCOLOGY | Facility: CLINIC | Age: 58
End: 2023-12-18
Payer: MEDICAID

## 2023-12-18 ENCOUNTER — INFUSION (OUTPATIENT)
Dept: HEMATOLOGY/ONCOLOGY | Facility: CLINIC | Age: 58
End: 2023-12-18
Payer: MEDICAID

## 2023-12-18 VITALS
HEART RATE: 108 BPM | TEMPERATURE: 97.2 F | OXYGEN SATURATION: 98 % | DIASTOLIC BLOOD PRESSURE: 69 MMHG | SYSTOLIC BLOOD PRESSURE: 160 MMHG | RESPIRATION RATE: 18 BRPM

## 2023-12-18 DIAGNOSIS — C16.0 ADENOCARCINOMA OF GASTROESOPHAGEAL JUNCTION (MULTI): Primary | ICD-10-CM

## 2023-12-18 PROCEDURE — 96374 THER/PROPH/DIAG INJ IV PUSH: CPT | Mod: INF

## 2023-12-18 PROCEDURE — 2500000004 HC RX 250 GENERAL PHARMACY W/ HCPCS (ALT 636 FOR OP/ED): Performed by: INTERNAL MEDICINE

## 2023-12-18 PROCEDURE — 2500000004 HC RX 250 GENERAL PHARMACY W/ HCPCS (ALT 636 FOR OP/ED)

## 2023-12-18 PROCEDURE — 96360 HYDRATION IV INFUSION INIT: CPT | Mod: INF

## 2023-12-18 PROCEDURE — 96523 IRRIG DRUG DELIVERY DEVICE: CPT

## 2023-12-18 RX ORDER — HEPARIN 100 UNIT/ML
SYRINGE INTRAVENOUS
Status: COMPLETED
Start: 2023-12-18 | End: 2023-12-18

## 2023-12-18 RX ORDER — FLUCONAZOLE 200 MG/1
200 TABLET ORAL DAILY
Qty: 14 TABLET | Refills: 0 | Status: SHIPPED | OUTPATIENT
Start: 2023-12-18 | End: 2024-01-01

## 2023-12-18 RX ADMIN — SODIUM CHLORIDE 500 ML: 9 INJECTION, SOLUTION INTRAVENOUS at 13:31

## 2023-12-18 RX ADMIN — Medication 500 UNITS: at 14:46

## 2023-12-18 ASSESSMENT — PAIN SCALES - GENERAL: PAINLEVEL: 0-NO PAIN

## 2023-12-18 NOTE — TELEPHONE ENCOUNTER
Patient's  called that she's feeling dehydrated and wanted to see about getting IVF infusion; per Sanjuana CRUZ, charge RN, patient added at 1 pm today for IVF.  Will also get her added next week for another IVF.

## 2023-12-18 NOTE — PROGRESS NOTES
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 She has completed preoperative FLOT and is due for restaging scans.  Assuming she has no new findings on imaging I have recommended that we proceed with total gastrectomy.  Given the tumor location, potential local invasion, and her body habitus I recommended that we do this as an open surgery.  After discussing the risks and benefits of total gastrectomy with J-tube placement the patient understands and would like to proceed.  We will start with diagnostic laparoscopy given her high-grade histology.  We did not really discuss surgery today because she has other more pressing issues.    Her heart rate was 130 and she has very symptomatic thrush today.  We have ordered a liter LR bolus and she will move back to the infusion area for that.  Cecilia our nutritionist will see her and discuss dietary goals over the next couple of weeks.  I told her I would like her to be gaining weight and feeling better before we proceed with surgery.  I will see her back in 2 weeks to assess her progress and we will tentatively plan for surgery on January 10.    I spent 60 minutes in the professional and overall care of this patient.    Zack Chavarria MD  Assistant Professor of Surgery  Division of Surgical Oncology  [phone]  [email or web address]      History Of Present Illness  Shruthi Ramos is a 58 y.o. female referred by Ollie Torres for GE junction cancer.     The patient had an esophagram followed by EGD with diagnosis of a GE junction mass that returned poorly differentiated adenocarcinoma with signet ring cell features. She then underwent CT scan, PET, and EUS that suggest a T4N2 cancer starting at the GE junction and extending to the cardia with no evidence of metastatic disease. She underwent negative diagnostic laparoscopy on 10/11/23 and has completed 4  cycles of preop FLOT on 23.  She was admitted with diarrhea and vomiting after her second cycle.  She is really struggled with chemo and has lost 35 pounds.  She has very symptomatic thrush right now but even before that she is not eating much.     All other systems have been reviewed and are negative except as noted in the HPI.     PMH is significant for hypertension, hyperlipidemia, anxiety and depression.  PSH is significant for hysterectomy, ankle surgery, cholecystectomy,  x2.     I personally reviewed all necessary laboratory results, pathology reports, and radiologic images for this patient.     Past Medical History  She has a past medical history of Depression, Hyperlipidemia, Hypertension, Osteoarthritis, PONV (postoperative nausea and vomiting), and Status post cholecystectomy.    Surgical History  She has a past surgical history that includes Ankle fracture surgery and  section, classic.     Social History  She reports that she has never smoked. She has never used smokeless tobacco. She reports that she does not drink alcohol and does not use drugs.    Family History  No family history on file.     Allergies  Morphine, Sutures, and Other     Last Recorded Vitals  There were no vitals taken for this visit.    Physical Exam  General: no acute distress, well-nourished  Eyes: intact EOM, no scleral icterus  ENT: hearing intact, no drainage  Respiratory: symmetric chest rise, no cough  Cardiovascular: intact distal pulses, no pitting edema  Abdominal: soft, tender, nondistended  Musculoskeletal: no deformities, intact strength  Integumentary: warm, dry, no lymphadenopathy  Neuro: no focal deficits, sensation intact  Psych: normal mood and affect       Relevant Results  Final Cytological Interpretation      A.  PERITONEAL WASH - PERITONEAL FLUID FOR CYTOLOGY  --  No malignant cells identified.  --  Abundant mixed inflammation present.

## 2023-12-19 ENCOUNTER — OFFICE VISIT (OUTPATIENT)
Dept: SURGICAL ONCOLOGY | Facility: CLINIC | Age: 58
End: 2023-12-19
Payer: MEDICAID

## 2023-12-19 ENCOUNTER — NUTRITION (OUTPATIENT)
Dept: HEMATOLOGY/ONCOLOGY | Facility: CLINIC | Age: 58
End: 2023-12-19

## 2023-12-19 ENCOUNTER — INFUSION (OUTPATIENT)
Dept: HEMATOLOGY/ONCOLOGY | Facility: CLINIC | Age: 58
End: 2023-12-19
Payer: MEDICAID

## 2023-12-19 ENCOUNTER — HOSPITAL ENCOUNTER (OUTPATIENT)
Dept: CARDIOLOGY | Facility: HOSPITAL | Age: 58
Discharge: HOME | End: 2023-12-19
Payer: MEDICAID

## 2023-12-19 VITALS — HEART RATE: 99 BPM | SYSTOLIC BLOOD PRESSURE: 123 MMHG | DIASTOLIC BLOOD PRESSURE: 82 MMHG

## 2023-12-19 VITALS
OXYGEN SATURATION: 95 % | DIASTOLIC BLOOD PRESSURE: 93 MMHG | WEIGHT: 214.95 LBS | TEMPERATURE: 97.2 F | HEART RATE: 127 BPM | SYSTOLIC BLOOD PRESSURE: 129 MMHG | RESPIRATION RATE: 16 BRPM | BODY MASS INDEX: 40.58 KG/M2

## 2023-12-19 DIAGNOSIS — C16.0 GE JUNCTION CARCINOMA (MULTI): Primary | ICD-10-CM

## 2023-12-19 DIAGNOSIS — C16.0 GE JUNCTION CARCINOMA (MULTI): ICD-10-CM

## 2023-12-19 DIAGNOSIS — E86.0 DEHYDRATION: Primary | ICD-10-CM

## 2023-12-19 PROCEDURE — 2500000004 HC RX 250 GENERAL PHARMACY W/ HCPCS (ALT 636 FOR OP/ED): Performed by: STUDENT IN AN ORGANIZED HEALTH CARE EDUCATION/TRAINING PROGRAM

## 2023-12-19 PROCEDURE — 3080F DIAST BP >= 90 MM HG: CPT | Performed by: STUDENT IN AN ORGANIZED HEALTH CARE EDUCATION/TRAINING PROGRAM

## 2023-12-19 PROCEDURE — 99214 OFFICE O/P EST MOD 30 MIN: CPT | Performed by: STUDENT IN AN ORGANIZED HEALTH CARE EDUCATION/TRAINING PROGRAM

## 2023-12-19 PROCEDURE — 96360 HYDRATION IV INFUSION INIT: CPT | Mod: INF

## 2023-12-19 PROCEDURE — 99214 OFFICE O/P EST MOD 30 MIN: CPT | Mod: 25 | Performed by: STUDENT IN AN ORGANIZED HEALTH CARE EDUCATION/TRAINING PROGRAM

## 2023-12-19 PROCEDURE — 3074F SYST BP LT 130 MM HG: CPT | Performed by: STUDENT IN AN ORGANIZED HEALTH CARE EDUCATION/TRAINING PROGRAM

## 2023-12-19 PROCEDURE — 1036F TOBACCO NON-USER: CPT | Performed by: STUDENT IN AN ORGANIZED HEALTH CARE EDUCATION/TRAINING PROGRAM

## 2023-12-19 PROCEDURE — 2500000004 HC RX 250 GENERAL PHARMACY W/ HCPCS (ALT 636 FOR OP/ED): Mod: SE | Performed by: INTERNAL MEDICINE

## 2023-12-19 PROCEDURE — 93005 ELECTROCARDIOGRAM TRACING: CPT

## 2023-12-19 RX ORDER — SODIUM CHLORIDE, SODIUM LACTATE, POTASSIUM CHLORIDE, CALCIUM CHLORIDE 600; 310; 30; 20 MG/100ML; MG/100ML; MG/100ML; MG/100ML
1000 INJECTION, SOLUTION INTRAVENOUS ONCE
Status: COMPLETED | OUTPATIENT
Start: 2023-12-19 | End: 2023-12-19

## 2023-12-19 RX ORDER — EPINEPHRINE 0.3 MG/.3ML
0.3 INJECTION SUBCUTANEOUS EVERY 5 MIN PRN
Status: CANCELLED | OUTPATIENT
Start: 2023-12-19

## 2023-12-19 RX ORDER — SODIUM CHLORIDE 9 MG/ML
1000 INJECTION, SOLUTION INTRAVENOUS ONCE
Status: CANCELLED | OUTPATIENT
Start: 2023-12-19 | End: 2023-12-19

## 2023-12-19 RX ORDER — DIPHENHYDRAMINE HYDROCHLORIDE 50 MG/ML
50 INJECTION INTRAMUSCULAR; INTRAVENOUS AS NEEDED
Status: CANCELLED | OUTPATIENT
Start: 2023-12-19

## 2023-12-19 RX ORDER — ALBUTEROL SULFATE 0.83 MG/ML
3 SOLUTION RESPIRATORY (INHALATION) AS NEEDED
Status: CANCELLED | OUTPATIENT
Start: 2023-12-19

## 2023-12-19 RX ORDER — HEPARIN SODIUM,PORCINE/PF 10 UNIT/ML
50 SYRINGE (ML) INTRAVENOUS AS NEEDED
Status: CANCELLED | OUTPATIENT
Start: 2023-12-19

## 2023-12-19 RX ORDER — FAMOTIDINE 10 MG/ML
20 INJECTION INTRAVENOUS ONCE AS NEEDED
Status: CANCELLED | OUTPATIENT
Start: 2023-12-19

## 2023-12-19 RX ORDER — HEPARIN 100 UNIT/ML
500 SYRINGE INTRAVENOUS AS NEEDED
Status: CANCELLED | OUTPATIENT
Start: 2023-12-19

## 2023-12-19 RX ORDER — HEPARIN 100 UNIT/ML
500 SYRINGE INTRAVENOUS AS NEEDED
Status: DISCONTINUED | OUTPATIENT
Start: 2023-12-19 | End: 2023-12-19 | Stop reason: HOSPADM

## 2023-12-19 RX ORDER — NYSTATIN 100000 [USP'U]/ML
500000 SUSPENSION ORAL 4 TIMES DAILY
Qty: 280 ML | Refills: 0 | Status: SHIPPED | OUTPATIENT
Start: 2023-12-19 | End: 2024-02-17

## 2023-12-19 RX ADMIN — SODIUM CHLORIDE, SODIUM LACTATE, POTASSIUM CHLORIDE, AND CALCIUM CHLORIDE 1000 ML: 600; 310; 30; 20 INJECTION, SOLUTION INTRAVENOUS at 11:53

## 2023-12-19 RX ADMIN — HEPARIN 500 UNITS: 100 SYRINGE at 13:03

## 2023-12-19 ASSESSMENT — PAIN SCALES - GENERAL: PAINLEVEL: 0-NO PAIN

## 2023-12-19 NOTE — LETTER
December 19, 2023     Ollie Torres MD  [address] [facility] [address]    Patient: Shruthi Ramos   YOB: 1965   Date of Visit: 12/19/2023       Dear Dr. Ollie Torres MD:    Thank you for referring Shruthi Ramos to me for evaluation. Below are my notes for this consultation.  If you have questions, please do not hesitate to call me. I look forward to following your patient along with you.       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 She has completed preoperative FLOT and is due for restaging scans.  Assuming she has no new findings on imaging I have recommended that we proceed with total gastrectomy.  Given the tumor location, potential local invasion, and her body habitus I recommended that we do this as an open surgery.  After discussing the risks and benefits of total gastrectomy with J-tube placement the patient understands and would like to proceed.  We will start with diagnostic laparoscopy given her high-grade histology.  We did not really discuss surgery today because she has other more pressing issues.    Her heart rate was 130 and she has very symptomatic thrush today.  We have ordered a liter LR bolus and she will move back to the infusion area for that.  Cecilia our nutritionist will see her and discuss dietary goals over the next couple of weeks.  I told her I would like her to be gaining weight and feeling better before we proceed with surgery.  I will see her back in 2 weeks to assess her progress and we will tentatively plan for surgery on January 10.    I spent 60 minutes in the professional and overall care of this patient.    Zack Chavarria MD  Assistant Professor of Surgery  Division of Surgical  Oncology  [phone]  [email or web address]      History Of Present Illness  Shruthi Ramos is a 58 y.o. female referred by Ollie Torres for GE junction cancer.     The patient had an esophagram followed by EGD with diagnosis of a GE junction mass that returned poorly differentiated adenocarcinoma with signet ring cell features. She then underwent CT scan, PET, and EUS that suggest a T4N2 cancer starting at the GE junction and extending to the cardia with no evidence of metastatic disease. She underwent negative diagnostic laparoscopy on 10/11/23 and has completed 4 cycles of preop FLOT on 23.  She was admitted with diarrhea and vomiting after her second cycle.  She is really struggled with chemo and has lost 35 pounds.  She has very symptomatic thrush right now but even before that she is not eating much.     All other systems have been reviewed and are negative except as noted in the HPI.     PMH is significant for hypertension, hyperlipidemia, anxiety and depression.  PSH is significant for hysterectomy, ankle surgery, cholecystectomy,  x2.     I personally reviewed all necessary laboratory results, pathology reports, and radiologic images for this patient.     Past Medical History  She has a past medical history of Depression, Hyperlipidemia, Hypertension, Osteoarthritis, PONV (postoperative nausea and vomiting), and Status post cholecystectomy.    Surgical History  She has a past surgical history that includes Ankle fracture surgery and  section, classic.     Social History  She reports that she has never smoked. She has never used smokeless tobacco. She reports that she does not drink alcohol and does not use drugs.    Family History  No family history on file.     Allergies  Morphine, Sutures, and Other     Last Recorded Vitals  There were no vitals taken for this visit.    Physical Exam  General: no acute distress, well-nourished  Eyes: intact EOM, no scleral  icterus  ENT: hearing intact, no drainage  Respiratory: symmetric chest rise, no cough  Cardiovascular: intact distal pulses, no pitting edema  Abdominal: soft, tender, nondistended  Musculoskeletal: no deformities, intact strength  Integumentary: warm, dry, no lymphadenopathy  Neuro: no focal deficits, sensation intact  Psych: normal mood and affect       Relevant Results  Final Cytological Interpretation      A.  PERITONEAL WASH - PERITONEAL FLUID FOR CYTOLOGY  --  No malignant cells identified.  --  Abundant mixed inflammation present.

## 2023-12-19 NOTE — PROGRESS NOTES
"NUTRITION Follow-up NOTE  Nutrition Assessment       Reason for Visit:  Shruthi Ramos is a 58 y.o. 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 Currently undergoing neoadjuvant chemotherapy with FLOT protocol (Docetaxel, oxaliplatin, leucovorin, and 5-fluorouracil).    This RD me with patient at [facility] per Dr. Chavarria request. Patient has had weight loss, nausea, trouble eating during treatment.  Per pt worse currently due to thrush she believes.  She stated she just had chemo last Monday and now thrush.  Patient has been followed by RDN at [place] throughout [facility] treatments.      Lab Results   Component Value Date/Time    GLUCOSE 101 (H) 12/08/2023 1358     12/08/2023 1358    K 3.4 (L) 12/08/2023 1358     12/08/2023 1358    CO2 23 12/08/2023 1358    ANIONGAP 16 12/08/2023 1358    BUN 10 12/08/2023 1358    CREATININE 0.71 12/08/2023 1358    EGFR >90 12/08/2023 1358    CALCIUM 8.7 12/08/2023 1358    ALBUMIN 3.4 12/08/2023 1358    ALKPHOS 148 (H) 12/08/2023 1358    PROT 5.7 (L) 12/08/2023 1358    AST 33 12/08/2023 1358    BILITOT 0.7 12/08/2023 1358    ALT 30 12/08/2023 1358       Nutrition Assessment     Anthropometrics:  Anthropometrics  Height: 155 cm (5' 1.02\")  Weight: 97.5 kg (214 lb 15.2 oz)  BMI (Calculated): 40.58  IBW/kg (Dietitian Calculated): 47.72 kg  Percent of IBW: 204 %  Weight Change  Weight History / % Weight Change: 5%  Significant Weight Loss: Yes  Interpretation of Weight Loss: >2% in 1 week    Wt Readings from Last 10 Encounters:   12/20/23 97.5 kg (214 lb 15.2 oz)   12/19/23 97.5 kg (214 lb 15.2 oz)   12/11/23 103 kg (226 lb 13.7 oz)   11/27/23 102 kg (224 lb 3.3 oz)   11/28/23 102 kg (224 lb 13.9 oz)   11/21/23 100 kg (221 lb 5.5 oz)   11/17/23 102 kg (225 lb 1.4 oz)   11/06/23 102 kg (225 lb)   10/30/23 105 kg (230 lb 9.6 oz)   10/30/23 105 kg (230 lb 9.6 oz)   5% in 1 week suspect related to dehydration.      Food And Nutrient " "Intake:  Food and Nutrient History  Food and Nutrient History: Significant Nausea and taste alterations this week  Energy Intake: Poor < 50 %  Fluid Intake: less than 64oz/day  GI Symptoms: nausea     Food Intake  Amount of Food: Eating little the last few days    Food Preparation  Cooking: Patient, Spouse/Significant Other  Grocery Shopping: Patient, Spouse/Significant Other  Dining Out: 1 to 3 times a week         Food Supplement Intake  Oral Nutrition Supplements:  (Enterade - only doing 1 per day currently)         Nutrition Focused Physical Exam:  Subcutaneous Fat Loss  Orbital Fat Pads: Well nourshed (slightly bulging fat pads)  Muscle Wasting  Temporalis: Well nourished (well-defined muscle)    Energy Needs  Calculated Energy Needs Using Equations  Height: 155 cm (5' 1.02\")  Weight Used for Equation Calculations: 97.5 kg (214 lb 15.2 oz)  [place] Equation (Overweight or Obese Patients): 1488  Activity Factor: 1.3  Total Energy Needs: 1800  Estimated Energy Needs  Total Energy Estimated Needs (kCal): 2400 kCal  Total Estimated Energy Need per Day (kCal/kg): 25 kCal/kg  Method for Estimating Needs: actual BW  Estimated Fluid Needs  Total Fluid Estimated Needs (mL): 2400 mL  Total Fluid Estimated Needs (mL/kg): 25 mL/kg  Method for Estimating Needs: actual BW  Estimated Protein Needs  Total Protein Estimated Needs (g): 115 g  Total Protein Estimated Needs (g/kg): 1.2 g/kg  Method for Estimating Needs: actual BW    Nutrition Diagnosis      Nutrition Diagnosis  Patient has Nutrition Diagnosis: Yes  Diagnosis Status (1): Ongoing  Nutrition Diagnosis 1: Swallowing difficulity  Additional Assessment Information (1): no changes  Additional Nutrition Diagnosis: Diagnosis 2  Diagnosis Status (2): Ongoing  Nutrition Diagnosis 2: Inadequate oral intake  Additional Assessment Information (2): no changes    Nutrition Interventions/Recommendations   Food and Nutrition Delivery  Meals & Snacks: Energy-modified " diet  Goals: increased nutrient needs in the setting of cancer  Medical Food Supplement: Premier Protein (Enterade 2 per day while nausea present until able to tolerate premier protein shakes.)  Goals: BID  Nutrition Education  Nutrition Education Content: Content related nutrition education  Goals: patient will understand impact of diet on success during treatment, in the healing process, and for survivorship  Coordination of Nutrition Care by a Nutrition Professional  Collaboration and referral of nutrition care: Collaboration by nutrition professional with other providers  Goals: will collaborate with Ira MCKAY at [place]    Nutrition Monitoring and Evaluation   Food/Nutrient Related History Monitoring  Monitoring and Evaluation Plan: Energy intake  Energy Intake: Estimated energy intake  Criteria: meet needs calculated by RD  Knowledge/Beliefs/Attitudes  Monitoring and Evaluation Plan: Food and nutrition knowledge  Food and nutrition knowledge: Nutrition knowledge of individual client  Criteria: will continue to work with patient on diet for cancer

## 2023-12-20 VITALS — BODY MASS INDEX: 40.58 KG/M2 | WEIGHT: 214.95 LBS | HEIGHT: 61 IN

## 2023-12-20 RX ORDER — FAMOTIDINE 10 MG/ML
20 INJECTION INTRAVENOUS ONCE AS NEEDED
Status: CANCELLED | OUTPATIENT
Start: 2024-03-19

## 2023-12-20 RX ORDER — LORAZEPAM 2 MG/ML
1 INJECTION INTRAMUSCULAR AS NEEDED
Status: CANCELLED | OUTPATIENT
Start: 2024-03-19

## 2023-12-20 RX ORDER — PROCHLORPERAZINE EDISYLATE 5 MG/ML
10 INJECTION INTRAMUSCULAR; INTRAVENOUS EVERY 6 HOURS PRN
Status: CANCELLED | OUTPATIENT
Start: 2024-03-19

## 2023-12-20 RX ORDER — EPINEPHRINE 0.3 MG/.3ML
0.3 INJECTION SUBCUTANEOUS EVERY 5 MIN PRN
Status: CANCELLED | OUTPATIENT
Start: 2024-03-20

## 2023-12-20 RX ORDER — DIPHENHYDRAMINE HYDROCHLORIDE 50 MG/ML
50 INJECTION INTRAMUSCULAR; INTRAVENOUS AS NEEDED
Status: CANCELLED | OUTPATIENT
Start: 2024-03-20

## 2023-12-20 RX ORDER — ALBUTEROL SULFATE 0.83 MG/ML
3 SOLUTION RESPIRATORY (INHALATION) AS NEEDED
Status: CANCELLED | OUTPATIENT
Start: 2024-03-20

## 2023-12-20 RX ORDER — ATROPINE SULFATE 0.1 MG/ML
0.4 INJECTION INTRAVENOUS ONCE
Status: CANCELLED
Start: 2024-03-19 | End: 2023-12-26

## 2023-12-20 RX ORDER — EPINEPHRINE 0.3 MG/.3ML
0.3 INJECTION SUBCUTANEOUS EVERY 5 MIN PRN
Status: CANCELLED | OUTPATIENT
Start: 2024-03-19

## 2023-12-20 RX ORDER — ALBUTEROL SULFATE 0.83 MG/ML
3 SOLUTION RESPIRATORY (INHALATION) AS NEEDED
Status: CANCELLED | OUTPATIENT
Start: 2024-03-19

## 2023-12-20 RX ORDER — DIPHENHYDRAMINE HYDROCHLORIDE 50 MG/ML
50 INJECTION INTRAMUSCULAR; INTRAVENOUS AS NEEDED
Status: CANCELLED | OUTPATIENT
Start: 2024-03-19

## 2023-12-20 RX ORDER — FAMOTIDINE 10 MG/ML
20 INJECTION INTRAVENOUS ONCE AS NEEDED
Status: CANCELLED | OUTPATIENT
Start: 2024-03-20

## 2023-12-20 RX ORDER — PROCHLORPERAZINE MALEATE 10 MG
10 TABLET ORAL EVERY 6 HOURS PRN
Status: CANCELLED | OUTPATIENT
Start: 2024-03-19

## 2023-12-20 RX ORDER — DIPHENHYDRAMINE HCL 25 MG
25 CAPSULE ORAL ONCE
Status: CANCELLED | OUTPATIENT
Start: 2024-03-19

## 2023-12-20 RX ORDER — PALONOSETRON 0.05 MG/ML
0.25 INJECTION, SOLUTION INTRAVENOUS ONCE
Status: CANCELLED | OUTPATIENT
Start: 2024-03-19

## 2023-12-20 RX ORDER — ATROPINE SULFATE 0.1 MG/ML
0.4 INJECTION INTRAVENOUS ONCE
Status: CANCELLED
Start: 2024-03-20 | End: 2023-12-27

## 2023-12-20 NOTE — PATIENT INSTRUCTIONS
Provided Handouts:  High calorie Food list and snack ideas  Dry Mouth Handout  Nausea and vomiting handout  Oral care handout   And discussed with patient and her .  Suggested trying to eat every few hours, even if something little.    Encouraged good fluid intake, at least 72 oz per day.    Discussed smaller frequent snacks and including protein source with all meals and snacks, as tolerated.

## 2023-12-22 ENCOUNTER — INFUSION (OUTPATIENT)
Dept: HEMATOLOGY/ONCOLOGY | Facility: CLINIC | Age: 58
End: 2023-12-22
Payer: MEDICAID

## 2023-12-22 ENCOUNTER — OFFICE VISIT (OUTPATIENT)
Dept: HEMATOLOGY/ONCOLOGY | Facility: CLINIC | Age: 58
End: 2023-12-22
Payer: MEDICAID

## 2023-12-22 DIAGNOSIS — E86.0 DEHYDRATION: ICD-10-CM

## 2023-12-22 DIAGNOSIS — C16.0 GE JUNCTION CARCINOMA (MULTI): Primary | ICD-10-CM

## 2023-12-22 DIAGNOSIS — C16.0 GE JUNCTION CARCINOMA (MULTI): ICD-10-CM

## 2023-12-22 LAB
ALBUMIN SERPL BCP-MCNC: 3.7 G/DL (ref 3.4–5)
ALP SERPL-CCNC: 145 U/L (ref 33–110)
ALT SERPL W P-5'-P-CCNC: 27 U/L (ref 7–45)
ANION GAP SERPL CALC-SCNC: 16 MMOL/L (ref 10–20)
AST SERPL W P-5'-P-CCNC: 37 U/L (ref 9–39)
BASOPHILS # BLD AUTO: 0.12 X10*3/UL (ref 0–0.1)
BASOPHILS NFR BLD AUTO: 0.9 %
BILIRUB SERPL-MCNC: 0.5 MG/DL (ref 0–1.2)
BUN SERPL-MCNC: 8 MG/DL (ref 6–23)
CALCIUM SERPL-MCNC: 8.8 MG/DL (ref 8.6–10.3)
CHLORIDE SERPL-SCNC: 104 MMOL/L (ref 98–107)
CO2 SERPL-SCNC: 22 MMOL/L (ref 21–32)
CREAT SERPL-MCNC: 0.99 MG/DL (ref 0.5–1.05)
EOSINOPHIL # BLD AUTO: 0.01 X10*3/UL (ref 0–0.7)
EOSINOPHIL NFR BLD AUTO: 0.1 %
ERYTHROCYTE [DISTWIDTH] IN BLOOD BY AUTOMATED COUNT: 20 % (ref 11.5–14.5)
GFR SERPL CREATININE-BSD FRML MDRD: 66 ML/MIN/1.73M*2
GLUCOSE SERPL-MCNC: 99 MG/DL (ref 74–99)
HCT VFR BLD AUTO: 40.7 % (ref 36–46)
HGB BLD-MCNC: 12.6 G/DL (ref 12–16)
IMM GRANULOCYTES # BLD AUTO: 0.44 X10*3/UL (ref 0–0.7)
IMM GRANULOCYTES NFR BLD AUTO: 3.2 % (ref 0–0.9)
LYMPHOCYTES # BLD AUTO: 2.73 X10*3/UL (ref 1.2–4.8)
LYMPHOCYTES NFR BLD AUTO: 20 %
MCH RBC QN AUTO: 29 PG (ref 26–34)
MCHC RBC AUTO-ENTMCNC: 31 G/DL (ref 32–36)
MCV RBC AUTO: 94 FL (ref 80–100)
MONOCYTES # BLD AUTO: 1.74 X10*3/UL (ref 0.1–1)
MONOCYTES NFR BLD AUTO: 12.8 %
NEUTROPHILS # BLD AUTO: 8.58 X10*3/UL (ref 1.2–7.7)
NEUTROPHILS NFR BLD AUTO: 63 %
NRBC BLD-RTO: 0.7 /100 WBCS (ref 0–0)
PLATELET # BLD AUTO: 175 X10*3/UL (ref 150–450)
POTASSIUM SERPL-SCNC: 3.7 MMOL/L (ref 3.5–5.3)
PROT SERPL-MCNC: 6 G/DL (ref 6.4–8.2)
RBC # BLD AUTO: 4.34 X10*6/UL (ref 4–5.2)
SODIUM SERPL-SCNC: 138 MMOL/L (ref 136–145)
WBC # BLD AUTO: 13.6 X10*3/UL (ref 4.4–11.3)

## 2023-12-22 PROCEDURE — 99214 OFFICE O/P EST MOD 30 MIN: CPT | Performed by: INTERNAL MEDICINE

## 2023-12-22 PROCEDURE — 85025 COMPLETE CBC W/AUTO DIFF WBC: CPT

## 2023-12-22 PROCEDURE — 96360 HYDRATION IV INFUSION INIT: CPT | Mod: INF

## 2023-12-22 PROCEDURE — 96523 IRRIG DRUG DELIVERY DEVICE: CPT

## 2023-12-22 PROCEDURE — 96374 THER/PROPH/DIAG INJ IV PUSH: CPT | Mod: INF

## 2023-12-22 PROCEDURE — 1036F TOBACCO NON-USER: CPT | Performed by: INTERNAL MEDICINE

## 2023-12-22 PROCEDURE — 2500000004 HC RX 250 GENERAL PHARMACY W/ HCPCS (ALT 636 FOR OP/ED): Performed by: STUDENT IN AN ORGANIZED HEALTH CARE EDUCATION/TRAINING PROGRAM

## 2023-12-22 PROCEDURE — 80053 COMPREHEN METABOLIC PANEL: CPT

## 2023-12-22 RX ORDER — HEPARIN 100 UNIT/ML
SYRINGE INTRAVENOUS
Status: DISCONTINUED
Start: 2023-12-22 | End: 2023-12-22 | Stop reason: HOSPADM

## 2023-12-22 RX ORDER — SODIUM CHLORIDE 9 MG/ML
1000 INJECTION, SOLUTION INTRAVENOUS ONCE
Status: COMPLETED | OUTPATIENT
Start: 2023-12-22 | End: 2023-12-22

## 2023-12-22 RX ADMIN — SODIUM CHLORIDE 1000 ML/HR: 9 INJECTION, SOLUTION INTRAVENOUS at 13:15

## 2023-12-22 NOTE — PROGRESS NOTES
LOCATION:  [facility].     HEMATOLOGY & ONCOLOGY PROBLEMS:  1.  Localized gastric adenocarcinoma.       a.  Neoadjuvant chemotherapy with FLOT from Oct to Dec 2023.    CHIEF COMPLAINT:    The patient is in the clinic today for follow-up of GE junction adenocarcinoma and for continuation of treatment and management of therapy related effects.                   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  She was having problem with gradually worsening dysphagia with further GI work-up revealing gastric mass with the biopsy confirming  poorly differentiated adenocarcinoma in July 2023.  Prior to that she had a barium esophagogram which was essentially unremarkable.  Patient does have a previous history of GERD and had one time was treated for H. pylori gastritis.  EUS done by Dr. London on 7/17/2023 showed malignancy starting near the GE junction and measuring 2.2 x 1.5 cm towards the cardia along  the lesser curvature / anterior wall with malignant appearing features.  There is loss of interface between the mass and liver along a 7 mm region, concerning for probable  early hepatic involvement.  Enlarged lymph nodes were noted in the diaphragmatic region, and gastrohepatic ligament.  CT chest abdomen pelvis with contrast on 8/9/2023 showed thickening of the distal esophagus extending into the anterior portion of the  gastric cardia.  There is a small amount of liver present directly adjacent to the area of the tumor but there is no altered enhancement pattern in the liver parenchyma.  Slightly inferior to the cardia there  were lymph nodes visible in the adjacent mesentery that were not visible previously measuring up to 6 mm in short axis. PET/CT on 8/31/2023 showed a hypermetabolic focus at the GE junction  with extension into the gastric cardia, no evidence of hypermetabolic regional or distant metastatic disease. Brain MRI negative for metastasis.   She is currently being treated with neoadjuvant chemotherapy with FLOT protocol beginning 2023.     INTERVAL HISTORY:  Lately she is tolerating the treatment well but interval course has been complicated by development of oral thrush.  She has been treated both with fluconazole and the nystatin oral rinse.  Symptoms are better.  She is also trying to improve her nutritional intake as much as possible.  There are plans for her to be scheduled for surgery on January 10.    PAST MEDICAL HISTORY:   1.  GE junction adenocarcinoma as detailed above.   2.  Hypertension   3.  Hyperlipidemia   4.  Anxiety/depression   5.  History of complete hysterectomy, ankle surgery, cholecystectomy and 2  procedures     SOCIAL HISTORY:    and lives in [facility].  Non-smoker and nonalcoholic.  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  Born and raised in [place].     FAMILY HISTORY:    Father  at age 76 from myocardial infarction mother  from stroke at age 78.  She had 8 siblings.  1 brother  from MI another committed suicide.  A sister also  from myocardial infarction.   She had 2 children.  Her son  from AML.  No other specific history of bleeding, clotting or malignant disorder in the family.     REVIEW OF SYSTEMS:  Pertinent finding as per the history above.  All other systems have been reviewed and generally negative and noncontributory.     PHYSICAL EXAMINATION:    Detailed physical examination not done     ALLERGY & MEDICATIONS:  Allergies and latest outpatient medications list were reviewed in the EMR.    LAB RESULTS:  CBC from today shows a white cell count of 13.6 with hemoglobin of 12.6 and platelets of 175K.  Metabolic profile was normal except for alkaline phosphatase of 145.     RADIOLOGY RESULT:   PET/CT Esophageal Initial [Aug 31 2023  1:12PM]  Impression:  1. Hypermetabolic focus localized at gastroesophageal junction with xtension to the gastric cardia compatible with  biopsy-proven gastric adenocarcinoma.  2. No evidence of hypermetabolic regional or distant metastatic disease.  3. Status post cholecystectomy with demonstration of localized pneumobilia.     MRI Brain w/wo Contrast [Aug 29 2023 12:28PM]   Impression:  There is no evidence of mass, infarction or hemorrhage.     PATHOLOGY RESULTS:  Surgical Pathology [Jul 3 2023 5:08PM] ([id number])  Specimens: MASS AT GE JUNCTION, DISTAL TO ESOPHAGUS   Name KIRSTIN GARDINER     Accession #: [id number]   Pathologist: KEI YORK MD   Date of Procedure: 6/19/2023   Submitting Physician: ISRAEL HERNANDEZ MD   Location:  [facility]   Copy To/Referring/Attending:   BOB GRAHAM, DO Other External #     FINAL DIAGNOSIS   A. MASS AT GE JUNCTION, DISTAL TO ESOPHAGUS:   -- POORLY DIFFERENTIATED  ADENOCARCINOMA WITH SIGNET RING CELL FEATURES. SEE   COMMENT.   Addendum Diagnosis   MISMATCH REPAIR PROTEIN EXPRESSION:     Tumor type/ specimen source: GE junction mass, distal  esophagus   Paraffin block number: [id number], A1     Protein: Result:   MLH-1 Intact Nuclear Expression   PMS-2 Intact Nuclear Expression   MSH-2 Intact Nuclear Expression   MSH-6 Intact Nuclear Expression     INTERPRETATION: Neoplasm with normal mismatch repair protein expression.      ASSESSMENT AND PLAN:   1.  GE junction poorly differentiated adenocarcinoma.  Please refer to the details as outlined above.  In summary patient with few month history  of gradually worsening dysphagia to both solid and liquid.  Initial barium esophagogram was normal but EGD showed a GE junction mass in July 2023. Biopsy results are confirmatory of poorly differentiated adenocarcinoma with normal mismatch repair gene  expression. EUS revealed a GE junction lesion with concern regarding direct extension to liver and enlarged pathological lymph nodes.  CT scan showed stomach involvement with local regional lymphadenopathy but no distance organ involvement.  A follow-up  PET scan  results were confirmatory of increased metabolic activity starting at the GE junction with extension into cardia.  There was no finding of any hypermetabolic activity in the liver or any other local or distant metastatic lesions.  Brain MRI was  unremarkable.  After multidisciplinary evaluation of tumor is considered more gastric than GE junction and she has been advised evaluation for neoadjuvant chemotherapy followed by surgical evaluation.  She is currently being treated with neoadjuvant chemotherapy with FLOT recall beginning October 2023.    She will continue with the treatment, at the current dose and schedule -completing planned last fourth cycle of neoadjuvant chemo.  Probable side effect of myelosuppression, cold-induced neuropathy, weakness, fatigue, diarrhea, loss stomatitis etc. were explained to them in detail.  She will continue to follow-up with the surgery team in the next few weeks as already scheduled.     2.  Chemotherapy-induced diarrhea/dehydration/thrush.  Will continue to support her with aggressive supportive care.  She has finished a course of Diflucan and will continue with the nystatin oral rinse.  She will continue with the Imodium and will also be treated with the event IV hydration as needed.    3.  Follow-up.  Follow-up with me in few weeks.  In the meantime she will come to the clinic for continuation of the treatment as per the protocol.  Advised to contact us immediately if there are any new questions or concerns.    This note has been transcribed using Dragon voice recognition system and there is a possibility of unintentional typing misprints.

## 2023-12-26 ENCOUNTER — APPOINTMENT (OUTPATIENT)
Dept: HEMATOLOGY/ONCOLOGY | Facility: CLINIC | Age: 58
End: 2023-12-26
Payer: MEDICAID

## 2023-12-27 ENCOUNTER — APPOINTMENT (OUTPATIENT)
Dept: HEMATOLOGY/ONCOLOGY | Facility: CLINIC | Age: 58
End: 2023-12-27
Payer: MEDICAID

## 2023-12-27 LAB
ATRIAL RATE: 146 BPM
P AXIS: 63 DEGREES
P OFFSET: 200 MS
P ONSET: 157 MS
PR INTERVAL: 130 MS
Q ONSET: 222 MS
QRS COUNT: 24 BEATS
QRS DURATION: 62 MS
QT INTERVAL: 342 MS
QTC CALCULATION(BAZETT): 532 MS
QTC FREDERICIA: 459 MS
R AXIS: -12 DEGREES
T AXIS: 106 DEGREES
T OFFSET: 393 MS
VENTRICULAR RATE: 146 BPM

## 2024-01-01 NOTE — PROGRESS NOTES
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 She has completed preoperative FLOT and is due for restaging scans.  We will arrange these today and assuming the do not show any new evidence of disease we will proceed with surgery.  She has made great progress since we saw her last and I think is ready for surgery next Wednesday.  I discussed laparotomy with total gastrectomy and Juany-en-Y reconstruction with a J-tube, the risks and benefits, the anticipated recovery and postoperative course.  She and her  asked questions and are in agreement with the plan.  We will make sure she has a PAT appointment and plan to see her next week.    I spent 60 minutes in the professional and overall care of this patient.    Zack Chavarria MD  Assistant Professor of Surgery  Division of Surgical Oncology  [phone]  [email or web address]      History Of Present Illness  Shruthi Ramos is a 58 y.o. female referred by Ollie Torres for GE junction cancer.     The patient had an esophagram followed by EGD with diagnosis of a GE junction mass that returned poorly differentiated adenocarcinoma with signet ring cell features. She then underwent CT scan, PET, and EUS that suggest a T4N2 cancer starting at the GE junction and extending to the cardia with no evidence of metastatic disease. She underwent negative diagnostic laparoscopy on 10/11/23 and has completed 4 cycles of preop FLOT on 12/11/23.  She was admitted with diarrhea and vomiting after her second cycle.  She is really struggled with chemo and has lost 35 pounds.  She had very symptomatic thrush and was struggling with oral intake 2 weeks ago. We treated this and had her meet with nutrition to work on preparing for surgery.    Over the past 2 weeks her thrush is mostly better, she is eating more, she is drinking plenty, she has gained 4 pounds and  she is exercising more around the house.  She is dramatically improved compared to her last visit.     All other systems have been reviewed and are negative except as noted in the HPI.     PMH is significant for hypertension, hyperlipidemia, anxiety and depression.  PSH is significant for hysterectomy, ankle surgery, cholecystectomy,  x2.     I personally reviewed all necessary laboratory results, pathology reports, and radiologic images for this patient.     Past Medical History  She has a past medical history of Depression, Hyperlipidemia, Hypertension, Osteoarthritis, PONV (postoperative nausea and vomiting), and Status post cholecystectomy.    Surgical History  She has a past surgical history that includes Ankle fracture surgery and  section, classic.     Social History  She reports that she has never smoked. She has never used smokeless tobacco. She reports that she does not drink alcohol and does not use drugs.    Family History  No family history on file.     Allergies  Morphine, Sutures, and Other     Last Recorded Vitals  There were no vitals taken for this visit.    Physical Exam  General: no acute distress, well-nourished  Eyes: intact EOM, no scleral icterus  ENT: hearing intact, no drainage  Respiratory: symmetric chest rise, no cough  Cardiovascular: intact distal pulses, no pitting edema  Abdominal: soft, tender, nondistended  Musculoskeletal: no deformities, intact strength  Integumentary: warm, dry, no lymphadenopathy  Neuro: no focal deficits, sensation intact  Psych: normal mood and affect       Relevant Results  Final Cytological Interpretation      A.  PERITONEAL WASH - PERITONEAL FLUID FOR CYTOLOGY  --  No malignant cells identified.  --  Abundant mixed inflammation present.

## 2024-01-01 NOTE — H&P (VIEW-ONLY)
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 She has completed preoperative FLOT and is due for restaging scans.  We will arrange these today and assuming the do not show any new evidence of disease we will proceed with surgery.  She has made great progress since we saw her last and I think is ready for surgery next Wednesday.  I discussed laparotomy with total gastrectomy and Juany-en-Y reconstruction with a J-tube, the risks and benefits, the anticipated recovery and postoperative course.  She and her  asked questions and are in agreement with the plan.  We will make sure she has a PAT appointment and plan to see her next week.    I spent 60 minutes in the professional and overall care of this patient.    Zack Chavarria MD  Assistant Professor of Surgery  Division of Surgical Oncology  [phone]  [email or web address]      History Of Present Illness  Shruthi Ramos is a 58 y.o. female referred by Ollie Torres for GE junction cancer.     The patient had an esophagram followed by EGD with diagnosis of a GE junction mass that returned poorly differentiated adenocarcinoma with signet ring cell features. She then underwent CT scan, PET, and EUS that suggest a T4N2 cancer starting at the GE junction and extending to the cardia with no evidence of metastatic disease. She underwent negative diagnostic laparoscopy on 10/11/23 and has completed 4 cycles of preop FLOT on 12/11/23.  She was admitted with diarrhea and vomiting after her second cycle.  She is really struggled with chemo and has lost 35 pounds.  She had very symptomatic thrush and was struggling with oral intake 2 weeks ago. We treated this and had her meet with nutrition to work on preparing for surgery.    Over the past 2 weeks her thrush is mostly better, she is eating more, she is drinking plenty, she has gained 4 pounds and  she is exercising more around the house.  She is dramatically improved compared to her last visit.     All other systems have been reviewed and are negative except as noted in the HPI.     PMH is significant for hypertension, hyperlipidemia, anxiety and depression.  PSH is significant for hysterectomy, ankle surgery, cholecystectomy,  x2.     I personally reviewed all necessary laboratory results, pathology reports, and radiologic images for this patient.     Past Medical History  She has a past medical history of Depression, Hyperlipidemia, Hypertension, Osteoarthritis, PONV (postoperative nausea and vomiting), and Status post cholecystectomy.    Surgical History  She has a past surgical history that includes Ankle fracture surgery and  section, classic.     Social History  She reports that she has never smoked. She has never used smokeless tobacco. She reports that she does not drink alcohol and does not use drugs.    Family History  No family history on file.     Allergies  Morphine, Sutures, and Other     Last Recorded Vitals  There were no vitals taken for this visit.    Physical Exam  General: no acute distress, well-nourished  Eyes: intact EOM, no scleral icterus  ENT: hearing intact, no drainage  Respiratory: symmetric chest rise, no cough  Cardiovascular: intact distal pulses, no pitting edema  Abdominal: soft, tender, nondistended  Musculoskeletal: no deformities, intact strength  Integumentary: warm, dry, no lymphadenopathy  Neuro: no focal deficits, sensation intact  Psych: normal mood and affect       Relevant Results  Final Cytological Interpretation      A.  PERITONEAL WASH - PERITONEAL FLUID FOR CYTOLOGY  --  No malignant cells identified.  --  Abundant mixed inflammation present.

## 2024-01-02 ENCOUNTER — OFFICE VISIT (OUTPATIENT)
Dept: SURGICAL ONCOLOGY | Facility: CLINIC | Age: 59
End: 2024-01-02
Payer: MEDICAID

## 2024-01-02 DIAGNOSIS — C16.0 GE JUNCTION CARCINOMA (MULTI): Primary | ICD-10-CM

## 2024-01-02 PROCEDURE — 1036F TOBACCO NON-USER: CPT | Performed by: STUDENT IN AN ORGANIZED HEALTH CARE EDUCATION/TRAINING PROGRAM

## 2024-01-02 PROCEDURE — 99214 OFFICE O/P EST MOD 30 MIN: CPT | Mod: 57 | Performed by: STUDENT IN AN ORGANIZED HEALTH CARE EDUCATION/TRAINING PROGRAM

## 2024-01-02 PROCEDURE — 99214 OFFICE O/P EST MOD 30 MIN: CPT | Performed by: STUDENT IN AN ORGANIZED HEALTH CARE EDUCATION/TRAINING PROGRAM

## 2024-01-02 NOTE — LETTER
January 2, 2024     Ollie Torres MD  [address] [facility] [address]    Patient: Shruthi Ramos   YOB: 1965   Date of Visit: 1/2/2024       Dear Dr. Ollie Torres MD:    Thank you for referring Shruthi Ramos to me for evaluation. Below are my notes for this consultation.  If you have questions, please do not hesitate to call me. I look forward to following your patient along with you.       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 She has completed preoperative FLOT and is due for restaging scans.  We will arrange these today and assuming the do not show any new evidence of disease we will proceed with surgery.  She has made great progress since we saw her last and I think is ready for surgery next Wednesday.  I discussed laparotomy with total gastrectomy and Juany-en-Y reconstruction with a J-tube, the risks and benefits, the anticipated recovery and postoperative course.  She and her  asked questions and are in agreement with the plan.  We will make sure she has a PAT appointment and plan to see her next week.    I spent 60 minutes in the professional and overall care of this patient.    Zack Chavarria MD  Assistant Professor of Surgery  Division of Surgical Oncology  [phone]  [email or web address]      History Of Present Illness  Shruthi Ramos is a 58 y.o. female referred by Ollie Torres for GE junction cancer.     The patient had an esophagram followed by EGD with diagnosis of a GE junction mass that returned poorly differentiated adenocarcinoma with signet ring cell features. She then underwent CT scan, PET, and EUS that suggest a T4N2 cancer starting at the GE junction and extending to the cardia  with no evidence of metastatic disease. She underwent negative diagnostic laparoscopy on 10/11/23 and has completed 4 cycles of preop FLOT on 23.  She was admitted with diarrhea and vomiting after her second cycle.  She is really struggled with chemo and has lost 35 pounds.  She had very symptomatic thrush and was struggling with oral intake 2 weeks ago. We treated this and had her meet with nutrition to work on preparing for surgery.    Over the past 2 weeks her thrush is mostly better, she is eating more, she is drinking plenty, she has gained 4 pounds and she is exercising more around the house.  She is dramatically improved compared to her last visit.     All other systems have been reviewed and are negative except as noted in the HPI.     PMH is significant for hypertension, hyperlipidemia, anxiety and depression.  PSH is significant for hysterectomy, ankle surgery, cholecystectomy,  x2.     I personally reviewed all necessary laboratory results, pathology reports, and radiologic images for this patient.     Past Medical History  She has a past medical history of Depression, Hyperlipidemia, Hypertension, Osteoarthritis, PONV (postoperative nausea and vomiting), and Status post cholecystectomy.    Surgical History  She has a past surgical history that includes Ankle fracture surgery and  section, classic.     Social History  She reports that she has never smoked. She has never used smokeless tobacco. She reports that she does not drink alcohol and does not use drugs.    Family History  No family history on file.     Allergies  Morphine, Sutures, and Other     Last Recorded Vitals  There were no vitals taken for this visit.    Physical Exam  General: no acute distress, well-nourished  Eyes: intact EOM, no scleral icterus  ENT: hearing intact, no drainage  Respiratory: symmetric chest rise, no cough  Cardiovascular: intact distal pulses, no pitting edema  Abdominal: soft, tender,  nondistended  Musculoskeletal: no deformities, intact strength  Integumentary: warm, dry, no lymphadenopathy  Neuro: no focal deficits, sensation intact  Psych: normal mood and affect       Relevant Results  Final Cytological Interpretation      A.  PERITONEAL WASH - PERITONEAL FLUID FOR CYTOLOGY  --  No malignant cells identified.  --  Abundant mixed inflammation present.

## 2024-01-04 ENCOUNTER — APPOINTMENT (OUTPATIENT)
Dept: PREADMISSION TESTING | Facility: HOSPITAL | Age: 59
End: 2024-01-04
Payer: MEDICAID

## 2024-01-04 ENCOUNTER — NUTRITION (OUTPATIENT)
Dept: RADIATION ONCOLOGY | Facility: CLINIC | Age: 59
End: 2024-01-04
Payer: MEDICAID

## 2024-01-04 NOTE — PROGRESS NOTES
NUTRITION COMMUNICATION NOTE    Shruthi Ramos     REASON FOR COMMUNICATION: Visit Type: Clinical Nutrition, Oncology, Telephone Visit:  A telephone visit (audio only) between the patient (at the distant site) and the provider (at the originating site) was utilized to provide this telehealth service.    Verbal Consent for Encounter: Verbal consent was requested and obtained from patient on this date for a telehealth visit.        RD reached out to patient via telephone to see if she would like us to get a DME on board prior to her surgery. She will be in the hospital ten days following surgery. We discussed our plan and decided that I will speak with the RD who will be taking care of her downtown to come up with a plan for nutrition at discharge. She is agreeable. RD messaged Cecilia Lawton, outpatient RD at [facility] to get more information on who may be handling her care. RD will continue to assist as able.

## 2024-01-05 ENCOUNTER — HOSPITAL ENCOUNTER (OUTPATIENT)
Dept: RADIOLOGY | Facility: HOSPITAL | Age: 59
Discharge: HOME | End: 2024-01-05
Payer: MEDICAID

## 2024-01-05 ENCOUNTER — INFUSION (OUTPATIENT)
Dept: HEMATOLOGY/ONCOLOGY | Facility: CLINIC | Age: 59
End: 2024-01-05
Payer: MEDICAID

## 2024-01-05 ENCOUNTER — PRE-ADMISSION TESTING (OUTPATIENT)
Dept: PREADMISSION TESTING | Facility: HOSPITAL | Age: 59
End: 2024-01-05
Payer: MEDICAID

## 2024-01-05 ENCOUNTER — TELEPHONE (OUTPATIENT)
Dept: SURGICAL ONCOLOGY | Facility: HOSPITAL | Age: 59
End: 2024-01-05

## 2024-01-05 VITALS
TEMPERATURE: 98.9 F | HEART RATE: 75 BPM | SYSTOLIC BLOOD PRESSURE: 114 MMHG | WEIGHT: 220.5 LBS | HEIGHT: 61 IN | BODY MASS INDEX: 41.63 KG/M2 | OXYGEN SATURATION: 99 % | DIASTOLIC BLOOD PRESSURE: 83 MMHG

## 2024-01-05 DIAGNOSIS — Z01.818 PREOPERATIVE EXAMINATION: Primary | ICD-10-CM

## 2024-01-05 DIAGNOSIS — C16.0 GE JUNCTION CARCINOMA (MULTI): ICD-10-CM

## 2024-01-05 LAB
ABO GROUP (TYPE) IN BLOOD: NORMAL
ANION GAP SERPL CALC-SCNC: 15 MMOL/L (ref 10–20)
ANTIBODY SCREEN: NORMAL
APTT PPP: 29 SECONDS (ref 27–38)
BUN SERPL-MCNC: 16 MG/DL (ref 6–23)
CALCIUM SERPL-MCNC: 9.3 MG/DL (ref 8.6–10.6)
CHLORIDE SERPL-SCNC: 106 MMOL/L (ref 98–107)
CO2 SERPL-SCNC: 27 MMOL/L (ref 21–32)
CREAT SERPL-MCNC: 0.86 MG/DL (ref 0.5–1.05)
ERYTHROCYTE [DISTWIDTH] IN BLOOD BY AUTOMATED COUNT: 20.2 % (ref 11.5–14.5)
GFR SERPL CREATININE-BSD FRML MDRD: 78 ML/MIN/1.73M*2
GLUCOSE SERPL-MCNC: 104 MG/DL (ref 74–99)
HCT VFR BLD AUTO: 43.7 % (ref 36–46)
HGB BLD-MCNC: 13.4 G/DL (ref 12–16)
INR PPP: 0.9 (ref 0.9–1.1)
MCH RBC QN AUTO: 29.2 PG (ref 26–34)
MCHC RBC AUTO-ENTMCNC: 30.7 G/DL (ref 32–36)
MCV RBC AUTO: 95 FL (ref 80–100)
NRBC BLD-RTO: 0 /100 WBCS (ref 0–0)
PLATELET # BLD AUTO: 226 X10*3/UL (ref 150–450)
POTASSIUM SERPL-SCNC: 5.2 MMOL/L (ref 3.5–5.3)
PROTHROMBIN TIME: 10.4 SECONDS (ref 9.8–12.8)
RBC # BLD AUTO: 4.59 X10*6/UL (ref 4–5.2)
RH FACTOR (ANTIGEN D): NORMAL
SODIUM SERPL-SCNC: 143 MMOL/L (ref 136–145)
WBC # BLD AUTO: 11.7 X10*3/UL (ref 4.4–11.3)

## 2024-01-05 PROCEDURE — 96523 IRRIG DRUG DELIVERY DEVICE: CPT

## 2024-01-05 PROCEDURE — 85027 COMPLETE CBC AUTOMATED: CPT

## 2024-01-05 PROCEDURE — 99205 OFFICE O/P NEW HI 60 MIN: CPT | Performed by: NURSE PRACTITIONER

## 2024-01-05 PROCEDURE — 80048 BASIC METABOLIC PNL TOTAL CA: CPT

## 2024-01-05 PROCEDURE — 86901 BLOOD TYPING SEROLOGIC RH(D): CPT

## 2024-01-05 PROCEDURE — 2550000001 HC RX 255 CONTRASTS: Performed by: STUDENT IN AN ORGANIZED HEALTH CARE EDUCATION/TRAINING PROGRAM

## 2024-01-05 PROCEDURE — 71260 CT THORAX DX C+: CPT | Performed by: RADIOLOGY

## 2024-01-05 PROCEDURE — 74177 CT ABD & PELVIS W/CONTRAST: CPT

## 2024-01-05 PROCEDURE — 74177 CT ABD & PELVIS W/CONTRAST: CPT | Performed by: RADIOLOGY

## 2024-01-05 PROCEDURE — 87081 CULTURE SCREEN ONLY: CPT

## 2024-01-05 PROCEDURE — 85610 PROTHROMBIN TIME: CPT

## 2024-01-05 RX ORDER — CHLORHEXIDINE GLUCONATE 40 MG/ML
SOLUTION TOPICAL 2 TIMES DAILY
Qty: 473 ML | Refills: 0 | Status: SHIPPED | OUTPATIENT
Start: 2024-01-05 | End: 2024-01-24 | Stop reason: HOSPADM

## 2024-01-05 RX ORDER — CHLORHEXIDINE GLUCONATE ORAL RINSE 1.2 MG/ML
SOLUTION DENTAL
Qty: 473 ML | Refills: 0 | Status: SHIPPED | OUTPATIENT
Start: 2024-01-05 | End: 2024-01-24 | Stop reason: HOSPADM

## 2024-01-05 RX ORDER — HEPARIN SODIUM,PORCINE/PF 10 UNIT/ML
50 SYRINGE (ML) INTRAVENOUS AS NEEDED
Status: CANCELLED | OUTPATIENT
Start: 2024-01-05

## 2024-01-05 RX ORDER — HEPARIN 100 UNIT/ML
500 SYRINGE INTRAVENOUS AS NEEDED
Status: CANCELLED | OUTPATIENT
Start: 2024-01-05

## 2024-01-05 RX ADMIN — IOHEXOL 75 ML: 350 INJECTION, SOLUTION INTRAVENOUS at 14:34

## 2024-01-05 ASSESSMENT — ENCOUNTER SYMPTOMS
CONSTITUTIONAL NEGATIVE: 1
NAUSEA: 1
MUSCULOSKELETAL NEGATIVE: 1
CARDIOVASCULAR NEGATIVE: 1
ENDOCRINE NEGATIVE: 1
RESPIRATORY NEGATIVE: 1
EYES NEGATIVE: 1
NECK NEGATIVE: 1
NEUROLOGICAL NEGATIVE: 1

## 2024-01-05 ASSESSMENT — DUKE ACTIVITY SCORE INDEX (DASI)
CAN YOU TAKE CARE OF YOURSELF (EAT, DRESS, BATHE, OR USE TOILET): YES
CAN YOU PARTICIPATE IN MODERATE RECREATIONAL ACTIVITIES LIKE GOLF, BOWLING, DANCING, DOUBLES TENNIS OR THROWING A BASEBALL OR FOOTBALL: NO
CAN YOU DO HEAVY WORK AROUND THE HOUSE LIKE SCRUBBING FLOORS OR LIFTING AND MOVING HEAVY FURNITURE: NO
TOTAL_SCORE: 18.7
CAN YOU DO LIGHT WORK AROUND THE HOUSE LIKE DUSTING OR WASHING DISHES: YES
CAN YOU CLIMB A FLIGHT OF STAIRS OR WALK UP A HILL: NO
DASI METS SCORE: 5
CAN YOU DO YARD WORK LIKE RAKING LEAVES, WEEDING OR PUSHING A MOWER: NO
CAN YOU RUN A SHORT DISTANCE: NO
CAN YOU WALK INDOORS, SUCH AS AROUND YOUR HOUSE: YES
CAN YOU PARTICIPATE IN STRENOUS SPORTS LIKE SWIMMING, SINGLES TENNIS, FOOTBALL, BASKETBALL, OR SKIING: NO
CAN YOU DO MODERATE WORK AROUND THE HOUSE LIKE VACUUMING, SWEEPING FLOORS OR CARRYING GROCERIES: YES
CAN YOU WALK A BLOCK OR TWO ON LEVEL GROUND: YES
CAN YOU HAVE SEXUAL RELATIONS: YES

## 2024-01-05 ASSESSMENT — LIFESTYLE VARIABLES: SMOKING_STATUS: NONSMOKER

## 2024-01-05 NOTE — CPM/PAT H&P
CPM/PAT Evaluation       Name: Shruthi Ramos (Shruthi Ramos)  /Age: 1965/58 y.o.     Visit Type:   In-Person       Chief Complaint: GE junction carcinoma scheduled for surgery    HPI: Marcellus is a 58-year-old female scheduled for total gastrectomy with feeding tube placement on January 10, 2024 for treatment of GE junction carcinoma.  The patient is referred by Dr. Zack Chavarria preoperative evaluation of osteoarthritis, depression, GE junction carcinoma, fibromyalgia, hypertension, hyperlipidemia, restless leg syndrome, LORENA noncompliant with CPAP, postop nausea and vomiting.    Past Medical History:   Diagnosis Date    Arthritis     Depression     Esophageal cancer (CMS/HCC)     GE junction carcinoma    Fibromyalgia, primary     Hyperlipidemia     Hypertension     Osteoarthritis     PONV (postoperative nausea and vomiting)     Restless leg syndrome     Sleep apnea        Past Surgical History:   Procedure Laterality Date    ANKLE FRACTURE SURGERY       SECTION, LOW TRANSVERSE      x2    CHOLECYSTECTOMY      COLONOSCOPY      ESOPHAGOGASTRODUODENOSCOPY      HYSTERECTOMY      OTHER SURGICAL HISTORY      Mediport placement       Patient  has no history on file for sexual activity.    Family History   Problem Relation Name Age of Onset    Diabetes Mother      Hypertension Mother      Heart disease Father      Diabetes Father      Heart attack Father      Heart attack Sister      Breast cancer Sister      Heart attack Brother         Allergies   Allergen Reactions    Morphine Nausea/vomiting     Constant vomiting until its out of system    Sutures Other     Dissolving sutures do not dissolve    Sutures do not dissolve and becomes infected    Other Rash     Pt endorses reaction to flu vaccine       Prior to Admission medications    Medication Sig Start Date End Date Taking? Authorizing Provider   acetaminophen (Tylenol) 325 mg tablet Take 2 tablets (650 mg) by mouth every 4 hours if needed for fever  (temp greater than 38.0 C) (greater than or equal to 38 C). 11/12/23   Gaudencio Burks DO   atorvastatin (Lipitor) 20 mg tablet Take 1 tablet (20 mg) by mouth once daily at bedtime.    Historical Provider, MD   chlorhexidine (Hibiclens) 4 % external liquid Apply topically 2 times a day for 5 days. 1/5/24 1/10/24  Samantha A Meeson, APRN-CNP   chlorhexidine (Peridex) 0.12 % solution Swish and spit 15 mL night before surgery and morning of surgery 1/5/24   Samantha A Meeson, APRN-CNP   cholecalciferol (Vitamin D3) 25 MCG (1000 UT) tablet Take 1 tablet (1,000 Units) by mouth once daily in the morning. 3/30/22   Historical Provider, MD   cyanocobalamin (Vitamin B-12) 1,000 mcg tablet Take 1 tablet (1,000 mcg) by mouth once daily. 3/30/22   Historical Provider, MD   DULoxetine (Cymbalta) 60 mg DR capsule Take 1 capsule (60 mg) by mouth once daily in the morning. Take before meals. 9/2/23   Historical Provider, MD   estradiol (Estrace) 0.01 % (0.1 mg/gram) vaginal cream Insert 0.75 Applicatorfuls (3 g) into the vagina every 3 days. 9/19/22   Historical Provider, MD   fluconazole (Diflucan) 200 mg tablet Take 1 tablet (200 mg) by mouth once daily for 14 days. 12/18/23 1/1/24  Ollie Torres MD   loperamide (Imodium) 2 mg capsule Take 2 capsules (4 mg) by mouth 4 times a day as needed for diarrhea. 11/12/23   Gaudencio Burks DO   metoprolol succinate XL (Toprol-XL) 50 mg 24 hr tablet Take 1 tablet (50 mg) by mouth once daily in the morning. Take before meals.    Historical Provider, MD   MULTIVITAMIN WITH IRON ORAL Take 1 tablet by mouth once daily.    Historical Provider, MD   nystatin (Mycostatin) 100,000 unit/mL suspension Take 5 mL (500,000 Units) by mouth 4 times a day. Swish in mouth and spit out. 12/19/23 2/17/24  Zack Chavarria MD   ondansetron ODT (Zofran-ODT) 4 mg disintegrating tablet Take 2 tablets (8 mg) by mouth every 8 hours if needed for nausea. 6/26/23   Historical Provider, MD   pantoprazole  (ProtoNix) 40 mg EC tablet Take 1 tablet (40 mg) by mouth once daily. Do not crush, chew, or split.  Patient not taking: Reported on 1/5/2024 11/12/23   Gaudencio Burks DO   scopolamine (Transderm-Scop) 1 mg over 3 days patch 3 day Place 1 patch over 72 hours on the skin every 3rd day. Do not start before November 15, 2023. 11/15/23   Gaudencio Burks DO   spironolactone (Aldactone) 25 mg tablet Take 1 tablet (25 mg) by mouth once daily in the morning. Take before meals.    Historical Provider, MD   traZODone (Desyrel) 50 mg tablet Take 1 tablet (50 mg) by mouth once daily at bedtime. 5/3/22   Historical Provider, MD   white petrolatum (Aquaphor) 41 % ointment ointment Apply 1 Application topically every 1 hour if needed (Dry Skin/Itching). 11/12/23   Gaudencio Burks DO   Acidophilus capsule Take 1 capsule by mouth once daily. 11/13/23 1/4/24  Historical Provider, MD   mirtazapine (Remeron) 15 mg tablet Take 1 tablet at bedtime  Patient not taking: Reported on 12/11/2023 12/6/23 1/5/24  Ollie Torres MD        PAT ROS:   Constitutional:   neg    Neuro/Psych:   neg    Eyes:   neg     use of corrective lenses  Ears:   neg    Nose:   neg    Mouth:   neg    Throat:   neg    Neck:   neg    Cardio:   neg    Respiratory:   neg    Endocrine:   neg    GI:    nausea  :   neg    Musculoskeletal:   neg    Hematologic:   neg    Skin:  neg        Physical Exam  Vitals reviewed.   Constitutional:       Appearance: Normal appearance.   HENT:      Head: Normocephalic.      Mouth/Throat:      Mouth: Mucous membranes are dry.   Eyes:      Conjunctiva/sclera: Conjunctivae normal.   Neck:      Vascular: No carotid bruit.   Cardiovascular:      Rate and Rhythm: Normal rate and regular rhythm.      Pulses: Normal pulses.      Heart sounds: Normal heart sounds.   Pulmonary:      Effort: Pulmonary effort is normal.      Breath sounds: Normal breath sounds.   Abdominal:      Palpations: Abdomen is soft.      Tenderness: There is no  abdominal tenderness.   Musculoskeletal:         General: Normal range of motion.      Cervical back: Normal range of motion.      Right lower leg: No edema.      Left lower leg: No edema.   Lymphadenopathy:      Cervical: No cervical adenopathy.   Skin:     General: Skin is warm and dry.      Capillary Refill: Capillary refill takes less than 2 seconds.   Neurological:      General: No focal deficit present.      Mental Status: She is alert and oriented to person, place, and time.   Psychiatric:         Mood and Affect: Mood normal.         Behavior: Behavior normal.         Thought Content: Thought content normal.         Judgment: Judgment normal.          PAT AIRWAY:   Airway:     Mallampati::  II    Neck ROM::  Full   upper dentures and lower dentures      Visit Vitals  /83   Pulse 75   Temp 37.2 °C (98.9 °F)       DASI Risk Score      Flowsheet Row Most Recent Value   DASI SCORE 18.7   METS Score (Will be calculated only when all the questions are answered) 5          Caprini DVT Assessment      Flowsheet Row Most Recent Value   DVT Score 14   Current Status Major surgery planned, lasting over 3 hours, Central venous access, Present cancer or chemotherapy   History Prior major surgery   Age 40-59 years   BMI 41-50 (Morbid obesity)          Modified Frailty Index      Flowsheet Row Most Recent Value   Modified Frailty Index Calculator .0909          CHADS2 Stroke Risk  Current as of 6 minutes ago        N/A 3 - 100%: High Risk   2 - 3%: Medium Risk   0 - 2%: Low Risk     Last Change: N/A          This score determines the patient's risk of having a stroke if the patient has atrial fibrillation.        This score is not applicable to this patient. Components are not calculated.          Revised Cardiac Risk Index      Flowsheet Row Most Recent Value   Revised Cardiac Risk Calculator 1          Apfel Simplified Score      Flowsheet Row Most Recent Value   Apfel Simplified Score Calculator 4          Risk  Analysis Index Results This Encounter    No data found in the last 1 encounters.       Stop Bang Score      Flowsheet Row Most Recent Value   Do you snore loudly? 0   Do you often feel tired or fatigued after your sleep? 0   Has anyone ever observed you stop breathing in your sleep? 0   Do you have or are you being treated for high blood pressure? 1   Recent BMI (Calculated) 40.6   Is BMI greater than 35 kg/m2? 1=Yes   Age older than 50 years old? 1=Yes   Is your neck circumference greater than 17 inches (Male) or 16 inches (Female)? 0   Gender - Male 0=No   STOP-BANG Total Score 3            Assessment and Plan:   Neuro:  depression related to cancer diagnosis and treatment managed with cymbalta. Fibromyalgia managed with PRN pain relievers. restless leg syndrome not currently on treatment.    No neurologic diagnoses, however, the patient is at an increased risk for post operative delirium secondary to depression and type and duration of surgery.  Preoperative brain exercise educational handout provided to patient.    The patient is at an increased risk for perioperative stroke secondary to HTN, HLD, female sex, general anesthesia and op time >2.5 hours.    HEENT/Airway:  No diagnosis or significant findings on chart review or clinical presentation and evaluation.     Cardiovascular:  HTN and HLD managed with diet and medications. EKG 12/19/23 sinus tachycardia  with no acute findings. Onco-echo 08/24/23 EF 60-65% with no significant valvular or wall motion abnormalities. Follows with Cardiology at [place], see below.  No additional preoperative testing is currently indicated.    METS are 5    RCRI  1 which is 6% 30 day risk of MACE (risk for cardiac death, nonfatal myocardial infarction, and nonfactal cardiac arrest    WALI score which indicates a 1% risk of intraoperative or 30-day postoperative MACE      Pulmonary:  LORENA noncompliant with CPAP.  Preoperative deep breathing educational handout provided to  patient.    ARISCAT: 41  points which is an intermediate  risk of in-hospital post-op pulmonary complications     PRODIGY:  5  points which is a low risk of post op opioid induced respiratory depression episodes    STOP BANG:   3  points which is an intermediate  risk for moderate to severe LORENA    Renal: No diagnosis or significant findings on chart review or clinical presentation and evaluation, however, the patient is at increased risk of perioperative renal complications secondary to age>56, HTN, intraperitoneal surgery. Preventative measures include preoperative hydration and good BP control.    Endocrine:  No diagnosis or significant findings on chart review or clinical presentation and evaluation.     Hematologic:   chemo induced anemia and thrombocytopenia improved and WNL after completing last cycle of chemo.  Preoperative DVT educational handout provided to patient.    Caprini Score:  14  points which is a highest risk of perioperative VTE    Gastrointestinal:   GE junction carcinoma scheduled for surgery.  Known postop nausea and vomiting- plans to wear her scopolamine patch in on day of surgery.  She has been using this to manage her disease related nausea.  Will need IV antiemetics.     EAT-10 score of 9 - self-perceived oropharyngeal dysphagia scale (0-40)     Apfel: 4 points  79% risk for post operative N/V    Infectious disease:  No diagnosis or significant findings on chart review or clinical presentation and evaluation.      Musculoskeletal:  OA managed with PRN pain relievers.            Labs ordered  Results for orders placed or performed in visit on 01/05/24 (from the past 96 hour(s))   Basic Metabolic Panel   Result Value Ref Range    Glucose 104 (H) 74 - 99 mg/dL    Sodium 143 136 - 145 mmol/L    Potassium 5.2 3.5 - 5.3 mmol/L    Chloride 106 98 - 107 mmol/L    Bicarbonate 27 21 - 32 mmol/L    Anion Gap 15 10 - 20 mmol/L    Urea Nitrogen 16 6 - 23 mg/dL    Creatinine 0.86 0.50 - 1.05 mg/dL     eGFR 78 >60 mL/min/1.73m*2    Calcium 9.3 8.6 - 10.6 mg/dL   Coagulation Screen   Result Value Ref Range    Protime 10.4 9.8 - 12.8 seconds    INR 0.9 0.9 - 1.1    aPTT 29 27 - 38 seconds   Type And Screen   Result Value Ref Range    ABO TYPE O     Rh TYPE NEG     ANTIBODY SCREEN NEG    Staphylococcus aureus/MRSA colonization, Culture    Specimen: Nares/Axilla/Groin; Swab   Result Value Ref Range    Staph/MRSA Screen Culture No Staphylococcus aureus isolated    CBC   Result Value Ref Range    WBC 11.7 (H) 4.4 - 11.3 x10*3/uL    nRBC 0.0 0.0 - 0.0 /100 WBCs    RBC 4.59 4.00 - 5.20 x10*6/uL    Hemoglobin 13.4 12.0 - 16.0 g/dL    Hematocrit 43.7 36.0 - 46.0 %    MCV 95 80 - 100 fL    MCH 29.2 26.0 - 34.0 pg    MCHC 30.7 (L) 32.0 - 36.0 g/dL    RDW 20.2 (H) 11.5 - 14.5 %    Platelets 226 150 - 450 x10*3/uL          [facility]  Last office visit 06/12/23: Stable disease with addition of spironolactone.  Patient scheduled for follow-up in the new year.  No additional testing ordered.      07/29/2022 Lexiscan stress test impression: 1.  EKG portion of Lexiscan stress test is negative for inducible ischemia.  2.  Results of nuclear images will be reported separately.  3.  Dr. Augdelo was available through telephone/telehealth during stress test.    10/31/2022 Cardiac catheterization [facility]: No significant coronary artery disease is identified.  The estimated ejection fraction is 55 to 60%.  Left ventricular systolic function is normal.    07/29/2022 transthoracic echocardiogram: 1.  Left ventricle: The cavity size is normal.  Wall thickness is normal.  Systolic function is normal.  The estimated ejection fraction is 55 to 60%.  Wall motion is normal: There are no regional wall motion abnormalities.  Normal diastolic function.  2.  Right ventricle: The RV systolic pressure by Doppler is 22 mmHg.  3.  Right atrium: The estimated right atrial pressure is 3 mmHg.

## 2024-01-05 NOTE — PREPROCEDURE INSTRUCTIONS
NPO Instructions:    Do not eat any food after midnight the night before your surgery/procedure.  You may have 10 ounces of clear liquids until TWO hours before arrival for surgery/procedure. This includes water, black tea/coffee, (no milk or cream) apple juice and electrolyte drinks (Gatorade).  You may chew gum up to TWO hours before your surgery/procedure.    Additional Instructions:     The Day before Surgery:  Review your medication instructions, stop indicated medications  You will be contacted in the evening regarding the time of your arrival to facility and surgery time    Day of Surgery:  Review your medication instructions, take indicated medications  Wear  comfortable loose fitting clothing  Do not use moisturizers, creams, lotions or perfume  All jewelry and valuables should be left at home    Samantha Meeson, MSN, NP-C  Adult-Gerontology Nurse Practitioner II  Department of Anesthesiology and Perioperative Medicine  Main phone [phone]  Direct phone [phone]  Fax [phone]

## 2024-01-05 NOTE — TELEPHONE ENCOUNTER
Patient's port is backed up; unable to access port today; surgery schedule for 01/11. Family would like to know how to proceed; , Jhon, can be reached at [phone]

## 2024-01-05 NOTE — TELEPHONE ENCOUNTER
Did they still get their CT scan done ?   Dr. Chavarria as long as they got their scan this should not change plans for surgery correct? yes

## 2024-01-06 LAB — STAPHYLOCOCCUS SPEC CULT: NORMAL

## 2024-01-08 NOTE — RESULT ENCOUNTER NOTE
I also just sent her an email with bowel prep instructions and explaining why it's needed. Hope to hear back from her as she would have to do bowel prep tomorrow.

## 2024-01-10 ENCOUNTER — ANESTHESIA EVENT (OUTPATIENT)
Dept: OPERATING ROOM | Facility: HOSPITAL | Age: 59
End: 2024-01-10
Payer: MEDICAID

## 2024-01-10 ENCOUNTER — ANESTHESIA (OUTPATIENT)
Dept: OPERATING ROOM | Facility: HOSPITAL | Age: 59
End: 2024-01-10
Payer: MEDICAID

## 2024-01-10 ENCOUNTER — HOSPITAL ENCOUNTER (INPATIENT)
Facility: HOSPITAL | Age: 59
LOS: 14 days | Discharge: HOME | End: 2024-01-24
Attending: STUDENT IN AN ORGANIZED HEALTH CARE EDUCATION/TRAINING PROGRAM | Admitting: STUDENT IN AN ORGANIZED HEALTH CARE EDUCATION/TRAINING PROGRAM
Payer: MEDICAID

## 2024-01-10 DIAGNOSIS — C16.0 GE JUNCTION CARCINOMA (MULTI): Primary | ICD-10-CM

## 2024-01-10 DIAGNOSIS — B99.9 INFECTION: ICD-10-CM

## 2024-01-10 PROBLEM — E66.813 CLASS 3 SEVERE OBESITY DUE TO EXCESS CALORIES IN ADULT: Status: ACTIVE | Noted: 2022-05-13

## 2024-01-10 PROBLEM — N39.0 RECURRENT UTI: Status: RESOLVED | Noted: 2023-10-12 | Resolved: 2024-01-10

## 2024-01-10 LAB
ALBUMIN SERPL BCP-MCNC: 3.9 G/DL (ref 3.4–5)
ALP SERPL-CCNC: 77 U/L (ref 33–110)
ALT SERPL W P-5'-P-CCNC: 96 U/L (ref 7–45)
ANION GAP BLDA CALCULATED.4IONS-SCNC: 10 MMO/L (ref 10–25)
ANION GAP BLDA CALCULATED.4IONS-SCNC: 12 MMO/L (ref 10–25)
ANION GAP SERPL CALC-SCNC: 13 MMOL/L (ref 10–20)
AST SERPL W P-5'-P-CCNC: 111 U/L (ref 9–39)
BASE EXCESS BLDA CALC-SCNC: -3.2 MMOL/L (ref -2–3)
BASE EXCESS BLDA CALC-SCNC: -5.6 MMOL/L (ref -2–3)
BILIRUB SERPL-MCNC: 0.6 MG/DL (ref 0–1.2)
BODY TEMPERATURE: 37 DEGREES CELSIUS
BODY TEMPERATURE: 37 DEGREES CELSIUS
BUN SERPL-MCNC: 11 MG/DL (ref 6–23)
CA-I BLDA-SCNC: 1.2 MMOL/L (ref 1.1–1.33)
CA-I BLDA-SCNC: 1.22 MMOL/L (ref 1.1–1.33)
CALCIUM SERPL-MCNC: 9 MG/DL (ref 8.6–10.6)
CHLORIDE BLDA-SCNC: 108 MMOL/L (ref 98–107)
CHLORIDE BLDA-SCNC: 108 MMOL/L (ref 98–107)
CHLORIDE SERPL-SCNC: 110 MMOL/L (ref 98–107)
CO2 SERPL-SCNC: 24 MMOL/L (ref 21–32)
CREAT SERPL-MCNC: 0.71 MG/DL (ref 0.5–1.05)
EGFRCR SERPLBLD CKD-EPI 2021: >90 ML/MIN/1.73M*2
ERYTHROCYTE [DISTWIDTH] IN BLOOD BY AUTOMATED COUNT: 19 % (ref 11.5–14.5)
GLUCOSE BLDA-MCNC: 152 MG/DL (ref 74–99)
GLUCOSE BLDA-MCNC: 168 MG/DL (ref 74–99)
GLUCOSE SERPL-MCNC: 168 MG/DL (ref 74–99)
HCO3 BLDA-SCNC: 21.1 MMOL/L (ref 22–26)
HCO3 BLDA-SCNC: 22.7 MMOL/L (ref 22–26)
HCT VFR BLD AUTO: 34.8 % (ref 36–46)
HCT VFR BLD EST: 36 % (ref 36–46)
HCT VFR BLD EST: 37 % (ref 36–46)
HGB BLD-MCNC: 11.5 G/DL (ref 12–16)
HGB BLDA-MCNC: 11.9 G/DL (ref 12–16)
HGB BLDA-MCNC: 12.4 G/DL (ref 12–16)
INHALED O2 CONCENTRATION: 28 %
INHALED O2 CONCENTRATION: 99 %
LACTATE BLDA-SCNC: 1.2 MMOL/L (ref 0.4–2)
LACTATE BLDA-SCNC: 1.3 MMOL/L (ref 0.4–2)
MCH RBC QN AUTO: 30.2 PG (ref 26–34)
MCHC RBC AUTO-ENTMCNC: 33 G/DL (ref 32–36)
MCV RBC AUTO: 91 FL (ref 80–100)
NRBC BLD-RTO: 0 /100 WBCS (ref 0–0)
OXYHGB MFR BLDA: 96.5 % (ref 94–98)
OXYHGB MFR BLDA: 97.4 % (ref 94–98)
PCO2 BLDA: 43 MM HG (ref 38–42)
PCO2 BLDA: 45 MM HG (ref 38–42)
PH BLDA: 7.28 PH (ref 7.38–7.42)
PH BLDA: 7.33 PH (ref 7.38–7.42)
PLATELET # BLD AUTO: 155 X10*3/UL (ref 150–450)
PO2 BLDA: 131 MM HG (ref 85–95)
PO2 BLDA: 170 MM HG (ref 85–95)
POTASSIUM BLDA-SCNC: 3.8 MMOL/L (ref 3.5–5.3)
POTASSIUM BLDA-SCNC: 4.3 MMOL/L (ref 3.5–5.3)
POTASSIUM SERPL-SCNC: 4.1 MMOL/L (ref 3.5–5.3)
PROT SERPL-MCNC: 5.5 G/DL (ref 6.4–8.2)
RBC # BLD AUTO: 3.81 X10*6/UL (ref 4–5.2)
SAO2 % BLDA: 100 % (ref 94–100)
SAO2 % BLDA: 99 % (ref 94–100)
SODIUM BLDA-SCNC: 136 MMOL/L (ref 136–145)
SODIUM BLDA-SCNC: 137 MMOL/L (ref 136–145)
SODIUM SERPL-SCNC: 143 MMOL/L (ref 136–145)
WBC # BLD AUTO: 18.1 X10*3/UL (ref 4.4–11.3)

## 2024-01-10 PROCEDURE — 0DT60ZZ RESECTION OF STOMACH, OPEN APPROACH: ICD-10-PCS | Performed by: STUDENT IN AN ORGANIZED HEALTH CARE EDUCATION/TRAINING PROGRAM

## 2024-01-10 PROCEDURE — 38747 REMOVE ABDOMINAL LYMPH NODES: CPT | Performed by: SURGERY

## 2024-01-10 PROCEDURE — 2500000005 HC RX 250 GENERAL PHARMACY W/O HCPCS: Performed by: STUDENT IN AN ORGANIZED HEALTH CARE EDUCATION/TRAINING PROGRAM

## 2024-01-10 PROCEDURE — 88305 TISSUE EXAM BY PATHOLOGIST: CPT

## 2024-01-10 PROCEDURE — 88331 PATH CONSLTJ SURG 1 BLK 1SPC: CPT | Mod: TC,SUR

## 2024-01-10 PROCEDURE — 84132 ASSAY OF SERUM POTASSIUM: CPT | Performed by: STUDENT IN AN ORGANIZED HEALTH CARE EDUCATION/TRAINING PROGRAM

## 2024-01-10 PROCEDURE — 2500000004 HC RX 250 GENERAL PHARMACY W/ HCPCS (ALT 636 FOR OP/ED): Performed by: STUDENT IN AN ORGANIZED HEALTH CARE EDUCATION/TRAINING PROGRAM

## 2024-01-10 PROCEDURE — 49321 LAPAROSCOPY BIOPSY: CPT | Performed by: STUDENT IN AN ORGANIZED HEALTH CARE EDUCATION/TRAINING PROGRAM

## 2024-01-10 PROCEDURE — 0FB10ZX EXCISION OF RIGHT LOBE LIVER, OPEN APPROACH, DIAGNOSTIC: ICD-10-PCS | Performed by: STUDENT IN AN ORGANIZED HEALTH CARE EDUCATION/TRAINING PROGRAM

## 2024-01-10 PROCEDURE — 43235 EGD DIAGNOSTIC BRUSH WASH: CPT | Performed by: STUDENT IN AN ORGANIZED HEALTH CARE EDUCATION/TRAINING PROGRAM

## 2024-01-10 PROCEDURE — 03HY32Z INSERTION OF MONITORING DEVICE INTO UPPER ARTERY, PERCUTANEOUS APPROACH: ICD-10-PCS | Performed by: STUDENT IN AN ORGANIZED HEALTH CARE EDUCATION/TRAINING PROGRAM

## 2024-01-10 PROCEDURE — 0DHA3UZ INSERTION OF FEEDING DEVICE INTO JEJUNUM, PERCUTANEOUS APPROACH: ICD-10-PCS | Performed by: STUDENT IN AN ORGANIZED HEALTH CARE EDUCATION/TRAINING PROGRAM

## 2024-01-10 PROCEDURE — 47100 WEDGE BIOPSY OF LIVER: CPT | Performed by: SURGERY

## 2024-01-10 PROCEDURE — 76942 ECHO GUIDE FOR BIOPSY: CPT

## 2024-01-10 PROCEDURE — 2780000003 HC OR 278 NO HCPCS: Performed by: STUDENT IN AN ORGANIZED HEALTH CARE EDUCATION/TRAINING PROGRAM

## 2024-01-10 PROCEDURE — 64450 NJX AA&/STRD OTHER PN/BRANCH: CPT

## 2024-01-10 PROCEDURE — 2500000004 HC RX 250 GENERAL PHARMACY W/ HCPCS (ALT 636 FOR OP/ED): Performed by: ANESTHESIOLOGIST ASSISTANT

## 2024-01-10 PROCEDURE — 43621 REMOVAL OF STOMACH: CPT | Performed by: SURGERY

## 2024-01-10 PROCEDURE — 0DJ08ZZ INSPECTION OF UPPER INTESTINAL TRACT, VIA NATURAL OR ARTIFICIAL OPENING ENDOSCOPIC: ICD-10-PCS | Performed by: STUDENT IN AN ORGANIZED HEALTH CARE EDUCATION/TRAINING PROGRAM

## 2024-01-10 PROCEDURE — 85027 COMPLETE CBC AUTOMATED: CPT | Performed by: STUDENT IN AN ORGANIZED HEALTH CARE EDUCATION/TRAINING PROGRAM

## 2024-01-10 PROCEDURE — 7100000002 HC RECOVERY ROOM TIME - EACH INCREMENTAL 1 MINUTE: Performed by: STUDENT IN AN ORGANIZED HEALTH CARE EDUCATION/TRAINING PROGRAM

## 2024-01-10 PROCEDURE — 3700000001 HC GENERAL ANESTHESIA TIME - INITIAL BASE CHARGE: Performed by: STUDENT IN AN ORGANIZED HEALTH CARE EDUCATION/TRAINING PROGRAM

## 2024-01-10 PROCEDURE — 07BD0ZX EXCISION OF AORTIC LYMPHATIC, OPEN APPROACH, DIAGNOSTIC: ICD-10-PCS | Performed by: STUDENT IN AN ORGANIZED HEALTH CARE EDUCATION/TRAINING PROGRAM

## 2024-01-10 PROCEDURE — 47100 WEDGE BIOPSY OF LIVER: CPT | Performed by: STUDENT IN AN ORGANIZED HEALTH CARE EDUCATION/TRAINING PROGRAM

## 2024-01-10 PROCEDURE — 88332 PATH CONSLTJ SURG EA ADD BLK: CPT

## 2024-01-10 PROCEDURE — 1170000001 HC PRIVATE ONCOLOGY ROOM DAILY

## 2024-01-10 PROCEDURE — 88309 TISSUE EXAM BY PATHOLOGIST: CPT

## 2024-01-10 PROCEDURE — 88307 TISSUE EXAM BY PATHOLOGIST: CPT

## 2024-01-10 PROCEDURE — 2720000007 HC OR 272 NO HCPCS: Performed by: STUDENT IN AN ORGANIZED HEALTH CARE EDUCATION/TRAINING PROGRAM

## 2024-01-10 PROCEDURE — 0D160ZA BYPASS STOMACH TO JEJUNUM, OPEN APPROACH: ICD-10-PCS | Performed by: STUDENT IN AN ORGANIZED HEALTH CARE EDUCATION/TRAINING PROGRAM

## 2024-01-10 PROCEDURE — 38747 REMOVE ABDOMINAL LYMPH NODES: CPT | Performed by: STUDENT IN AN ORGANIZED HEALTH CARE EDUCATION/TRAINING PROGRAM

## 2024-01-10 PROCEDURE — 3600000009 HC OR TIME - EACH INCREMENTAL 1 MINUTE - PROCEDURE LEVEL FOUR: Performed by: STUDENT IN AN ORGANIZED HEALTH CARE EDUCATION/TRAINING PROGRAM

## 2024-01-10 PROCEDURE — 2500000005 HC RX 250 GENERAL PHARMACY W/O HCPCS

## 2024-01-10 PROCEDURE — 3600000004 HC OR TIME - INITIAL BASE CHARGE - PROCEDURE LEVEL FOUR: Performed by: STUDENT IN AN ORGANIZED HEALTH CARE EDUCATION/TRAINING PROGRAM

## 2024-01-10 PROCEDURE — 43621 REMOVAL OF STOMACH: CPT | Performed by: STUDENT IN AN ORGANIZED HEALTH CARE EDUCATION/TRAINING PROGRAM

## 2024-01-10 PROCEDURE — 84075 ASSAY ALKALINE PHOSPHATASE: CPT | Performed by: STUDENT IN AN ORGANIZED HEALTH CARE EDUCATION/TRAINING PROGRAM

## 2024-01-10 PROCEDURE — 49321 LAPAROSCOPY BIOPSY: CPT | Performed by: SURGERY

## 2024-01-10 PROCEDURE — 36620 INSERTION CATHETER ARTERY: CPT | Performed by: STUDENT IN AN ORGANIZED HEALTH CARE EDUCATION/TRAINING PROGRAM

## 2024-01-10 PROCEDURE — 3700000002 HC GENERAL ANESTHESIA TIME - EACH INCREMENTAL 1 MINUTE: Performed by: STUDENT IN AN ORGANIZED HEALTH CARE EDUCATION/TRAINING PROGRAM

## 2024-01-10 PROCEDURE — P9045 ALBUMIN (HUMAN), 5%, 250 ML: HCPCS | Mod: JZ | Performed by: ANESTHESIOLOGIST ASSISTANT

## 2024-01-10 PROCEDURE — A4217 STERILE WATER/SALINE, 500 ML: HCPCS | Performed by: STUDENT IN AN ORGANIZED HEALTH CARE EDUCATION/TRAINING PROGRAM

## 2024-01-10 PROCEDURE — 88307 TISSUE EXAM BY PATHOLOGIST: CPT | Mod: TC,SUR | Performed by: STUDENT IN AN ORGANIZED HEALTH CARE EDUCATION/TRAINING PROGRAM

## 2024-01-10 PROCEDURE — A43621 PR REMOVAL STOMACH,ROUX-EN-Y: Performed by: ANESTHESIOLOGIST ASSISTANT

## 2024-01-10 PROCEDURE — A43621 PR REMOVAL STOMACH,ROUX-EN-Y: Performed by: STUDENT IN AN ORGANIZED HEALTH CARE EDUCATION/TRAINING PROGRAM

## 2024-01-10 PROCEDURE — 37799 UNLISTED PX VASCULAR SURGERY: CPT | Performed by: STUDENT IN AN ORGANIZED HEALTH CARE EDUCATION/TRAINING PROGRAM

## 2024-01-10 PROCEDURE — 99223 1ST HOSP IP/OBS HIGH 75: CPT

## 2024-01-10 PROCEDURE — 7100000001 HC RECOVERY ROOM TIME - INITIAL BASE CHARGE: Performed by: STUDENT IN AN ORGANIZED HEALTH CARE EDUCATION/TRAINING PROGRAM

## 2024-01-10 PROCEDURE — 2500000004 HC RX 250 GENERAL PHARMACY W/ HCPCS (ALT 636 FOR OP/ED)

## 2024-01-10 RX ORDER — ACETAMINOPHEN 10 MG/ML
1000 INJECTION, SOLUTION INTRAVENOUS EVERY 6 HOURS SCHEDULED
Status: COMPLETED | OUTPATIENT
Start: 2024-01-11 | End: 2024-01-11

## 2024-01-10 RX ORDER — HYDROMORPHONE HCL/0.9% NACL/PF 15 MG/30ML
PATIENT CONTROLLED ANALGESIA SYRINGE INTRAVENOUS CONTINUOUS
Status: DISCONTINUED | OUTPATIENT
Start: 2024-01-10 | End: 2024-01-15

## 2024-01-10 RX ORDER — METHOCARBAMOL 100 MG/ML
1000 INJECTION, SOLUTION INTRAMUSCULAR; INTRAVENOUS ONCE
Status: DISCONTINUED | OUTPATIENT
Start: 2024-01-10 | End: 2024-01-10 | Stop reason: HOSPADM

## 2024-01-10 RX ORDER — PROPOFOL 10 MG/ML
INJECTION, EMULSION INTRAVENOUS CONTINUOUS PRN
Status: DISCONTINUED | OUTPATIENT
Start: 2024-01-10 | End: 2024-01-10

## 2024-01-10 RX ORDER — ONDANSETRON HYDROCHLORIDE 2 MG/ML
INJECTION, SOLUTION INTRAVENOUS AS NEEDED
Status: DISCONTINUED | OUTPATIENT
Start: 2024-01-10 | End: 2024-01-10

## 2024-01-10 RX ORDER — MIDAZOLAM HYDROCHLORIDE 1 MG/ML
INJECTION INTRAMUSCULAR; INTRAVENOUS AS NEEDED
Status: DISCONTINUED | OUTPATIENT
Start: 2024-01-10 | End: 2024-01-10

## 2024-01-10 RX ORDER — ONDANSETRON HYDROCHLORIDE 2 MG/ML
8 INJECTION, SOLUTION INTRAVENOUS EVERY 6 HOURS PRN
Status: DISCONTINUED | OUTPATIENT
Start: 2024-01-10 | End: 2024-01-24 | Stop reason: HOSPADM

## 2024-01-10 RX ORDER — ACETAMINOPHEN 10 MG/ML
1000 INJECTION, SOLUTION INTRAVENOUS ONCE
Status: COMPLETED | OUTPATIENT
Start: 2024-01-10 | End: 2024-01-10

## 2024-01-10 RX ORDER — LIDOCAINE HYDROCHLORIDE 10 MG/ML
0.1 INJECTION INFILTRATION; PERINEURAL ONCE
Status: DISCONTINUED | OUTPATIENT
Start: 2024-01-10 | End: 2024-01-10 | Stop reason: HOSPADM

## 2024-01-10 RX ORDER — SCOLOPAMINE TRANSDERMAL SYSTEM 1 MG/1
1 PATCH, EXTENDED RELEASE TRANSDERMAL ONCE
Status: DISCONTINUED | OUTPATIENT
Start: 2024-01-10 | End: 2024-01-10

## 2024-01-10 RX ORDER — METOPROLOL TARTRATE 1 MG/ML
5 INJECTION, SOLUTION INTRAVENOUS EVERY 6 HOURS
Status: DISCONTINUED | OUTPATIENT
Start: 2024-01-10 | End: 2024-01-24 | Stop reason: HOSPADM

## 2024-01-10 RX ORDER — ONDANSETRON HYDROCHLORIDE 2 MG/ML
4 INJECTION, SOLUTION INTRAVENOUS ONCE AS NEEDED
Status: COMPLETED | OUTPATIENT
Start: 2024-01-10 | End: 2024-01-10

## 2024-01-10 RX ORDER — HYDROMORPHONE HYDROCHLORIDE 1 MG/ML
0.2 INJECTION, SOLUTION INTRAMUSCULAR; INTRAVENOUS; SUBCUTANEOUS EVERY 5 MIN PRN
Status: DISCONTINUED | OUTPATIENT
Start: 2024-01-10 | End: 2024-01-10 | Stop reason: HOSPADM

## 2024-01-10 RX ORDER — SODIUM CHLORIDE, SODIUM LACTATE, POTASSIUM CHLORIDE, CALCIUM CHLORIDE 600; 310; 30; 20 MG/100ML; MG/100ML; MG/100ML; MG/100ML
INJECTION, SOLUTION INTRAVENOUS CONTINUOUS PRN
Status: DISCONTINUED | OUTPATIENT
Start: 2024-01-10 | End: 2024-01-10

## 2024-01-10 RX ORDER — SODIUM CHLORIDE, SODIUM LACTATE, POTASSIUM CHLORIDE, CALCIUM CHLORIDE 600; 310; 30; 20 MG/100ML; MG/100ML; MG/100ML; MG/100ML
100 INJECTION, SOLUTION INTRAVENOUS CONTINUOUS
Status: DISCONTINUED | OUTPATIENT
Start: 2024-01-10 | End: 2024-01-10 | Stop reason: HOSPADM

## 2024-01-10 RX ORDER — DEXMEDETOMIDINE HYDROCHLORIDE 4 UG/ML
INJECTION, SOLUTION INTRAVENOUS CONTINUOUS PRN
Status: DISCONTINUED | OUTPATIENT
Start: 2024-01-10 | End: 2024-01-10

## 2024-01-10 RX ORDER — PANTOPRAZOLE SODIUM 40 MG/10ML
40 INJECTION, POWDER, LYOPHILIZED, FOR SOLUTION INTRAVENOUS DAILY
Status: DISCONTINUED | OUTPATIENT
Start: 2024-01-10 | End: 2024-01-11

## 2024-01-10 RX ORDER — ACETAMINOPHEN 10 MG/ML
1000 INJECTION, SOLUTION INTRAVENOUS EVERY 6 HOURS SCHEDULED
Status: DISCONTINUED | OUTPATIENT
Start: 2024-01-10 | End: 2024-01-11

## 2024-01-10 RX ORDER — SODIUM CHLORIDE 0.9 G/100ML
IRRIGANT IRRIGATION AS NEEDED
Status: DISCONTINUED | OUTPATIENT
Start: 2024-01-10 | End: 2024-01-10 | Stop reason: HOSPADM

## 2024-01-10 RX ORDER — ONDANSETRON HYDROCHLORIDE 2 MG/ML
4 INJECTION, SOLUTION INTRAVENOUS ONCE AS NEEDED
Status: DISCONTINUED | OUTPATIENT
Start: 2024-01-10 | End: 2024-01-10 | Stop reason: HOSPADM

## 2024-01-10 RX ORDER — ALBUTEROL SULFATE 0.83 MG/ML
2.5 SOLUTION RESPIRATORY (INHALATION) ONCE AS NEEDED
Status: DISCONTINUED | OUTPATIENT
Start: 2024-01-10 | End: 2024-01-10 | Stop reason: HOSPADM

## 2024-01-10 RX ORDER — PROCHLORPERAZINE EDISYLATE 5 MG/ML
5 INJECTION INTRAMUSCULAR; INTRAVENOUS ONCE AS NEEDED
Status: DISCONTINUED | OUTPATIENT
Start: 2024-01-10 | End: 2024-01-10 | Stop reason: HOSPADM

## 2024-01-10 RX ORDER — DEXAMETHASONE SODIUM PHOSPHATE 4 MG/ML
INJECTION, SOLUTION INTRA-ARTICULAR; INTRALESIONAL; INTRAMUSCULAR; INTRAVENOUS; SOFT TISSUE AS NEEDED
Status: DISCONTINUED | OUTPATIENT
Start: 2024-01-10 | End: 2024-01-10

## 2024-01-10 RX ORDER — ROPIVACAINE IN 0.9% SOD CHL/PF 0.2 %
6 PLASTIC BAG, INJECTION (ML) EPIDURAL CONTINUOUS
Status: DISCONTINUED | OUTPATIENT
Start: 2024-01-10 | End: 2024-01-15

## 2024-01-10 RX ORDER — HYDROMORPHONE HYDROCHLORIDE 1 MG/ML
0.5 INJECTION, SOLUTION INTRAMUSCULAR; INTRAVENOUS; SUBCUTANEOUS EVERY 5 MIN PRN
Status: DISCONTINUED | OUTPATIENT
Start: 2024-01-10 | End: 2024-01-10 | Stop reason: HOSPADM

## 2024-01-10 RX ORDER — ROCURONIUM BROMIDE 10 MG/ML
INJECTION, SOLUTION INTRAVENOUS AS NEEDED
Status: DISCONTINUED | OUTPATIENT
Start: 2024-01-10 | End: 2024-01-10

## 2024-01-10 RX ORDER — SODIUM CHLORIDE, SODIUM LACTATE, POTASSIUM CHLORIDE, CALCIUM CHLORIDE 600; 310; 30; 20 MG/100ML; MG/100ML; MG/100ML; MG/100ML
50 INJECTION, SOLUTION INTRAVENOUS CONTINUOUS
Status: DISCONTINUED | OUTPATIENT
Start: 2024-01-10 | End: 2024-01-15

## 2024-01-10 RX ORDER — HYDROMORPHONE HYDROCHLORIDE 1 MG/ML
INJECTION, SOLUTION INTRAMUSCULAR; INTRAVENOUS; SUBCUTANEOUS AS NEEDED
Status: DISCONTINUED | OUTPATIENT
Start: 2024-01-10 | End: 2024-01-10

## 2024-01-10 RX ORDER — PROPOFOL 10 MG/ML
INJECTION, EMULSION INTRAVENOUS AS NEEDED
Status: DISCONTINUED | OUTPATIENT
Start: 2024-01-10 | End: 2024-01-10

## 2024-01-10 RX ORDER — CEFAZOLIN SODIUM 2 G/100ML
2 INJECTION, SOLUTION INTRAVENOUS EVERY 8 HOURS
Status: DISCONTINUED | OUTPATIENT
Start: 2024-01-10 | End: 2024-01-10 | Stop reason: HOSPADM

## 2024-01-10 RX ORDER — NALOXONE HYDROCHLORIDE 0.4 MG/ML
0.2 INJECTION, SOLUTION INTRAMUSCULAR; INTRAVENOUS; SUBCUTANEOUS AS NEEDED
Status: DISCONTINUED | OUTPATIENT
Start: 2024-01-10 | End: 2024-01-22

## 2024-01-10 RX ORDER — FENTANYL CITRATE 50 UG/ML
INJECTION, SOLUTION INTRAMUSCULAR; INTRAVENOUS AS NEEDED
Status: DISCONTINUED | OUTPATIENT
Start: 2024-01-10 | End: 2024-01-10

## 2024-01-10 RX ORDER — LABETALOL HYDROCHLORIDE 5 MG/ML
5 INJECTION, SOLUTION INTRAVENOUS ONCE AS NEEDED
Status: DISCONTINUED | OUTPATIENT
Start: 2024-01-10 | End: 2024-01-10 | Stop reason: HOSPADM

## 2024-01-10 RX ORDER — PHENYLEPHRINE 10 MG/250 ML(40 MCG/ML)IN 0.9 % SOD.CHLORIDE INTRAVENOUS
CONTINUOUS PRN
Status: DISCONTINUED | OUTPATIENT
Start: 2024-01-10 | End: 2024-01-10

## 2024-01-10 RX ORDER — HEPARIN SODIUM 5000 [USP'U]/ML
7500 INJECTION, SOLUTION INTRAVENOUS; SUBCUTANEOUS EVERY 8 HOURS SCHEDULED
Status: DISCONTINUED | OUTPATIENT
Start: 2024-01-10 | End: 2024-01-20

## 2024-01-10 RX ORDER — ALBUMIN HUMAN 50 G/1000ML
SOLUTION INTRAVENOUS AS NEEDED
Status: DISCONTINUED | OUTPATIENT
Start: 2024-01-10 | End: 2024-01-10

## 2024-01-10 RX ORDER — PHENYLEPHRINE HCL IN 0.9% NACL 1 MG/10 ML
SYRINGE (ML) INTRAVENOUS AS NEEDED
Status: DISCONTINUED | OUTPATIENT
Start: 2024-01-10 | End: 2024-01-10

## 2024-01-10 RX ADMIN — SODIUM CHLORIDE, POTASSIUM CHLORIDE, SODIUM LACTATE AND CALCIUM CHLORIDE 100 ML/HR: 600; 310; 30; 20 INJECTION, SOLUTION INTRAVENOUS at 23:08

## 2024-01-10 RX ADMIN — PROPOFOL 150 MG: 10 INJECTION, EMULSION INTRAVENOUS at 10:50

## 2024-01-10 RX ADMIN — MIDAZOLAM HYDROCHLORIDE 2 MG: 1 INJECTION, SOLUTION INTRAMUSCULAR; INTRAVENOUS at 10:50

## 2024-01-10 RX ADMIN — ONDANSETRON 4 MG: 2 INJECTION INTRAMUSCULAR; INTRAVENOUS at 18:32

## 2024-01-10 RX ADMIN — ONDANSETRON 4 MG: 2 INJECTION INTRAMUSCULAR; INTRAVENOUS at 14:58

## 2024-01-10 RX ADMIN — PROPOFOL 50 MG: 10 INJECTION, EMULSION INTRAVENOUS at 11:07

## 2024-01-10 RX ADMIN — ALBUMIN HUMAN 250 ML: 0.05 INJECTION, SOLUTION INTRAVENOUS at 14:05

## 2024-01-10 RX ADMIN — ROCURONIUM BROMIDE 60 MG: 10 INJECTION INTRAVENOUS at 10:51

## 2024-01-10 RX ADMIN — Medication 6 ML/HR: at 11:30

## 2024-01-10 RX ADMIN — HYDROMORPHONE HYDROCHLORIDE 0.5 MG: 1 INJECTION, SOLUTION INTRAMUSCULAR; INTRAVENOUS; SUBCUTANEOUS at 14:29

## 2024-01-10 RX ADMIN — Medication: at 18:48

## 2024-01-10 RX ADMIN — SODIUM CHLORIDE, SODIUM LACTATE, POTASSIUM CHLORIDE, AND CALCIUM CHLORIDE: 600; 310; 30; 20 INJECTION, SOLUTION INTRAVENOUS at 11:13

## 2024-01-10 RX ADMIN — HEPARIN SODIUM 7500 UNITS: 5000 INJECTION INTRAVENOUS; SUBCUTANEOUS at 23:04

## 2024-01-10 RX ADMIN — ALBUMIN HUMAN 250 ML: 0.05 INJECTION, SOLUTION INTRAVENOUS at 12:07

## 2024-01-10 RX ADMIN — ALBUMIN HUMAN 250 ML: 0.05 INJECTION, SOLUTION INTRAVENOUS at 12:11

## 2024-01-10 RX ADMIN — HYDROMORPHONE HYDROCHLORIDE 0.25 MG: 1 INJECTION, SOLUTION INTRAMUSCULAR; INTRAVENOUS; SUBCUTANEOUS at 12:33

## 2024-01-10 RX ADMIN — DEXAMETHASONE SODIUM PHOSPHATE 8 MG: 4 INJECTION, SOLUTION INTRA-ARTICULAR; INTRALESIONAL; INTRAMUSCULAR; INTRAVENOUS; SOFT TISSUE at 14:58

## 2024-01-10 RX ADMIN — PROPOFOL 125 MCG/KG/MIN: 10 INJECTION, EMULSION INTRAVENOUS at 11:08

## 2024-01-10 RX ADMIN — ROCURONIUM BROMIDE 10 MG: 10 INJECTION INTRAVENOUS at 14:08

## 2024-01-10 RX ADMIN — ROCURONIUM BROMIDE 20 MG: 10 INJECTION INTRAVENOUS at 11:43

## 2024-01-10 RX ADMIN — HYDROMORPHONE HYDROCHLORIDE 0.25 MG: 1 INJECTION, SOLUTION INTRAMUSCULAR; INTRAVENOUS; SUBCUTANEOUS at 12:57

## 2024-01-10 RX ADMIN — Medication 160 MCG: at 15:45

## 2024-01-10 RX ADMIN — CEFAZOLIN SODIUM 2 G: 2 INJECTION, SOLUTION INTRAVENOUS at 10:51

## 2024-01-10 RX ADMIN — DEXAMETHASONE SODIUM PHOSPHATE 8 MG: 4 INJECTION, SOLUTION INTRA-ARTICULAR; INTRALESIONAL; INTRAMUSCULAR; INTRAVENOUS; SOFT TISSUE at 11:09

## 2024-01-10 RX ADMIN — ROCURONIUM BROMIDE 10 MG: 10 INJECTION INTRAVENOUS at 15:04

## 2024-01-10 RX ADMIN — CEFAZOLIN SODIUM 2 G: 2 INJECTION, SOLUTION INTRAVENOUS at 14:45

## 2024-01-10 RX ADMIN — Medication 0.2 MCG/KG/MIN: at 12:01

## 2024-01-10 RX ADMIN — DEXMEDETOMIDINE HYDROCHLORIDE 0.2 MCG/KG/HR: 4 INJECTION, SOLUTION INTRAVENOUS at 11:23

## 2024-01-10 RX ADMIN — ROCURONIUM BROMIDE 20 MG: 10 INJECTION INTRAVENOUS at 12:42

## 2024-01-10 RX ADMIN — SUGAMMADEX 200 MG: 100 INJECTION, SOLUTION INTRAVENOUS at 17:10

## 2024-01-10 RX ADMIN — FENTANYL CITRATE 100 MCG: 50 INJECTION, SOLUTION INTRAMUSCULAR; INTRAVENOUS at 10:50

## 2024-01-10 RX ADMIN — ROCURONIUM BROMIDE 20 MG: 10 INJECTION INTRAVENOUS at 12:01

## 2024-01-10 RX ADMIN — ROCURONIUM BROMIDE 20 MG: 10 INJECTION INTRAVENOUS at 13:23

## 2024-01-10 RX ADMIN — METOPROLOL TARTRATE 5 MG: 5 INJECTION, SOLUTION INTRAVENOUS at 23:03

## 2024-01-10 RX ADMIN — SODIUM CHLORIDE, POTASSIUM CHLORIDE, SODIUM LACTATE AND CALCIUM CHLORIDE: 600; 310; 30; 20 INJECTION, SOLUTION INTRAVENOUS at 10:56

## 2024-01-10 RX ADMIN — ONDANSETRON 4 MG: 2 INJECTION INTRAMUSCULAR; INTRAVENOUS at 16:32

## 2024-01-10 RX ADMIN — Medication 50 MG: at 11:13

## 2024-01-10 RX ADMIN — ACETAMINOPHEN 1000 MG: 10 INJECTION, SOLUTION INTRAVENOUS at 14:29

## 2024-01-10 RX ADMIN — Medication 2 L/MIN: at 19:45

## 2024-01-10 RX ADMIN — Medication 120 MCG: at 16:02

## 2024-01-10 RX ADMIN — ROCURONIUM BROMIDE 10 MG: 10 INJECTION INTRAVENOUS at 14:29

## 2024-01-10 SDOH — HEALTH STABILITY: MENTAL HEALTH: CURRENT SMOKER: 0

## 2024-01-10 ASSESSMENT — PAIN SCALES - GENERAL
PAINLEVEL_OUTOF10: 0 - NO PAIN
PAINLEVEL_OUTOF10: 5 - MODERATE PAIN
PAINLEVEL_OUTOF10: 0 - NO PAIN

## 2024-01-10 ASSESSMENT — PAIN - FUNCTIONAL ASSESSMENT
PAIN_FUNCTIONAL_ASSESSMENT: 0-10
PAIN_FUNCTIONAL_ASSESSMENT: UNABLE TO SELF-REPORT
PAIN_FUNCTIONAL_ASSESSMENT: 0-10
PAIN_FUNCTIONAL_ASSESSMENT: UNABLE TO SELF-REPORT
PAIN_FUNCTIONAL_ASSESSMENT: UNABLE TO SELF-REPORT
PAIN_FUNCTIONAL_ASSESSMENT: 0-10
PAIN_FUNCTIONAL_ASSESSMENT: UNABLE TO SELF-REPORT
PAIN_FUNCTIONAL_ASSESSMENT: 0-10
PAIN_FUNCTIONAL_ASSESSMENT: 0-10
PAIN_FUNCTIONAL_ASSESSMENT: UNABLE TO SELF-REPORT
PAIN_FUNCTIONAL_ASSESSMENT: 0-10

## 2024-01-10 ASSESSMENT — ACTIVITIES OF DAILY LIVING (ADL)
WALKS IN HOME: INDEPENDENT
HEARING - RIGHT EAR: FUNCTIONAL
TOILETING: INDEPENDENT
PATIENT'S MEMORY ADEQUATE TO SAFELY COMPLETE DAILY ACTIVITIES?: NO
ADEQUATE_TO_COMPLETE_ADL: YES
JUDGMENT_ADEQUATE_SAFELY_COMPLETE_DAILY_ACTIVITIES: YES
FEEDING YOURSELF: INDEPENDENT
BATHING: INDEPENDENT
DRESSING YOURSELF: INDEPENDENT
ASSISTIVE_DEVICE: EYEGLASSES;DENTURES UPPER;DENTURES LOWER
HEARING - LEFT EAR: FUNCTIONAL

## 2024-01-10 ASSESSMENT — PAIN DESCRIPTION - DESCRIPTORS: DESCRIPTORS: ACHING;DISCOMFORT

## 2024-01-10 NOTE — CONSULTS
Consults  Acute Pain Service    Shruthi Ramos is a 58 y.o. year old female patient who presents for Total gastrectomy with feeding tube placement with Dr. Chavarria. Acute Pain consulted for block for postoperative pain control.     Anticipated Postop Pain Issues -   Palliative: typically relieved with IV analgesics and regional local anesthetics  Provocative: typically with movement  Quality: typically burning and aching  Radiation: typically none  Severity: typically severe 8-10/10  Timing: typically constant    Past Medical History:   Diagnosis Date    Arthritis     Depression     Esophageal cancer (CMS/HCC)     GE junction carcinoma    Fibromyalgia, primary     Hyperlipidemia     Hypertension     Osteoarthritis     PONV (postoperative nausea and vomiting)     Restless leg syndrome     Sleep apnea         Past Surgical History:   Procedure Laterality Date    ANKLE FRACTURE SURGERY       SECTION, LOW TRANSVERSE      x2    CHOLECYSTECTOMY      COLONOSCOPY      ESOPHAGOGASTRODUODENOSCOPY      HYSTERECTOMY      OTHER SURGICAL HISTORY      Mediport placement        Family History   Problem Relation Name Age of Onset    Diabetes Mother      Hypertension Mother      Heart disease Father      Diabetes Father      Heart attack Father      Heart attack Sister      Breast cancer Sister      Heart attack Brother          Social History     Socioeconomic History    Marital status:      Spouse name: Not on file    Number of children: Not on file    Years of education: Not on file    Highest education level: Not on file   Occupational History    Not on file   Tobacco Use    Smoking status: Never    Smokeless tobacco: Never   Vaping Use    Vaping Use: Never used   Substance and Sexual Activity    Alcohol use: Never    Drug use: Never    Sexual activity: Not on file   Other Topics Concern    Not on file   Social History Narrative    Not on file     Social Determinants of Health     Financial Resource Strain: Low  Risk  (11/6/2023)    Overall Financial Resource Strain (CARDIA)     Difficulty of Paying Living Expenses: Not hard at all   Food Insecurity: Not on file   Transportation Needs: No Transportation Needs (11/6/2023)    PRAPARE - Transportation     Lack of Transportation (Medical): No     Lack of Transportation (Non-Medical): No   Physical Activity: Not on file   Stress: Not on file   Social Connections: Not on file   Intimate Partner Violence: Not on file   Housing Stability: Low Risk  (11/6/2023)    Housing Stability Vital Sign     Unable to Pay for Housing in the Last Year: No     Number of Places Lived in the Last Year: 1     Unstable Housing in the Last Year: No        Allergies   Allergen Reactions    Morphine Nausea/vomiting and Unknown     Constant vomiting until its out of system    Sutures Other     Dissolving sutures do not dissolve    Sutures do not dissolve and becomes infected    Other Rash     Pt endorses reaction to flu vaccine         Review of Systems  Gen: No fatigue, anorexia, insomnia, fever.   Eyes: No vision loss, double vision, drainage, eye pain.   ENT: No pharyngitis, dry mouth, no hearing changes or ear discharge  Cardiac: No chest pain, palpitations, syncope, near syncope.   Pulmonary: No shortness of breath, cough, hemoptysis.   Heme/lymph: No swollen glands, fever, bleeding.   GI: No abdominal pain, change in bowel habits, melena, hematemesis, hematochezia, nausea, vomiting, diarrhea.   : No discharge, dysuria, frequency, urgency, hematuria.  Endo: No polyuria or weight loss.   Musculoskeletal: Negative for any pain or loss of ROM/weakness  Skin: No rashes or lesions  Neuro: Normal speech, no numbness or weakness. No gait difficulties  Review of systems is otherwise negative unless stated above or in history of present illness.    Physical Exam:  Constitutional:  no distress, alert and cooperative  Eyes: clear sclera  Head/Neck: No apparent injury, trachea midline  Respiratory/Thorax:  Patent airways, thorax symmetric, breathing comfortably  Cardiovascular: no pitting edema  Gastrointestinal: Nondistended  Musculoskeletal: ROM intact  Extremities: no clubbing  Neurological: alert, jay x4  Psychological: Appropriate affect    No results found for this or any previous visit (from the past 24 hour(s)).     Shruthi Ramos is a 58 y.o. year old female patient who presents for Total gastrectomy with feeding tube placement with Dr. Chavarria. Acute Pain consulted for block for postoperative pain control.     Plan:    - B/L Quadratus lumborum blocks with catheters performed preoperatively on 1/10/24  - Ambit ball with Ropivacaine 0.2%/NaCl 0.9% 500mL, Rate 7 cc/hr bilaterally  - Ambit medication will not interfere with pain medication prescribed by the primary team.   - Please be aware of local anesthetic toxic dose and absorption variability before considering lidocaine patches  - Acute pain service will follow while catheters in place  - Rest of pain management per primary team    Acute Pain Resident  pg [phone] ph [phone]

## 2024-01-10 NOTE — OP NOTE
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  Diagnostic laparoscopy   2.  Liver wedge biopsy  3. EGD   4.  Total gastrectomy with D2 lymphadenectomy and Juany-en-Y reconstruction  5.  Placement of Jejunostomy feeding tube      Surgeons      * Zack Chavarria - Primary    Resident/Fellow/Other Assistant:  Surgeon(s) and Role:     * Enrico Nolan MD - Assisting     * Costa Hernandez MD - Resident - Assisting    Procedure Summary  Anesthesia: General  ASA: III  Anesthesia Staff: Anesthesiologist: Peterson Miller DO  C-AA: JACOB Roque; JACOB Mccain; JACOB Fox  Anesthesia Resident: Costa Wolf MD  Estimated Blood Loss: 100 mL  Intra-op Medications: * Intraprocedure medication information is unavailable because the case start and end events have not been set *           Anesthesia Record               Intraprocedure I/O Totals          Intake    Dexmedetomidine 0.00 mL    The total shown is the total volume documented since Anesthesia Start was filed.    LR bolus 700.00 mL    Propofol Drip 0.00 mL    The total shown is the total volume documented since Anesthesia Start was filed.    Phenylephrine Drip 0.00 mL    The total shown is the total volume documented since Anesthesia Start was filed.    LR infusion 600.00 mL    ceFAZolin in dextrose (iso-os) (Ancef) IVPB 2 g 100.00 mL    Total Intake 1400 mL       Output    Urine 400 mL    Est. Blood Loss 200 mL    Total Output 600 mL       Net    Net Volume 800 mL          Specimen:   ID Type Source Tests Collected by Time   1 : liver biopsy seg 4 Tissue LIVER WEDGE RESECTION LEFT SURGICAL  PATHOLOGY EXAM Zack Chavarria MD 1/10/2024 1152   2 : Proximal margin is marked with stitch Tissue STOMACH GASTRECTOMY SURGICAL PATHOLOGY EXAM Zack Chavarria MD 1/10/2024 1351   3 : Lymphadenectomy D2 Tissue LYMPH NODE BIOPSY SURGICAL PATHOLOGY EXAM Zack Chavarria MD 1/10/2024 1410   4 : FINAL ESOPHAGEAL MARGIN Tissue ESOPHAGOGASTRIC JUNCTION BIOPSY SURGICAL PATHOLOGY EXAM Zack Chavarria MD 1/10/2024 1522        Staff:   Circulator: Pedro Joel RN; Jailyn Alfonso, RN; Robbie Leon, RN  Relief Circulator: Azlu Ojeda, SHI  Relief Scrub: Charity Concepcion  Scrub Person: Shiv Linda; Yasmine Burnham RN; Azul Ojeda RN         Drains and/or Catheters:   Closed/Suction Drain Right RLQ Bulb 19 Fr. (Active)       Open Drain Inferior;Midline Abdomen  (Active)       Gastrostomy/Enterostomy Jejunostomy 1 20 Fr. LUQ (Active)       Urethral Catheter Non-latex 16 Fr. (Active)       Findings: Normal anatomy, macroscopically clear proximal margin that was confirmed on frozen section    Indications for surgery: Shruthi Ramos is a 58 y.o. year old female who was diagnosed with a Siewert 2 GE junction cancer and completed 4 cycles of preoperative FLOT.  She presents today for total gastrectomy.    Details of procedure: The patient arrived to the preoperative area at [facility] for the aforementioned procedure. History and physical was performed, consent was verified, questions were answered, and she was moved into the operating room. A timeout was performed and she was induced under general anesthesia. The abdomen was prepped and draped in the usual sterile fashion. Next, we performed diagnostic laparoscopy to look for evidence of peritoneal carcinomatosis given her high-grade disease and T4 status.  She had none so we created a midline laparotomy, lysed adhesions to safely enter the abdomen, and placed the Rick retractor.  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  The lesser sac was entered along the greater curve of the stomach outside of the gastroepiploic vessels.  The greater curve of the stomach was mobilized up to the cardia with LigaSure and the short gastrics were divided.  The inferior aspect of the antrum was then mobilized and station 6 nodes were elevated with the specimen while the right gastroepiploic artery and vein were taken with clips and silk.  The pars flaccida was divided and the liver and the peritoneum over the ewa was dissected towards the stomach.  The right gastric vessels were taken with clips and silk.  An Endo FLORENCIO blue load stapler was used to divide the proximal duodenum just past the pylorus.  The staple line was oversewed with interrupted 3-0 silk sutures.  The stomach was then elevated and the left gastric vessels were identified.  Lymphatic tissue was elevated towards the specimen and the artery and vein were taken with clips and silk.  The stomach was then retracted towards the feet and the GE junction was dissected from the hiatus.  I felt the GE junction and confirmed its location with an EGD.  There was no obvious tumor but I could see a thickened area where there was tumor just past the GE junction.  I marked the esophagus with a marker 3 cm proximal to this, 2 cm proximal to the GE junction.  Silk stay sutures were placed on both sides of the esophagus and a blue load Endo FLORENCIO stapler was used to divide the esophagus at this level.  The specimen was sent for frozen analysis of the proximal margin which ultimately came back as negative.  We then performed a D2 lymphadenectomy by dissecting and retrieving all lymphatic tissue off of the hepatic artery, stations 8 and 12, the celiac axis, and the proximal splenic artery.  These were sent for permanent pathology.    We then commenced reconstruction.  The proximal jejunum was divided about 40 cm past the ligament of Treitz and the Juany limb was fed  up through a mesocolic window to the diaphragmatic hiatus.  This reach without any tension.  A stapled side-to-side jejunojejunostomy was then performed and reinforced with silk suture.  An esophagojejunostomy was created using an Orvil and a 25 mm EEA stapler.  2 intact donuts were identified following this anastomosis, EGD identified an intact anastomosis, and a saline leak test was negative.  The jejunum was secured to the right and left diaphragmatic crura to relieve tension and the stay sutures were removed from the esophagus.  The lymphadenectomy bed was inspected for hemostasis which was adequate.  A 19 Belgian Anthony drain was placed through the right abdomen and laid under the liver and behind the esophagojejunostomy.  A PEG tube was used to create a jejunostomy tube that went out through the left abdominal wall and was secured with nylon.  The fascia was closed using running looped PDS suture tied in the middle and the skin was closed with suture over 1/4 inch Penrose drain laid in her deep subcutaneous tissue and exiting the incision inferiorly.    All sponge and needle counts were correct at the end of the case. The patient was awoken from the procedure and moved to the PACU for recovery. I was present and scrubbed and directed all operative decision making.    Zack Chavarria MD  Assistant Professor of Surgery  Division of Surgical Oncology  [phone]  [email or web address]

## 2024-01-10 NOTE — PROCEDURES
Peripheral Block    Patient location during procedure: pre-op  Start time: 1/10/2024 9:35 AM  End time: 1/10/2024 9:52 AM  Reason for block: at surgeon's request  Staffing  Performed: resident   Authorized by: aZck Chavarria MD    Performed by: Mumtaz Holman MD  Preanesthetic Checklist  Completed: patient identified, IV checked, site marked, risks and benefits discussed, surgical consent, monitors and equipment checked, pre-op evaluation and timeout performed   Timeout performed at: 1/10/2024 9:35 AM  Peripheral Block  Patient position: laying flat  Prep: ChloraPrep  Patient monitoring: heart rate and continuous pulse ox  Block type: QL  Injection technique: catheter  Guidance: ultrasound guided  Local infiltration: lidocaine  Needle  Needle type: Tuohy   Needle gauge: 26 G  Needle length: 8 cm  Needle localization: ultrasound guidance     image stored in chart  Catheter type: open end  Assessment  Injection assessment: negative aspiration for heme, no paresthesia on injection, incremental injection and local visualized surrounding nerve on ultrasound  Heart rate change: no  Slow fractionated injection: no  Additional Notes  QL catheters: informed consent obtained. risks and benefits discussed. ASA monitors placed, timeout performed. Pt positioned, prepped with chlorhexidine, draped with sterile towels. Ultrasound guidance used with visualization of the needle throughout duration of the procedure. Aspiration was negative. A total of 30 cc 0.5% ropivacaine, 100mcg epinephrine, and 4mg decadron injected between both sides. catheters threaded into space and secured. Patient tolerated procedure well.       Timeout by RN

## 2024-01-10 NOTE — ANESTHESIA POSTPROCEDURE EVALUATION
Patient: Shruthi Ramos    Procedure Summary       Date: 01/10/24 Room / Location: [facility] OR 17 / 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    Anesthesia Post Evaluation    Patient location during evaluation: PACU  Patient participation: complete - patient participated  Level of consciousness: sleepy but conscious  Pain management: adequate  Multimodal analgesia pain management approach  Airway patency: patent  Two or more strategies used to mitigate risk of obstructive sleep apnea  Cardiovascular status: acceptable  Respiratory status: acceptable  Hydration status: acceptable  Postoperative Nausea and Vomiting: none        No notable events documented.

## 2024-01-10 NOTE — ANESTHESIA PROCEDURE NOTES
Peripheral IV  Date/Time: 1/10/2024 11:23 AM      Placement  Needle size: 16 G  Laterality: right  Location: forearm

## 2024-01-10 NOTE — ANESTHESIA PROCEDURE NOTES
Airway  Date/Time: 1/10/2024 11:22 AM  Urgency: elective    Airway not difficult    Staffing  Performed: resident   Authorized by: Peterson Miller DO    Performed by: Costa Wolf MD  Patient location during procedure: OR    Indications and Patient Condition  Indications for airway management: anesthesia  Sedation level: deep  Preoxygenated: yes  Patient position: sniffing  Mask difficulty assessment: 1 - vent by mask    Final Airway Details  Final airway type: endotracheal airway      Successful airway: ETT     Successful intubation technique: direct laryngoscopy  Endotracheal tube insertion site: oral  Blade: Stephanie  Blade size: #3  ETT size (mm): 7.5  Cormack-Lehane Classification: grade I - full view of glottis  Placement verified by: chest auscultation and capnometry   Measured from: lips  Number of attempts at approach: 1  Ventilation between attempts: BVM

## 2024-01-10 NOTE — BRIEF OP NOTE
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]     Procedures  Open Total Gastrectomy with Esophagogastroduodenoscopy (EGD), with Jejunostomy Tube Placement  69678 - PA GSTRCT TOT W/ESOPHAGOENTEROSTOMY      Surgeons      * Zack Chavarria - Primary    Resident/Fellow/Other Assistant:  Surgeon(s) and Role:     * Enrico Nolan MD - Assisting     * Costa Hernandez MD - Resident - Assisting    Procedure Summary  Anesthesia: General  ASA: III  Anesthesia Staff: Anesthesiologist: Peterson Miller DO  C-AA: JACOB Roque; JACOB Mccain; JACOB Fox  Anesthesia Resident: Costa Wolf MD  Estimated Blood Loss: 200mL  Intra-op Medications:            Anesthesia Record               Intraprocedure I/O Totals          Intake    Dexmedetomidine 0.00 mL    The total shown is the total volume documented since Anesthesia Start was filed.    LR bolus 700.00 mL    Propofol Drip 0.00 mL    The total shown is the total volume documented since Anesthesia Start was filed.    Phenylephrine Drip 0.00 mL    The total shown is the total volume documented since Anesthesia Start was filed.    ceFAZolin in dextrose (iso-os) (Ancef) IVPB 2 g 100.00 mL    Total Intake 800 mL       Output    Urine 200 mL    Total Output 200 mL       Net    Net Volume 600 mL          Specimen:   ID Type Source Tests Collected by Time   1 : liver biopsy seg 4 Tissue LIVER WEDGE RESECTION LEFT SURGICAL PATHOLOGY EXAM Zack Chavarria MD 1/10/2024 1152   2 : Proximal margin is marked with stitch Tissue STOMACH GASTRECTOMY SURGICAL PATHOLOGY EXAM Zack Chavarria MD 1/10/2024 1351   3 : Lymphadenectomy D2 Tissue LYMPH NODE BIOPSY SURGICAL PATHOLOGY EXAM Zack Chavarria MD 1/10/2024 1410   4 : FINAL ESOPHAGEAL MARGIN Tissue  ESOPHAGOGASTRIC JUNCTION BIOPSY SURGICAL PATHOLOGY EXAM Zack Chavarria MD 1/10/2024 1522        Staff:   Circulator: Pedro Joel, RN; Jailyn Alfonso, RN; Robbie Leon, RN  Relief Circulator: Azul Ojeda, RN  Relief Scrub: Charity Concepcion  Scrub Person: Shiv Linda; Yasmine Burnham, RN; Azul Ojeda, RN          Findings: EGD performed and good response to chemotherapy difficult to see tumor, proximal margin negative on frozen.     Complications:  None; patient tolerated the procedure well.     Disposition: PACU - hemodynamically stable.  Condition: stable  Specimens Collected:   ID Type Source Tests Collected by Time   1 : liver biopsy seg 4 Tissue LIVER WEDGE RESECTION LEFT SURGICAL PATHOLOGY EXAM Zack Chavarria MD 1/10/2024 1152   2 : Proximal margin is marked with stitch Tissue STOMACH GASTRECTOMY SURGICAL PATHOLOGY EXAM Zack Chavarria MD 1/10/2024 1351   3 : Lymphadenectomy D2 Tissue LYMPH NODE BIOPSY SURGICAL PATHOLOGY EXAM Zack Chavarria MD 1/10/2024 1410   4 : FINAL ESOPHAGEAL MARGIN Tissue ESOPHAGOGASTRIC JUNCTION BIOPSY SURGICAL PATHOLOGY EXAM Zack Chavarria MD 1/10/2024 1522     Attending Attestation: I was present and scrubbed for the entire procedure.    Zack Chavarria  Phone Number: [phone]

## 2024-01-10 NOTE — ANESTHESIA PREPROCEDURE EVALUATION
Patient: Shruthi Ramos    Procedure Information       Date/Time: 01/10/24 1000    Procedure: Total gastrectomy with feeding tube placement    Location: [facility] OR 17 / [facility] OR    Surgeons: Zack Chavarria MD            Relevant Problems   Anesthesia   (+) PONV (postoperative nausea and vomiting)   (-) Malignant hyperthermia      Cardiovascular   (+) HTN (hypertension)   (+) Hypercholesteremia      Endocrine   (+) Class 3 severe obesity due to excess calories in adult (CMS/HCC)      GI   (+) GE junction carcinoma (CMS/HCC)      /Renal   (+) CKD (chronic kidney disease) stage 3, GFR 30-59 ml/min (CMS/HCC)   (+) Recurrent UTI (Resolved)      Neuro/Psych   (+) Anxiety   (+) Depression   (+) Moderate major depression (CMS/HCC)      Pulmonary   (+) YUN (dyspnea on exertion)   (+) LORENA (obstructive sleep apnea)      GI/Hepatic   (+) GE junction carcinoma (CMS/HCC)      Hematology (within normal limits)      Musculoskeletal   (+) Fibromyalgia      Eyes, Ears, Nose, and Throat (within normal limits)      Infectious Disease (within normal limits)   (+) Recurrent UTI (Resolved)       Clinical information reviewed:   Tobacco  Allergies    Med Hx  Surg Hx   Fam Hx  Soc Hx        NPO Detail:  NPO/Void Status  Carbohydrate Drink Given Prior to Surgery? : N  Date of Last Liquid: 01/10/24  Time of Last Liquid: 0000  Date of Last Solid: 01/09/24  Time of Last Solid: 2000  Last Intake Type: GI prep         Physical Exam    Airway  Mallampati: I  TM distance: >3 FB  Neck ROM: full     Cardiovascular    Dental   Comments: edentulous   Pulmonary    Abdominal   (+) obese         Patient has scopolamine patch behind right ear that she put on at home prior to coming to hospital.    Anesthesia Plan    History of general anesthesia?: yes  History of complications of general anesthesia?: yes    ASA 3     general   (TIVA)  The patient is not a current smoker.    intravenous induction   Postoperative administration of  opioids is intended.  Trial extubation is planned.  Anesthetic plan and risks discussed with patient.  Use of blood products discussed with patient who consented to blood products.    Plan discussed with resident.

## 2024-01-10 NOTE — ANESTHESIA PROCEDURE NOTES
Arterial Line:    Date/Time: 1/10/2024 11:07 AM    Staffing  Performed: attending   Authorized by: Peterson Miller DO    Performed by: Costa Wolf MD    An arterial line was placed. Procedure performed using ultrasound guidance.in the OR for the following indication(s): continuous blood pressure monitoring and blood sampling needed.    A 20 gauge (size), 1 and 3/4 inch (length), Angiocath (type) catheter was placed into the Left radial artery, secured by Tegaderm,   Seldinger technique used.  Events:  patient tolerated procedure well with no complications.

## 2024-01-11 ENCOUNTER — DOCUMENTATION (OUTPATIENT)
Dept: HOME HEALTH SERVICES | Facility: HOME HEALTH | Age: 59
End: 2024-01-11
Payer: MEDICAID

## 2024-01-11 ENCOUNTER — HOME HEALTH ADMISSION (OUTPATIENT)
Dept: HOME HEALTH SERVICES | Facility: HOME HEALTH | Age: 59
End: 2024-01-11
Payer: COMMERCIAL

## 2024-01-11 ENCOUNTER — APPOINTMENT (OUTPATIENT)
Dept: CARDIOLOGY | Facility: HOSPITAL | Age: 59
End: 2024-01-11
Payer: MEDICAID

## 2024-01-11 LAB
ALBUMIN SERPL BCP-MCNC: 3.7 G/DL (ref 3.4–5)
ALP SERPL-CCNC: 78 U/L (ref 33–110)
ALT SERPL W P-5'-P-CCNC: 74 U/L (ref 7–45)
ANION GAP SERPL CALC-SCNC: 17 MMOL/L (ref 10–20)
AST SERPL W P-5'-P-CCNC: 88 U/L (ref 9–39)
BILIRUB SERPL-MCNC: 0.7 MG/DL (ref 0–1.2)
BUN SERPL-MCNC: 11 MG/DL (ref 6–23)
CALCIUM SERPL-MCNC: 9 MG/DL (ref 8.6–10.6)
CHLORIDE SERPL-SCNC: 106 MMOL/L (ref 98–107)
CO2 SERPL-SCNC: 21 MMOL/L (ref 21–32)
CREAT SERPL-MCNC: 0.63 MG/DL (ref 0.5–1.05)
EGFRCR SERPLBLD CKD-EPI 2021: >90 ML/MIN/1.73M*2
ERYTHROCYTE [DISTWIDTH] IN BLOOD BY AUTOMATED COUNT: 18.8 % (ref 11.5–14.5)
GLUCOSE SERPL-MCNC: 124 MG/DL (ref 74–99)
HCT VFR BLD AUTO: 32.8 % (ref 36–46)
HGB BLD-MCNC: 11 G/DL (ref 12–16)
MCH RBC QN AUTO: 29.9 PG (ref 26–34)
MCHC RBC AUTO-ENTMCNC: 33.5 G/DL (ref 32–36)
MCV RBC AUTO: 89 FL (ref 80–100)
NRBC BLD-RTO: 0 /100 WBCS (ref 0–0)
PHOSPHATE SERPL-MCNC: 4.2 MG/DL (ref 2.5–4.9)
PLATELET # BLD AUTO: 185 X10*3/UL (ref 150–450)
POTASSIUM SERPL-SCNC: 4.5 MMOL/L (ref 3.5–5.3)
PROT SERPL-MCNC: 5.7 G/DL (ref 6.4–8.2)
RBC # BLD AUTO: 3.68 X10*6/UL (ref 4–5.2)
SODIUM SERPL-SCNC: 139 MMOL/L (ref 136–145)
WBC # BLD AUTO: 20.7 X10*3/UL (ref 4.4–11.3)

## 2024-01-11 PROCEDURE — 2500000004 HC RX 250 GENERAL PHARMACY W/ HCPCS (ALT 636 FOR OP/ED)

## 2024-01-11 PROCEDURE — 37799 UNLISTED PX VASCULAR SURGERY: CPT | Performed by: STUDENT IN AN ORGANIZED HEALTH CARE EDUCATION/TRAINING PROGRAM

## 2024-01-11 PROCEDURE — 99231 SBSQ HOSP IP/OBS SF/LOW 25: CPT

## 2024-01-11 PROCEDURE — 2500000004 HC RX 250 GENERAL PHARMACY W/ HCPCS (ALT 636 FOR OP/ED): Performed by: STUDENT IN AN ORGANIZED HEALTH CARE EDUCATION/TRAINING PROGRAM

## 2024-01-11 PROCEDURE — 93005 ELECTROCARDIOGRAM TRACING: CPT

## 2024-01-11 PROCEDURE — 85027 COMPLETE CBC AUTOMATED: CPT | Performed by: STUDENT IN AN ORGANIZED HEALTH CARE EDUCATION/TRAINING PROGRAM

## 2024-01-11 PROCEDURE — 82150 ASSAY OF AMYLASE: CPT

## 2024-01-11 PROCEDURE — 82947 ASSAY GLUCOSE BLOOD QUANT: CPT

## 2024-01-11 PROCEDURE — 80053 COMPREHEN METABOLIC PANEL: CPT | Performed by: STUDENT IN AN ORGANIZED HEALTH CARE EDUCATION/TRAINING PROGRAM

## 2024-01-11 PROCEDURE — 99232 SBSQ HOSP IP/OBS MODERATE 35: CPT | Performed by: STUDENT IN AN ORGANIZED HEALTH CARE EDUCATION/TRAINING PROGRAM

## 2024-01-11 PROCEDURE — 84100 ASSAY OF PHOSPHORUS: CPT | Performed by: STUDENT IN AN ORGANIZED HEALTH CARE EDUCATION/TRAINING PROGRAM

## 2024-01-11 PROCEDURE — 2500000005 HC RX 250 GENERAL PHARMACY W/O HCPCS: Performed by: STUDENT IN AN ORGANIZED HEALTH CARE EDUCATION/TRAINING PROGRAM

## 2024-01-11 PROCEDURE — 1200000002 HC GENERAL ROOM WITH TELEMETRY DAILY

## 2024-01-11 RX ORDER — KETOROLAC TROMETHAMINE 30 MG/ML
30 INJECTION, SOLUTION INTRAMUSCULAR; INTRAVENOUS EVERY 6 HOURS
Status: DISPENSED | OUTPATIENT
Start: 2024-01-11 | End: 2024-01-12

## 2024-01-11 RX ORDER — ONDANSETRON HYDROCHLORIDE 8 MG/1
8 TABLET, FILM COATED ORAL EVERY 8 HOURS PRN
COMMUNITY

## 2024-01-11 RX ORDER — MIRTAZAPINE 15 MG/1
15 TABLET, FILM COATED ORAL NIGHTLY
COMMUNITY
End: 2024-03-13 | Stop reason: ALTCHOICE

## 2024-01-11 RX ADMIN — HEPARIN SODIUM 7500 UNITS: 5000 INJECTION INTRAVENOUS; SUBCUTANEOUS at 14:33

## 2024-01-11 RX ADMIN — ACETAMINOPHEN 1000 MG: 10 INJECTION INTRAVENOUS at 18:18

## 2024-01-11 RX ADMIN — HEPARIN SODIUM 7500 UNITS: 5000 INJECTION INTRAVENOUS; SUBCUTANEOUS at 05:47

## 2024-01-11 RX ADMIN — ACETAMINOPHEN 1000 MG: 10 INJECTION INTRAVENOUS at 05:46

## 2024-01-11 RX ADMIN — Medication 6 ML/HR: at 12:32

## 2024-01-11 RX ADMIN — ACETAMINOPHEN 1000 MG: 10 INJECTION INTRAVENOUS at 12:53

## 2024-01-11 RX ADMIN — ACETAMINOPHEN 1000 MG: 10 INJECTION INTRAVENOUS at 01:50

## 2024-01-11 RX ADMIN — METOPROLOL TARTRATE 5 MG: 5 INJECTION, SOLUTION INTRAVENOUS at 05:47

## 2024-01-11 RX ADMIN — KETOROLAC TROMETHAMINE 30 MG: 30 INJECTION, SOLUTION INTRAMUSCULAR; INTRAVENOUS at 12:55

## 2024-01-11 RX ADMIN — METOPROLOL TARTRATE 5 MG: 5 INJECTION, SOLUTION INTRAVENOUS at 12:55

## 2024-01-11 RX ADMIN — SODIUM CHLORIDE, POTASSIUM CHLORIDE, SODIUM LACTATE AND CALCIUM CHLORIDE 100 ML/HR: 600; 310; 30; 20 INJECTION, SOLUTION INTRAVENOUS at 01:49

## 2024-01-11 RX ADMIN — SODIUM CHLORIDE, POTASSIUM CHLORIDE, SODIUM LACTATE AND CALCIUM CHLORIDE 100 ML/HR: 600; 310; 30; 20 INJECTION, SOLUTION INTRAVENOUS at 13:11

## 2024-01-11 SDOH — SOCIAL STABILITY: SOCIAL INSECURITY: ARE THERE ANY APPARENT SIGNS OF INJURIES/BEHAVIORS THAT COULD BE RELATED TO ABUSE/NEGLECT?: NO

## 2024-01-11 SDOH — SOCIAL STABILITY: SOCIAL INSECURITY: HAVE YOU HAD THOUGHTS OF HARMING ANYONE ELSE?: NO

## 2024-01-11 SDOH — SOCIAL STABILITY: SOCIAL INSECURITY: WERE YOU ABLE TO COMPLETE ALL THE BEHAVIORAL HEALTH SCREENINGS?: YES

## 2024-01-11 SDOH — SOCIAL STABILITY: SOCIAL INSECURITY: DOES ANYONE TRY TO KEEP YOU FROM HAVING/CONTACTING OTHER FRIENDS OR DOING THINGS OUTSIDE YOUR HOME?: NO

## 2024-01-11 SDOH — SOCIAL STABILITY: SOCIAL INSECURITY: HAS ANYONE EVER THREATENED TO HURT YOUR FAMILY OR YOUR PETS?: NO

## 2024-01-11 SDOH — SOCIAL STABILITY: SOCIAL INSECURITY: ABUSE: ADULT

## 2024-01-11 SDOH — SOCIAL STABILITY: SOCIAL INSECURITY: DO YOU FEEL ANYONE HAS EXPLOITED OR TAKEN ADVANTAGE OF YOU FINANCIALLY OR OF YOUR PERSONAL PROPERTY?: NO

## 2024-01-11 SDOH — SOCIAL STABILITY: SOCIAL INSECURITY: DO YOU FEEL UNSAFE GOING BACK TO THE PLACE WHERE YOU ARE LIVING?: NO

## 2024-01-11 SDOH — SOCIAL STABILITY: SOCIAL INSECURITY: ARE YOU OR HAVE YOU BEEN THREATENED OR ABUSED PHYSICALLY, EMOTIONALLY, OR SEXUALLY BY ANYONE?: NO

## 2024-01-11 ASSESSMENT — ACTIVITIES OF DAILY LIVING (ADL)
ADEQUATE_TO_COMPLETE_ADL: YES
FEEDING YOURSELF: INDEPENDENT
DRESSING YOURSELF: INDEPENDENT
LACK_OF_TRANSPORTATION: PATIENT DECLINED
JUDGMENT_ADEQUATE_SAFELY_COMPLETE_DAILY_ACTIVITIES: YES
GROOMING: INDEPENDENT
BATHING: INDEPENDENT
HEARING - LEFT EAR: FUNCTIONAL
HEARING - RIGHT EAR: FUNCTIONAL
PATIENT'S MEMORY ADEQUATE TO SAFELY COMPLETE DAILY ACTIVITIES?: YES
TOILETING: INDEPENDENT
WALKS IN HOME: INDEPENDENT
LACK_OF_TRANSPORTATION: PATIENT DECLINED

## 2024-01-11 ASSESSMENT — LIFESTYLE VARIABLES
SUBSTANCE_ABUSE_PAST_12_MONTHS: NO
AUDIT-C TOTAL SCORE: 0
HOW MANY STANDARD DRINKS CONTAINING ALCOHOL DO YOU HAVE ON A TYPICAL DAY: PATIENT DOES NOT DRINK
AUDIT-C TOTAL SCORE: 0
PRESCIPTION_ABUSE_PAST_12_MONTHS: NO
SKIP TO QUESTIONS 9-10: 1
SUBSTANCE_ABUSE_PAST_12_MONTHS: NO
HOW OFTEN DO YOU HAVE A DRINK CONTAINING ALCOHOL: NEVER
HOW OFTEN DO YOU HAVE 6 OR MORE DRINKS ON ONE OCCASION: NEVER
HOW OFTEN DO YOU HAVE 6 OR MORE DRINKS ON ONE OCCASION: NEVER
PRESCIPTION_ABUSE_PAST_12_MONTHS: NO
HOW MANY STANDARD DRINKS CONTAINING ALCOHOL DO YOU HAVE ON A TYPICAL DAY: PATIENT DOES NOT DRINK

## 2024-01-11 ASSESSMENT — PATIENT HEALTH QUESTIONNAIRE - PHQ9
1. LITTLE INTEREST OR PLEASURE IN DOING THINGS: NOT AT ALL
2. FEELING DOWN, DEPRESSED OR HOPELESS: NOT AT ALL
SUM OF ALL RESPONSES TO PHQ9 QUESTIONS 1 & 2: 0
2. FEELING DOWN, DEPRESSED OR HOPELESS: NOT AT ALL
1. LITTLE INTEREST OR PLEASURE IN DOING THINGS: NOT AT ALL
SUM OF ALL RESPONSES TO PHQ9 QUESTIONS 1 & 2: 0

## 2024-01-11 ASSESSMENT — COGNITIVE AND FUNCTIONAL STATUS - GENERAL
TOILETING: A LITTLE
DAILY ACTIVITIY SCORE: 24
TURNING FROM BACK TO SIDE WHILE IN FLAT BAD: A LITTLE
MOVING FROM LYING ON BACK TO SITTING ON SIDE OF FLAT BED WITH BEDRAILS: A LITTLE
HELP NEEDED FOR BATHING: A LITTLE
MOBILITY SCORE: 17
WALKING IN HOSPITAL ROOM: A LITTLE
CLIMB 3 TO 5 STEPS WITH RAILING: A LOT
STANDING UP FROM CHAIR USING ARMS: A LITTLE
DRESSING REGULAR LOWER BODY CLOTHING: A LITTLE
MOVING TO AND FROM BED TO CHAIR: A LITTLE
DAILY ACTIVITIY SCORE: 20
DRESSING REGULAR UPPER BODY CLOTHING: A LITTLE
PATIENT BASELINE BEDBOUND: NO
MOBILITY SCORE: 24
PATIENT BASELINE BEDBOUND: NO

## 2024-01-11 ASSESSMENT — PAIN SCALES - GENERAL
PAINLEVEL_OUTOF10: 3
PAINLEVEL_OUTOF10: 3
PAINLEVEL_OUTOF10: 0 - NO PAIN

## 2024-01-11 ASSESSMENT — PAIN - FUNCTIONAL ASSESSMENT
PAIN_FUNCTIONAL_ASSESSMENT: 0-10

## 2024-01-11 ASSESSMENT — PAIN DESCRIPTION - LOCATION
LOCATION: ABDOMEN
LOCATION: ABDOMEN

## 2024-01-11 NOTE — PROGRESS NOTES
Pharmacy Medication History Review    Shruthi Ramos is a 58 y.o. female admitted for GE junction carcinoma (CMS/[facility]). Pharmacy reviewed the patient's cnsiw-[id number] medications and allergies for accuracy.    The list below reflects the updated PTA list. Comments regarding how patient may be taking medications differently can be found in the Admit Orders Activity  Prior to Admission Medications   Prescriptions Last Dose Informant Patient Reported? Taking?   DULoxetine (Cymbalta) 60 mg DR capsule Past Week Self, Spouse/Significant Other Yes Yes   Sig: Take 1 capsule (60 mg) by mouth once daily in the morning. Take before meals.   MULTIVITAMIN WITH IRON ORAL 1/2/2024 Self, Spouse/Significant Other Yes Yes   Sig: Take 1 tablet by mouth once daily.   acetaminophen (Tylenol) 325 mg tablet  at PRN Self, Spouse/Significant Other No PRN   Sig: Take 2 tablets (650 mg) by mouth every 4 hours if needed for fever (temp greater than 38.0 C) (greater than or equal to 38 C).   atorvastatin (Lipitor) 20 mg tablet Past Week Self, Spouse/Significant Other Yes Yes   Sig: Take 1 tablet (20 mg) by mouth once daily at bedtime.   chlorhexidine (Hibiclens) 4 % external liquid   No No   Sig: Apply topically 2 times a day for 5 days.   chlorhexidine (Peridex) 0.12 % solution  Self, Spouse/Significant Other No No   Sig: Swish and spit 15 mL night before surgery and morning of surgery   cholecalciferol (Vitamin D3) 25 MCG (1000 UT) tablet 1/2/2024 Self, Spouse/Significant Other Yes Yes   Sig: Take 1 tablet (1,000 Units) by mouth once daily in the morning.   cyanocobalamin (Vitamin B-12) 1,000 mcg tablet 1/2/2024 Self, Spouse/Significant Other Yes Yes   Sig: Take 1 tablet (1,000 mcg) by mouth once daily.   loperamide (Imodium) 2 mg capsule Not Taking Self, Spouse/Significant Other No No   Sig: Take 2 capsules (4 mg) by mouth 4 times a day as needed for diarrhea.   Patient not taking: Reported on 1/11/2024   metoprolol succinate XL  (Toprol-XL) 50 mg 24 hr tablet Past Week Self, Spouse/Significant Other Yes Yes   Sig: Take 1 tablet (50 mg) by mouth once daily in the morning. Take before meals.   nystatin (Mycostatin) 100,000 unit/mL suspension  Self, Spouse/Significant Other No    Sig: Take 5 mL (500,000 Units) by mouth 4 times a day. Swish in mouth and spit out.   ondansetron ODT (Zofran-ODT) 4 mg disintegrating tablet 1/9/2024 Self, Spouse/Significant Other Yes Yes   Sig: Take 2 tablets (8 mg) by mouth every 8 hours if needed for nausea.   pantoprazole (ProtoNix) 40 mg EC tablet  Self, Spouse/Significant Other No No   Sig: Take 1 tablet (40 mg) by mouth once daily. Do not crush, chew, or split.   Patient not taking: Reported on 1/5/2024   scopolamine (Transderm-Scop) 1 mg over 3 days patch 3 day 1/10/2024 at am Self, Spouse/Significant Other No Yes   Sig: Place 1 patch over 72 hours on the skin every 3rd day. Do not start before November 15, 2023.   spironolactone (Aldactone) 25 mg tablet Past Week Self, Spouse/Significant Other Yes Yes   Sig: Take 1 tablet (25 mg) by mouth once daily in the morning. Take before meals.   traZODone (Desyrel) 50 mg tablet Past Week Self, Spouse/Significant Other Yes Yes   Sig: Take 1 tablet (50 mg) by mouth once daily at bedtime.   white petrolatum (Aquaphor) 41 % ointment ointment Unknown at PRN Self, Spouse/Significant Other No PRN   Sig: Apply 1 Application topically every 1 hour if needed (Dry Skin/Itching).   Mirtazapine (Remeron) 15mg tablet                             Past week           Self. Spouse     Yes                Yes   Sig: Take 1 tablet by mouth once daily at bedtime      Facility-Administered Medications: None        The list below reflects the updated allergy list. Please review each documented allergy for additional clarification and justification.  Allergies  Reviewed by Peterson Miller DO on 1/10/2024        Severity Reactions Comments    Morphine High Nausea/vomiting, Unknown Constant  "vomiting until its out of system    Sutures High Other Dissolving sutures do not dissolve Sutures do not dissolve and becomes infected    Other Low Rash Pt endorses reaction to flu vaccine            Patient declines M2B at discharge. Pharmacy has been updated to [facility] on [address] in [place].    Sources used to complete the med history include   QutureS, Cuffed and Wanted fill history, care everywhere  Interview with patient and patient's , who jointly confirmed med list    Below are additional concerns with the patient's PTA list.  None       Hue Gutierrez, PharmD, [facility] PGY1 Pharmacy Resident  [facility] Retail Services  Please reach out via [employer] Secure Chat for questions, or if no response call [phone] or [employer] \"MedRec\"    "

## 2024-01-11 NOTE — PROGRESS NOTES
01/11/24 1100   Discharge Planning   Living Arrangements Spouse/significant other   Support Systems Spouse/significant other   Assistance Needed none   Type of Residence Private residence   Number of Stairs to Enter Residence 5   Number of Stairs Within Residence 12   Do you have animals or pets at home? Yes   Type of Animals or Pets 0   Who is requesting discharge planning? Provider   Home or Post Acute Services In home services   Type of Home Care Services Home nursing visits;Home PT   Patient expects to be discharged to: home   Does the patient need discharge transport arranged? No     TCC Note    Plan per Medical/Surgical Team:   day 1 of admission presenting with GE junction carcinoma (CMS/HCC). S/p total gastrectomy with D2 lymphadenectomy and Juany-en-Y reconstruction and placement of Jejunostomy feeding tube    Status: inpatient   Payor Source: [employer]   Discharge disposition: home   Expected date of discharge: 1/17  Barriers:  Primary Oncologist: Dr. Roger Cheung   Preferred home care agency:    TCC met with patient at bedside to discuss anticipated discharge needs. Patient states she was independent with ADLs prior to admission. Demographics and insurance verified with patient. Patient live at home with her  and has not used home health care. Patient agreeable for TCC to send out blanket of referral to home health agencies. Patient will need home care and TF supplies. Will continue to follow patient for any discharge needs.     01/11/24 at 11:33 AM - PRABHU TRINIDAD RN

## 2024-01-11 NOTE — CONSULTS
"Nutrition Initial Assessment:   Nutrition Assessment    Reason for Assessment: Tube feeding recommendations    Patient is a 58 y.o. 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    Taken to OR (1/10/24) now s/p diagnostic laparoscopy, liver wedge bx, EGD, total gastrectomy with D2 lymphadenectomy, Juany-en-Y reconstruction, and placement of J-tube.     Pt with BAT this AM given confusion. Neurology consulted, low suspicion for stroke & pt with resolution of symptoms. Question if secondary to effects from anesthesia.     Pt planned for UGI w/ SBFT on (1/15). Remains NPO at time of assessment.    Nutrition History:  Energy Intake: Good > 75 %  Food and Nutrient History: Pt resting in bed w/  & daughter at bedside. Reports that prior to surgery she was eating \"whatever she wanted\" & had a good appetite. Pt notes appetite further improved after completion of chemotherapy ~2 weeks ago. Pt typically consumes x1 carton of Boost daily at home. RDN discussed plan for gradual TF initiation/advancement. Pt & family deny any further questions at this time.  Vitamin/Herbal Supplement Use: MVI w/ iron, Vitamin B12, & Vitamin D3  Food Allergies/Intolerances:  None  GI Symptoms: Nausea and Vomiting  Oral Problems: None     Anthropometrics:  Height: 154.9 cm (5' 1\")   Weight: 99.6 kg (219 lb 9.3 oz)   BMI (Calculated): 41.51  IBW/kg (Dietitian Calculated): 47.7 kg  Percent of IBW: 209 %     Weight History:   Wt Readings from Last 20 Encounters:   01/10/24 99.6 kg (219 lb 9.3 oz) --> Admit wt   12/19/23 97.5 kg (214 lb 15.2 oz)   12/11/23 103 kg (226 lb 13.7 oz)   11/06/23 102 kg (225 lb)   10/10/23 105 kg (231 lb 0.7 oz)   09/26/23 105 kg (231 lb 14.8 oz)   05/16/23 108 kg (239 lb)     Weight Change %:  Weight History / % Weight Change: While weight has been downtrending over the past 8 months, no significant losses noted.  Significant Weight Loss: No    Nutrition Focused " Physical Exam Findings:  Subcutaneous Fat Loss:   Orbital Fat Pads: Well nourshed (slightly bulging fat pads)  Buccal Fat Pads: Well nourished (full, rounded cheeks)  Triceps: Well nourished (ample fat tissue)  Muscle Wasting:  Temporalis: Well nourished (well-defined muscle)  Pectoralis (Clavicular Region): Well nourished (clavicle not visible)  Deltoid/Trapezius: Well nourished (rounded appearance at arm, shoulder, neck)  Interosseous: Well nourished (muscle bulges)  Edema:  Edema: none  Physical Findings:  Hair: Positive (Alopecia)  Eyes: Negative  Mouth: Negative  Nails: Negative  Skin: Positive (Medial lower abdominal incision)    Nutrition Significant Labs:  BMP Trend:   Results from last 7 days   Lab Units 01/11/24  0607 01/10/24  1758 01/05/24  0901   GLUCOSE mg/dL 124* 168* 104*   CALCIUM mg/dL 9.0 9.0 9.3   SODIUM mmol/L 139 143 143   POTASSIUM mmol/L 4.5 4.1 5.2   CO2 mmol/L 21 24 27   CHLORIDE mmol/L 106 110* 106   BUN mg/dL 11 11 16   CREATININE mg/dL 0.63 0.71 0.86      Nutrition Specific Medications:  Scheduled medications  heparin (porcine), 7,500 Units, subcutaneous, q8h CHELO    Continuous medications   lactated Ringer's, 100 mL/hr, Last Rate: 100 mL/hr (01/11/24 1311)    I/O:   No recorded BM since admission.    Dietary Orders (From admission, onward)       Start     Ordered    01/10/24 1748  NPO Diet; Effective now  Diet effective now         01/10/24 1751               Estimated Needs:   Total Energy Estimated Needs (kCal): 0109-6653 kCal  Method for Estimating Needs: MSJ (REE: 1512) x ~1.2  Total Protein Estimated Needs (g): 70 g  Method for Estimating Needs: ~1.5 g/kg x IBW  Total Fluid Estimated Needs (mL): 5844-3124 mL  Method for Estimating Needs: 1mL/kcal or per team        Nutrition Diagnosis   Malnutrition Diagnosis  Patient has Malnutrition Diagnosis: No    Nutrition Diagnosis  Patient has Nutrition Diagnosis: Yes  Diagnosis Status (1): New  Nutrition Diagnosis 1: Altered GI  function  Related to (1): 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  Defer additional free water flushes to team's discretion  Provides: 1800 kcals, 81g protein, & 917mL free water    Please advance PO diet, as deemed medically feasible.         Time Spent/Follow-up Reminder:   Time Spent (min): 60 minutes  Last Date of Nutrition Visit: 01/11/24  Nutrition Follow-Up Needed?: Dietitian to reassess per policy

## 2024-01-11 NOTE — PROGRESS NOTES
Shruthi Ramos is a 58 y.o. year old female patient who presents for Total gastrectomy with feeding tube placement with Dr. Chavarria. Acute Pain consulted for block for postoperative pain control.     Postop Pain HPI -   Palliative: relieved with IV analgesics and regional local anesthetics  Provocative: movement  Quality:  burning and aching  Radiation:  none  Severity:  0/10  Timing: constant    24-HOUR OPIOID CONSUMPTION:  Dilaudid PCA (1mg over 24hrs)    Scheduled medications  acetaminophen, 1,000 mg, intravenous, q6h CHELO  heparin (porcine), 7,500 Units, subcutaneous, q8h CHELO  metoprolol, 5 mg, intravenous, q6h      Continuous medications  HYDROmorphone,   lactated Ringer's, 100 mL/hr, Last Rate: 100 mL/hr (01/11/24 0149)  ropivacaine (PF) in NS cmpd, 6 mL/hr, Last Rate: 6 mL/hr (01/10/24 1130)      PRN medications  PRN medications: naloxone, ondansetron     Physical Exam:  Constitutional:  no distress, alert and cooperative  Eyes: clear sclera  Head/Neck: No apparent injury, trachea midline  Respiratory/Thorax: Patent airways, thorax symmetric, breathing comfortably  Cardiovascular: no pitting edema  Gastrointestinal: Nondistended  Musculoskeletal: ROM intact  Extremities: no clubbing  Neurological: alert, jay x4  Psychological: Appropriate affect    Results for orders placed or performed during the hospital encounter of 01/10/24 (from the past 24 hour(s))   CBC   Result Value Ref Range    WBC 18.1 (H) 4.4 - 11.3 x10*3/uL    nRBC 0.0 0.0 - 0.0 /100 WBCs    RBC 3.81 (L) 4.00 - 5.20 x10*6/uL    Hemoglobin 11.5 (L) 12.0 - 16.0 g/dL    Hematocrit 34.8 (L) 36.0 - 46.0 %    MCV 91 80 - 100 fL    MCH 30.2 26.0 - 34.0 pg    MCHC 33.0 32.0 - 36.0 g/dL    RDW 19.0 (H) 11.5 - 14.5 %    Platelets 155 150 - 450 x10*3/uL   Comprehensive metabolic panel   Result Value Ref Range    Glucose 168 (H) 74 - 99 mg/dL    Sodium 143 136 - 145 mmol/L    Potassium 4.1 3.5 - 5.3 mmol/L    Chloride 110 (H) 98 - 107 mmol/L    Bicarbonate  24 21 - 32 mmol/L    Anion Gap 13 10 - 20 mmol/L    Urea Nitrogen 11 6 - 23 mg/dL    Creatinine 0.71 0.50 - 1.05 mg/dL    eGFR >90 >60 mL/min/1.73m*2    Calcium 9.0 8.6 - 10.6 mg/dL    Albumin 3.9 3.4 - 5.0 g/dL    Alkaline Phosphatase 77 33 - 110 U/L    Total Protein 5.5 (L) 6.4 - 8.2 g/dL     (H) 9 - 39 U/L    Bilirubin, Total 0.6 0.0 - 1.2 mg/dL    ALT 96 (H) 7 - 45 U/L   Blood Gas Arterial Full Panel   Result Value Ref Range    POCT pH, Arterial 7.28 (L) 7.38 - 7.42 pH    POCT pCO2, Arterial 45 (H) 38 - 42 mm Hg    POCT pO2, Arterial 170 (H) 85 - 95 mm Hg    POCT SO2, Arterial 100 94 - 100 %    POCT Oxy Hemoglobin, Arterial 97.4 94.0 - 98.0 %    POCT Hematocrit Calculated, Arterial 36.0 36.0 - 46.0 %    POCT Sodium, Arterial 137 136 - 145 mmol/L    POCT Potassium, Arterial 3.8 3.5 - 5.3 mmol/L    POCT Chloride, Arterial 108 (H) 98 - 107 mmol/L    POCT Ionized Calcium, Arterial 1.20 1.10 - 1.33 mmol/L    POCT Glucose, Arterial 152 (H) 74 - 99 mg/dL    POCT Lactate, Arterial 1.2 0.4 - 2.0 mmol/L    POCT Base Excess, Arterial -5.6 (L) -2.0 - 3.0 mmol/L    POCT HCO3 Calculated, Arterial 21.1 (L) 22.0 - 26.0 mmol/L    POCT Hemoglobin, Arterial 11.9 (L) 12.0 - 16.0 g/dL    POCT Anion Gap, Arterial 12 10 - 25 mmo/L    Patient Temperature 37.0 degrees Celsius    FiO2 99 %   BLOOD GAS ARTERIAL FULL PANEL   Result Value Ref Range    POCT pH, Arterial 7.33 (L) 7.38 - 7.42 pH    POCT pCO2, Arterial 43 (H) 38 - 42 mm Hg    POCT pO2, Arterial 131 (H) 85 - 95 mm Hg    POCT SO2, Arterial 99 94 - 100 %    POCT Oxy Hemoglobin, Arterial 96.5 94.0 - 98.0 %    POCT Hematocrit Calculated, Arterial 37.0 36.0 - 46.0 %    POCT Sodium, Arterial 136 136 - 145 mmol/L    POCT Potassium, Arterial 4.3 3.5 - 5.3 mmol/L    POCT Chloride, Arterial 108 (H) 98 - 107 mmol/L    POCT Ionized Calcium, Arterial 1.22 1.10 - 1.33 mmol/L    POCT Glucose, Arterial 168 (H) 74 - 99 mg/dL    POCT Lactate, Arterial 1.3 0.4 - 2.0 mmol/L    POCT Base  Excess, Arterial -3.2 (L) -2.0 - 3.0 mmol/L    POCT HCO3 Calculated, Arterial 22.7 22.0 - 26.0 mmol/L    POCT Hemoglobin, Arterial 12.4 12.0 - 16.0 g/dL    POCT Anion Gap, Arterial 10 10 - 25 mmo/L    Patient Temperature 37.0 degrees Celsius    FiO2 28 %      Shruthi Ramos is a 58 y.o. year old female patient who presents for Total gastrectomy with feeding tube placement with Dr. Chavarria. Acute Pain consulted for block for postoperative pain control.      Plan:     - B/L Quadratus lumborum blocks with catheters performed preoperatively on 1/10/24  - Ambit ball with Ropivacaine 0.2%/NaCl 0.9% 500mL, Rate 7 cc/hr bilaterally  - Ambit medication will not interfere with pain medication prescribed by the primary team.   - Please be aware of local anesthetic toxic dose and absorption variability before considering lidocaine patches  - Acute pain service will follow while catheters in place  - Rest of pain management per primary team     Acute Pain Resident  pg [phone] ph [phone]

## 2024-01-11 NOTE — CARE PLAN
The patient's goals for the shift include      The clinical goals for the shift include mobility    Over the shift, the patient did not make progress toward the following goals. Barriers to progression include becoming more alert after anesthesia. Recommendations to address these barriers include sleep hygiene.

## 2024-01-11 NOTE — SIGNIFICANT EVENT
Postoperative Check Note    Subjective  Shruthi Ramos is a 58 y.o. female who is now POD0 s/p Open Total Gastrectomy with Esophagogastroduodenoscopy (EGD), with Jejunostomy Tube Placement. Postoperatively, patient feels well, and denies fevers/chills, n/v, chest pain, shortness of breath. Feels her pain is well-controlled and appropriate for this time. Has no other concerns.    Objective  Vitals:  Visit Vitals  /86 (BP Location: Left arm, Patient Position: Lying)   Pulse 84   Temp 36.5 °C (97.7 °F)   Resp 14       Physical Exam:  GEN: No acute distress. Alert, awake and conversive  RESP: Breathing non-labored, equal chest rise. On NC.  CV: Regular rate, normotensive  GI: Abdomen soft, nondistended, appropriately tender for postoperative course. Midline laparotomy incision cdi, covered with dermabond. Right-sided 19F becky drain with serosanguinous output. Jejunostomy tube in place, no issues. Dressings are cdi, with minimal strikethrough present  : Flores in place with yellow urine.  MSK: No gross deformities. Moves all extremities spontaneously.  NEURO: Alert and oriented x3. No focal deficits.  PSYCH: Appropriate mood and affect.    Most recent labs:            11.5     18.1>-----<155              34.8     143  110  11                  ----------------<168     4.1  24  0.71            Mg 1.94         Assessment  Shruthi Ramos is a 58 y.o. female who is now POD0 s/p Open Total Gastrectomy with Esophagogastroduodenoscopy (EGD), with Jejunostomy Tube Placement .  Patient is in stable condition, appropriate for postoperative course. The plan is as follows:    - Will continue to optimize pain management, current pain regimen with PCA, IV tylenol, and Ropivacaine pumps  - NPO   -  LR @ 100 mL/hr     Will update day team in AM regarding exam. Will revisit patient on an as-needed basis.    Vito Ling DO  Department of Surgery / IR Integrated PGY1  Surgical Oncology [id number]

## 2024-01-11 NOTE — HH CARE COORDINATION
This referral has been made a Non Admit with  Home Care due to Patient's Home is Outside of [facility] Service Area. If you have further questions, feel free to reach out to our office at [phone]. Thank you, [facility] Intake.

## 2024-01-11 NOTE — PROGRESS NOTES
"Shruthi Ramos is a 58 y.o. female on day 1 of admission presenting with GE junction carcinoma (CMS/HCC). S/p total gastrectomy with D2 lymphadenectomy and Juany-en-Y reconstruction and placement of Jejunostomy feeding tube 1/10/24    Subjective   NAEO. Patient doing well this morning, endorsing some pain well controlled with PCA. Patient denies N/V, fevers, chills, chest pain, and shortness of breath.       Objective     Physical Exam  Constitutional:       General: She is not in acute distress.     Appearance: Normal appearance. She is not ill-appearing, toxic-appearing or diaphoretic.   HENT:      Head: Normocephalic and atraumatic.   Eyes:      General: No scleral icterus.     Pupils: Pupils are equal, round, and reactive to light.   Cardiovascular:      Rate and Rhythm: Normal rate and regular rhythm.   Pulmonary:      Effort: Pulmonary effort is normal. No respiratory distress.      Breath sounds: Normal breath sounds. No wheezing.   Abdominal:      General: There is no distension.      Palpations: Abdomen is soft.      Tenderness: There is abdominal tenderness. There is no guarding.      Comments: Soft, appropriately tender, nondistended. Incision c/d/I with staples on top. Penrose in place draining SS fluid on inferior portion of incision. Drain with SS drainage. J tube in place.   Musculoskeletal:         General: Normal range of motion.   Skin:     General: Skin is warm and dry.      Coloration: Skin is not jaundiced.      Findings: No rash.   Neurological:      Mental Status: She is alert and oriented to person, place, and time. Mental status is at baseline.   Psychiatric:         Mood and Affect: Mood normal.         Behavior: Behavior normal.         Judgment: Judgment normal.         Last Recorded Vitals  Blood pressure 115/78, pulse 82, temperature 36.8 °C (98.2 °F), temperature source Temporal, resp. rate 14, height 1.549 m (5' 1\"), weight 99.6 kg (219 lb 9.3 oz), SpO2 93 %.  Intake/Output last 3 " Shifts:  I/O last 3 completed shifts:  In: 1601 (16.1 mL/kg) [I.V.:801 (8 mL/kg); IV Piggyback:800]  Out: 1500 (15.1 mL/kg) [Urine:1125 (0.3 mL/kg/hr); Drains:175; Blood:200]  Weight: 99.6 kg     Relevant Results  Results for orders placed or performed during the hospital encounter of 01/10/24 (from the past 24 hour(s))   CBC   Result Value Ref Range    WBC 18.1 (H) 4.4 - 11.3 x10*3/uL    nRBC 0.0 0.0 - 0.0 /100 WBCs    RBC 3.81 (L) 4.00 - 5.20 x10*6/uL    Hemoglobin 11.5 (L) 12.0 - 16.0 g/dL    Hematocrit 34.8 (L) 36.0 - 46.0 %    MCV 91 80 - 100 fL    MCH 30.2 26.0 - 34.0 pg    MCHC 33.0 32.0 - 36.0 g/dL    RDW 19.0 (H) 11.5 - 14.5 %    Platelets 155 150 - 450 x10*3/uL   Comprehensive metabolic panel   Result Value Ref Range    Glucose 168 (H) 74 - 99 mg/dL    Sodium 143 136 - 145 mmol/L    Potassium 4.1 3.5 - 5.3 mmol/L    Chloride 110 (H) 98 - 107 mmol/L    Bicarbonate 24 21 - 32 mmol/L    Anion Gap 13 10 - 20 mmol/L    Urea Nitrogen 11 6 - 23 mg/dL    Creatinine 0.71 0.50 - 1.05 mg/dL    eGFR >90 >60 mL/min/1.73m*2    Calcium 9.0 8.6 - 10.6 mg/dL    Albumin 3.9 3.4 - 5.0 g/dL    Alkaline Phosphatase 77 33 - 110 U/L    Total Protein 5.5 (L) 6.4 - 8.2 g/dL     (H) 9 - 39 U/L    Bilirubin, Total 0.6 0.0 - 1.2 mg/dL    ALT 96 (H) 7 - 45 U/L   Blood Gas Arterial Full Panel   Result Value Ref Range    POCT pH, Arterial 7.28 (L) 7.38 - 7.42 pH    POCT pCO2, Arterial 45 (H) 38 - 42 mm Hg    POCT pO2, Arterial 170 (H) 85 - 95 mm Hg    POCT SO2, Arterial 100 94 - 100 %    POCT Oxy Hemoglobin, Arterial 97.4 94.0 - 98.0 %    POCT Hematocrit Calculated, Arterial 36.0 36.0 - 46.0 %    POCT Sodium, Arterial 137 136 - 145 mmol/L    POCT Potassium, Arterial 3.8 3.5 - 5.3 mmol/L    POCT Chloride, Arterial 108 (H) 98 - 107 mmol/L    POCT Ionized Calcium, Arterial 1.20 1.10 - 1.33 mmol/L    POCT Glucose, Arterial 152 (H) 74 - 99 mg/dL    POCT Lactate, Arterial 1.2 0.4 - 2.0 mmol/L    POCT Base Excess, Arterial -5.6 (L) -2.0  - 3.0 mmol/L    POCT HCO3 Calculated, Arterial 21.1 (L) 22.0 - 26.0 mmol/L    POCT Hemoglobin, Arterial 11.9 (L) 12.0 - 16.0 g/dL    POCT Anion Gap, Arterial 12 10 - 25 mmo/L    Patient Temperature 37.0 degrees Celsius    FiO2 99 %   BLOOD GAS ARTERIAL FULL PANEL   Result Value Ref Range    POCT pH, Arterial 7.33 (L) 7.38 - 7.42 pH    POCT pCO2, Arterial 43 (H) 38 - 42 mm Hg    POCT pO2, Arterial 131 (H) 85 - 95 mm Hg    POCT SO2, Arterial 99 94 - 100 %    POCT Oxy Hemoglobin, Arterial 96.5 94.0 - 98.0 %    POCT Hematocrit Calculated, Arterial 37.0 36.0 - 46.0 %    POCT Sodium, Arterial 136 136 - 145 mmol/L    POCT Potassium, Arterial 4.3 3.5 - 5.3 mmol/L    POCT Chloride, Arterial 108 (H) 98 - 107 mmol/L    POCT Ionized Calcium, Arterial 1.22 1.10 - 1.33 mmol/L    POCT Glucose, Arterial 168 (H) 74 - 99 mg/dL    POCT Lactate, Arterial 1.3 0.4 - 2.0 mmol/L    POCT Base Excess, Arterial -3.2 (L) -2.0 - 3.0 mmol/L    POCT HCO3 Calculated, Arterial 22.7 22.0 - 26.0 mmol/L    POCT Hemoglobin, Arterial 12.4 12.0 - 16.0 g/dL    POCT Anion Gap, Arterial 10 10 - 25 mmo/L    Patient Temperature 37.0 degrees Celsius    FiO2 28 %   Comprehensive metabolic panel   Result Value Ref Range    Glucose 124 (H) 74 - 99 mg/dL    Sodium 139 136 - 145 mmol/L    Potassium 4.5 3.5 - 5.3 mmol/L    Chloride 106 98 - 107 mmol/L    Bicarbonate 21 21 - 32 mmol/L    Anion Gap 17 10 - 20 mmol/L    Urea Nitrogen 11 6 - 23 mg/dL    Creatinine 0.63 0.50 - 1.05 mg/dL    eGFR >90 >60 mL/min/1.73m*2    Calcium 9.0 8.6 - 10.6 mg/dL    Albumin 3.7 3.4 - 5.0 g/dL    Alkaline Phosphatase 78 33 - 110 U/L    Total Protein 5.7 (L) 6.4 - 8.2 g/dL    AST 88 (H) 9 - 39 U/L    Bilirubin, Total 0.7 0.0 - 1.2 mg/dL    ALT 74 (H) 7 - 45 U/L   Phosphorus   Result Value Ref Range    Phosphorus 4.2 2.5 - 4.9 mg/dL         Assessment/Plan   Principal Problem:    GE junction carcinoma (CMS/HCC)  Active Problems:    Class 3 severe obesity due to excess calories in adult  (CMS/HCC)    Shruthi Ramos is a 58 y.o. 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 s/p total gastrectomy with D2 lymphadenectomy and Juany-en-Y reconstruction and placement of Jejunostomy feeding tube 1/10/24    Plan    Neuro:   - pain control with IV tylenol, PCA  - pain blocks per anestehsia    CV/pulm:  - continue home metoprolol  - IS  - OOB as tolerated    GI:  - NPO, possible trickle feeds this PM  - UGI with SBFT on POD5 (1/15)  - PPI  - drain amylase tomorrow     :   - voiding via verma  - LR@100  - daily labs, replete lytes PRN  - I&Os    Endo:  - no indication for sliding scale insulin    Heme:  - Hgb stable, no indications for transfusions    ID:  - no indications for abx    DVT ppx:  - lovenox   - SCDs in place    Dispo: continue management on RNF    Seen and discussed with chief Dr. Hernandez and attending Dr. Deon Mendenhall MD  General Surgery PGY2  Surgical oncology [id number]

## 2024-01-11 NOTE — SIGNIFICANT EVENT
BAT     01/11/24 1211   Onset Documentation   Rapid Response Initiated By RN   Pager Time [phone]   Arrival Time 1215   Event End Time 1230   Primary Reason for Call BAT - see stroke narrator;Staff concern     BAT called for pt with altered mentation- upon arrival neurology at the bedside for pt exam. Pt resting in bed- s/p surgery. Pt awake and following commands-no focal deficits. Pt is altered with mentation-doesn't know where she is unless promted-giving her  name and not recognizing ? Memory issues? Or short term memory loss. Neurology and primary team at the bedside to evaluate. BAT cancelled and plan to closely watch as feels may be effect of anesthesia. Pt ok to remain on the floor at this time.

## 2024-01-11 NOTE — SIGNIFICANT EVENT
"- 58 year old female with PMHx Gastric cancer, who is status post Gastric bypass, PEG placement yesterday.  - BAT was called at 12:09 for confusion LKN 10 AM  - On arrival, /69 POCG 124 earlier in AM. Per RN, pt was at at her baseline at 10AM, where she is A&Ox4 and is at her normal baseline, when the pt was seen again at ~ 12 noon, she was oriented only to self and appeared confused as to where she was and did not recognize her  as well.   - pt not on AC or AP  - NIHSS 2 (2 for Questions), there was no VAN or Focal deficits. Pt was able to follow commands and engage in conversations with intact expression and comprehension. She states she is at [facility], not sure why she is here, does not remember that she got the surgery, and could not state what was the last thing she remembers. When asked about her  she stated\" he says he is my  but I do not remember him\". She also was not able to know her age or her .    - per chart review, noted that patient completely improved to baseline shortly after the BAT was canceld.    - Not a candidate for TNK/MT given low NIHSS, no VAN Sx, and low suspicion for stroke, No indication for CT head.  - BAT canceled at 12:30  - Continue to monitor patient, consider anesthesia related complications.      Britney Judd  PGY2 Neurology  Gen Neuro Pager: [phone]  Stroke Pager: [phone]      "

## 2024-01-11 NOTE — SIGNIFICANT EVENT
"Paged by nurse at 1200 that patient was A&Ox1 to self    At bedside, patient was confused and did not recognize  in room or remember she had surgery. She also said her name was \"Edge\" which was her maiden name. Patient denied any other symptoms including pain, N/V, fevers, chills, chest pain, or shortness of breath. Of note, at this point she had only pushed her PCA button once overnight.     Physical exam showed a soft, nontender, nondistended abdomen. No focal neurological deficits. Equal strength bilaterally.     Vitals stable  T 36.9  HR 81  /73  O2 93% on RA    Labs from AM were unconcerning         Discussion was had with anesthesia team where they recommended calling a code stroke in case it was a \"hidden stroke.\"     Stroke team at bedside had low concern for stroke and did not recommend a CT head since she was having amnestic symptoms rather than stroke symptoms.     Decision was made to continue with supportive care  - frequent neuro checks  - keep on telemetry for close monitoring of vital signs  - add IV toradol for pain control        Update:   1230 patient was back to baseline, A&Ox4, mentating appropriately. Patient did not remember the previous 30 minutes. Last thing she recalled was her daughter dropping items off to the car which was appropriate according to .     - continue frequent monitoring     Patient discussed with attending Dr. Deon Mendenhall MD  General Surgery PGY2  Surgical oncology [id number]  "

## 2024-01-12 LAB
ALBUMIN SERPL BCP-MCNC: 2.9 G/DL (ref 3.4–5)
ALP SERPL-CCNC: 57 U/L (ref 33–110)
ALT SERPL W P-5'-P-CCNC: 42 U/L (ref 7–45)
AMYLASE FLD-CCNC: 2332 U/L
AMYLASE FLD-CCNC: 3109 U/L
ANION GAP SERPL CALC-SCNC: 10 MMOL/L (ref 10–20)
AST SERPL W P-5'-P-CCNC: 44 U/L (ref 9–39)
BILIRUB SERPL-MCNC: 0.5 MG/DL (ref 0–1.2)
BUN SERPL-MCNC: 13 MG/DL (ref 6–23)
CALCIUM SERPL-MCNC: 8.4 MG/DL (ref 8.6–10.6)
CHLORIDE SERPL-SCNC: 107 MMOL/L (ref 98–107)
CO2 SERPL-SCNC: 28 MMOL/L (ref 21–32)
CREAT SERPL-MCNC: 0.62 MG/DL (ref 0.5–1.05)
EGFRCR SERPLBLD CKD-EPI 2021: >90 ML/MIN/1.73M*2
ERYTHROCYTE [DISTWIDTH] IN BLOOD BY AUTOMATED COUNT: 19.2 % (ref 11.5–14.5)
GLUCOSE SERPL-MCNC: 73 MG/DL (ref 74–99)
HCT VFR BLD AUTO: 28.1 % (ref 36–46)
HGB BLD-MCNC: 9.5 G/DL (ref 12–16)
MAGNESIUM SERPL-MCNC: 1.77 MG/DL (ref 1.6–2.4)
MCH RBC QN AUTO: 30.8 PG (ref 26–34)
MCHC RBC AUTO-ENTMCNC: 33.8 G/DL (ref 32–36)
MCV RBC AUTO: 91 FL (ref 80–100)
NRBC BLD-RTO: 0 /100 WBCS (ref 0–0)
PHOSPHATE SERPL-MCNC: 2.6 MG/DL (ref 2.5–4.9)
PLATELET # BLD AUTO: 166 X10*3/UL (ref 150–450)
POTASSIUM SERPL-SCNC: 3.5 MMOL/L (ref 3.5–5.3)
PROT SERPL-MCNC: 4.4 G/DL (ref 6.4–8.2)
RBC # BLD AUTO: 3.08 X10*6/UL (ref 4–5.2)
SODIUM SERPL-SCNC: 141 MMOL/L (ref 136–145)
WBC # BLD AUTO: 12.7 X10*3/UL (ref 4.4–11.3)

## 2024-01-12 PROCEDURE — 83735 ASSAY OF MAGNESIUM: CPT | Performed by: NURSE PRACTITIONER

## 2024-01-12 PROCEDURE — 2500000004 HC RX 250 GENERAL PHARMACY W/ HCPCS (ALT 636 FOR OP/ED)

## 2024-01-12 PROCEDURE — 1200000002 HC GENERAL ROOM WITH TELEMETRY DAILY

## 2024-01-12 PROCEDURE — 84100 ASSAY OF PHOSPHORUS: CPT

## 2024-01-12 PROCEDURE — 82150 ASSAY OF AMYLASE: CPT

## 2024-01-12 PROCEDURE — 2500000004 HC RX 250 GENERAL PHARMACY W/ HCPCS (ALT 636 FOR OP/ED): Performed by: STUDENT IN AN ORGANIZED HEALTH CARE EDUCATION/TRAINING PROGRAM

## 2024-01-12 PROCEDURE — 80053 COMPREHEN METABOLIC PANEL: CPT | Performed by: NURSE PRACTITIONER

## 2024-01-12 PROCEDURE — 85027 COMPLETE CBC AUTOMATED: CPT | Performed by: NURSE PRACTITIONER

## 2024-01-12 PROCEDURE — 99231 SBSQ HOSP IP/OBS SF/LOW 25: CPT

## 2024-01-12 PROCEDURE — 97162 PT EVAL MOD COMPLEX 30 MIN: CPT | Mod: GP

## 2024-01-12 PROCEDURE — 99232 SBSQ HOSP IP/OBS MODERATE 35: CPT | Performed by: STUDENT IN AN ORGANIZED HEALTH CARE EDUCATION/TRAINING PROGRAM

## 2024-01-12 RX ORDER — POTASSIUM CHLORIDE 14.9 MG/ML
20 INJECTION INTRAVENOUS
Status: DISCONTINUED | OUTPATIENT
Start: 2024-01-12 | End: 2024-01-12

## 2024-01-12 RX ORDER — MAGNESIUM SULFATE HEPTAHYDRATE 40 MG/ML
2 INJECTION, SOLUTION INTRAVENOUS ONCE
Status: DISCONTINUED | OUTPATIENT
Start: 2024-01-12 | End: 2024-01-12

## 2024-01-12 RX ORDER — POTASSIUM CHLORIDE 14.9 MG/ML
20 INJECTION INTRAVENOUS
Status: COMPLETED | OUTPATIENT
Start: 2024-01-12 | End: 2024-01-12

## 2024-01-12 RX ORDER — ACETAMINOPHEN 10 MG/ML
1000 INJECTION, SOLUTION INTRAVENOUS EVERY 6 HOURS SCHEDULED
Status: COMPLETED | OUTPATIENT
Start: 2024-01-12 | End: 2024-01-13

## 2024-01-12 RX ORDER — MAGNESIUM SULFATE HEPTAHYDRATE 40 MG/ML
2 INJECTION, SOLUTION INTRAVENOUS ONCE
Status: COMPLETED | OUTPATIENT
Start: 2024-01-12 | End: 2024-01-12

## 2024-01-12 RX ADMIN — ACETAMINOPHEN 1000 MG: 10 INJECTION INTRAVENOUS at 18:07

## 2024-01-12 RX ADMIN — HEPARIN SODIUM 7500 UNITS: 5000 INJECTION INTRAVENOUS; SUBCUTANEOUS at 00:05

## 2024-01-12 RX ADMIN — HEPARIN SODIUM 7500 UNITS: 5000 INJECTION INTRAVENOUS; SUBCUTANEOUS at 21:59

## 2024-01-12 RX ADMIN — HEPARIN SODIUM 7500 UNITS: 5000 INJECTION INTRAVENOUS; SUBCUTANEOUS at 14:52

## 2024-01-12 RX ADMIN — ONDANSETRON 8 MG: 2 INJECTION INTRAMUSCULAR; INTRAVENOUS at 22:04

## 2024-01-12 RX ADMIN — POTASSIUM CHLORIDE 20 MEQ: 14.9 INJECTION, SOLUTION INTRAVENOUS at 10:53

## 2024-01-12 RX ADMIN — ACETAMINOPHEN 1000 MG: 10 INJECTION INTRAVENOUS at 12:42

## 2024-01-12 RX ADMIN — SODIUM CHLORIDE, POTASSIUM CHLORIDE, SODIUM LACTATE AND CALCIUM CHLORIDE 100 ML/HR: 600; 310; 30; 20 INJECTION, SOLUTION INTRAVENOUS at 09:35

## 2024-01-12 RX ADMIN — ONDANSETRON 8 MG: 2 INJECTION INTRAMUSCULAR; INTRAVENOUS at 00:05

## 2024-01-12 RX ADMIN — MAGNESIUM SULFATE HEPTAHYDRATE 2 G: 40 INJECTION, SOLUTION INTRAVENOUS at 10:54

## 2024-01-12 RX ADMIN — HEPARIN SODIUM 7500 UNITS: 5000 INJECTION INTRAVENOUS; SUBCUTANEOUS at 05:53

## 2024-01-12 RX ADMIN — ACETAMINOPHEN 1000 MG: 10 INJECTION INTRAVENOUS at 06:30

## 2024-01-12 RX ADMIN — KETOROLAC TROMETHAMINE 30 MG: 30 INJECTION, SOLUTION INTRAMUSCULAR; INTRAVENOUS at 00:05

## 2024-01-12 RX ADMIN — KETOROLAC TROMETHAMINE 30 MG: 30 INJECTION, SOLUTION INTRAMUSCULAR; INTRAVENOUS at 05:53

## 2024-01-12 RX ADMIN — POTASSIUM CHLORIDE 20 MEQ: 14.9 INJECTION, SOLUTION INTRAVENOUS at 12:41

## 2024-01-12 RX ADMIN — KETOROLAC TROMETHAMINE 30 MG: 30 INJECTION, SOLUTION INTRAMUSCULAR; INTRAVENOUS at 12:42

## 2024-01-12 ASSESSMENT — PAIN SCALES - GENERAL
PAINLEVEL_OUTOF10: 0 - NO PAIN
PAINLEVEL_OUTOF10: 0 - NO PAIN

## 2024-01-12 ASSESSMENT — COGNITIVE AND FUNCTIONAL STATUS - GENERAL
STANDING UP FROM CHAIR USING ARMS: A LITTLE
MOVING TO AND FROM BED TO CHAIR: A LITTLE
TURNING FROM BACK TO SIDE WHILE IN FLAT BAD: A LITTLE
WALKING IN HOSPITAL ROOM: A LITTLE
MOBILITY SCORE: 19
CLIMB 3 TO 5 STEPS WITH RAILING: A LITTLE

## 2024-01-12 ASSESSMENT — PAIN - FUNCTIONAL ASSESSMENT
PAIN_FUNCTIONAL_ASSESSMENT: 0-10
PAIN_FUNCTIONAL_ASSESSMENT: 0-10

## 2024-01-12 NOTE — PROGRESS NOTES
Shruthi Ramos is a 58 y.o. female on day 2 of admission presenting with GE junction carcinoma (CMS/HCC). S/p total gastrectomy with D2 lymphadenectomy and Juany-en-Y reconstruction and placement of Jejunostomy feeding tube 1/10/24    Subjective   Yesterday, patient had an amnestic event lasting 30 minutes for which a code BAT was called; no concerns from stroke team.     Patient doing well this morning, pain improving. Was able to get OOB yesterday. Patient denies N/V, fevers, chills, chest pain, and shortness of breath.       Objective     Physical Exam  Constitutional:       General: She is not in acute distress.     Appearance: Normal appearance. She is not ill-appearing, toxic-appearing or diaphoretic.   HENT:      Head: Normocephalic and atraumatic.   Eyes:      General: No scleral icterus.     Pupils: Pupils are equal, round, and reactive to light.   Cardiovascular:      Rate and Rhythm: Normal rate and regular rhythm.   Pulmonary:      Effort: Pulmonary effort is normal. No respiratory distress.      Breath sounds: Normal breath sounds. No wheezing.   Abdominal:      General: There is no distension.      Palpations: Abdomen is soft.      Tenderness: There is no abdominal tenderness. There is no guarding.      Comments: Soft, appropriately tender, nondistended. Incision c/d/I with staples on top. Penrose in place draining SS fluid on inferior portion of incision. Drain with light brown drainage, lighter than yesterday. J tube in place.   Musculoskeletal:         General: Normal range of motion.   Skin:     General: Skin is warm and dry.      Coloration: Skin is not jaundiced.      Findings: No rash.   Neurological:      Mental Status: She is alert and oriented to person, place, and time. Mental status is at baseline.   Psychiatric:         Mood and Affect: Mood normal.         Behavior: Behavior normal.         Judgment: Judgment normal.         Last Recorded Vitals  Blood pressure 105/69, pulse 80,  "temperature 36.2 °C (97.2 °F), temperature source Temporal, resp. rate 18, height 1.549 m (5' 1\"), weight 99.6 kg (219 lb 9.3 oz), SpO2 98 %.  Intake/Output last 3 Shifts:  I/O last 3 completed shifts:  In: 3026.2 (30.4 mL/kg) [I.V.:2370.2 (23.8 mL/kg); NG/GT:71; IV Piggyback:400]  Out: 1680 (16.9 mL/kg) [Urine:1430 (0.4 mL/kg/hr); Drains:250]  Weight: 99.6 kg     Relevant Results  Results for orders placed or performed during the hospital encounter of 01/10/24 (from the past 24 hour(s))   CBC   Result Value Ref Range    WBC 20.7 (H) 4.4 - 11.3 x10*3/uL    nRBC 0.0 0.0 - 0.0 /100 WBCs    RBC 3.68 (L) 4.00 - 5.20 x10*6/uL    Hemoglobin 11.0 (L) 12.0 - 16.0 g/dL    Hematocrit 32.8 (L) 36.0 - 46.0 %    MCV 89 80 - 100 fL    MCH 29.9 26.0 - 34.0 pg    MCHC 33.5 32.0 - 36.0 g/dL    RDW 18.8 (H) 11.5 - 14.5 %    Platelets 185 150 - 450 x10*3/uL   Amylase, Fluid   Result Value Ref Range    Amylase, Fluid 3,109 Not established. U/L   Amylase, Fluid   Result Value Ref Range    Amylase, Fluid 2,332 Not established. U/L         Assessment/Plan   Principal Problem:    GE junction carcinoma (CMS/HCC)  Active Problems:    Class 3 severe obesity due to excess calories in adult (CMS/HCC)    Shruthi Ramos is a 58 y.o. 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 s/p total gastrectomy with D2 lymphadenectomy and Juany-en-Y reconstruction and placement of Jejunostomy feeding tube 1/10/24    Plan    Neuro:   - pain control with IV tylenol, PCA  - pain blocks per anestehsia    CV/pulm:  - continue home metoprolol  - IS  - OOB as tolerated    GI:  - NPO  - nutrition recs for tube feeds   - Vital 1.5 @ 10mL/hr & increase by 10mL q 6-8hrs until goal rate of 50mL/hr   - UGI with SBFT on POD5 (1/15)  - PPI  - drain amylase tomorrow     :   - DC KALPANA verma  - LR@100  - daily labs, replete lytes PRN  - I&Os    Endo:  - no indication for sliding scale insulin    Heme:  - Hgb stable, no " indications for transfusions    ID:  - no indications for abx    DVT ppx:  - lovenox   - SCDs in place    Dispo: continue management on RNF    Seen and discussed with chief Dr. Hernandez and attending Dr. Deon Mendenhall MD  General Surgery PGY2  Surgical oncology [id number]

## 2024-01-12 NOTE — PROGRESS NOTES
Shruthi Ramos is a 58 y.o. year old female patient who presents for Total gastrectomy with feeding tube placement with Dr. Chavarria. Acute Pain consulted for block for postoperative pain control.     Postop Pain HPI -   Palliative: relieved with IV analgesics and regional local anesthetics  Provocative: movement  Quality:  burning and aching  Radiation:  none  Severity:  0/10  Timing: constant    24-HOUR OPIOID CONSUMPTION:  Dilaudid PCA 7.4mg    Scheduled medications  acetaminophen, 1,000 mg, intravenous, q6h CHELO  heparin (porcine), 7,500 Units, subcutaneous, q8h CHELO  ketorolac, 30 mg, intravenous, q6h  metoprolol, 5 mg, intravenous, q6h      Continuous medications  HYDROmorphone,   lactated Ringer's, 100 mL/hr, Last Rate: 100 mL/hr (01/11/24 1820)  ropivacaine (PF) in NS cmpd, 6 mL/hr, Last Rate: 6 mL/hr (01/11/24 1232)      PRN medications  PRN medications: naloxone, ondansetron     Physical Exam:  Constitutional:  no distress, alert and cooperative  Eyes: clear sclera  Head/Neck: No apparent injury, trachea midline  Respiratory/Thorax: Patent airways, thorax symmetric, breathing comfortably  Cardiovascular: no pitting edema  Gastrointestinal: Nondistended  Musculoskeletal: ROM intact  Extremities: no clubbing  Neurological: alert, jay x4  Psychological: Appropriate affect    Results for orders placed or performed during the hospital encounter of 01/10/24 (from the past 24 hour(s))   CBC   Result Value Ref Range    WBC 20.7 (H) 4.4 - 11.3 x10*3/uL    nRBC 0.0 0.0 - 0.0 /100 WBCs    RBC 3.68 (L) 4.00 - 5.20 x10*6/uL    Hemoglobin 11.0 (L) 12.0 - 16.0 g/dL    Hematocrit 32.8 (L) 36.0 - 46.0 %    MCV 89 80 - 100 fL    MCH 29.9 26.0 - 34.0 pg    MCHC 33.5 32.0 - 36.0 g/dL    RDW 18.8 (H) 11.5 - 14.5 %    Platelets 185 150 - 450 x10*3/uL   Amylase, Fluid   Result Value Ref Range    Amylase, Fluid 3,109 Not established. U/L   Amylase, Fluid   Result Value Ref Range    Amylase, Fluid 2,332 Not established. U/L       Shruthi Ramos is a 58 y.o. year old female patient who presents for Total gastrectomy with feeding tube placement with Dr. Chavarria. Acute Pain consulted for block for postoperative pain control.      Plan:     - B/L Quadratus lumborum blocks with catheters performed preoperatively on 1/10/24  - Ambit ball with Ropivacaine 0.2%/NaCl 0.9% 500mL, Rate 7 cc/hr bilaterally  - Ambit medication will not interfere with pain medication prescribed by the primary team.   - Please be aware of local anesthetic toxic dose and absorption variability before considering lidocaine patches  - Acute pain service will follow while catheters in place  - Ambit to be refilled today  - Rest of pain management per primary team     Acute Pain Resident  pg [phone] ph [phone]

## 2024-01-12 NOTE — PROGRESS NOTES
01/11/24 1100   Discharge Planning   Living Arrangements Spouse/significant other   Support Systems Spouse/significant other   Assistance Needed none   Type of Residence Private residence   Number of Stairs to Enter Residence 5   Number of Stairs Within Residence 12   Do you have animals or pets at home? Yes   Type of Animals or Pets 0   Who is requesting discharge planning? Provider   Home or Post Acute Services In home services   Type of Home Care Services Home nursing visits;Home PT   Patient expects to be discharged to: home   Does the patient need discharge transport arranged? No     TCC Note    Plan per Medical/Surgical Team:   day 1 of admission presenting with GE junction carcinoma (CMS/HCC). S/p total gastrectomy with D2 lymphadenectomy and Juany-en-Y reconstruction and placement of Jejunostomy feeding tube    Status: inpatient   Payor Source: [employer]   Discharge disposition: home   Expected date of discharge: 1/17  Barriers:  Primary Oncologist: Dr. Roger Cheung   Preferred home care agency:    TCC met with patient at bedside to discuss anticipated discharge needs. Patient states she was independent with ADLs prior to admission. Demographics and insurance verified with patient. Patient live at home with her  and has not used home health care. Patient agreeable for TCC to send out blanket of referral to home health agencies. Patient will need home care and TF supplies. Will continue to follow patient for any discharge needs.     1/12 332pm TCC sent out more home care referrals for home health agency. No accepting [facility] agency. TCC will continue to follow patient for discharge needs.      01/12/24 at 3:50 PM - PRABHU TRINIDAD RN

## 2024-01-12 NOTE — PROGRESS NOTES
Physical Therapy    Physical Therapy Evaluation & Treatment    Patient Name: Shruthi Ramos  MRN: [id number]  Today's Date: 1/12/2024   Time Calculation  Start Time: 1251  Stop Time: 1325  Time Calculation (min): 34 min    Assessment/Plan   PT Assessment  PT Assessment Results: Decreased strength, Decreased endurance, Impaired balance  Rehab Prognosis: Excellent  Medical Staff Made Aware: Yes  End of Session Communication: Bedside nurse  Assessment Comment: Pt is a 58 y.o female who presents to PT with decreased strength, balance, and endurance.  Pt would benefit from skilled PT to address the above deficits and would benefit from low intensity PT upon discharge but may progress to no PT needs upon discharge.  End of Session Patient Position: Up in chair, Alarm off, not on at start of session (RN cleared pt to be without chair alarm)   IP OR SWING BED PT PLAN  Inpatient or Swing Bed: Inpatient  PT Plan  Treatment/Interventions: Bed mobility, Transfer training, Gait training, Stair training, Balance training, Strengthening, Therapeutic exercise, Therapeutic activity, Home exercise program  PT Plan: Skilled PT  PT Frequency: 4 times per week  PT Discharge Recommendations: Low intensity level of continued care (may progress to no needs)  PT Recommended Transfer Status: Stand by assist  PT - OK to Discharge: Yes (eval complete and discharge recommendations made)      Subjective     General Visit Information:  General  Reason for Referral: S/p total gastrectomy with D2 lymphadenectomy and Juany-en-Y reconstruction and placement of Jejunostomy feeding tube 1/10/24  Past Medical History Relevant to Rehab: Per chart, PMH includes hypertension, hyperlipidemia, anxiety and depression.  Family/Caregiver Present: Yes (supportive )  Prior to Session Communication: Bedside nurse  Patient Position Received: Bed, 2 rail up, Alarm on  General Comment: Pt cleared for PT by RN.  Pt is alert and agreeable to PT.  Home  Living:  Home Living  Type of Home: House  Lives With: Spouse  Home Adaptive Equipment: None  Home Layout: Two level, Able to live on main level with bedroom/bathroom (pt lives on 1st floor only)  Home Access: Stairs to enter with rails  Entrance Stairs-Rails: Both  Entrance Stairs-Number of Steps: 5  Prior Level of Function:  Prior Function Per Pt/Caregiver Report  Level of [place]: Independent with ADLs and functional transfers, Independent with homemaking with ambulation  Precautions:  Precautions  Medical Precautions: Fall precautions, Cardiac precautions, Abdominal precautions  Post-Surgical Precautions: Abdominal surgery precautions  Vital Signs:  Vital Signs  Heart Rate:  (pre:96, after amb:130, post:95)  Heart Rate Source: Monitor    Objective   Pain:  Pain Assessment  Pain Assessment: 0-10  Pain Score: 0 - No pain  Cognition:  Cognition  Overall Cognitive Status: Within Functional Limits  Orientation Level: Oriented X4    General Assessments:  Activity Tolerance  Endurance: Tolerates 10 - 20 min exercise with multiple rests    Strength  Strength Comments: B LE strength grossly 4+/5. B hip flexion NT due to abdominal surgery  Coordination  Movements are Fluid and Coordinated: Yes    Postural Control  Posture Comment: WFL    Static Sitting Balance  Static Sitting-Balance Support: Feet unsupported, Bilateral upper extremity supported  Static Sitting-Level of Assistance: Independent  Dynamic Sitting Balance  Dynamic Sitting-Balance Support: Bilateral upper extremity supported, Feet unsupported  Dynamic Sitting-Balance: Forward lean, Lateral lean  Dynamic Sitting-Comments: Independent    Static Standing Balance  Static Standing-Balance Support: No upper extremity supported  Static Standing-Level of Assistance: Close supervision  Dynamic Standing Balance  Dynamic Standing-Balance Support: No upper extremity supported  Dynamic Standing-Balance: Turning  Dynamic Standing-Comments: SBA  Functional  Assessments:  Bed Mobility  Bed Mobility: Yes  Bed Mobility 1  Bed Mobility 1: Supine to sitting, Log roll  Level of Assistance 1: Contact guard    Transfers  Transfer: Yes  Transfer 1  Transfer From 1: Bed to  Transfer to 1: Stand  Technique 1: Sit to stand  Transfer Level of Assistance 1: Close supervision  Transfers 2  Transfer From 2: Chair with arms to  Transfer to 2: Stand  Technique 2: Sit to stand, Stand to sit  Transfer Level of Assistance 2: Close supervision    Ambulation/Gait Training  Ambulation/Gait Training Performed: Yes  Ambulation/Gait Training 1  Surface 1: Level tile  Device 1: No device  Assistance 1: Close supervision, Contact guard  Quality of Gait 1: Decreased step length, Inconsistent stride length, Wide base of support  Comments/Distance (ft) 1: 70ft with SBA, 70ft with CGA due to pt stating she felt unsturdy however no LOB observed.  No rest break.    Stairs  Stairs: No  Treatments:  Therapeutic Exercise  Therapeutic Exercise Performed: Yes  Therapeutic Exercise Activity 1: Seated marches, LAQ, ankle pumps x10 each LE.  Outcome Measures:  [facility] Basic Mobility  Turning from your back to your side while in a flat bed without using bedrails: None  Moving from lying on your back to sitting on the side of a flat bed without using bedrails: A little  Moving to and from bed to chair (including a wheelchair): A little  Standing up from a chair using your arms (e.g. wheelchair or bedside chair): A little  To walk in hospital room: A little  Climbing 3-5 steps with railing: A little  Basic Mobility - Total Score: 19    Tinetti  Sitting Balance: Steady, safe  Arises: Able, uses arms to help  Attempts to Arise: Able to arise, one attempt  Immediate Standing Balance (First 5 Seconds): Steady without walker or other support  Standing Balance: Narrow stance without support  Nudged: Steady without walker or other support  Eyes Closed: Steady  Turned 360 Degrees: Steadiness: Steady  Turned 360 Degrees:  Continuity of Steps: Continuous  Sitting Down: Uses arms or not a smooth motion  Balance Score: 14  Initiation of Gait: No hesitancy  Step Height: R Swing Foot: Right foot complete clears floor  Step Length: R Swing Foot: Passes left stance foot  Step Height: L Swing Foot: Left foot complete clears floor  Step Length: L Swing Foot: Does not pass right stance foot with step  Step Symmetry: Right and left step length not equal (Estimate)  Step Continuity: Steps appear continuous  Path: Straight without walking aid  Trunk: No sway, no flexion, no use of arms, no walking aid  Walking Time: Heels apart  Gait Score: 9  Total Score: 23    Encounter Problems       Encounter Problems (Active)       Balance       Pt will score >/=24 on the Tinetti to indicate low fall risk  (Progressing)       Start:  01/12/24    Expected End:  01/26/24               Mobility       Pt will complete bed mobility independently utilizing log roll technique (Progressing)       Start:  01/12/24    Expected End:  01/26/24            Pt will amb >200ft with SBA in prep for safe discharge home  (Progressing)       Start:  01/12/24    Expected End:  01/26/24            Pt will a/descend 5 steps with B rails and SBA in prep for safe home entry (Progressing)       Start:  01/12/24    Expected End:  01/26/24                   Education Documentation  Precautions, taught by Asha Wang PT at 1/12/2024  2:01 PM.  Learner: Patient  Readiness: Acceptance  Method: Explanation  Response: Verbalizes Understanding  Comment: Abdominal precautions    Home Exercise Program, taught by Asha Wang PT at 1/12/2024  2:01 PM.  Learner: Patient  Readiness: Acceptance  Method: Explanation  Response: Verbalizes Understanding  Comment: Abdominal precautions    Mobility Training, taught by Asha Wang PT at 1/12/2024  2:01 PM.  Learner: Patient  Readiness: Acceptance  Method: Explanation  Response: Verbalizes Understanding  Comment: Abdominal  precautions    Education Comments  No comments found.

## 2024-01-12 NOTE — HOSPITAL COURSE
58 y.o. 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  Post-op course uncomplicated.  Flores removed POD 2 (1/12) and passed TOV.  TF started POD 1 and titrated to goal.  UGI on POD 5 showed no evidence anastomotic leak. 1/15 TF cycled to goal 85 ml/hr. 1/16 multiple BM ON. Patient ordered CT A/P due to persistent WBC demonstrating no Destiny anastomotic  loculated fluid collection or extraluminal gas to suggest leak or developing abscess. Pneumatosis/portal venous gas on read, but team unconvinced.  IV Zosyn started. Held TF with J tube to gravity. 1/16 Pulled right DAVI drain. TF resumed and titrated to goal and diet advanced to FLD.  Incision opened 1/18 at bedside with large amount of drainage; packed with Kerlix; wound vac applied 1/23.

## 2024-01-13 LAB
ALBUMIN SERPL BCP-MCNC: 2.9 G/DL (ref 3.4–5)
ALP SERPL-CCNC: 69 U/L (ref 33–110)
ALT SERPL W P-5'-P-CCNC: 33 U/L (ref 7–45)
ANION GAP SERPL CALC-SCNC: 12 MMOL/L (ref 10–20)
AST SERPL W P-5'-P-CCNC: 32 U/L (ref 9–39)
BILIRUB SERPL-MCNC: 0.5 MG/DL (ref 0–1.2)
BUN SERPL-MCNC: 13 MG/DL (ref 6–23)
CALCIUM SERPL-MCNC: 8 MG/DL (ref 8.6–10.6)
CHLORIDE SERPL-SCNC: 108 MMOL/L (ref 98–107)
CO2 SERPL-SCNC: 25 MMOL/L (ref 21–32)
CREAT SERPL-MCNC: 0.62 MG/DL (ref 0.5–1.05)
EGFRCR SERPLBLD CKD-EPI 2021: >90 ML/MIN/1.73M*2
ERYTHROCYTE [DISTWIDTH] IN BLOOD BY AUTOMATED COUNT: 19 % (ref 11.5–14.5)
GLUCOSE SERPL-MCNC: 115 MG/DL (ref 74–99)
HCT VFR BLD AUTO: 27 % (ref 36–46)
HGB BLD-MCNC: 9.2 G/DL (ref 12–16)
MAGNESIUM SERPL-MCNC: 1.96 MG/DL (ref 1.6–2.4)
MCH RBC QN AUTO: 30.8 PG (ref 26–34)
MCHC RBC AUTO-ENTMCNC: 34.1 G/DL (ref 32–36)
MCV RBC AUTO: 90 FL (ref 80–100)
NRBC BLD-RTO: 0 /100 WBCS (ref 0–0)
PLATELET # BLD AUTO: 184 X10*3/UL (ref 150–450)
POTASSIUM SERPL-SCNC: 3.8 MMOL/L (ref 3.5–5.3)
PROT SERPL-MCNC: 4.3 G/DL (ref 6.4–8.2)
RBC # BLD AUTO: 2.99 X10*6/UL (ref 4–5.2)
SODIUM SERPL-SCNC: 141 MMOL/L (ref 136–145)
WBC # BLD AUTO: 11.5 X10*3/UL (ref 4.4–11.3)

## 2024-01-13 PROCEDURE — 85027 COMPLETE CBC AUTOMATED: CPT | Performed by: NURSE PRACTITIONER

## 2024-01-13 PROCEDURE — 2500000004 HC RX 250 GENERAL PHARMACY W/ HCPCS (ALT 636 FOR OP/ED)

## 2024-01-13 PROCEDURE — 2500000004 HC RX 250 GENERAL PHARMACY W/ HCPCS (ALT 636 FOR OP/ED): Performed by: STUDENT IN AN ORGANIZED HEALTH CARE EDUCATION/TRAINING PROGRAM

## 2024-01-13 PROCEDURE — 83735 ASSAY OF MAGNESIUM: CPT | Performed by: NURSE PRACTITIONER

## 2024-01-13 PROCEDURE — 2500000005 HC RX 250 GENERAL PHARMACY W/O HCPCS

## 2024-01-13 PROCEDURE — 84075 ASSAY ALKALINE PHOSPHATASE: CPT | Performed by: NURSE PRACTITIONER

## 2024-01-13 PROCEDURE — 1200000002 HC GENERAL ROOM WITH TELEMETRY DAILY

## 2024-01-13 RX ORDER — POTASSIUM CHLORIDE 20 MEQ/1
20 TABLET, EXTENDED RELEASE ORAL ONCE
Status: DISCONTINUED | OUTPATIENT
Start: 2024-01-13 | End: 2024-01-13

## 2024-01-13 RX ORDER — POTASSIUM CHLORIDE 14.9 MG/ML
20 INJECTION INTRAVENOUS ONCE
Status: COMPLETED | OUTPATIENT
Start: 2024-01-13 | End: 2024-01-13

## 2024-01-13 RX ADMIN — SODIUM CHLORIDE, POTASSIUM CHLORIDE, SODIUM LACTATE AND CALCIUM CHLORIDE 100 ML/HR: 600; 310; 30; 20 INJECTION, SOLUTION INTRAVENOUS at 00:34

## 2024-01-13 RX ADMIN — ONDANSETRON 8 MG: 2 INJECTION INTRAMUSCULAR; INTRAVENOUS at 11:42

## 2024-01-13 RX ADMIN — HEPARIN SODIUM 7500 UNITS: 5000 INJECTION INTRAVENOUS; SUBCUTANEOUS at 21:35

## 2024-01-13 RX ADMIN — POTASSIUM CHLORIDE 20 MEQ: 14.9 INJECTION, SOLUTION INTRAVENOUS at 12:58

## 2024-01-13 RX ADMIN — ACETAMINOPHEN 1000 MG: 10 INJECTION INTRAVENOUS at 00:34

## 2024-01-13 RX ADMIN — METOPROLOL TARTRATE 5 MG: 5 INJECTION, SOLUTION INTRAVENOUS at 18:08

## 2024-01-13 RX ADMIN — HEPARIN SODIUM 7500 UNITS: 5000 INJECTION INTRAVENOUS; SUBCUTANEOUS at 14:00

## 2024-01-13 RX ADMIN — HEPARIN SODIUM 7500 UNITS: 5000 INJECTION INTRAVENOUS; SUBCUTANEOUS at 06:34

## 2024-01-13 ASSESSMENT — COGNITIVE AND FUNCTIONAL STATUS - GENERAL
MOVING TO AND FROM BED TO CHAIR: A LITTLE
STANDING UP FROM CHAIR USING ARMS: A LITTLE
DRESSING REGULAR UPPER BODY CLOTHING: A LITTLE
MOBILITY SCORE: 18
DAILY ACTIVITIY SCORE: 20
MOVING FROM LYING ON BACK TO SITTING ON SIDE OF FLAT BED WITH BEDRAILS: A LITTLE
DRESSING REGULAR LOWER BODY CLOTHING: A LITTLE
TURNING FROM BACK TO SIDE WHILE IN FLAT BAD: A LITTLE
HELP NEEDED FOR BATHING: A LITTLE
WALKING IN HOSPITAL ROOM: A LITTLE
CLIMB 3 TO 5 STEPS WITH RAILING: A LITTLE
TOILETING: A LITTLE

## 2024-01-13 ASSESSMENT — PAIN SCALES - GENERAL
PAINLEVEL_OUTOF10: 0 - NO PAIN
PAINLEVEL_OUTOF10: 4
PAINLEVEL_OUTOF10: 0 - NO PAIN

## 2024-01-13 ASSESSMENT — PAIN - FUNCTIONAL ASSESSMENT
PAIN_FUNCTIONAL_ASSESSMENT: 0-10
PAIN_FUNCTIONAL_ASSESSMENT: 0-10

## 2024-01-13 NOTE — PROGRESS NOTES
Shruthi Ramos is a 58 y.o. year old female patient who presents for Total gastrectomy with feeding tube placement with Dr. Chavarria. Acute Pain consulted for block for postoperative pain control.     Postop Pain HPI -   Palliative: relieved with IV analgesics and regional local anesthetics  Provocative: movement  Quality:  burning and aching  Radiation:  none  Severity:  7/10  Timing: constant    24-HOUR OPIOID CONSUMPTION:  Dilaudid PCA 1mg    Scheduled medications  heparin (porcine), 7,500 Units, subcutaneous, q8h CHELO  metoprolol, 5 mg, intravenous, q6h  potassium chloride CR, 20 mEq, oral, Once      Continuous medications  HYDROmorphone,   lactated Ringer's, 50 mL/hr, Last Rate: 100 mL/hr (01/13/24 0427)  ropivacaine (PF) in NS cmpd, 6 mL/hr, Last Rate: 6 mL/hr (01/13/24 0427)      PRN medications  PRN medications: naloxone, ondansetron     Physical Exam:  Constitutional:  no distress, alert and cooperative  Eyes: clear sclera  Head/Neck: No apparent injury, trachea midline  Respiratory/Thorax: Patent airways, thorax symmetric, breathing comfortably  Cardiovascular: no pitting edema  Gastrointestinal: Nondistended  Musculoskeletal: ROM intact  Extremities: no clubbing  Neurological: alert, jay x4  Psychological: Appropriate affect    Results for orders placed or performed during the hospital encounter of 01/10/24 (from the past 24 hour(s))   CBC   Result Value Ref Range    WBC 11.5 (H) 4.4 - 11.3 x10*3/uL    nRBC 0.0 0.0 - 0.0 /100 WBCs    RBC 2.99 (L) 4.00 - 5.20 x10*6/uL    Hemoglobin 9.2 (L) 12.0 - 16.0 g/dL    Hematocrit 27.0 (L) 36.0 - 46.0 %    MCV 90 80 - 100 fL    MCH 30.8 26.0 - 34.0 pg    MCHC 34.1 32.0 - 36.0 g/dL    RDW 19.0 (H) 11.5 - 14.5 %    Platelets 184 150 - 450 x10*3/uL   Comprehensive Metabolic Panel   Result Value Ref Range    Glucose 115 (H) 74 - 99 mg/dL    Sodium 141 136 - 145 mmol/L    Potassium 3.8 3.5 - 5.3 mmol/L    Chloride 108 (H) 98 - 107 mmol/L    Bicarbonate 25 21 - 32 mmol/L     Anion Gap 12 10 - 20 mmol/L    Urea Nitrogen 13 6 - 23 mg/dL    Creatinine 0.62 0.50 - 1.05 mg/dL    eGFR >90 >60 mL/min/1.73m*2    Calcium 8.0 (L) 8.6 - 10.6 mg/dL    Albumin 2.9 (L) 3.4 - 5.0 g/dL    Alkaline Phosphatase 69 33 - 110 U/L    Total Protein 4.3 (L) 6.4 - 8.2 g/dL    AST 32 9 - 39 U/L    Bilirubin, Total 0.5 0.0 - 1.2 mg/dL    ALT 33 7 - 45 U/L   Magnesium   Result Value Ref Range    Magnesium 1.96 1.60 - 2.40 mg/dL        Shruthi Ramos is a 58 y.o. year old female patient who presents for Total gastrectomy with feeding tube placement with Dr. Chavarria. Acute Pain consulted for block for postoperative pain control.      Plan:     - B/L Quadratus lumborum blocks with catheters performed preoperatively on 1/10/24  - Ambit ball with Ropivacaine 0.2%/NaCl 0.9% 500mL, Rate 7 cc/hr bilaterally  - Ambit medication will not interfere with pain medication prescribed by the primary team.   - Please be aware of local anesthetic toxic dose and absorption variability before considering lidocaine patches  - Catheters removed on 1/13/2024  - Rest of pain management per primary team  - Acute pain team will sign off      Acute Pain Resident  pg [phone]

## 2024-01-13 NOTE — PROGRESS NOTES
Shruthi Ramos is a 58 y.o. female on day 3 of admission presenting with GE junction carcinoma (CMS/HCC). S/p total gastrectomy with D2 lymphadenectomy and Juany-en-Y reconstruction and placement of Jejunostomy feeding tube 1/10/24    Subjective   NAEO. Patient doing well this morning. Has some cramping with the increase in tube feeds. Denies passing flatus. Patient denies N/V, fevers, chills, chest pain, and shortness of breath.       Objective     Physical Exam  Constitutional:       General: She is not in acute distress.     Appearance: Normal appearance. She is not ill-appearing, toxic-appearing or diaphoretic.   HENT:      Head: Normocephalic and atraumatic.   Eyes:      General: No scleral icterus.     Pupils: Pupils are equal, round, and reactive to light.   Cardiovascular:      Rate and Rhythm: Normal rate and regular rhythm.   Pulmonary:      Effort: Pulmonary effort is normal. No respiratory distress.      Breath sounds: Normal breath sounds. No wheezing.   Abdominal:      General: There is no distension.      Palpations: Abdomen is soft.      Tenderness: There is no abdominal tenderness. There is no guarding.      Comments: Soft, appropriately tender, nondistended. Incision c/d/I with staples on top. Penrose in place draining SS fluid on inferior portion of incision, removed at bedside. Drain with SS drainage. J tube in place with tube feeds going through.   Musculoskeletal:         General: Normal range of motion.   Skin:     General: Skin is warm and dry.      Coloration: Skin is not jaundiced.      Findings: No rash.   Neurological:      Mental Status: She is alert and oriented to person, place, and time. Mental status is at baseline.   Psychiatric:         Mood and Affect: Mood normal.         Behavior: Behavior normal.         Judgment: Judgment normal.       Last Recorded Vitals  Blood pressure 104/78, pulse 100, temperature 36.5 °C (97.7 °F), temperature source Temporal, resp. rate 14, height  "1.549 m (5' 1\"), weight 99.6 kg (219 lb 9.3 oz), SpO2 96 %.  Intake/Output last 3 Shifts:  I/O last 3 completed shifts:  In: 3254.8 (32.7 mL/kg) [I.V.:3050.1 (30.6 mL/kg)]  Out: 657.5 (6.6 mL/kg) [Urine:625 (0.2 mL/kg/hr); Drains:32.5]  Weight: 99.6 kg     Relevant Results  Results for orders placed or performed during the hospital encounter of 01/10/24 (from the past 24 hour(s))   CBC   Result Value Ref Range    WBC 11.5 (H) 4.4 - 11.3 x10*3/uL    nRBC 0.0 0.0 - 0.0 /100 WBCs    RBC 2.99 (L) 4.00 - 5.20 x10*6/uL    Hemoglobin 9.2 (L) 12.0 - 16.0 g/dL    Hematocrit 27.0 (L) 36.0 - 46.0 %    MCV 90 80 - 100 fL    MCH 30.8 26.0 - 34.0 pg    MCHC 34.1 32.0 - 36.0 g/dL    RDW 19.0 (H) 11.5 - 14.5 %    Platelets 184 150 - 450 x10*3/uL   Comprehensive Metabolic Panel   Result Value Ref Range    Glucose 115 (H) 74 - 99 mg/dL    Sodium 141 136 - 145 mmol/L    Potassium 3.8 3.5 - 5.3 mmol/L    Chloride 108 (H) 98 - 107 mmol/L    Bicarbonate 25 21 - 32 mmol/L    Anion Gap 12 10 - 20 mmol/L    Urea Nitrogen 13 6 - 23 mg/dL    Creatinine 0.62 0.50 - 1.05 mg/dL    eGFR >90 >60 mL/min/1.73m*2    Calcium 8.0 (L) 8.6 - 10.6 mg/dL    Albumin 2.9 (L) 3.4 - 5.0 g/dL    Alkaline Phosphatase 69 33 - 110 U/L    Total Protein 4.3 (L) 6.4 - 8.2 g/dL    AST 32 9 - 39 U/L    Bilirubin, Total 0.5 0.0 - 1.2 mg/dL    ALT 33 7 - 45 U/L   Magnesium   Result Value Ref Range    Magnesium 1.96 1.60 - 2.40 mg/dL       Assessment/Plan   Principal Problem:    GE junction carcinoma (CMS/HCC)  Active Problems:    Class 3 severe obesity due to excess calories in adult (CMS/HCC)    Shruthi Ramos is a 58 y.o. 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 1/11 had amnestic event lasting 30 min, no stroke workup needed per BAT team. Doing well postoperatively    Plan    Neuro:   - pain " control with IV tylenol, PCA  - pain blocks per anesthesia    CV/pulm:  - continue home metoprolol  - IS  - OOB as tolerated    GI:  - NPO  - nutrition recs for tube feeds   - Vital 1.5 @ 10mL/hr & increase by 10mL q 6-8hrs until goal rate of 50mL/hr   - will wait to cycle TF for now  - UGI with SBFT on POD5 (1/15)  - PPI  - drain amylase 2k    :   - voiding independently  - LR@50  - daily labs, replete lytes PRN  - I&Os    Endo:  - no indication for sliding scale insulin    Heme:  - Hgb stable, no indications for transfusions    ID:  - no indications for abx    DVT ppx:  - lovenox   - SCDs in place    Dispo: continue management on RNF    Seen and discussed attending Dr. Blanco Mendenhall MD  General Surgery PGY2  Surgical oncology [id number]

## 2024-01-13 NOTE — CARE PLAN
Problem: Fall/Injury  Goal: Not fall by end of shift  Outcome: Progressing     Problem: Pain  Goal: Takes deep breaths with improved pain control throughout the shift  Outcome: Progressing  Goal: Turns in bed with improved pain control throughout the shift  Outcome: Progressing  Goal: Walks with improved pain control throughout the shift  Outcome: Progressing  Goal: Performs ADL's with improved pain control throughout shift  Outcome: Progressing  Goal: Participates in PT with improved pain control throughout the shift  Outcome: Progressing  Goal: Free from opioid side effects throughout the shift  Outcome: Progressing  Goal: Free from acute confusion related to pain meds throughout the shift  Outcome: Progressing     Problem: Skin  Goal: Decreased wound size/increased tissue granulation at next dressing change  Outcome: Progressing  Goal: Participates in plan/prevention/treatment measures  Outcome: Progressing  Goal: Prevent/manage excess moisture  Outcome: Progressing  Goal: Prevent/minimize sheer/friction injuries  Outcome: Progressing  Goal: Promote/optimize nutrition  Outcome: Progressing  Goal: Promote skin healing  Outcome: Progressing   The patient's goals for the shift include      The clinical goals for the shift include Patient will ambulate in halls 3x's by 1/13/2023 1600    Over the shift patient made progress to above goal. Patient did ambulate in halls 2xs this shift. Patient's pain being managed with PCA dilaudid. Patient did have episode of nausea, relieved with zofran.

## 2024-01-13 NOTE — CARE PLAN
Problem: Fall/Injury  Goal: Not fall by end of shift  Outcome: Progressing     Problem: Pain  Goal: Takes deep breaths with improved pain control throughout the shift  Outcome: Progressing  Goal: Turns in bed with improved pain control throughout the shift  Outcome: Progressing  Goal: Walks with improved pain control throughout the shift  Outcome: Progressing  Goal: Performs ADL's with improved pain control throughout shift  Outcome: Progressing  Goal: Participates in PT with improved pain control throughout the shift  Outcome: Progressing  Goal: Free from opioid side effects throughout the shift  Outcome: Progressing  Goal: Free from acute confusion related to pain meds throughout the shift  Outcome: Progressing     Problem: Skin  Goal: Decreased wound size/increased tissue granulation at next dressing change  Outcome: Progressing  Goal: Participates in plan/prevention/treatment measures  Outcome: Progressing  Goal: Prevent/manage excess moisture  Outcome: Progressing  Goal: Prevent/minimize sheer/friction injuries  Outcome: Progressing  Goal: Promote/optimize nutrition  Outcome: Progressing  Goal: Promote skin healing  Outcome: Progressing   The patient's goals for the shift include      The clinical goals for the shift include Patient eill tolerate verma removal well this shift and void within ordered parameters.    Over the shift, the patient did not make progress toward the following goals. Barriers to progression include pain. Recommendations to address these barriers include md dennise.

## 2024-01-14 ENCOUNTER — APPOINTMENT (OUTPATIENT)
Dept: CARDIOLOGY | Facility: HOSPITAL | Age: 59
End: 2024-01-14
Payer: MEDICAID

## 2024-01-14 LAB
ALBUMIN SERPL BCP-MCNC: 3 G/DL (ref 3.4–5)
ALP SERPL-CCNC: 79 U/L (ref 33–110)
ALT SERPL W P-5'-P-CCNC: 26 U/L (ref 7–45)
ANION GAP SERPL CALC-SCNC: 12 MMOL/L (ref 10–20)
AST SERPL W P-5'-P-CCNC: 23 U/L (ref 9–39)
BILIRUB SERPL-MCNC: 0.5 MG/DL (ref 0–1.2)
BUN SERPL-MCNC: 9 MG/DL (ref 6–23)
CALCIUM SERPL-MCNC: 8 MG/DL (ref 8.6–10.6)
CHLORIDE SERPL-SCNC: 108 MMOL/L (ref 98–107)
CO2 SERPL-SCNC: 26 MMOL/L (ref 21–32)
CREAT SERPL-MCNC: 0.48 MG/DL (ref 0.5–1.05)
EGFRCR SERPLBLD CKD-EPI 2021: >90 ML/MIN/1.73M*2
ERYTHROCYTE [DISTWIDTH] IN BLOOD BY AUTOMATED COUNT: 18.9 % (ref 11.5–14.5)
GLUCOSE SERPL-MCNC: 142 MG/DL (ref 74–99)
HCT VFR BLD AUTO: 28.7 % (ref 36–46)
HGB BLD-MCNC: 9.3 G/DL (ref 12–16)
MAGNESIUM SERPL-MCNC: 1.86 MG/DL (ref 1.6–2.4)
MCH RBC QN AUTO: 29.5 PG (ref 26–34)
MCHC RBC AUTO-ENTMCNC: 32.4 G/DL (ref 32–36)
MCV RBC AUTO: 91 FL (ref 80–100)
NRBC BLD-RTO: 0 /100 WBCS (ref 0–0)
PLATELET # BLD AUTO: 231 X10*3/UL (ref 150–450)
POTASSIUM SERPL-SCNC: 3.8 MMOL/L (ref 3.5–5.3)
PROT SERPL-MCNC: 4.6 G/DL (ref 6.4–8.2)
RBC # BLD AUTO: 3.15 X10*6/UL (ref 4–5.2)
SODIUM SERPL-SCNC: 142 MMOL/L (ref 136–145)
WBC # BLD AUTO: 14.3 X10*3/UL (ref 4.4–11.3)

## 2024-01-14 PROCEDURE — 83735 ASSAY OF MAGNESIUM: CPT

## 2024-01-14 PROCEDURE — 1200000002 HC GENERAL ROOM WITH TELEMETRY DAILY

## 2024-01-14 PROCEDURE — 2500000004 HC RX 250 GENERAL PHARMACY W/ HCPCS (ALT 636 FOR OP/ED)

## 2024-01-14 PROCEDURE — 85027 COMPLETE CBC AUTOMATED: CPT

## 2024-01-14 PROCEDURE — 93010 ELECTROCARDIOGRAM REPORT: CPT | Performed by: INTERNAL MEDICINE

## 2024-01-14 PROCEDURE — 93005 ELECTROCARDIOGRAM TRACING: CPT

## 2024-01-14 PROCEDURE — 2500000004 HC RX 250 GENERAL PHARMACY W/ HCPCS (ALT 636 FOR OP/ED): Performed by: STUDENT IN AN ORGANIZED HEALTH CARE EDUCATION/TRAINING PROGRAM

## 2024-01-14 PROCEDURE — 2500000005 HC RX 250 GENERAL PHARMACY W/O HCPCS

## 2024-01-14 PROCEDURE — 80053 COMPREHEN METABOLIC PANEL: CPT

## 2024-01-14 RX ORDER — MAGNESIUM SULFATE HEPTAHYDRATE 40 MG/ML
2 INJECTION, SOLUTION INTRAVENOUS ONCE
Status: COMPLETED | OUTPATIENT
Start: 2024-01-14 | End: 2024-01-14

## 2024-01-14 RX ORDER — POTASSIUM CHLORIDE 14.9 MG/ML
20 INJECTION INTRAVENOUS ONCE
Status: COMPLETED | OUTPATIENT
Start: 2024-01-14 | End: 2024-01-14

## 2024-01-14 RX ADMIN — MAGNESIUM SULFATE HEPTAHYDRATE 2 G: 40 INJECTION, SOLUTION INTRAVENOUS at 10:04

## 2024-01-14 RX ADMIN — HEPARIN SODIUM 7500 UNITS: 5000 INJECTION INTRAVENOUS; SUBCUTANEOUS at 14:49

## 2024-01-14 RX ADMIN — HEPARIN SODIUM 7500 UNITS: 5000 INJECTION INTRAVENOUS; SUBCUTANEOUS at 21:30

## 2024-01-14 RX ADMIN — METOPROLOL TARTRATE 5 MG: 5 INJECTION, SOLUTION INTRAVENOUS at 06:59

## 2024-01-14 RX ADMIN — METOPROLOL TARTRATE 5 MG: 5 INJECTION, SOLUTION INTRAVENOUS at 00:54

## 2024-01-14 RX ADMIN — HEPARIN SODIUM 7500 UNITS: 5000 INJECTION INTRAVENOUS; SUBCUTANEOUS at 06:59

## 2024-01-14 RX ADMIN — METOPROLOL TARTRATE 5 MG: 5 INJECTION, SOLUTION INTRAVENOUS at 21:30

## 2024-01-14 RX ADMIN — POTASSIUM CHLORIDE 20 MEQ: 14.9 INJECTION, SOLUTION INTRAVENOUS at 10:04

## 2024-01-14 RX ADMIN — METOPROLOL TARTRATE 5 MG: 5 INJECTION, SOLUTION INTRAVENOUS at 12:06

## 2024-01-14 ASSESSMENT — PAIN SCALES - GENERAL: PAINLEVEL_OUTOF10: 0 - NO PAIN

## 2024-01-14 NOTE — PROGRESS NOTES
"Shruthi Ramos is a 58 y.o. female on day 4 of admission presenting with GE junction carcinoma (CMS/HCC). S/p total gastrectomy with D2 lymphadenectomy and Juany-en-Y reconstruction and placement of Jejunostomy feeding tube 1/10/24    Subjective   NAEO. Patient doing well this morning. Tolerating goal feeds well. No pain. Passing gas but no bowel movements. Ambulating. Patient denies N/V, fevers, chills, chest pain, and shortness of breath.       Objective     Physical Exam  Constitutional:       General: She is not in acute distress.     Appearance: Normal appearance. She is not ill-appearing, toxic-appearing or diaphoretic.   HENT:      Head: Normocephalic and atraumatic.   Eyes:      General: No scleral icterus.     Pupils: Pupils are equal, round, and reactive to light.   Cardiovascular:      Rate and Rhythm: Normal rate and regular rhythm.   Pulmonary:      Effort: Pulmonary effort is normal. No respiratory distress.      Breath sounds: Normal breath sounds. No wheezing.   Abdominal:      General: There is no distension.      Palpations: Abdomen is soft.      Tenderness: There is no abdominal tenderness. There is no guarding.      Comments: Soft, non tender, nondistended. Incision c/d/I with staples on top. Drain with SS drainage. J tube in place with tube feeds going through.   Musculoskeletal:         General: Normal range of motion.   Skin:     General: Skin is warm and dry.      Coloration: Skin is not jaundiced.      Findings: No rash.   Neurological:      Mental Status: She is alert and oriented to person, place, and time. Mental status is at baseline.   Psychiatric:         Mood and Affect: Mood normal.         Behavior: Behavior normal.         Judgment: Judgment normal.       Last Recorded Vitals  Blood pressure 95/63, pulse 97, temperature 36.8 °C (98.2 °F), temperature source Temporal, resp. rate 18, height 1.549 m (5' 1\"), weight 99.6 kg (219 lb 9.3 oz), SpO2 94 %.  Intake/Output last 3 " Shifts:  I/O last 3 completed shifts:  In: 2262 (22.7 mL/kg) [I.V.:1434.2 (14.4 mL/kg); NG/GT:600; IV Piggyback:100]  Out: 1810 (18.2 mL/kg) [Urine:1710 (0.5 mL/kg/hr); Drains:100]  Weight: 99.6 kg     Relevant Results  Results for orders placed or performed during the hospital encounter of 01/10/24 (from the past 24 hour(s))   CBC   Result Value Ref Range    WBC 14.3 (H) 4.4 - 11.3 x10*3/uL    nRBC 0.0 0.0 - 0.0 /100 WBCs    RBC 3.15 (L) 4.00 - 5.20 x10*6/uL    Hemoglobin 9.3 (L) 12.0 - 16.0 g/dL    Hematocrit 28.7 (L) 36.0 - 46.0 %    MCV 91 80 - 100 fL    MCH 29.5 26.0 - 34.0 pg    MCHC 32.4 32.0 - 36.0 g/dL    RDW 18.9 (H) 11.5 - 14.5 %    Platelets 231 150 - 450 x10*3/uL   Comprehensive Metabolic Panel   Result Value Ref Range    Glucose 142 (H) 74 - 99 mg/dL    Sodium 142 136 - 145 mmol/L    Potassium 3.8 3.5 - 5.3 mmol/L    Chloride 108 (H) 98 - 107 mmol/L    Bicarbonate 26 21 - 32 mmol/L    Anion Gap 12 10 - 20 mmol/L    Urea Nitrogen 9 6 - 23 mg/dL    Creatinine 0.48 (L) 0.50 - 1.05 mg/dL    eGFR >90 >60 mL/min/1.73m*2    Calcium 8.0 (L) 8.6 - 10.6 mg/dL    Albumin 3.0 (L) 3.4 - 5.0 g/dL    Alkaline Phosphatase 79 33 - 110 U/L    Total Protein 4.6 (L) 6.4 - 8.2 g/dL    AST 23 9 - 39 U/L    Bilirubin, Total 0.5 0.0 - 1.2 mg/dL    ALT 26 7 - 45 U/L   Magnesium   Result Value Ref Range    Magnesium 1.86 1.60 - 2.40 mg/dL       Assessment/Plan   Principal Problem:    GE junction carcinoma (CMS/HCC)  Active Problems:    Class 3 severe obesity due to excess calories in adult (CMS/HCC)    Shruthi Ramos is a 58 y.o. 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 1/11 had amnestic event lasting 30 min, no stroke workup needed per BAT team. Doing well postoperatively    Plan    Neuro:   - pain control with IV tylenol, PCA  - pain blocks per  anesthesia    CV/pulm:  - continue home metoprolol  - IS  - OOB as tolerated    GI:  - NPO  - nutrition recs for tube feeds   - Vital 1.5 @ goal rate of 50mL/hr   - appreciate cycling recommendations  - UGI with SBFT on POD5 (1/15)  - PPI  - drain amylase 2k    :   - voiding independently  - LR@50  - daily labs, replete lytes PRN  - I&Os    Endo:  - no indication for sliding scale insulin    Heme:  - Hgb stable, no indications for transfusions    ID:  - no indications for abx    DVT ppx:  - lovenox   - SCDs in place    Dispo: continue management on RNF    Seen and discussed attending Dr. Blanco Mendenhall MD  General Surgery PGY2  Surgical oncology [id number]

## 2024-01-14 NOTE — CARE PLAN
Problem: Fall/Injury  Goal: Not fall by end of shift  Outcome: Progressing     Problem: Pain  Goal: Takes deep breaths with improved pain control throughout the shift  Outcome: Progressing  Goal: Turns in bed with improved pain control throughout the shift  Outcome: Progressing  Goal: Walks with improved pain control throughout the shift  Outcome: Progressing  Goal: Performs ADL's with improved pain control throughout shift  Outcome: Progressing  Goal: Participates in PT with improved pain control throughout the shift  Outcome: Progressing  Goal: Free from opioid side effects throughout the shift  Outcome: Progressing  Goal: Free from acute confusion related to pain meds throughout the shift  Outcome: Progressing     Problem: Skin  Goal: Decreased wound size/increased tissue granulation at next dressing change  Outcome: Progressing  Goal: Participates in plan/prevention/treatment measures  Outcome: Progressing  Goal: Prevent/manage excess moisture  Outcome: Progressing  Goal: Prevent/minimize sheer/friction injuries  Outcome: Progressing  Goal: Promote/optimize nutrition  Outcome: Progressing  Goal: Promote skin healing  Outcome: Progressing   The patient's goals for the shift include      The clinical goals for the shift include Patient will ambulate in halls 3x's by 1/13/2023 1600    Over the shift, the patient did not make progress toward the following goals. Barriers to progression include pain. Recommendations to address these barriers include MD bowden.

## 2024-01-15 ENCOUNTER — APPOINTMENT (OUTPATIENT)
Dept: RADIOLOGY | Facility: HOSPITAL | Age: 59
End: 2024-01-15
Payer: MEDICAID

## 2024-01-15 ENCOUNTER — APPOINTMENT (OUTPATIENT)
Dept: CARDIOLOGY | Facility: HOSPITAL | Age: 59
End: 2024-01-15
Payer: MEDICAID

## 2024-01-15 LAB
ALBUMIN SERPL BCP-MCNC: 2.7 G/DL (ref 3.4–5)
ALP SERPL-CCNC: 81 U/L (ref 33–110)
ALT SERPL W P-5'-P-CCNC: 26 U/L (ref 7–45)
ANION GAP SERPL CALC-SCNC: 11 MMOL/L (ref 10–20)
ANION GAP SERPL CALC-SCNC: 15 MMOL/L (ref 10–20)
AST SERPL W P-5'-P-CCNC: 23 U/L (ref 9–39)
BILIRUB SERPL-MCNC: 0.5 MG/DL (ref 0–1.2)
BUN SERPL-MCNC: 10 MG/DL (ref 6–23)
BUN SERPL-MCNC: 11 MG/DL (ref 6–23)
CALCIUM SERPL-MCNC: 8.1 MG/DL (ref 8.6–10.6)
CALCIUM SERPL-MCNC: 8.4 MG/DL (ref 8.6–10.6)
CHLORIDE SERPL-SCNC: 104 MMOL/L (ref 98–107)
CHLORIDE SERPL-SCNC: 105 MMOL/L (ref 98–107)
CO2 SERPL-SCNC: 24 MMOL/L (ref 21–32)
CO2 SERPL-SCNC: 27 MMOL/L (ref 21–32)
CREAT SERPL-MCNC: 0.47 MG/DL (ref 0.5–1.05)
CREAT SERPL-MCNC: 0.59 MG/DL (ref 0.5–1.05)
EGFRCR SERPLBLD CKD-EPI 2021: >90 ML/MIN/1.73M*2
EGFRCR SERPLBLD CKD-EPI 2021: >90 ML/MIN/1.73M*2
ERYTHROCYTE [DISTWIDTH] IN BLOOD BY AUTOMATED COUNT: 18.5 % (ref 11.5–14.5)
ERYTHROCYTE [DISTWIDTH] IN BLOOD BY AUTOMATED COUNT: 18.7 % (ref 11.5–14.5)
GLUCOSE SERPL-MCNC: 139 MG/DL (ref 74–99)
GLUCOSE SERPL-MCNC: 160 MG/DL (ref 74–99)
HCT VFR BLD AUTO: 27.6 % (ref 36–46)
HCT VFR BLD AUTO: 29.7 % (ref 36–46)
HGB BLD-MCNC: 10 G/DL (ref 12–16)
HGB BLD-MCNC: 9 G/DL (ref 12–16)
MAGNESIUM SERPL-MCNC: 1.95 MG/DL (ref 1.6–2.4)
MCH RBC QN AUTO: 29.8 PG (ref 26–34)
MCH RBC QN AUTO: 30.8 PG (ref 26–34)
MCHC RBC AUTO-ENTMCNC: 32.6 G/DL (ref 32–36)
MCHC RBC AUTO-ENTMCNC: 33.7 G/DL (ref 32–36)
MCV RBC AUTO: 91 FL (ref 80–100)
MCV RBC AUTO: 91 FL (ref 80–100)
NRBC BLD-RTO: 0 /100 WBCS (ref 0–0)
NRBC BLD-RTO: 0 /100 WBCS (ref 0–0)
PLATELET # BLD AUTO: 225 X10*3/UL (ref 150–450)
PLATELET # BLD AUTO: 263 X10*3/UL (ref 150–450)
POTASSIUM SERPL-SCNC: 4.2 MMOL/L (ref 3.5–5.3)
POTASSIUM SERPL-SCNC: 4.2 MMOL/L (ref 3.5–5.3)
PROT SERPL-MCNC: 4.4 G/DL (ref 6.4–8.2)
RBC # BLD AUTO: 3.02 X10*6/UL (ref 4–5.2)
RBC # BLD AUTO: 3.25 X10*6/UL (ref 4–5.2)
SODIUM SERPL-SCNC: 139 MMOL/L (ref 136–145)
SODIUM SERPL-SCNC: 139 MMOL/L (ref 136–145)
WBC # BLD AUTO: 16.8 X10*3/UL (ref 4.4–11.3)
WBC # BLD AUTO: 17.6 X10*3/UL (ref 4.4–11.3)

## 2024-01-15 PROCEDURE — 2500000005 HC RX 250 GENERAL PHARMACY W/O HCPCS

## 2024-01-15 PROCEDURE — 83735 ASSAY OF MAGNESIUM: CPT

## 2024-01-15 PROCEDURE — 93010 ELECTROCARDIOGRAM REPORT: CPT | Performed by: INTERNAL MEDICINE

## 2024-01-15 PROCEDURE — 85027 COMPLETE CBC AUTOMATED: CPT

## 2024-01-15 PROCEDURE — 84075 ASSAY ALKALINE PHOSPHATASE: CPT

## 2024-01-15 PROCEDURE — 74018 RADEX ABDOMEN 1 VIEW: CPT

## 2024-01-15 PROCEDURE — 93005 ELECTROCARDIOGRAM TRACING: CPT

## 2024-01-15 PROCEDURE — 2500000004 HC RX 250 GENERAL PHARMACY W/ HCPCS (ALT 636 FOR OP/ED): Performed by: STUDENT IN AN ORGANIZED HEALTH CARE EDUCATION/TRAINING PROGRAM

## 2024-01-15 PROCEDURE — 82374 ASSAY BLOOD CARBON DIOXIDE: CPT

## 2024-01-15 PROCEDURE — 74240 X-RAY XM UPR GI TRC 1CNTRST: CPT

## 2024-01-15 PROCEDURE — 74240 X-RAY XM UPR GI TRC 1CNTRST: CPT | Performed by: STUDENT IN AN ORGANIZED HEALTH CARE EDUCATION/TRAINING PROGRAM

## 2024-01-15 PROCEDURE — 2550000001 HC RX 255 CONTRASTS: Performed by: STUDENT IN AN ORGANIZED HEALTH CARE EDUCATION/TRAINING PROGRAM

## 2024-01-15 PROCEDURE — 2500000001 HC RX 250 WO HCPCS SELF ADMINISTERED DRUGS (ALT 637 FOR MEDICARE OP)

## 2024-01-15 PROCEDURE — 2500000004 HC RX 250 GENERAL PHARMACY W/ HCPCS (ALT 636 FOR OP/ED)

## 2024-01-15 PROCEDURE — 1200000002 HC GENERAL ROOM WITH TELEMETRY DAILY

## 2024-01-15 PROCEDURE — 99232 SBSQ HOSP IP/OBS MODERATE 35: CPT | Performed by: STUDENT IN AN ORGANIZED HEALTH CARE EDUCATION/TRAINING PROGRAM

## 2024-01-15 RX ORDER — BISACODYL 10 MG/1
10 SUPPOSITORY RECTAL DAILY
Status: DISCONTINUED | OUTPATIENT
Start: 2024-01-15 | End: 2024-01-16

## 2024-01-15 RX ORDER — TRIPROLIDINE/PSEUDOEPHEDRINE 2.5MG-60MG
600 TABLET ORAL EVERY 6 HOURS
Status: DISCONTINUED | OUTPATIENT
Start: 2024-01-15 | End: 2024-01-24 | Stop reason: HOSPADM

## 2024-01-15 RX ORDER — ACETAMINOPHEN 160 MG/5ML
650 SOLUTION ORAL EVERY 6 HOURS
Status: DISCONTINUED | OUTPATIENT
Start: 2024-01-15 | End: 2024-01-24 | Stop reason: HOSPADM

## 2024-01-15 RX ORDER — OXYCODONE HCL 5 MG/5 ML
2.5 SOLUTION, ORAL ORAL EVERY 6 HOURS PRN
Status: DISCONTINUED | OUTPATIENT
Start: 2024-01-15 | End: 2024-01-24 | Stop reason: HOSPADM

## 2024-01-15 RX ORDER — OXYCODONE HCL 5 MG/5 ML
5 SOLUTION, ORAL ORAL EVERY 6 HOURS PRN
Status: DISCONTINUED | OUTPATIENT
Start: 2024-01-15 | End: 2024-01-24 | Stop reason: HOSPADM

## 2024-01-15 RX ADMIN — SODIUM CHLORIDE, POTASSIUM CHLORIDE, SODIUM LACTATE AND CALCIUM CHLORIDE 500 ML: 600; 310; 30; 20 INJECTION, SOLUTION INTRAVENOUS at 19:02

## 2024-01-15 RX ADMIN — HEPARIN SODIUM 7500 UNITS: 5000 INJECTION INTRAVENOUS; SUBCUTANEOUS at 04:50

## 2024-01-15 RX ADMIN — METOPROLOL TARTRATE 5 MG: 5 INJECTION, SOLUTION INTRAVENOUS at 04:50

## 2024-01-15 RX ADMIN — HEPARIN SODIUM 7500 UNITS: 5000 INJECTION INTRAVENOUS; SUBCUTANEOUS at 23:11

## 2024-01-15 RX ADMIN — DIATRIZOATE MEGLUMINE AND DIATRIZOATE SODIUM 30 ML: 660; 100 LIQUID ORAL; RECTAL at 13:45

## 2024-01-15 RX ADMIN — HEPARIN SODIUM 7500 UNITS: 5000 INJECTION INTRAVENOUS; SUBCUTANEOUS at 15:02

## 2024-01-15 RX ADMIN — METOPROLOL TARTRATE 5 MG: 5 INJECTION, SOLUTION INTRAVENOUS at 11:29

## 2024-01-15 RX ADMIN — BISACODYL 10 MG: 10 SUPPOSITORY RECTAL at 17:45

## 2024-01-15 RX ADMIN — ACETAMINOPHEN 650 MG: 650 SOLUTION ORAL at 16:45

## 2024-01-15 RX ADMIN — ACETAMINOPHEN 650 MG: 650 SOLUTION ORAL at 23:11

## 2024-01-15 ASSESSMENT — COGNITIVE AND FUNCTIONAL STATUS - GENERAL
DRESSING REGULAR UPPER BODY CLOTHING: A LITTLE
DRESSING REGULAR LOWER BODY CLOTHING: A LITTLE
STANDING UP FROM CHAIR USING ARMS: A LITTLE
MOBILITY SCORE: 18
PERSONAL GROOMING: A LITTLE
WALKING IN HOSPITAL ROOM: A LITTLE
CLIMB 3 TO 5 STEPS WITH RAILING: A LITTLE
TURNING FROM BACK TO SIDE WHILE IN FLAT BAD: A LITTLE
TOILETING: A LITTLE
HELP NEEDED FOR BATHING: A LITTLE
MOVING FROM LYING ON BACK TO SITTING ON SIDE OF FLAT BED WITH BEDRAILS: A LITTLE
DAILY ACTIVITIY SCORE: 19
MOVING TO AND FROM BED TO CHAIR: A LITTLE

## 2024-01-15 ASSESSMENT — PAIN SCALES - GENERAL
PAINLEVEL_OUTOF10: 0 - NO PAIN

## 2024-01-15 NOTE — CARE PLAN
Problem: Fall/Injury  Goal: Not fall by end of shift  Outcome: Progressing     Problem: Pain  Goal: Takes deep breaths with improved pain control throughout the shift  Outcome: Progressing  Goal: Turns in bed with improved pain control throughout the shift  Outcome: Progressing  Goal: Walks with improved pain control throughout the shift  Outcome: Progressing  Goal: Performs ADL's with improved pain control throughout shift  Outcome: Progressing  Goal: Participates in PT with improved pain control throughout the shift  Outcome: Progressing  Goal: Free from opioid side effects throughout the shift  Outcome: Progressing  Goal: Free from acute confusion related to pain meds throughout the shift  Outcome: Progressing     Problem: Skin  Goal: Decreased wound size/increased tissue granulation at next dressing change  Outcome: Progressing  Goal: Participates in plan/prevention/treatment measures  Outcome: Progressing  Goal: Prevent/manage excess moisture  Outcome: Progressing  Goal: Prevent/minimize sheer/friction injuries  Outcome: Progressing  Goal: Promote/optimize nutrition  Outcome: Progressing  Goal: Promote skin healing  Outcome: Progressing   The patient's goals for the shift include      The clinical goals for the shift include maintain safety    Over the shift, the patient did not make progress toward the following goals. Barriers to progression include safety. Recommendations to address these barriers include MD following.

## 2024-01-15 NOTE — PROGRESS NOTES
Nutrition Note:   Nutrition Assessment       Patient is a 58 y.o. 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     Taken to OR (1/10/24) now s/p diagnostic laparoscopy, liver wedge bx, EGD, total gastrectomy with D2 lymphadenectomy, Juany-en-Y reconstruction, and placement of J-tube.     Currently tolerating TF at continuous goal rate. Team requesting cycled recommendations.    Dietary Orders (From admission, onward)       Start     Ordered    01/12/24 0751  Enteral feeding with NPO Vital 1.5; 10  Diet effective now        Comments: increase by 10mL/hr q 8 hrs until goal rate of 50mL/hr   Question Answer Comment   Tube feeding formula: Vital 1.5    Tube feeding continuous rate (mL/hr): 10        01/12/24 0750               Estimated Needs:   Total Energy Estimated Needs (kCal): 2298-2874 kCal  Method for Estimating Needs: MSJ (REE: 1512) x ~1.2  Total Protein Estimated Needs (g): 70 g  Method for Estimating Needs: ~1.5 g/kg x IBW  Total Fluid Estimated Needs (mL): 8870-6009 mL  Method for Estimating Needs: 1mL/kcal or per team        Nutrition Interventions/Recommendations         Nutrition Prescription:  When ready to cycle pts TF:  Day 1: Start Vital 1.5 @ 55mL/hr & increase 10mL q 3hrs to goal rate of 85mL/hr (x14hrs daily)  Day 2 & onward: Run Vital 1.5 @ 85mL/hr x 14hrs daily  Defer additional free water to team's discretion  Provides: 1190mL, 1785 kcals, 80g protein, & 909mL free water        Time Spent/Follow-up Reminder:   Time Spent (min): 30 minutes  Last Date of Nutrition Visit: 01/15/24  Nutrition Follow-Up Needed?: Dietitian to reassess per policy  Follow up Comment: Brief Clinical Note - Cycled TF recs

## 2024-01-15 NOTE — PROGRESS NOTES
01/11/24 1100   Discharge Planning   Living Arrangements Spouse/significant other   Support Systems Spouse/significant other   Assistance Needed none   Type of Residence Private residence   Number of Stairs to Enter Residence 5   Number of Stairs Within Residence 12   Do you have animals or pets at home? Yes   Type of Animals or Pets 0   Who is requesting discharge planning? Provider   Home or Post Acute Services In home services   Type of Home Care Services Home nursing visits;Home PT   Patient expects to be discharged to: home   Does the patient need discharge transport arranged? No     TCC Note    Plan per Medical/Surgical Team:   day 1 of admission presenting with GE junction carcinoma (CMS/HCC). S/p total gastrectomy with D2 lymphadenectomy and Juany-en-Y reconstruction and placement of Jejunostomy feeding tube    Status: inpatient   Payor Source: [employer]   Discharge disposition: home   Expected date of discharge: 1/17  Barriers: unable to find home health care   Primary Oncologist: Dr. Roger Cheung   Preferred home care agency:    TCC met with patient at bedside to discuss anticipated discharge needs. Patient states she was independent with ADLs prior to admission. Demographics and insurance verified with patient. Patient live at home with her  and has not used home health care. Patient agreeable for TCC to send out blanket of referral to home health agencies. Patient will need home care and TF supplies. Will continue to follow patient for any discharge needs.     1/15 200pm- TCC contacted multiple C agencies via phone. Unable to accept patient for HHC. Team is aware, possible SNF if no accepting home care. TCC will continue to follow for discharge needs.    01/15/24 at 2:52 PM - PRABHU TRINIDDA RN

## 2024-01-15 NOTE — PROGRESS NOTES
"Shruthi Ramos is a 58 y.o. female on day 5 of admission presenting with GE junction carcinoma (CMS/HCC). S/p total gastrectomy with D2 lymphadenectomy and Juany-en-Y reconstruction and placement of Jejunostomy feeding tube 1/10/24    Subjective   NAEO. Slighly TC in the 100s.Patient doing well this morning. Tolerating goal feeds well. Pain when up and ambulating but not when laying flat. Passing gas but no bowel movements. Ambulating. Patient denies N/V, fevers, chills, chest pain, and shortness of breath.       Objective     Physical Exam  Constitutional:       General: She is not in acute distress.     Appearance: Normal appearance. She is not ill-appearing, toxic-appearing or diaphoretic.   HENT:      Head: Normocephalic and atraumatic.   Eyes:      General: No scleral icterus.     Pupils: Pupils are equal, round, and reactive to light.   Cardiovascular:      Rate and Rhythm: Regular rhythm. Tachycardia present.   Pulmonary:      Effort: Pulmonary effort is normal. No respiratory distress.      Breath sounds: Normal breath sounds. No wheezing.   Abdominal:      General: There is no distension.      Palpations: Abdomen is soft.      Tenderness: There is no abdominal tenderness. There is no guarding.      Comments: Soft, non tender, nondistended. Incision c/d/I with staples on top. Drain with SS drainage. J tube in place with tube feeds going through.   Musculoskeletal:         General: Normal range of motion.   Skin:     General: Skin is warm and dry.      Coloration: Skin is not jaundiced.      Findings: No rash.   Neurological:      Mental Status: She is alert and oriented to person, place, and time. Mental status is at baseline.   Psychiatric:         Mood and Affect: Mood normal.         Behavior: Behavior normal.         Judgment: Judgment normal.     Last Recorded Vitals  Blood pressure 107/74, pulse 103, temperature 36.1 °C (97 °F), temperature source Temporal, resp. rate 18, height 1.549 m (5' 1\"), " weight 99.6 kg (219 lb 9.3 oz), SpO2 99 %.  Intake/Output last 3 Shifts:  I/O last 3 completed shifts:  In: 0 (0 mL/kg)   Out: 1715 (17.2 mL/kg) [Urine:1500 (0.4 mL/kg/hr); Drains:215]  Weight: 99.6 kg     Relevant Results  Results for orders placed or performed during the hospital encounter of 01/10/24 (from the past 24 hour(s))   CBC   Result Value Ref Range    WBC 16.8 (H) 4.4 - 11.3 x10*3/uL    nRBC 0.0 0.0 - 0.0 /100 WBCs    RBC 3.02 (L) 4.00 - 5.20 x10*6/uL    Hemoglobin 9.0 (L) 12.0 - 16.0 g/dL    Hematocrit 27.6 (L) 36.0 - 46.0 %    MCV 91 80 - 100 fL    MCH 29.8 26.0 - 34.0 pg    MCHC 32.6 32.0 - 36.0 g/dL    RDW 18.7 (H) 11.5 - 14.5 %    Platelets 225 150 - 450 x10*3/uL   Comprehensive Metabolic Panel   Result Value Ref Range    Glucose 139 (H) 74 - 99 mg/dL    Sodium 139 136 - 145 mmol/L    Potassium 4.2 3.5 - 5.3 mmol/L    Chloride 105 98 - 107 mmol/L    Bicarbonate 27 21 - 32 mmol/L    Anion Gap 11 10 - 20 mmol/L    Urea Nitrogen 10 6 - 23 mg/dL    Creatinine 0.47 (L) 0.50 - 1.05 mg/dL    eGFR >90 >60 mL/min/1.73m*2    Calcium 8.1 (L) 8.6 - 10.6 mg/dL    Albumin 2.7 (L) 3.4 - 5.0 g/dL    Alkaline Phosphatase 81 33 - 110 U/L    Total Protein 4.4 (L) 6.4 - 8.2 g/dL    AST 23 9 - 39 U/L    Bilirubin, Total 0.5 0.0 - 1.2 mg/dL    ALT 26 7 - 45 U/L   Magnesium   Result Value Ref Range    Magnesium 1.95 1.60 - 2.40 mg/dL       Assessment/Plan   Principal Problem:    GE junction carcinoma (CMS/HCC)  Active Problems:    Class 3 severe obesity due to excess calories in adult (CMS/HCC)    Shruthi Ramos is a 58 y.o. 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 1/11 had amnestic event lasting 30 min, no stroke workup needed per BAT team. Doing well postoperatively    Plan    Neuro:   - pain control with IV tylenol, PCA   - pain blocks per  anesthesia DC     CV/pulm:  - continue home metoprolol  - IS  - OOB as tolerated    GI:  - NPO  - nutrition recs for tube feeds   - Vital 1.5 @ goal rate of 50mL/hr   - appreciate cycling recommendations pending   - UGI with SBFT on POD5 (1/15)  - PPI  - drain amylase 2k    :   - voiding independently  - daily labs, replete lytes PRN  - I&Os    Endo:  - no indication for sliding scale insulin    Heme:  - Hgb stable, no indications for transfusions    ID:  - no indications for abx  -Monitor rising WBC, currently 16.8     DVT ppx:  - CHELO  - SCDs in place    Dispo: continue management on RNF    Discussed attending Dr. Deon Magallanes MD  General Surgery PGY1  Surgical oncology [id number]

## 2024-01-16 ENCOUNTER — APPOINTMENT (OUTPATIENT)
Dept: RADIOLOGY | Facility: HOSPITAL | Age: 59
End: 2024-01-16
Payer: MEDICAID

## 2024-01-16 LAB
ALBUMIN SERPL BCP-MCNC: 2.9 G/DL (ref 3.4–5)
ALP SERPL-CCNC: 98 U/L (ref 33–110)
ALT SERPL W P-5'-P-CCNC: 28 U/L (ref 7–45)
ANION GAP SERPL CALC-SCNC: 18 MMOL/L (ref 10–20)
AST SERPL W P-5'-P-CCNC: 24 U/L (ref 9–39)
BILIRUB SERPL-MCNC: 0.7 MG/DL (ref 0–1.2)
BUN SERPL-MCNC: 10 MG/DL (ref 6–23)
CALCIUM SERPL-MCNC: 8.4 MG/DL (ref 8.6–10.6)
CHLORIDE SERPL-SCNC: 104 MMOL/L (ref 98–107)
CO2 SERPL-SCNC: 22 MMOL/L (ref 21–32)
CREAT SERPL-MCNC: 0.55 MG/DL (ref 0.5–1.05)
EGFRCR SERPLBLD CKD-EPI 2021: >90 ML/MIN/1.73M*2
ERYTHROCYTE [DISTWIDTH] IN BLOOD BY AUTOMATED COUNT: 18.3 % (ref 11.5–14.5)
GLUCOSE SERPL-MCNC: 171 MG/DL (ref 74–99)
HCT VFR BLD AUTO: 30.4 % (ref 36–46)
HGB BLD-MCNC: 10 G/DL (ref 12–16)
MAGNESIUM SERPL-MCNC: 1.79 MG/DL (ref 1.6–2.4)
MCH RBC QN AUTO: 29.4 PG (ref 26–34)
MCHC RBC AUTO-ENTMCNC: 32.9 G/DL (ref 32–36)
MCV RBC AUTO: 89 FL (ref 80–100)
NRBC BLD-RTO: 0 /100 WBCS (ref 0–0)
PLATELET # BLD AUTO: 278 X10*3/UL (ref 150–450)
POTASSIUM SERPL-SCNC: 4.2 MMOL/L (ref 3.5–5.3)
PROT SERPL-MCNC: 4.9 G/DL (ref 6.4–8.2)
RBC # BLD AUTO: 3.4 X10*6/UL (ref 4–5.2)
SODIUM SERPL-SCNC: 140 MMOL/L (ref 136–145)
WBC # BLD AUTO: 16.4 X10*3/UL (ref 4.4–11.3)

## 2024-01-16 PROCEDURE — 2500000004 HC RX 250 GENERAL PHARMACY W/ HCPCS (ALT 636 FOR OP/ED): Performed by: STUDENT IN AN ORGANIZED HEALTH CARE EDUCATION/TRAINING PROGRAM

## 2024-01-16 PROCEDURE — 2500000004 HC RX 250 GENERAL PHARMACY W/ HCPCS (ALT 636 FOR OP/ED)

## 2024-01-16 PROCEDURE — 2500000005 HC RX 250 GENERAL PHARMACY W/O HCPCS

## 2024-01-16 PROCEDURE — 80053 COMPREHEN METABOLIC PANEL: CPT

## 2024-01-16 PROCEDURE — 1200000002 HC GENERAL ROOM WITH TELEMETRY DAILY

## 2024-01-16 PROCEDURE — 74177 CT ABD & PELVIS W/CONTRAST: CPT | Performed by: RADIOLOGY

## 2024-01-16 PROCEDURE — 2500000004 HC RX 250 GENERAL PHARMACY W/ HCPCS (ALT 636 FOR OP/ED): Performed by: NURSE PRACTITIONER

## 2024-01-16 PROCEDURE — 2550000001 HC RX 255 CONTRASTS: Performed by: STUDENT IN AN ORGANIZED HEALTH CARE EDUCATION/TRAINING PROGRAM

## 2024-01-16 PROCEDURE — 97530 THERAPEUTIC ACTIVITIES: CPT | Mod: GP

## 2024-01-16 PROCEDURE — 74177 CT ABD & PELVIS W/CONTRAST: CPT

## 2024-01-16 PROCEDURE — 99232 SBSQ HOSP IP/OBS MODERATE 35: CPT | Performed by: STUDENT IN AN ORGANIZED HEALTH CARE EDUCATION/TRAINING PROGRAM

## 2024-01-16 PROCEDURE — 83735 ASSAY OF MAGNESIUM: CPT

## 2024-01-16 PROCEDURE — 2500000001 HC RX 250 WO HCPCS SELF ADMINISTERED DRUGS (ALT 637 FOR MEDICARE OP)

## 2024-01-16 PROCEDURE — 85027 COMPLETE CBC AUTOMATED: CPT

## 2024-01-16 RX ORDER — MAGNESIUM SULFATE HEPTAHYDRATE 40 MG/ML
2 INJECTION, SOLUTION INTRAVENOUS ONCE
Status: COMPLETED | OUTPATIENT
Start: 2024-01-16 | End: 2024-01-16

## 2024-01-16 RX ORDER — SODIUM CHLORIDE, SODIUM LACTATE, POTASSIUM CHLORIDE, CALCIUM CHLORIDE 600; 310; 30; 20 MG/100ML; MG/100ML; MG/100ML; MG/100ML
75 INJECTION, SOLUTION INTRAVENOUS CONTINUOUS
Status: DISCONTINUED | OUTPATIENT
Start: 2024-01-16 | End: 2024-01-17

## 2024-01-16 RX ADMIN — ONDANSETRON 8 MG: 2 INJECTION INTRAMUSCULAR; INTRAVENOUS at 08:25

## 2024-01-16 RX ADMIN — SODIUM CHLORIDE, POTASSIUM CHLORIDE, SODIUM LACTATE AND CALCIUM CHLORIDE 1000 ML: 600; 310; 30; 20 INJECTION, SOLUTION INTRAVENOUS at 06:28

## 2024-01-16 RX ADMIN — HEPARIN SODIUM 7500 UNITS: 5000 INJECTION INTRAVENOUS; SUBCUTANEOUS at 23:32

## 2024-01-16 RX ADMIN — HEPARIN SODIUM 7500 UNITS: 5000 INJECTION INTRAVENOUS; SUBCUTANEOUS at 12:45

## 2024-01-16 RX ADMIN — METOPROLOL TARTRATE 5 MG: 5 INJECTION, SOLUTION INTRAVENOUS at 18:05

## 2024-01-16 RX ADMIN — OXYCODONE HYDROCHLORIDE 5 MG: 5 SOLUTION ORAL at 04:47

## 2024-01-16 RX ADMIN — IOHEXOL 75 ML: 350 INJECTION, SOLUTION INTRAVENOUS at 09:34

## 2024-01-16 RX ADMIN — MAGNESIUM SULFATE HEPTAHYDRATE 2 G: 40 INJECTION, SOLUTION INTRAVENOUS at 08:28

## 2024-01-16 RX ADMIN — PIPERACILLIN SODIUM AND TAZOBACTAM SODIUM 3.38 G: 3; .375 INJECTION, SOLUTION INTRAVENOUS at 23:30

## 2024-01-16 RX ADMIN — ACETAMINOPHEN 650 MG: 650 SOLUTION ORAL at 16:59

## 2024-01-16 RX ADMIN — SODIUM CHLORIDE, POTASSIUM CHLORIDE, SODIUM LACTATE AND CALCIUM CHLORIDE 75 ML/HR: 600; 310; 30; 20 INJECTION, SOLUTION INTRAVENOUS at 06:29

## 2024-01-16 RX ADMIN — ACETAMINOPHEN 650 MG: 650 SOLUTION ORAL at 04:41

## 2024-01-16 RX ADMIN — IBUPROFEN 600 MG: 200 SUSPENSION ORAL at 08:27

## 2024-01-16 RX ADMIN — METOPROLOL TARTRATE 5 MG: 5 INJECTION, SOLUTION INTRAVENOUS at 12:45

## 2024-01-16 RX ADMIN — PIPERACILLIN SODIUM AND TAZOBACTAM SODIUM 3.38 G: 3; .375 INJECTION, SOLUTION INTRAVENOUS at 10:43

## 2024-01-16 RX ADMIN — PIPERACILLIN SODIUM AND TAZOBACTAM SODIUM 3.38 G: 3; .375 INJECTION, SOLUTION INTRAVENOUS at 16:59

## 2024-01-16 RX ADMIN — SODIUM CHLORIDE, POTASSIUM CHLORIDE, SODIUM LACTATE AND CALCIUM CHLORIDE 75 ML/HR: 600; 310; 30; 20 INJECTION, SOLUTION INTRAVENOUS at 23:32

## 2024-01-16 RX ADMIN — SODIUM CHLORIDE, POTASSIUM CHLORIDE, SODIUM LACTATE AND CALCIUM CHLORIDE 75 ML/HR: 600; 310; 30; 20 INJECTION, SOLUTION INTRAVENOUS at 10:44

## 2024-01-16 RX ADMIN — ACETAMINOPHEN 650 MG: 650 SOLUTION ORAL at 23:32

## 2024-01-16 ASSESSMENT — PAIN SCALES - GENERAL
PAINLEVEL_OUTOF10: 0 - NO PAIN
PAINLEVEL_OUTOF10: 2
PAINLEVEL_OUTOF10: 3
PAINLEVEL_OUTOF10: 8

## 2024-01-16 ASSESSMENT — PAIN DESCRIPTION - LOCATION
LOCATION: ABDOMEN
LOCATION: ABDOMEN

## 2024-01-16 ASSESSMENT — COGNITIVE AND FUNCTIONAL STATUS - GENERAL
MOVING TO AND FROM BED TO CHAIR: A LITTLE
STANDING UP FROM CHAIR USING ARMS: A LITTLE
DRESSING REGULAR LOWER BODY CLOTHING: A LITTLE
CLIMB 3 TO 5 STEPS WITH RAILING: A LITTLE
DAILY ACTIVITIY SCORE: 20
STANDING UP FROM CHAIR USING ARMS: A LITTLE
CLIMB 3 TO 5 STEPS WITH RAILING: A LITTLE
WALKING IN HOSPITAL ROOM: A LITTLE
TOILETING: A LITTLE
MOVING TO AND FROM BED TO CHAIR: A LITTLE
MOBILITY SCORE: 20
WALKING IN HOSPITAL ROOM: A LITTLE
DRESSING REGULAR UPPER BODY CLOTHING: A LITTLE
MOBILITY SCORE: 20
HELP NEEDED FOR BATHING: A LITTLE

## 2024-01-16 ASSESSMENT — PAIN DESCRIPTION - DESCRIPTORS: DESCRIPTORS: CRAMPING;PRESSURE

## 2024-01-16 ASSESSMENT — PAIN - FUNCTIONAL ASSESSMENT
PAIN_FUNCTIONAL_ASSESSMENT: 0-10

## 2024-01-16 NOTE — CARE PLAN
The patient's goals for the shift include      The clinical goals for the shift include pt will remain free from injury throughout shift

## 2024-01-16 NOTE — CARE PLAN
The clinical goals for the shift include Patient will remain free of falls and hemodynamically stable throughout shift.    VSS, afebrile, no complaints N/V/D this shift. Pt remained safe and free of falls/injury. Patient started out shift w/ abdominal cramping/pain that was relieved by scheduled pain medications. Patient's J tube was unclamped and put to gravity to give bowels some rest. Patient with an uneventful day.

## 2024-01-16 NOTE — PROGRESS NOTES
"Nutrition Follow Up Assessment:   Nutrition Assessment       Patient is a 58 y.o. 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     Taken to OR (1/10/24) now s/p diagnostic laparoscopy, liver wedge bx, EGD, total gastrectomy with D2 lymphadenectomy, Juany-en-Y reconstruction, and placement of J-tube.     Team planning for CT scan to assess etiology of increased WBC.     RDN received [facility] regarding pt w/ increased number of BM since changing to cycled regimen. Discussed updated recommendations w/ team. Please refer below.    Nutrition History:  Food and Nutrient History: Visit made, pt resting in bed with TF turned off d/t cycled regimen. Reports that since changing to a cycled regimen she started having an increased number of BMs. Pts  also notes pt was given a suppository yesterday, which he feels contributed. RDN discussed plan to trial a more concentrated enteral formula to reduce volume going into the small bowel. Pt/ agreeable & deny questions at this time.  Food Allergies/Intolerances:  None  GI Symptoms: Diarrhea  Oral Problems: None     Anthropometrics:  Height: 154.9 cm (5' 1\")   Weight: 99.6 kg (219 lb 9.3 oz)   BMI (Calculated): 41.51  IBW/kg (Dietitian Calculated): 47.7 kg  Percent of IBW: 209 %     Weight History:   Weight         1/10/2024  0840             Weight: 99.6 kg (219 lb 9.3 oz)     Weight Change %:  Weight History / % Weight Change: No updated weight in EMR since admission. Unable to assess for change.    Nutrition Focused Physical Exam Findings:  defer: Refer to RDN assessment (1/11/24)    Edema:  Edema: +1 trace  Edema Location: Generalized ; BLE    Physical Findings:  Skin: Positive (Lower medial abdominal incision)    Nutrition Significant Labs:  BMP Trend:   Results from last 7 days   Lab Units 01/16/24  0506 01/15/24  1857 01/15/24  0501 01/14/24  0620   GLUCOSE mg/dL 171* 160* 139* 142*   CALCIUM mg/dL 8.4* 8.4* 8.1* " 8.0*   SODIUM mmol/L 140 139 139 142   POTASSIUM mmol/L 4.2 4.2 4.2 3.8   CO2 mmol/L 22 24 27 26   CHLORIDE mmol/L 104 104 105 108*   BUN mg/dL 10 11 10 9   CREATININE mg/dL 0.55 0.59 0.47* 0.48*      Nutrition Specific Medications:  Scheduled medications  bisacodyl, 10 mg, rectal, Daily  heparin (porcine), 7,500 Units, subcutaneous, q8h CHELO  piperacillin-tazobactam, 3.375 g, intravenous, q6h    Continuous medications  lactated Ringer's, 75 mL/hr, Last Rate: 75 mL/hr (01/16/24 1044)    I/O:   Last BM Date: 01/16/24; Stool Appearance: Loose (01/16/24 0828)      Dietary Orders (From admission, onward)       Start     Ordered    01/15/24 1635  Adult diet Clear Liquid  Diet effective now        Question:  Diet type  Answer:  Clear Liquid    01/15/24 1638             Estimated Needs:   Total Energy Estimated Needs (kCal): 9684-8289 kCal  Method for Estimating Needs: MSJ (REE: 1512) x ~1.2  Total Protein Estimated Needs (g): 70 g  Method for Estimating Needs: ~1.5 g/kg x IBW  Total Fluid Estimated Needs (mL): 0498-9956 mL  Method for Estimating Needs: 1mL/kcal or per team        Nutrition Diagnosis   Malnutrition Diagnosis  Patient has Malnutrition Diagnosis: No    Nutrition Diagnosis  Patient has Nutrition Diagnosis: Yes  Diagnosis Status (1): Ongoing  Nutrition Diagnosis 1: Altered GI function  Related to (1): 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 s/p total gastrectomy  As Evidenced by (1): need for supplemental TF via J-tube       Nutrition Interventions/Recommendations         Nutrition Prescription:  Recommend changing to a more concentrated enteral formula to reduce rate:  Day 1: Start TwoCal HN @ 25mL/hr & increase by 10mL q 4hrs to goal rate of 55mL/hr (x16hrs total)  Day 2 & onward: Run TwoCal HN @ 55mL/hr x 16hrs daily  FWF: Defer to team's discretion  TF provides: 1760 kcals, 73g protein, & 616mL free water    Please discontinue order for suppository given current clinical  picture    If pt continues to experience multiple BM, recommend addition of anti-diarrheal (i.e. Imodium)        Time Spent/Follow-up Reminder:   Time Spent (min): 60 minutes  Last Date of Nutrition Visit: 01/16/24  Nutrition Follow-Up Needed?: Dietitian to reassess per policy  Follow up Comment: TF follow up

## 2024-01-16 NOTE — PROGRESS NOTES
Physical Therapy                 Therapy Communication Note    Patient Name: Shruthi Ramos  MRN: [id number]  Today's Date: 1/16/2024     Discipline: Physical Therapy    Missed Visit Reason: Missed Visit Reason:  (per RN, pt is about to head down to CT. PT will re-attempt as schedule allows)    Missed Time: Attempt 0183

## 2024-01-16 NOTE — PROGRESS NOTES
Physical Therapy    Physical Therapy Treatment    Patient Name: Shruthi Ramos  MRN: [id number]  Today's Date: 1/16/2024  Time Calculation  Start Time: 1004  Stop Time: 1029  Time Calculation (min): 25 min       Assessment/Plan   PT Assessment  Medical Staff Made Aware: Yes  End of Session Communication: Bedside nurse  Assessment Comment: Pt had increased HR to 157 and SOB rated 9/10 with ambulation ~180ft this session despite 3 brief standing rest breaks.  HR decreased to 126 and SOB resolved with seated rest break.  She scores as a low fall riski on the Tinetti Balance Test.  Pt completed ther ex with no difficulty.  Pt continues to benefit from skilled PT and remains appropriate for low intensity PT upon discharge.  End of Session Patient Position: Up in chair, Alarm off, not on at start of session  PT Plan  Inpatient/Swing Bed or Outpatient: Inpatient  PT Plan  Treatment/Interventions: Bed mobility, Transfer training, Gait training, Stair training, Balance training, Strengthening, Therapeutic exercise, Therapeutic activity, Home exercise program  PT Plan: Skilled PT  PT Frequency: 4 times per week  PT Discharge Recommendations: Low intensity level of continued care  PT Recommended Transfer Status: Stand by assist  PT - OK to Discharge: Yes (eval complete and discharge recommendations made)      General Visit Information:   PT  Visit  PT Received On: 01/16/24  General  Prior to Session Communication: Bedside nurse  Patient Position Received: Bed, 3 rail up, Alarm off, not on at start of session  General Comment: Pt cleared for PT by RN.  Pt is alert and agreeable to PT.    Subjective   Precautions:  Precautions  Medical Precautions: Fall precautions, Cardiac precautions, Abdominal precautions  Post-Surgical Precautions: Abdominal surgery precautions  Vital Signs:  Vital Signs  Heart Rate:  (pre:106, during amb:157, post:126)  Heart Rate Source: Monitor    Objective   Pain:  Pain Assessment  Pain Assessment:  "0-10  Pain Score: 0 - No pain  Cognition:  Cognition  Overall Cognitive Status: Within Functional Limits  Postural Control:  Postural Control  Posture Comment: WFL  Static Sitting Balance  Static Sitting-Balance Support: Bilateral upper extremity supported, Feet supported  Static Sitting-Level of Assistance: Independent  Dynamic Sitting Balance  Dynamic Sitting-Balance Support: Bilateral upper extremity supported, Feet unsupported  Dynamic Sitting-Balance: Forward lean, Lateral lean  Dynamic Sitting-Comments: Independent  Static Standing Balance  Static Standing-Balance Support: No upper extremity supported  Static Standing-Level of Assistance: Distant supervision  Dynamic Standing Balance  Dynamic Standing-Balance Support: No upper extremity supported  Dynamic Standing-Balance: Turning  Dynamic Standing-Comments: Supervision  Activity Tolerance:  Activity Tolerance  Endurance: Tolerates 10 - 20 min exercise with multiple rests  Treatments:  Therapeutic Exercise  Therapeutic Exercise Performed: Yes  Therapeutic Exercise Activity 1: Seated Lu reynoso3\" hold, ankle pumps x10 each LE.    Bed Mobility  Bed Mobility: Yes  Bed Mobility 1  Bed Mobility 1: Supine to sitting, Log roll  Level of Assistance 1: Modified independent (use of bed rails)    Ambulation/Gait Training  Ambulation/Gait Training Performed: Yes  Ambulation/Gait Training 1  Surface 1: Level tile  Device 1: No device, Grewal rail  Assistance 1: Close supervision  Quality of Gait 1: Wide base of support, Trendelenburg (decreased lb)  Comments/Distance (ft) 1: 180ft with 3 brief standing rest breaks <10 seconds each.  SOB rated 9/10 with amb.  recovered to 2/10 sitting in chair, resolved with rest in chair.  Transfers  Transfer: Yes  Transfer 1  Transfer From 1: Bed to  Transfer to 1: Stand  Technique 1: Sit to stand  Transfer Level of Assistance 1: Independent  Transfers 2  Transfer From 2: Toilet to  Transfer to 2: Stand  Technique 2: Sit to stand, " Stand to sit  Transfer Level of Assistance 2: Independent  Transfers 3  Transfer From 3: Chair with arms to  Transfer to 3: Stand  Technique 3: Sit to stand, Stand to sit  Transfer Level of Assistance 3: Independent    Stairs  Stairs: No    Outcome Measures:  [facility] Basic Mobility  Turning from your back to your side while in a flat bed without using bedrails: None  Moving from lying on your back to sitting on the side of a flat bed without using bedrails: None  Moving to and from bed to chair (including a wheelchair): A little  Standing up from a chair using your arms (e.g. wheelchair or bedside chair): A little  To walk in hospital room: A little  Climbing 3-5 steps with railing: A little  Basic Mobility - Total Score: 20    Tinetti  Sitting Balance: Steady, safe  Arises: Able, uses arms to help  Attempts to Arise: Able to arise, one attempt  Immediate Standing Balance (First 5 Seconds): Steady without walker or other support  Standing Balance: Narrow stance without support  Nudged: Steady without walker or other support  Eyes Closed: Steady  Turned 360 Degrees: Steadiness: Steady  Turned 360 Degrees: Continuity of Steps: Continuous  Sitting Down: Uses arms or not a smooth motion  Balance Score: 14  Initiation of Gait: No hesitancy  Step Height: R Swing Foot: Right foot complete clears floor  Step Length: R Swing Foot: Passes left stance foot  Step Height: L Swing Foot: Left foot complete clears floor  Step Length: L Swing Foot: Passes right stance foot  Step Symmetry: Right and left step appear equal  Step Continuity: Steps appear continuous  Path: Straight without walking aid  Trunk: No sway, no flexion, no use of arms, no walking aid  Walking Time: Heels apart  Gait Score: 11  Total Score: 25    Education Documentation  Precautions, taught by Asha Wang PT at 1/16/2024 10:42 AM.  Learner: Patient  Readiness: Acceptance  Method: Explanation  Response: Verbalizes Understanding  Comment: Abdominal  precautions    Home Exercise Program, taught by Asha Wang, PT at 1/16/2024 10:42 AM.  Learner: Patient  Readiness: Acceptance  Method: Explanation  Response: Verbalizes Understanding  Comment: Abdominal precautions    Mobility Training, taught by Asha Wang PT at 1/16/2024 10:42 AM.  Learner: Patient  Readiness: Acceptance  Method: Explanation  Response: Verbalizes Understanding  Comment: Abdominal precautions    Education Comments  No comments found.          Encounter Problems       Encounter Problems (Active)       Mobility       Pt will complete bed mobility independently utilizing log roll technique (Progressing)       Start:  01/12/24    Expected End:  01/26/24            Pt will amb >200ft with SBA in prep for safe discharge home  (Progressing)       Start:  01/12/24    Expected End:  01/26/24            Pt will a/descend 5 steps with B rails and SBA in prep for safe home entry (Progressing)       Start:  01/12/24    Expected End:  01/26/24                  Encounter Problems (Resolved)       Balance       Pt will score >/=24 on the Tinetti to indicate low fall risk  (Met)       Start:  01/12/24    Expected End:  01/26/24    Resolved:  01/16/24

## 2024-01-16 NOTE — PROGRESS NOTES
"Shruthi Ramos is a 58 y.o. female on day 6 of admission presenting with GE junction carcinoma (CMS/HCC). S/p total gastrectomy with D2 lymphadenectomy and Juany-en-Y reconstruction and placement of Jejunostomy feeding tube 1/10/24    Subjective   NAEO. Slighly TC in the 120s, when using restroom. Patient doing well this morning. Passing gas. Multiple BM. Some nausea when standing.Patient denies V, fevers, chills, chest pain, and shortness of breath.       Objective     Physical Exam  Constitutional:       General: She is not in acute distress.     Appearance: Normal appearance. She is not ill-appearing, toxic-appearing or diaphoretic.   HENT:      Head: Normocephalic and atraumatic.   Eyes:      General: No scleral icterus.     Pupils: Pupils are equal, round, and reactive to light.   Cardiovascular:      Rate and Rhythm: Regular rhythm. Tachycardia present.   Pulmonary:      Effort: Pulmonary effort is normal. No respiratory distress.      Breath sounds: Normal breath sounds. No wheezing.   Abdominal:      General: There is no distension.      Palpations: Abdomen is soft.      Tenderness: There is no abdominal tenderness. There is no guarding.      Comments: Soft, mild tender, nondistended. Incision c/d/I with staples on top(Redness around incision). Drain with SS drainage. J tube in place with tube feeds going through.   Musculoskeletal:         General: Normal range of motion.   Skin:     General: Skin is warm and dry.      Coloration: Skin is not jaundiced.      Findings: No rash.   Neurological:      Mental Status: She is alert and oriented to person, place, and time. Mental status is at baseline.   Psychiatric:         Mood and Affect: Mood normal.         Behavior: Behavior normal.         Judgment: Judgment normal.       Last Recorded Vitals  Blood pressure 106/68, pulse (!) 121, temperature 36.4 °C (97.5 °F), resp. rate 16, height 1.549 m (5' 1\"), weight 99.6 kg (219 lb 9.3 oz), SpO2 96 %.  Intake/Output " last 3 Shifts:  I/O last 3 completed shifts:  In: 1221.4 (12.3 mL/kg) [I.V.:1.4 (0 mL/kg); NG/GT:720; IV Piggyback:500]  Out: 1450 (14.6 mL/kg) [Urine:1350 (0.4 mL/kg/hr); Drains:100]  Weight: 99.6 kg     Relevant Results  Results for orders placed or performed during the hospital encounter of 01/10/24 (from the past 24 hour(s))   CBC   Result Value Ref Range    WBC 17.6 (H) 4.4 - 11.3 x10*3/uL    nRBC 0.0 0.0 - 0.0 /100 WBCs    RBC 3.25 (L) 4.00 - 5.20 x10*6/uL    Hemoglobin 10.0 (L) 12.0 - 16.0 g/dL    Hematocrit 29.7 (L) 36.0 - 46.0 %    MCV 91 80 - 100 fL    MCH 30.8 26.0 - 34.0 pg    MCHC 33.7 32.0 - 36.0 g/dL    RDW 18.5 (H) 11.5 - 14.5 %    Platelets 263 150 - 450 x10*3/uL   Basic metabolic panel   Result Value Ref Range    Glucose 160 (H) 74 - 99 mg/dL    Sodium 139 136 - 145 mmol/L    Potassium 4.2 3.5 - 5.3 mmol/L    Chloride 104 98 - 107 mmol/L    Bicarbonate 24 21 - 32 mmol/L    Anion Gap 15 10 - 20 mmol/L    Urea Nitrogen 11 6 - 23 mg/dL    Creatinine 0.59 0.50 - 1.05 mg/dL    eGFR >90 >60 mL/min/1.73m*2    Calcium 8.4 (L) 8.6 - 10.6 mg/dL   CBC   Result Value Ref Range    WBC 16.4 (H) 4.4 - 11.3 x10*3/uL    nRBC 0.0 0.0 - 0.0 /100 WBCs    RBC 3.40 (L) 4.00 - 5.20 x10*6/uL    Hemoglobin 10.0 (L) 12.0 - 16.0 g/dL    Hematocrit 30.4 (L) 36.0 - 46.0 %    MCV 89 80 - 100 fL    MCH 29.4 26.0 - 34.0 pg    MCHC 32.9 32.0 - 36.0 g/dL    RDW 18.3 (H) 11.5 - 14.5 %    Platelets 278 150 - 450 x10*3/uL   Comprehensive Metabolic Panel   Result Value Ref Range    Glucose 171 (H) 74 - 99 mg/dL    Sodium 140 136 - 145 mmol/L    Potassium 4.2 3.5 - 5.3 mmol/L    Chloride 104 98 - 107 mmol/L    Bicarbonate 22 21 - 32 mmol/L    Anion Gap 18 10 - 20 mmol/L    Urea Nitrogen 10 6 - 23 mg/dL    Creatinine 0.55 0.50 - 1.05 mg/dL    eGFR >90 >60 mL/min/1.73m*2    Calcium 8.4 (L) 8.6 - 10.6 mg/dL    Albumin 2.9 (L) 3.4 - 5.0 g/dL    Alkaline Phosphatase 98 33 - 110 U/L    Total Protein 4.9 (L) 6.4 - 8.2 g/dL    AST 24 9 - 39 U/L     Bilirubin, Total 0.7 0.0 - 1.2 mg/dL    ALT 28 7 - 45 U/L   Magnesium   Result Value Ref Range    Magnesium 1.79 1.60 - 2.40 mg/dL       Assessment/Plan   Principal Problem:    GE junction carcinoma (CMS/HCC)  Active Problems:    Class 3 severe obesity due to excess calories in adult (CMS/HCC)    Shruthi Ramos is a 58 y.o. 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 1/11 had amnestic event lasting 30 min, no stroke workup needed per BAT team. Doing well postoperatively    Plan    Neuro:   - pain control with tylenol, ibuprofen, oxy 2.5/5 PRN through J tube  - PCA DC 1/15  - pain blocks DC    CV/pulm:  - continue home metoprolol  - IS  - OOB as tolerated    GI:  - NPO  - nutrition recs for tube feeds   - Vital 1.5 @ goal rate of 85mL/hr - PAUSED for CT  - UGI with SBFT on POD5 (1/15) - no evidence suggesting leak   - PPI  - drain amylase 2k  -Zofran 8mg PRN   -CT scan pending     :   - voiding independently  - daily labs, replete lytes PRN  - I&Os    Endo:  - no indication for sliding scale insulin    Heme:  - Hgb stable, no indications for transfusions    ID:  - no indications for abx  -Monitor rising WBC, currently 16.4 (17.6 PM labs)    DVT ppx:  - CHELO  - SCDs in place    Dispo: continue management on RNF    Discussed attending Dr. Deon Magallanes MD  General Surgery PGY1  Surgical oncology [id number]

## 2024-01-17 LAB
ALBUMIN SERPL BCP-MCNC: 2.4 G/DL (ref 3.4–5)
ALP SERPL-CCNC: 76 U/L (ref 33–110)
ALT SERPL W P-5'-P-CCNC: 17 U/L (ref 7–45)
ANION GAP SERPL CALC-SCNC: 14 MMOL/L (ref 10–20)
AST SERPL W P-5'-P-CCNC: 14 U/L (ref 9–39)
ATRIAL RATE: 100 BPM
BILIRUB SERPL-MCNC: 0.8 MG/DL (ref 0–1.2)
BUN SERPL-MCNC: 11 MG/DL (ref 6–23)
CALCIUM SERPL-MCNC: 7.9 MG/DL (ref 8.6–10.6)
CHLORIDE SERPL-SCNC: 103 MMOL/L (ref 98–107)
CO2 SERPL-SCNC: 25 MMOL/L (ref 21–32)
CREAT SERPL-MCNC: 0.59 MG/DL (ref 0.5–1.05)
EGFRCR SERPLBLD CKD-EPI 2021: >90 ML/MIN/1.73M*2
ERYTHROCYTE [DISTWIDTH] IN BLOOD BY AUTOMATED COUNT: 18 % (ref 11.5–14.5)
GLUCOSE SERPL-MCNC: 92 MG/DL (ref 74–99)
HCT VFR BLD AUTO: 26 % (ref 36–46)
HGB BLD-MCNC: 8.7 G/DL (ref 12–16)
MAGNESIUM SERPL-MCNC: 1.88 MG/DL (ref 1.6–2.4)
MCH RBC QN AUTO: 29.7 PG (ref 26–34)
MCHC RBC AUTO-ENTMCNC: 33.5 G/DL (ref 32–36)
MCV RBC AUTO: 89 FL (ref 80–100)
NRBC BLD-RTO: 0 /100 WBCS (ref 0–0)
P AXIS: 33 DEGREES
P OFFSET: 186 MS
P ONSET: 139 MS
PLATELET # BLD AUTO: 293 X10*3/UL (ref 150–450)
POTASSIUM SERPL-SCNC: 4.1 MMOL/L (ref 3.5–5.3)
PR INTERVAL: 156 MS
PROT SERPL-MCNC: 4.1 G/DL (ref 6.4–8.2)
Q ONSET: 217 MS
QRS COUNT: 16 BEATS
QRS DURATION: 72 MS
QT INTERVAL: 350 MS
QTC CALCULATION(BAZETT): 451 MS
QTC FREDERICIA: 415 MS
R AXIS: -24 DEGREES
RBC # BLD AUTO: 2.93 X10*6/UL (ref 4–5.2)
SODIUM SERPL-SCNC: 138 MMOL/L (ref 136–145)
T AXIS: 34 DEGREES
T OFFSET: 392 MS
VENTRICULAR RATE: 100 BPM
WBC # BLD AUTO: 15.8 X10*3/UL (ref 4.4–11.3)

## 2024-01-17 PROCEDURE — 2500000004 HC RX 250 GENERAL PHARMACY W/ HCPCS (ALT 636 FOR OP/ED)

## 2024-01-17 PROCEDURE — 85027 COMPLETE CBC AUTOMATED: CPT

## 2024-01-17 PROCEDURE — 2500000001 HC RX 250 WO HCPCS SELF ADMINISTERED DRUGS (ALT 637 FOR MEDICARE OP)

## 2024-01-17 PROCEDURE — 80053 COMPREHEN METABOLIC PANEL: CPT

## 2024-01-17 PROCEDURE — 1200000002 HC GENERAL ROOM WITH TELEMETRY DAILY

## 2024-01-17 PROCEDURE — 2500000005 HC RX 250 GENERAL PHARMACY W/O HCPCS

## 2024-01-17 PROCEDURE — 2500000004 HC RX 250 GENERAL PHARMACY W/ HCPCS (ALT 636 FOR OP/ED): Performed by: STUDENT IN AN ORGANIZED HEALTH CARE EDUCATION/TRAINING PROGRAM

## 2024-01-17 PROCEDURE — 99232 SBSQ HOSP IP/OBS MODERATE 35: CPT | Performed by: STUDENT IN AN ORGANIZED HEALTH CARE EDUCATION/TRAINING PROGRAM

## 2024-01-17 PROCEDURE — 2500000004 HC RX 250 GENERAL PHARMACY W/ HCPCS (ALT 636 FOR OP/ED): Performed by: NURSE PRACTITIONER

## 2024-01-17 PROCEDURE — 83735 ASSAY OF MAGNESIUM: CPT

## 2024-01-17 RX ORDER — SODIUM CHLORIDE, SODIUM LACTATE, POTASSIUM CHLORIDE, CALCIUM CHLORIDE 600; 310; 30; 20 MG/100ML; MG/100ML; MG/100ML; MG/100ML
50 INJECTION, SOLUTION INTRAVENOUS CONTINUOUS
Status: DISCONTINUED | OUTPATIENT
Start: 2024-01-17 | End: 2024-01-21

## 2024-01-17 RX ORDER — MAGNESIUM SULFATE HEPTAHYDRATE 40 MG/ML
2 INJECTION, SOLUTION INTRAVENOUS ONCE
Status: COMPLETED | OUTPATIENT
Start: 2024-01-17 | End: 2024-01-17

## 2024-01-17 RX ADMIN — PIPERACILLIN SODIUM AND TAZOBACTAM SODIUM 3.38 G: 3; .375 INJECTION, SOLUTION INTRAVENOUS at 12:00

## 2024-01-17 RX ADMIN — METOPROLOL TARTRATE 5 MG: 5 INJECTION, SOLUTION INTRAVENOUS at 06:51

## 2024-01-17 RX ADMIN — HEPARIN SODIUM 7500 UNITS: 5000 INJECTION INTRAVENOUS; SUBCUTANEOUS at 06:49

## 2024-01-17 RX ADMIN — MAGNESIUM SULFATE HEPTAHYDRATE 2 G: 40 INJECTION, SOLUTION INTRAVENOUS at 08:58

## 2024-01-17 RX ADMIN — METOPROLOL TARTRATE 5 MG: 5 INJECTION, SOLUTION INTRAVENOUS at 14:02

## 2024-01-17 RX ADMIN — HEPARIN SODIUM 7500 UNITS: 5000 INJECTION INTRAVENOUS; SUBCUTANEOUS at 22:19

## 2024-01-17 RX ADMIN — SODIUM CHLORIDE, POTASSIUM CHLORIDE, SODIUM LACTATE AND CALCIUM CHLORIDE 50 ML/HR: 600; 310; 30; 20 INJECTION, SOLUTION INTRAVENOUS at 11:59

## 2024-01-17 RX ADMIN — ACETAMINOPHEN 650 MG: 650 SOLUTION ORAL at 11:59

## 2024-01-17 RX ADMIN — PIPERACILLIN SODIUM AND TAZOBACTAM SODIUM 3.38 G: 3; .375 INJECTION, SOLUTION INTRAVENOUS at 05:08

## 2024-01-17 RX ADMIN — IBUPROFEN 600 MG: 200 SUSPENSION ORAL at 08:59

## 2024-01-17 RX ADMIN — IBUPROFEN 600 MG: 200 SUSPENSION ORAL at 14:00

## 2024-01-17 RX ADMIN — HEPARIN SODIUM 7500 UNITS: 5000 INJECTION INTRAVENOUS; SUBCUTANEOUS at 14:00

## 2024-01-17 RX ADMIN — ACETAMINOPHEN 650 MG: 650 SOLUTION ORAL at 17:50

## 2024-01-17 RX ADMIN — ACETAMINOPHEN 650 MG: 650 SOLUTION ORAL at 05:08

## 2024-01-17 RX ADMIN — ACETAMINOPHEN 650 MG: 650 SOLUTION ORAL at 22:19

## 2024-01-17 RX ADMIN — PIPERACILLIN SODIUM AND TAZOBACTAM SODIUM 3.38 G: 3; .375 INJECTION, SOLUTION INTRAVENOUS at 17:50

## 2024-01-17 ASSESSMENT — PAIN DESCRIPTION - LOCATION
LOCATION: ABDOMEN

## 2024-01-17 ASSESSMENT — COGNITIVE AND FUNCTIONAL STATUS - GENERAL
DRESSING REGULAR LOWER BODY CLOTHING: A LITTLE
HELP NEEDED FOR BATHING: A LITTLE
DAILY ACTIVITIY SCORE: 20
TOILETING: A LITTLE
DRESSING REGULAR UPPER BODY CLOTHING: A LITTLE

## 2024-01-17 ASSESSMENT — PAIN - FUNCTIONAL ASSESSMENT
PAIN_FUNCTIONAL_ASSESSMENT: 0-10

## 2024-01-17 ASSESSMENT — PAIN SCALES - GENERAL
PAINLEVEL_OUTOF10: 6
PAINLEVEL_OUTOF10: 0 - NO PAIN
PAINLEVEL_OUTOF10: 0 - NO PAIN
PAINLEVEL_OUTOF10: 7
PAINLEVEL_OUTOF10: 0 - NO PAIN
PAINLEVEL_OUTOF10: 5 - MODERATE PAIN

## 2024-01-17 ASSESSMENT — PAIN DESCRIPTION - ORIENTATION
ORIENTATION: LOWER
ORIENTATION: LOWER

## 2024-01-17 NOTE — CARE PLAN
The patient's goals for the shift include      The clinical goals for the shift include Pt gumaro be comfortable and HD stable overnight      Problem: Fall/Injury  Goal: Not fall by end of shift  Outcome: Progressing     Problem: Pain  Goal: Takes deep breaths with improved pain control throughout the shift  Outcome: Progressing  Goal: Turns in bed with improved pain control throughout the shift  Outcome: Progressing  Goal: Walks with improved pain control throughout the shift  Outcome: Progressing  Goal: Performs ADL's with improved pain control throughout shift  Outcome: Progressing  Goal: Participates in PT with improved pain control throughout the shift  Outcome: Progressing  Goal: Free from opioid side effects throughout the shift  Outcome: Progressing  Goal: Free from acute confusion related to pain meds throughout the shift  Outcome: Progressing     Problem: Skin  Goal: Decreased wound size/increased tissue granulation at next dressing change  Outcome: Progressing  Goal: Participates in plan/prevention/treatment measures  Outcome: Progressing  Goal: Prevent/manage excess moisture  Outcome: Progressing  Goal: Prevent/minimize sheer/friction injuries  Outcome: Progressing  Goal: Promote/optimize nutrition  Outcome: Progressing  Goal: Promote skin healing  Outcome: Progressing

## 2024-01-17 NOTE — CARE PLAN
The patient's goals for the shift include pain management, rest, and safe no nausea.      The clinical goals for the shift include Pt will tolerate increased oral intake throughout shift.    Over the shift, the patient did not make progress toward the following goals. Barriers to progression include . Recommendations to address these barriers include .      Problem: Fall/Injury  Goal: Not fall by end of shift  Outcome: Progressing     Problem: Pain  Goal: Takes deep breaths with improved pain control throughout the shift  Outcome: Progressing  Goal: Turns in bed with improved pain control throughout the shift  Outcome: Progressing  Goal: Walks with improved pain control throughout the shift  Outcome: Progressing  Goal: Performs ADL's with improved pain control throughout shift  Outcome: Progressing  Goal: Participates in PT with improved pain control throughout the shift  Outcome: Progressing  Goal: Free from opioid side effects throughout the shift  Outcome: Progressing  Goal: Free from acute confusion related to pain meds throughout the shift  Outcome: Progressing     Problem: Skin  Goal: Decreased wound size/increased tissue granulation at next dressing change  Outcome: Progressing  Flowsheets (Taken 1/17/2024 1513)  Decreased wound size/increased tissue granulation at next dressing change: Promote sleep for wound healing  Goal: Participates in plan/prevention/treatment measures  Outcome: Progressing  Flowsheets (Taken 1/17/2024 1513)  Participates in plan/prevention/treatment measures:   Discuss with provider PT/OT consult   Increase activity/out of bed for meals  Goal: Prevent/manage excess moisture  Outcome: Progressing  Flowsheets (Taken 1/17/2024 1513)  Prevent/manage excess moisture:   Monitor for/manage infection if present   Moisturize dry skin  Goal: Prevent/minimize sheer/friction injuries  Outcome: Progressing  Flowsheets (Taken 1/17/2024 1513)  Prevent/minimize sheer/friction injuries: Increase  activity/out of bed for meals  Goal: Promote/optimize nutrition  Outcome: Progressing  Flowsheets (Taken 1/17/2024 1513)  Promote/optimize nutrition:   Monitor/record intake including meals   Offer water/supplements/favorite foods   Consume > 50% meals/supplements  Goal: Promote skin healing  Outcome: Progressing  Flowsheets (Taken 1/17/2024 1513)  Promote skin healing: Assess skin/pad under line(s)/device(s)

## 2024-01-17 NOTE — PROGRESS NOTES
01/11/24 1100   Discharge Planning   Living Arrangements Spouse/significant other   Support Systems Spouse/significant other   Assistance Needed none   Type of Residence Private residence   Number of Stairs to Enter Residence 5   Number of Stairs Within Residence 12   Do you have animals or pets at home? Yes   Type of Animals or Pets 0   Who is requesting discharge planning? Provider   Home or Post Acute Services In home services   Type of Home Care Services Home nursing visits;Home PT   Patient expects to be discharged to: home   Does the patient need discharge transport arranged? No     TCC Note    Plan per Medical/Surgical Team:   day 1 of admission presenting with GE junction carcinoma (CMS/HCC). S/p total gastrectomy with D2 lymphadenectomy and Juany-en-Y reconstruction and placement of Jejunostomy feeding tube    Status: inpatient   Payor Source: [employer]   Discharge disposition: home   Expected date of discharge: 1/17  Barriers: unable to find home health care   Primary Oncologist: Dr. Roger Cheung   Preferred home care agency:    TCC met with patient at bedside to discuss anticipated discharge needs. Patient states she was independent with ADLs prior to admission. Demographics and insurance verified with patient. Patient live at home with her  and has not used home health care. Patient agreeable for TCC to send out blanket of referral to home health agencies. Patient will need home care and TF supplies. Will continue to follow patient for any discharge needs.     1/15 200pm- TCC contacted multiple C agencies via phone. Unable to accept patient for C. Team is aware, possible SNF if no accepting home care. TCC will continue to follow for discharge needs.    1/17 330pm- TCC met with patient to discuss anticipated discharge needs. Patient is agreeable to discharge home with no home health care. Patient and her  are agreeable to learning peg and TF care. Updated scripts  and notes sent to [place]. TCC will continue to follow for discharge needs.      01/17/24 at 3:33 PM - PRABHU TRINIDAD RN

## 2024-01-17 NOTE — PROGRESS NOTES
"Shruthi Ramos is a 58 y.o. female on day 7 of admission presenting with GE junction carcinoma (CMS/HCC). S/p total gastrectomy with D2 lymphadenectomy and Juany-en-Y reconstruction and placement of Jejunostomy feeding tube 1/10/24    Subjective   NAEO. Patient doing well this morning. In some pain but tolerable. +gas. +BM. Drank half a cup. Felt little nausea this morning.Patient denies V, fevers, chills, chest pain, and shortness of breath.       Objective     Physical Exam  Constitutional:       General: She is not in acute distress.     Appearance: Normal appearance. She is not ill-appearing, toxic-appearing or diaphoretic.   HENT:      Head: Normocephalic and atraumatic.   Eyes:      General: No scleral icterus.     Pupils: Pupils are equal, round, and reactive to light.   Cardiovascular:      Rate and Rhythm: Regular rhythm. Tachycardia present.   Pulmonary:      Effort: Pulmonary effort is normal. No respiratory distress.      Breath sounds: Normal breath sounds. No wheezing.   Abdominal:      General: There is no distension.      Palpations: Abdomen is soft.      Tenderness: There is no abdominal tenderness. There is no guarding.      Comments: Soft, mild tender, nondistended. Incision c/d/I with staples on top(Redness around incision). J tube clamped.    Musculoskeletal:         General: Normal range of motion.   Skin:     General: Skin is warm and dry.      Coloration: Skin is not jaundiced.      Findings: No rash.   Neurological:      Mental Status: She is alert and oriented to person, place, and time. Mental status is at baseline.   Psychiatric:         Mood and Affect: Mood normal.         Behavior: Behavior normal.         Judgment: Judgment normal.       Last Recorded Vitals  Blood pressure 105/71, pulse 90, temperature 36.5 °C (97.7 °F), temperature source Temporal, resp. rate 16, height 1.549 m (5' 1\"), weight 99.6 kg (219 lb 9.3 oz), SpO2 96 %.  Intake/Output last 3 Shifts:  I/O last 3 completed " shifts:  In: 4343.8 (43.6 mL/kg) [I.V.:1863.8 (18.7 mL/kg); NG/GT:780; IV Piggyback:1700]  Out: 1710 (17.2 mL/kg) [Urine:1200 (0.3 mL/kg/hr); Emesis/NG output:500; Drains:10]  Weight: 99.6 kg     Relevant Results  Results for orders placed or performed during the hospital encounter of 01/10/24 (from the past 24 hour(s))   CBC   Result Value Ref Range    WBC 15.8 (H) 4.4 - 11.3 x10*3/uL    nRBC 0.0 0.0 - 0.0 /100 WBCs    RBC 2.93 (L) 4.00 - 5.20 x10*6/uL    Hemoglobin 8.7 (L) 12.0 - 16.0 g/dL    Hematocrit 26.0 (L) 36.0 - 46.0 %    MCV 89 80 - 100 fL    MCH 29.7 26.0 - 34.0 pg    MCHC 33.5 32.0 - 36.0 g/dL    RDW 18.0 (H) 11.5 - 14.5 %    Platelets 293 150 - 450 x10*3/uL   Magnesium   Result Value Ref Range    Magnesium 1.88 1.60 - 2.40 mg/dL   Comprehensive Metabolic Panel   Result Value Ref Range    Glucose 92 74 - 99 mg/dL    Sodium 138 136 - 145 mmol/L    Potassium 4.1 3.5 - 5.3 mmol/L    Chloride 103 98 - 107 mmol/L    Bicarbonate 25 21 - 32 mmol/L    Anion Gap 14 10 - 20 mmol/L    Urea Nitrogen 11 6 - 23 mg/dL    Creatinine 0.59 0.50 - 1.05 mg/dL    eGFR >90 >60 mL/min/1.73m*2    Calcium 7.9 (L) 8.6 - 10.6 mg/dL    Albumin 2.4 (L) 3.4 - 5.0 g/dL    Alkaline Phosphatase 76 33 - 110 U/L    Total Protein 4.1 (L) 6.4 - 8.2 g/dL    AST 14 9 - 39 U/L    Bilirubin, Total 0.8 0.0 - 1.2 mg/dL    ALT 17 7 - 45 U/L       Assessment/Plan   Principal Problem:    GE junction carcinoma (CMS/HCC)  Active Problems:    Class 3 severe obesity due to excess calories in adult (CMS/HCC)    Shruthi Ramos is a 58 y.o. 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 1/11 had amnestic event lasting 30 min, no stroke workup needed per BAT team. Doing well postoperatively    Plan    Neuro:   - pain control with tylenol, ibuprofen, oxy 2.5/5 PRN through J tube  - PCA DC  1/15  - pain blocks DC    CV/pulm:  - continue home metoprolol  - IS  - OOB as tolerated    GI:  - FLD + ensures   - Hold feeds for now   - nutrition recs for tube feeds - J TUBE CURRENTLY CLAMPED, if pain/nausea --> unclamp   Day 1: Start TwoCal HN @ 25mL/hr & increase by 10mL q 4hrs to goal rate of 55mL/hr (x16hrs total)  Day 2 & onward: Run TwoCal HN @ 55mL/hr x 16hrs daily  - UGI with SBFT on POD5 (1/15) - no evidence suggesting leak   - PPI  - drain amylase 2k  -Zofran 8mg PRN     :   - voiding independently  - daily labs, replete lytes PRN  - I&Os    Endo:  - no indication for sliding scale insulin    Heme:  - Hgb stable, no indications for transfusions    ID:  - Zosyn for persistent WBC  -Monitor rising WBC, currently 16.4 (17.6 PM labs)    DVT ppx:  - CHELO  - SCDs in place    Dispo: continue management on RNF.    Discussed attending Dr. Deon Magallanes MD  General Surgery PGY1  Surgical oncology [id number]

## 2024-01-18 LAB
ANION GAP SERPL CALC-SCNC: 14 MMOL/L (ref 10–20)
ATRIAL RATE: 124 BPM
BUN SERPL-MCNC: 11 MG/DL (ref 6–23)
CALCIUM SERPL-MCNC: 7.7 MG/DL (ref 8.6–10.6)
CHLORIDE SERPL-SCNC: 106 MMOL/L (ref 98–107)
CO2 SERPL-SCNC: 25 MMOL/L (ref 21–32)
CREAT SERPL-MCNC: 0.6 MG/DL (ref 0.5–1.05)
EGFRCR SERPLBLD CKD-EPI 2021: >90 ML/MIN/1.73M*2
ERYTHROCYTE [DISTWIDTH] IN BLOOD BY AUTOMATED COUNT: 18.4 % (ref 11.5–14.5)
GLUCOSE SERPL-MCNC: 66 MG/DL (ref 74–99)
HCT VFR BLD AUTO: 23.8 % (ref 36–46)
HGB BLD-MCNC: 8 G/DL (ref 12–16)
LAB AP ASR DISCLAIMER: NORMAL
LABORATORY COMMENT REPORT: NORMAL
Lab: NORMAL
MAGNESIUM SERPL-MCNC: 2.28 MG/DL (ref 1.6–2.4)
MCH RBC QN AUTO: 29.9 PG (ref 26–34)
MCHC RBC AUTO-ENTMCNC: 33.6 G/DL (ref 32–36)
MCV RBC AUTO: 89 FL (ref 80–100)
NRBC BLD-RTO: 0 /100 WBCS (ref 0–0)
P AXIS: 27 DEGREES
P OFFSET: 196 MS
P ONSET: 147 MS
PATH REPORT.FINAL DX SPEC: NORMAL
PATH REPORT.GROSS SPEC: NORMAL
PATH REPORT.RELEVANT HX SPEC: NORMAL
PATH REPORT.TOTAL CANCER: NORMAL
PATHOLOGY SYNOPTIC REPORT: NORMAL
PLATELET # BLD AUTO: 297 X10*3/UL (ref 150–450)
POTASSIUM SERPL-SCNC: 3.7 MMOL/L (ref 3.5–5.3)
PR INTERVAL: 144 MS
Q ONSET: 219 MS
QRS COUNT: 20 BEATS
QRS DURATION: 70 MS
QT INTERVAL: 280 MS
QTC CALCULATION(BAZETT): 402 MS
QTC FREDERICIA: 356 MS
R AXIS: -18 DEGREES
RBC # BLD AUTO: 2.68 X10*6/UL (ref 4–5.2)
SODIUM SERPL-SCNC: 141 MMOL/L (ref 136–145)
T AXIS: 94 DEGREES
T OFFSET: 359 MS
VENTRICULAR RATE: 124 BPM
WBC # BLD AUTO: 17.1 X10*3/UL (ref 4.4–11.3)

## 2024-01-18 PROCEDURE — 99232 SBSQ HOSP IP/OBS MODERATE 35: CPT | Performed by: STUDENT IN AN ORGANIZED HEALTH CARE EDUCATION/TRAINING PROGRAM

## 2024-01-18 PROCEDURE — 2500000004 HC RX 250 GENERAL PHARMACY W/ HCPCS (ALT 636 FOR OP/ED): Performed by: NURSE PRACTITIONER

## 2024-01-18 PROCEDURE — 83735 ASSAY OF MAGNESIUM: CPT | Performed by: NURSE PRACTITIONER

## 2024-01-18 PROCEDURE — 85027 COMPLETE CBC AUTOMATED: CPT | Performed by: NURSE PRACTITIONER

## 2024-01-18 PROCEDURE — 2500000001 HC RX 250 WO HCPCS SELF ADMINISTERED DRUGS (ALT 637 FOR MEDICARE OP)

## 2024-01-18 PROCEDURE — 2500000004 HC RX 250 GENERAL PHARMACY W/ HCPCS (ALT 636 FOR OP/ED)

## 2024-01-18 PROCEDURE — 1200000002 HC GENERAL ROOM WITH TELEMETRY DAILY

## 2024-01-18 PROCEDURE — 80048 BASIC METABOLIC PNL TOTAL CA: CPT | Performed by: NURSE PRACTITIONER

## 2024-01-18 PROCEDURE — 2500000004 HC RX 250 GENERAL PHARMACY W/ HCPCS (ALT 636 FOR OP/ED): Performed by: STUDENT IN AN ORGANIZED HEALTH CARE EDUCATION/TRAINING PROGRAM

## 2024-01-18 PROCEDURE — 2500000005 HC RX 250 GENERAL PHARMACY W/O HCPCS: Performed by: STUDENT IN AN ORGANIZED HEALTH CARE EDUCATION/TRAINING PROGRAM

## 2024-01-18 RX ORDER — POTASSIUM CHLORIDE 14.9 MG/ML
20 INJECTION INTRAVENOUS ONCE
Status: COMPLETED | OUTPATIENT
Start: 2024-01-18 | End: 2024-01-18

## 2024-01-18 RX ORDER — METOPROLOL TARTRATE 1 MG/ML
5 INJECTION, SOLUTION INTRAVENOUS EVERY 6 HOURS
Status: CANCELLED | OUTPATIENT
Start: 2024-01-18

## 2024-01-18 RX ADMIN — OXYCODONE HYDROCHLORIDE 5 MG: 5 SOLUTION ORAL at 14:25

## 2024-01-18 RX ADMIN — PIPERACILLIN SODIUM AND TAZOBACTAM SODIUM 3.38 G: 3; .375 INJECTION, SOLUTION INTRAVENOUS at 17:53

## 2024-01-18 RX ADMIN — HEPARIN SODIUM 7500 UNITS: 5000 INJECTION INTRAVENOUS; SUBCUTANEOUS at 06:50

## 2024-01-18 RX ADMIN — PIPERACILLIN SODIUM AND TAZOBACTAM SODIUM 3.38 G: 3; .375 INJECTION, SOLUTION INTRAVENOUS at 06:50

## 2024-01-18 RX ADMIN — METOPROLOL TARTRATE 5 MG: 5 INJECTION, SOLUTION INTRAVENOUS at 17:53

## 2024-01-18 RX ADMIN — ACETAMINOPHEN 650 MG: 650 SOLUTION ORAL at 04:18

## 2024-01-18 RX ADMIN — PIPERACILLIN SODIUM AND TAZOBACTAM SODIUM 3.38 G: 3; .375 INJECTION, SOLUTION INTRAVENOUS at 12:42

## 2024-01-18 RX ADMIN — PIPERACILLIN SODIUM AND TAZOBACTAM SODIUM 3.38 G: 3; .375 INJECTION, SOLUTION INTRAVENOUS at 22:42

## 2024-01-18 RX ADMIN — ACETAMINOPHEN 650 MG: 650 SOLUTION ORAL at 22:42

## 2024-01-18 RX ADMIN — POTASSIUM CHLORIDE 20 MEQ: 14.9 INJECTION, SOLUTION INTRAVENOUS at 10:06

## 2024-01-18 RX ADMIN — ACETAMINOPHEN 650 MG: 650 SOLUTION ORAL at 10:06

## 2024-01-18 RX ADMIN — HEPARIN SODIUM 7500 UNITS: 5000 INJECTION INTRAVENOUS; SUBCUTANEOUS at 22:42

## 2024-01-18 RX ADMIN — HEPARIN SODIUM 7500 UNITS: 5000 INJECTION INTRAVENOUS; SUBCUTANEOUS at 14:25

## 2024-01-18 RX ADMIN — PIPERACILLIN SODIUM AND TAZOBACTAM SODIUM 3.38 G: 3; .375 INJECTION, SOLUTION INTRAVENOUS at 00:39

## 2024-01-18 RX ADMIN — SODIUM CHLORIDE, POTASSIUM CHLORIDE, SODIUM LACTATE AND CALCIUM CHLORIDE 50 ML/HR: 600; 310; 30; 20 INJECTION, SOLUTION INTRAVENOUS at 14:31

## 2024-01-18 RX ADMIN — ACETAMINOPHEN 650 MG: 650 SOLUTION ORAL at 16:09

## 2024-01-18 RX ADMIN — IBUPROFEN 600 MG: 200 SUSPENSION ORAL at 14:24

## 2024-01-18 ASSESSMENT — COGNITIVE AND FUNCTIONAL STATUS - GENERAL
HELP NEEDED FOR BATHING: A LITTLE
MOBILITY SCORE: 21
STANDING UP FROM CHAIR USING ARMS: A LITTLE
CLIMB 3 TO 5 STEPS WITH RAILING: A LITTLE
DAILY ACTIVITIY SCORE: 21
WALKING IN HOSPITAL ROOM: A LITTLE
PERSONAL GROOMING: A LITTLE
TOILETING: A LITTLE

## 2024-01-18 ASSESSMENT — PAIN SCALES - GENERAL
PAINLEVEL_OUTOF10: 2
PAINLEVEL_OUTOF10: 8
PAINLEVEL_OUTOF10: 4
PAINLEVEL_OUTOF10: 8
PAINLEVEL_OUTOF10: 4
PAINLEVEL_OUTOF10: 0 - NO PAIN

## 2024-01-18 ASSESSMENT — PAIN DESCRIPTION - LOCATION: LOCATION: ABDOMEN

## 2024-01-18 ASSESSMENT — PAIN - FUNCTIONAL ASSESSMENT
PAIN_FUNCTIONAL_ASSESSMENT: 0-10

## 2024-01-18 NOTE — CARE PLAN
The patient's goals for the shift include      The clinical goals for the shift include Pt will be comfortable and HD stable overnight    Over the shift, the patient did not make progress toward the following goals. Barriers to progression include. Recommendations to address these barriers include.

## 2024-01-18 NOTE — CARE PLAN
The patient's goals for the shift include      The clinical goals for the shift include Pt will be comfortable and HD stable overnight      Problem: Fall/Injury  Goal: Not fall by end of shift  Outcome: Progressing     Problem: Pain  Goal: Takes deep breaths with improved pain control throughout the shift  Outcome: Progressing  Goal: Turns in bed with improved pain control throughout the shift  Outcome: Progressing  Goal: Walks with improved pain control throughout the shift  Outcome: Progressing  Goal: Performs ADL's with improved pain control throughout shift  Outcome: Progressing  Goal: Participates in PT with improved pain control throughout the shift  Outcome: Progressing  Goal: Free from opioid side effects throughout the shift  Outcome: Progressing  Goal: Free from acute confusion related to pain meds throughout the shift  Outcome: Progressing     Problem: Skin  Goal: Decreased wound size/increased tissue granulation at next dressing change  Outcome: Progressing  Flowsheets (Taken 1/17/2024 2042)  Decreased wound size/increased tissue granulation at next dressing change: Promote sleep for wound healing  Goal: Participates in plan/prevention/treatment measures  Outcome: Progressing  Flowsheets (Taken 1/17/2024 2042)  Participates in plan/prevention/treatment measures:   Increase activity/out of bed for meals   Discuss with provider PT/OT consult  Goal: Prevent/manage excess moisture  Outcome: Progressing  Flowsheets (Taken 1/17/2024 2042)  Prevent/manage excess moisture: Monitor for/manage infection if present  Goal: Prevent/minimize sheer/friction injuries  Outcome: Progressing  Flowsheets (Taken 1/17/2024 2042)  Prevent/minimize sheer/friction injuries: Increase activity/out of bed for meals  Goal: Promote/optimize nutrition  Outcome: Progressing  Flowsheets (Taken 1/17/2024 2042)  Promote/optimize nutrition: Monitor/record intake including meals  Goal: Promote skin healing  Outcome: Progressing  Flowsheets  (Taken 1/17/2024 2042)  Promote skin healing: Assess skin/pad under line(s)/device(s)

## 2024-01-18 NOTE — PROGRESS NOTES
"Shruthi Ramos is a 58 y.o. female on day 8 of admission presenting with GE junction carcinoma (CMS/HCC). S/p total gastrectomy with D2 lymphadenectomy and Juany-en-Y reconstruction and placement of Jejunostomy feeding tube 1/10/24    Subjective   NAEO. Patient doing well this morning. Had a good night. No drink overnight.  +gas. +BM .Patient denies N, V, fevers, chills, chest pain, and shortness of breath.       Objective     Physical Exam  Constitutional:       General: She is not in acute distress.     Appearance: Normal appearance. She is not ill-appearing, toxic-appearing or diaphoretic.   HENT:      Head: Normocephalic and atraumatic.   Eyes:      General: No scleral icterus.     Pupils: Pupils are equal, round, and reactive to light.   Cardiovascular:      Rate and Rhythm: Regular rhythm.   Pulmonary:      Effort: Pulmonary effort is normal. No respiratory distress.      Breath sounds: Normal breath sounds. No wheezing.   Abdominal:      General: There is no distension.      Palpations: Abdomen is soft.      Tenderness: There is no abdominal tenderness. There is no guarding.      Comments: Soft, mild tender, nondistended. Incision c/d/I with staples on top.  J tube clamped.    Musculoskeletal:         General: Normal range of motion.   Skin:     General: Skin is warm and dry.      Coloration: Skin is not jaundiced.      Findings: No rash.   Neurological:      Mental Status: She is alert and oriented to person, place, and time. Mental status is at baseline.   Psychiatric:         Mood and Affect: Mood normal.         Behavior: Behavior normal.         Judgment: Judgment normal.       Last Recorded Vitals  Blood pressure 102/56, pulse 93, temperature 35.8 °C (96.5 °F), resp. rate 16, height 1.549 m (5' 1\"), weight 99.6 kg (219 lb 9.3 oz), SpO2 100 %.  Intake/Output last 3 Shifts:  I/O last 3 completed shifts:  In: 2248.3 (22.6 mL/kg) [P.O.:240; I.V.:1758.3 (17.7 mL/kg); IV Piggyback:250]  Out: 2400 (24.1 " mL/kg) [Urine:2050 (0.6 mL/kg/hr); Emesis/NG output:350]  Weight: 99.6 kg     Relevant Results  Results for orders placed or performed during the hospital encounter of 01/10/24 (from the past 24 hour(s))   CBC   Result Value Ref Range    WBC 17.1 (H) 4.4 - 11.3 x10*3/uL    nRBC 0.0 0.0 - 0.0 /100 WBCs    RBC 2.68 (L) 4.00 - 5.20 x10*6/uL    Hemoglobin 8.0 (L) 12.0 - 16.0 g/dL    Hematocrit 23.8 (L) 36.0 - 46.0 %    MCV 89 80 - 100 fL    MCH 29.9 26.0 - 34.0 pg    MCHC 33.6 32.0 - 36.0 g/dL    RDW 18.4 (H) 11.5 - 14.5 %    Platelets 297 150 - 450 x10*3/uL       Assessment/Plan   Principal Problem:    GE junction carcinoma (CMS/HCC)  Active Problems:    Class 3 severe obesity due to excess calories in adult (CMS/HCC)    Shruthi Ramos is a 58 y.o. 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 1/11 had amnestic event lasting 30 min, no stroke workup needed per BAT team. Doing well postoperatively.     Plan    Neuro:   - pain control with tylenol, ibuprofen, oxy 2.5/5 PRN through J tube  - PCA DC 1/15  - pain blocks DC    CV/pulm:  - continue home metoprolol  - IS  - OOB as tolerated    GI:  - FLD + ensures   - Hold feeds for now   - nutrition recs for tube feeds - J TUBE CURRENTLY CLAMPED, if pain/nausea --> unclamp   Day 1: Start TwoCal HN @ 25mL/hr & increase by 10mL q 4hrs to goal rate of 55mL/hr (x16hrs total)  Day 2 & onward: Run TwoCal HN @ 55mL/hr x 16hrs daily  - UGI with SBFT on POD5 (1/15) - no evidence suggesting leak   - PPI  - drain amylase 2k  -Zofran 8mg PRN   - to bring in supplements     :   - voiding independently  - daily labs, replete lytes PRN  - I&Os    Endo:  - no indication for sliding scale insulin    Heme:  - Hgb stable, no indications for transfusions    ID:  - Zosyn for persistent WBC  -Monitor rising WBC,  currently 17.1 (15.8)    DVT ppx:  - CHELO  - SCDs in place    Dispo: continue management on RNF.    Patient seen with chief Dr. Hernandez and discussed attending Dr. Chavarria.     [place] Magallanes MD  General Surgery PGY1  Surgical oncology [id number]

## 2024-01-19 LAB
ANION GAP SERPL CALC-SCNC: 15 MMOL/L (ref 10–20)
BUN SERPL-MCNC: 10 MG/DL (ref 6–23)
CALCIUM SERPL-MCNC: 7.7 MG/DL (ref 8.6–10.6)
CHLORIDE SERPL-SCNC: 106 MMOL/L (ref 98–107)
CO2 SERPL-SCNC: 25 MMOL/L (ref 21–32)
CREAT SERPL-MCNC: 0.63 MG/DL (ref 0.5–1.05)
EGFRCR SERPLBLD CKD-EPI 2021: >90 ML/MIN/1.73M*2
ERYTHROCYTE [DISTWIDTH] IN BLOOD BY AUTOMATED COUNT: 18.1 % (ref 11.5–14.5)
GLUCOSE SERPL-MCNC: 63 MG/DL (ref 74–99)
HCT VFR BLD AUTO: 22.8 % (ref 36–46)
HGB BLD-MCNC: 7.7 G/DL (ref 12–16)
MAGNESIUM SERPL-MCNC: 2.26 MG/DL (ref 1.6–2.4)
MCH RBC QN AUTO: 29.5 PG (ref 26–34)
MCHC RBC AUTO-ENTMCNC: 33.8 G/DL (ref 32–36)
MCV RBC AUTO: 87 FL (ref 80–100)
NRBC BLD-RTO: 0 /100 WBCS (ref 0–0)
PLATELET # BLD AUTO: 335 X10*3/UL (ref 150–450)
POTASSIUM SERPL-SCNC: 3.7 MMOL/L (ref 3.5–5.3)
RBC # BLD AUTO: 2.61 X10*6/UL (ref 4–5.2)
SODIUM SERPL-SCNC: 142 MMOL/L (ref 136–145)
WBC # BLD AUTO: 14.1 X10*3/UL (ref 4.4–11.3)

## 2024-01-19 PROCEDURE — 97110 THERAPEUTIC EXERCISES: CPT | Mod: GP

## 2024-01-19 PROCEDURE — 2500000005 HC RX 250 GENERAL PHARMACY W/O HCPCS: Performed by: STUDENT IN AN ORGANIZED HEALTH CARE EDUCATION/TRAINING PROGRAM

## 2024-01-19 PROCEDURE — 85027 COMPLETE CBC AUTOMATED: CPT

## 2024-01-19 PROCEDURE — 1200000002 HC GENERAL ROOM WITH TELEMETRY DAILY

## 2024-01-19 PROCEDURE — 2500000001 HC RX 250 WO HCPCS SELF ADMINISTERED DRUGS (ALT 637 FOR MEDICARE OP)

## 2024-01-19 PROCEDURE — 80048 BASIC METABOLIC PNL TOTAL CA: CPT

## 2024-01-19 PROCEDURE — 83735 ASSAY OF MAGNESIUM: CPT

## 2024-01-19 PROCEDURE — 2500000004 HC RX 250 GENERAL PHARMACY W/ HCPCS (ALT 636 FOR OP/ED): Performed by: STUDENT IN AN ORGANIZED HEALTH CARE EDUCATION/TRAINING PROGRAM

## 2024-01-19 PROCEDURE — 2500000004 HC RX 250 GENERAL PHARMACY W/ HCPCS (ALT 636 FOR OP/ED): Performed by: NURSE PRACTITIONER

## 2024-01-19 PROCEDURE — 2500000004 HC RX 250 GENERAL PHARMACY W/ HCPCS (ALT 636 FOR OP/ED)

## 2024-01-19 RX ORDER — POTASSIUM CHLORIDE 14.9 MG/ML
20 INJECTION INTRAVENOUS ONCE
Status: COMPLETED | OUTPATIENT
Start: 2024-01-19 | End: 2024-01-19

## 2024-01-19 RX ADMIN — HEPARIN SODIUM 7500 UNITS: 5000 INJECTION INTRAVENOUS; SUBCUTANEOUS at 21:00

## 2024-01-19 RX ADMIN — PIPERACILLIN SODIUM AND TAZOBACTAM SODIUM 3.38 G: 3; .375 INJECTION, SOLUTION INTRAVENOUS at 23:23

## 2024-01-19 RX ADMIN — PIPERACILLIN SODIUM AND TAZOBACTAM SODIUM 3.38 G: 3; .375 INJECTION, SOLUTION INTRAVENOUS at 17:32

## 2024-01-19 RX ADMIN — METOPROLOL TARTRATE 5 MG: 5 INJECTION, SOLUTION INTRAVENOUS at 23:26

## 2024-01-19 RX ADMIN — IBUPROFEN 600 MG: 200 SUSPENSION ORAL at 21:00

## 2024-01-19 RX ADMIN — METOPROLOL TARTRATE 5 MG: 5 INJECTION, SOLUTION INTRAVENOUS at 18:00

## 2024-01-19 RX ADMIN — METOPROLOL TARTRATE 5 MG: 5 INJECTION, SOLUTION INTRAVENOUS at 13:12

## 2024-01-19 RX ADMIN — POTASSIUM CHLORIDE 20 MEQ: 14.9 INJECTION, SOLUTION INTRAVENOUS at 09:39

## 2024-01-19 RX ADMIN — ACETAMINOPHEN 650 MG: 650 SOLUTION ORAL at 23:23

## 2024-01-19 RX ADMIN — PIPERACILLIN SODIUM AND TAZOBACTAM SODIUM 3.38 G: 3; .375 INJECTION, SOLUTION INTRAVENOUS at 04:46

## 2024-01-19 RX ADMIN — ACETAMINOPHEN 650 MG: 650 SOLUTION ORAL at 17:32

## 2024-01-19 RX ADMIN — ACETAMINOPHEN 650 MG: 650 SOLUTION ORAL at 04:46

## 2024-01-19 RX ADMIN — HEPARIN SODIUM 7500 UNITS: 5000 INJECTION INTRAVENOUS; SUBCUTANEOUS at 06:26

## 2024-01-19 RX ADMIN — ACETAMINOPHEN 650 MG: 650 SOLUTION ORAL at 11:17

## 2024-01-19 RX ADMIN — HEPARIN SODIUM 7500 UNITS: 5000 INJECTION INTRAVENOUS; SUBCUTANEOUS at 15:17

## 2024-01-19 RX ADMIN — PIPERACILLIN SODIUM AND TAZOBACTAM SODIUM 3.38 G: 3; .375 INJECTION, SOLUTION INTRAVENOUS at 11:17

## 2024-01-19 RX ADMIN — IBUPROFEN 600 MG: 200 SUSPENSION ORAL at 09:37

## 2024-01-19 RX ADMIN — IBUPROFEN 600 MG: 200 SUSPENSION ORAL at 15:48

## 2024-01-19 ASSESSMENT — COGNITIVE AND FUNCTIONAL STATUS - GENERAL
MOVING TO AND FROM BED TO CHAIR: A LITTLE
STANDING UP FROM CHAIR USING ARMS: A LITTLE
DAILY ACTIVITIY SCORE: 24
MOBILITY SCORE: 20
WALKING IN HOSPITAL ROOM: A LITTLE
WALKING IN HOSPITAL ROOM: A LITTLE
STANDING UP FROM CHAIR USING ARMS: A LITTLE
CLIMB 3 TO 5 STEPS WITH RAILING: A LITTLE
MOVING TO AND FROM BED TO CHAIR: A LITTLE
MOBILITY SCORE: 20
DAILY ACTIVITIY SCORE: 24
CLIMB 3 TO 5 STEPS WITH RAILING: A LITTLE

## 2024-01-19 ASSESSMENT — PAIN SCALES - GENERAL
PAINLEVEL_OUTOF10: 3
PAINLEVEL_OUTOF10: 4
PAINLEVEL_OUTOF10: 2
PAINLEVEL_OUTOF10: 3
PAINLEVEL_OUTOF10: 0 - NO PAIN

## 2024-01-19 ASSESSMENT — PAIN - FUNCTIONAL ASSESSMENT
PAIN_FUNCTIONAL_ASSESSMENT: 0-10

## 2024-01-19 ASSESSMENT — PAIN DESCRIPTION - LOCATION
LOCATION: ABDOMEN
LOCATION: ABDOMEN

## 2024-01-19 NOTE — CARE PLAN
The patient's goals for the shift include      The clinical goals for the shift include pt will be comfortable and HD stable overnight    Problem: Fall/Injury  Goal: Not fall by end of shift  Outcome: Progressing     Problem: Pain  Goal: Takes deep breaths with improved pain control throughout the shift  Outcome: Progressing  Goal: Turns in bed with improved pain control throughout the shift  Outcome: Progressing  Goal: Walks with improved pain control throughout the shift  Outcome: Progressing  Goal: Performs ADL's with improved pain control throughout shift  Outcome: Progressing  Goal: Participates in PT with improved pain control throughout the shift  Outcome: Progressing  Goal: Free from opioid side effects throughout the shift  Outcome: Progressing  Goal: Free from acute confusion related to pain meds throughout the shift  Outcome: Progressing     Problem: Skin  Goal: Decreased wound size/increased tissue granulation at next dressing change  Outcome: Progressing  Flowsheets (Taken 1/19/2024 0109)  Decreased wound size/increased tissue granulation at next dressing change: Promote sleep for wound healing  Goal: Participates in plan/prevention/treatment measures  Outcome: Progressing  Flowsheets (Taken 1/19/2024 0109)  Participates in plan/prevention/treatment measures: Elevate heels  Goal: Prevent/manage excess moisture  Outcome: Progressing  Flowsheets (Taken 1/19/2024 0109)  Prevent/manage excess moisture:   Cleanse incontinence/protect with barrier cream   Follow provider orders for dressing changes   Monitor for/manage infection if present  Goal: Prevent/minimize sheer/friction injuries  Outcome: Progressing  Flowsheets (Taken 1/19/2024 0109)  Prevent/minimize sheer/friction injuries:   Increase activity/out of bed for meals   Use pull sheet  Goal: Promote/optimize nutrition  Outcome: Progressing  Flowsheets (Taken 1/19/2024 0109)  Promote/optimize nutrition: Monitor/record intake including meals  Goal:  Promote skin healing  Outcome: Progressing  Flowsheets (Taken 1/19/2024 0109)  Promote skin healing: Rotate device position/do not position patient on device

## 2024-01-19 NOTE — PROGRESS NOTES
Physical Therapy    Physical Therapy Treatment    Patient Name: Shruthi Ramos  MRN: [id number]  Today's Date: 1/19/2024  Time Calculation  Start Time: 1437  Stop Time: 1454  Time Calculation (min): 17 min       Assessment/Plan   PT Assessment  Medical Staff Made Aware: Yes  End of Session Communication: Bedside nurse  Assessment Comment: Pt states she is too fatigued to walk with PT today but completed supine ther ex with slight increase in abdominal pain to 2/10.  She continues to benefit from skilled PT and remains appropriate for low intensity PT upon discharge.  End of Session Patient Position: Bed, 3 rail up, Alarm off, not on at start of session  PT Plan  Inpatient/Swing Bed or Outpatient: Inpatient  PT Plan  Treatment/Interventions: Bed mobility, Transfer training, Gait training, Stair training, Balance training, Strengthening, Therapeutic exercise, Therapeutic activity, Home exercise program  PT Plan: Skilled PT  PT Frequency: 4 times per week  PT Discharge Recommendations: Low intensity level of continued care  PT Recommended Transfer Status: Stand by assist  PT - OK to Discharge: Yes (eval complete and discharge recommendations made)      General Visit Information:   PT  Visit  PT Received On: 01/19/24  General  Prior to Session Communication: Bedside nurse  Patient Position Received: Bed, 3 rail up, Alarm off, not on at start of session  General Comment: Pt cleared for PT by RN.  Pt is alert and agreeable to PT.    Subjective   Precautions:  Precautions  Medical Precautions: Fall precautions, Cardiac precautions, Abdominal precautions  Post-Surgical Precautions: Abdominal surgery precautions  Vital Signs:  Vital Signs  Heart Rate:  (pre:79, during supine ther ex:103, post:89)  Heart Rate Source: Monitor    Objective   Pain:  Pain Assessment  Pain Assessment: 0-10  Pain Score: 2  Pain Type: Acute pain  Pain Location: Abdomen  Cognition:  Cognition  Overall Cognitive Status: Within Functional  Limits  Activity Tolerance:  Activity Tolerance  Endurance: Tolerates 10 - 20 min exercise with multiple rests  Treatments:  Therapeutic Exercise  Therapeutic Exercise Performed: Yes  Therapeutic Exercise Activity 1: supine ankle pumps, quad sets, glute sets, heel slides x20 each LE.  Hip abd/add, SAQ x10 each LE    Outcome Measures:  [facility] Basic Mobility  Turning from your back to your side while in a flat bed without using bedrails: None  Moving from lying on your back to sitting on the side of a flat bed without using bedrails: None  Moving to and from bed to chair (including a wheelchair): A little  Standing up from a chair using your arms (e.g. wheelchair or bedside chair): A little  To walk in hospital room: A little  Climbing 3-5 steps with railing: A little  Basic Mobility - Total Score: 20    Education Documentation  Precautions, taught by Asha Wang PT at 1/19/2024  3:07 PM.  Learner: Patient  Readiness: Acceptance  Method: Explanation  Response: Verbalizes Understanding  Comment: Abdominal precautions    Home Exercise Program, taught by Asha Wang PT at 1/19/2024  3:07 PM.  Learner: Patient  Readiness: Acceptance  Method: Explanation  Response: Verbalizes Understanding  Comment: Abdominal precautions    Mobility Training, taught by Asha Wang PT at 1/19/2024  3:07 PM.  Learner: Patient  Readiness: Acceptance  Method: Explanation  Response: Verbalizes Understanding  Comment: Abdominal precautions    Education Comments  No comments found.          Encounter Problems       Encounter Problems (Active)       Mobility       Pt will complete bed mobility independently utilizing log roll technique (Progressing)       Start:  01/12/24    Expected End:  01/26/24            Pt will amb >200ft with SBA in prep for safe discharge home  (Progressing)       Start:  01/12/24    Expected End:  01/26/24            Pt will a/descend 5 steps with B rails and SBA in prep for safe home entry (Progressing)        Start:  01/12/24    Expected End:  01/26/24                  Encounter Problems (Resolved)       Balance       Pt will score >/=24 on the Tinetti to indicate low fall risk  (Met)       Start:  01/12/24    Expected End:  01/26/24    Resolved:  01/16/24

## 2024-01-19 NOTE — CARE PLAN
The patient's goals for the shift include      The clinical goals for the shift include pt will remain safe by 1/19/24 1600    Over the shift, the patient did not make progress toward the following goals. Barriers to progression include ***. Recommendations to address these barriers include ***.

## 2024-01-19 NOTE — PROGRESS NOTES
"Shruthi Ramos is a 58 y.o. female on day 9 of admission presenting with GE junction carcinoma (CMS/HCC). S/p total gastrectomy with D2 lymphadenectomy and Juany-en-Y reconstruction and placement of Jejunostomy feeding tube 1/10/24    Subjective   NAEO. Patient doing well this morning. Denies abdominal pain, has been passing gas and having BM. Had some leakage from her midline overnight.      Objective   Physical Exam:   Laying in bed in NAD  RRR  Non-labored breathing  Soft, appropriately tender, non-distended, midline incision with seropurulent drainage packed with nu-gauze, J-tube to gravity with some leakage around insertion site.   Mild edema  Appropriate mood    Last Recorded Vitals  Blood pressure 125/84, pulse 86, temperature 36.6 °C (97.9 °F), temperature source Temporal, resp. rate 16, height 1.549 m (5' 1\"), weight 99.6 kg (219 lb 9.3 oz), SpO2 95 %.  Intake/Output last 3 Shifts:  I/O last 3 completed shifts:  In: 2058.3 (20.7 mL/kg) [P.O.:120; I.V.:1638.3 (16.4 mL/kg); IV Piggyback:300]  Out: 1950 (19.6 mL/kg) [Urine:1950 (0.5 mL/kg/hr)]  Weight: 99.6 kg     Relevant Results  Results for orders placed or performed during the hospital encounter of 01/10/24 (from the past 24 hour(s))   CBC   Result Value Ref Range    WBC 14.1 (H) 4.4 - 11.3 x10*3/uL    nRBC 0.0 0.0 - 0.0 /100 WBCs    RBC 2.61 (L) 4.00 - 5.20 x10*6/uL    Hemoglobin 7.7 (L) 12.0 - 16.0 g/dL    Hematocrit 22.8 (L) 36.0 - 46.0 %    MCV 87 80 - 100 fL    MCH 29.5 26.0 - 34.0 pg    MCHC 33.8 32.0 - 36.0 g/dL    RDW 18.1 (H) 11.5 - 14.5 %    Platelets 335 150 - 450 x10*3/uL   Magnesium   Result Value Ref Range    Magnesium 2.26 1.60 - 2.40 mg/dL   Basic metabolic panel   Result Value Ref Range    Glucose 63 (L) 74 - 99 mg/dL    Sodium 142 136 - 145 mmol/L    Potassium 3.7 3.5 - 5.3 mmol/L    Chloride 106 98 - 107 mmol/L    Bicarbonate 25 21 - 32 mmol/L    Anion Gap 15 10 - 20 mmol/L    Urea Nitrogen 10 6 - 23 mg/dL    Creatinine 0.63 0.50 - " 1.05 mg/dL    eGFR >90 >60 mL/min/1.73m*2    Calcium 7.7 (L) 8.6 - 10.6 mg/dL       Assessment/Plan   Principal Problem:    GE junction carcinoma (CMS/HCC)  Active Problems:    Class 3 severe obesity due to excess calories in adult (CMS/HCC)    Shruthi Ramos is a 58 y.o. 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 1/11 had amnestic event lasting 30 min, no stroke workup needed per BAT team. Doing well postoperatively. Having some seropurulent drainage from her midline incision, now packed.     Plan    Neuro:   - pain control with tylenol, ibuprofen, oxy 2.5/5 PRN through J tube    CV/pulm:  - continue home metoprolol with hold parameters  - IS  - OOB as tolerated    GI:  - CLD  - Hold feeds for now   - nutrition recs for tube feeds - J TUBE CURRENTLY CLAMPED, if pain/nausea --> unclamp   Day 1: Start TwoCal HN @ 25mL/hr & increase by 10mL q 4hrs to goal rate of 55mL/hr (x16hrs total)  Day 2 & onward: Run TwoCal HN @ 55mL/hr x 16hrs daily  - UGI with SBFT on POD5 (1/15) - no evidence suggesting leak   -Zofran 8mg PRN   - to bring in supplements     :   - IVF @ 50  - voiding independently  - daily labs, replete lytes PRN  - I&Os    Endo:  - no indication for sliding scale insulin    Heme:  - Hgb stable, no indications for transfusions    ID:  - Zosyn for persistent WBC and fluid collection in soft tissue  - WBC down trending now     DVT ppx:  - SQH  - SCDs in place    Dispo: continue management on RNF.    Discussed attending Dr. Chavarria.     Costa Hernandez MD  General Surgery PGY5  Surgical oncology [id number]

## 2024-01-20 LAB
ALBUMIN SERPL BCP-MCNC: 2.6 G/DL (ref 3.4–5)
ANION GAP SERPL CALC-SCNC: 16 MMOL/L (ref 10–20)
BUN SERPL-MCNC: 8 MG/DL (ref 6–23)
CALCIUM SERPL-MCNC: 7.8 MG/DL (ref 8.6–10.6)
CHLORIDE SERPL-SCNC: 109 MMOL/L (ref 98–107)
CO2 SERPL-SCNC: 24 MMOL/L (ref 21–32)
CREAT SERPL-MCNC: 0.65 MG/DL (ref 0.5–1.05)
EGFRCR SERPLBLD CKD-EPI 2021: >90 ML/MIN/1.73M*2
ERYTHROCYTE [DISTWIDTH] IN BLOOD BY AUTOMATED COUNT: 18.1 % (ref 11.5–14.5)
GLUCOSE SERPL-MCNC: 82 MG/DL (ref 74–99)
HCT VFR BLD AUTO: 25.2 % (ref 36–46)
HGB BLD-MCNC: 8.3 G/DL (ref 12–16)
MAGNESIUM SERPL-MCNC: 2.23 MG/DL (ref 1.6–2.4)
MCH RBC QN AUTO: 30 PG (ref 26–34)
MCHC RBC AUTO-ENTMCNC: 32.9 G/DL (ref 32–36)
MCV RBC AUTO: 91 FL (ref 80–100)
NRBC BLD-RTO: 0 /100 WBCS (ref 0–0)
PHOSPHATE SERPL-MCNC: 5.2 MG/DL (ref 2.5–4.9)
PLATELET # BLD AUTO: 387 X10*3/UL (ref 150–450)
POTASSIUM SERPL-SCNC: 3.8 MMOL/L (ref 3.5–5.3)
RBC # BLD AUTO: 2.77 X10*6/UL (ref 4–5.2)
SODIUM SERPL-SCNC: 145 MMOL/L (ref 136–145)
WBC # BLD AUTO: 12.1 X10*3/UL (ref 4.4–11.3)

## 2024-01-20 PROCEDURE — 2500000001 HC RX 250 WO HCPCS SELF ADMINISTERED DRUGS (ALT 637 FOR MEDICARE OP): Performed by: STUDENT IN AN ORGANIZED HEALTH CARE EDUCATION/TRAINING PROGRAM

## 2024-01-20 PROCEDURE — 2500000004 HC RX 250 GENERAL PHARMACY W/ HCPCS (ALT 636 FOR OP/ED): Performed by: STUDENT IN AN ORGANIZED HEALTH CARE EDUCATION/TRAINING PROGRAM

## 2024-01-20 PROCEDURE — 1200000002 HC GENERAL ROOM WITH TELEMETRY DAILY

## 2024-01-20 PROCEDURE — 2500000001 HC RX 250 WO HCPCS SELF ADMINISTERED DRUGS (ALT 637 FOR MEDICARE OP)

## 2024-01-20 PROCEDURE — 2500000004 HC RX 250 GENERAL PHARMACY W/ HCPCS (ALT 636 FOR OP/ED)

## 2024-01-20 PROCEDURE — 2500000005 HC RX 250 GENERAL PHARMACY W/O HCPCS: Performed by: STUDENT IN AN ORGANIZED HEALTH CARE EDUCATION/TRAINING PROGRAM

## 2024-01-20 PROCEDURE — 83735 ASSAY OF MAGNESIUM: CPT

## 2024-01-20 PROCEDURE — 84100 ASSAY OF PHOSPHORUS: CPT

## 2024-01-20 PROCEDURE — 85027 COMPLETE CBC AUTOMATED: CPT

## 2024-01-20 RX ORDER — ENOXAPARIN SODIUM 100 MG/ML
40 INJECTION SUBCUTANEOUS EVERY 24 HOURS
Status: DISCONTINUED | OUTPATIENT
Start: 2024-01-20 | End: 2024-01-23

## 2024-01-20 RX ORDER — POTASSIUM CHLORIDE 1.5 G/1.58G
20 POWDER, FOR SOLUTION ORAL ONCE
Status: COMPLETED | OUTPATIENT
Start: 2024-01-20 | End: 2024-01-20

## 2024-01-20 RX ORDER — ENOXAPARIN SODIUM 100 MG/ML
50 INJECTION SUBCUTANEOUS ONCE
Status: DISCONTINUED | OUTPATIENT
Start: 2024-01-20 | End: 2024-01-20

## 2024-01-20 RX ADMIN — ACETAMINOPHEN 650 MG: 650 SOLUTION ORAL at 12:08

## 2024-01-20 RX ADMIN — POTASSIUM CHLORIDE 20 MEQ: 1.5 POWDER, FOR SOLUTION ORAL at 12:09

## 2024-01-20 RX ADMIN — METOPROLOL TARTRATE 5 MG: 5 INJECTION, SOLUTION INTRAVENOUS at 05:45

## 2024-01-20 RX ADMIN — PIPERACILLIN SODIUM AND TAZOBACTAM SODIUM 3.38 G: 3; .375 INJECTION, SOLUTION INTRAVENOUS at 05:44

## 2024-01-20 RX ADMIN — HEPARIN SODIUM 7500 UNITS: 5000 INJECTION INTRAVENOUS; SUBCUTANEOUS at 05:43

## 2024-01-20 RX ADMIN — IBUPROFEN 600 MG: 200 SUSPENSION ORAL at 05:42

## 2024-01-20 RX ADMIN — IBUPROFEN 600 MG: 200 SUSPENSION ORAL at 18:16

## 2024-01-20 RX ADMIN — ACETAMINOPHEN 650 MG: 650 SOLUTION ORAL at 18:16

## 2024-01-20 RX ADMIN — PIPERACILLIN SODIUM AND TAZOBACTAM SODIUM 3.38 G: 3; .375 INJECTION, SOLUTION INTRAVENOUS at 12:08

## 2024-01-20 RX ADMIN — METOPROLOL TARTRATE 5 MG: 5 INJECTION, SOLUTION INTRAVENOUS at 12:09

## 2024-01-20 RX ADMIN — IBUPROFEN 600 MG: 200 SUSPENSION ORAL at 12:08

## 2024-01-20 RX ADMIN — ENOXAPARIN SODIUM 40 MG: 100 INJECTION SUBCUTANEOUS at 12:38

## 2024-01-20 RX ADMIN — ACETAMINOPHEN 650 MG: 650 SOLUTION ORAL at 05:42

## 2024-01-20 RX ADMIN — METOPROLOL TARTRATE 5 MG: 5 INJECTION, SOLUTION INTRAVENOUS at 18:16

## 2024-01-20 RX ADMIN — PIPERACILLIN SODIUM AND TAZOBACTAM SODIUM 3.38 G: 3; .375 INJECTION, SOLUTION INTRAVENOUS at 18:16

## 2024-01-20 ASSESSMENT — COGNITIVE AND FUNCTIONAL STATUS - GENERAL
STANDING UP FROM CHAIR USING ARMS: A LITTLE
CLIMB 3 TO 5 STEPS WITH RAILING: A LITTLE
DAILY ACTIVITIY SCORE: 24
WALKING IN HOSPITAL ROOM: A LITTLE
STANDING UP FROM CHAIR USING ARMS: A LITTLE
DAILY ACTIVITIY SCORE: 24
CLIMB 3 TO 5 STEPS WITH RAILING: A LITTLE
MOBILITY SCORE: 20
WALKING IN HOSPITAL ROOM: A LITTLE
MOBILITY SCORE: 20
MOVING TO AND FROM BED TO CHAIR: A LITTLE
MOVING TO AND FROM BED TO CHAIR: A LITTLE

## 2024-01-20 ASSESSMENT — PAIN SCALES - GENERAL
PAINLEVEL_OUTOF10: 2
PAINLEVEL_OUTOF10: 3

## 2024-01-20 NOTE — NURSING NOTE
Rn restarted tube feed at 10ml per hour continuous and educated pt to tell nurse if she feels nauseous.

## 2024-01-20 NOTE — PROGRESS NOTES
"Shruthi Ramos is a 58 y.o. female on day 10 of admission presenting with GE junction carcinoma (CMS/HCC). S/p total gastrectomy with D2 lymphadenectomy and Juany-en-Y reconstruction and placement of Jejunostomy feeding tube 1/10/24    Subjective   NAEO. Patient doing well this morning. Did well with clears without nausea or vomiting. WBC 12 from 14 yesterday. Passing flatus with BM.     Objective   Physical Exam:   Laying in bed in NAD  RRR  Non-labored breathing  Soft, appropriately tender, non-distended, midline incision with murky seropurulent drainage packed with nu-gauze, J-tube to clamped.   Mild edema  Appropriate mood    Last Recorded Vitals  Blood pressure 105/68, pulse 73, temperature 36 °C (96.8 °F), temperature source Temporal, resp. rate 18, height 1.549 m (5' 1\"), weight 99.6 kg (219 lb 9.3 oz), SpO2 94 %.    Intake/Output last 3 Shifts:  I/O last 3 completed shifts:  In: 2023.3 (20.3 mL/kg) [P.O.:250; I.V.:1523.3 (15.3 mL/kg); IV Piggyback:250]  Out: 2250 (22.6 mL/kg) [Urine:2150 (0.6 mL/kg/hr); Emesis/NG output:100]  Weight: 99.6 kg     Relevant Results  Results for orders placed or performed during the hospital encounter of 01/10/24 (from the past 24 hour(s))   CBC   Result Value Ref Range    WBC 12.1 (H) 4.4 - 11.3 x10*3/uL    nRBC 0.0 0.0 - 0.0 /100 WBCs    RBC 2.77 (L) 4.00 - 5.20 x10*6/uL    Hemoglobin 8.3 (L) 12.0 - 16.0 g/dL    Hematocrit 25.2 (L) 36.0 - 46.0 %    MCV 91 80 - 100 fL    MCH 30.0 26.0 - 34.0 pg    MCHC 32.9 32.0 - 36.0 g/dL    RDW 18.1 (H) 11.5 - 14.5 %    Platelets 387 150 - 450 x10*3/uL   Renal function panel   Result Value Ref Range    Glucose 82 74 - 99 mg/dL    Sodium 145 136 - 145 mmol/L    Potassium 3.8 3.5 - 5.3 mmol/L    Chloride 109 (H) 98 - 107 mmol/L    Bicarbonate 24 21 - 32 mmol/L    Anion Gap 16 10 - 20 mmol/L    Urea Nitrogen 8 6 - 23 mg/dL    Creatinine 0.65 0.50 - 1.05 mg/dL    eGFR >90 >60 mL/min/1.73m*2    Calcium 7.8 (L) 8.6 - 10.6 mg/dL    Phosphorus 5.2 " (H) 2.5 - 4.9 mg/dL    Albumin 2.6 (L) 3.4 - 5.0 g/dL   Magnesium   Result Value Ref Range    Magnesium 2.23 1.60 - 2.40 mg/dL       Assessment/Plan   Principal Problem:    GE junction carcinoma (CMS/HCC)  Active Problems:    Class 3 severe obesity due to excess calories in adult (CMS/HCC)    Shruthi Ramos is a 58 y.o. 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 1/11 had amnestic event lasting 30 min, no stroke workup needed per BAT team. Doing well postoperatively. New leukocytosis prompted CT AP which showed SB pneumatosis distal to J-tube after which feeds were stopped, as well as a soft tissue collection requiring bedside I/D and packing. WBC downtrending, tolerating PO.     Neuro:   - pain control with tylenol, ibuprofen, oxy 2.5/5 PRN through J tube    CV/pulm:  - continue home metoprolol with hold parameters  - IS  - OOB as tolerated    GI:  - FLD  - Will start TF at 10cc/hr   - nutrition recs for tube feeds   Day 1: Start TwoCal HN @ 25mL/hr & increase by 10mL q 4hrs to goal rate of 55mL/hr (x16hrs total)  Day 2 & onward: Run TwoCal HN @ 55mL/hr x 16hrs daily  - UGI with SBFT on POD5 (1/15) - no evidence suggesting leak   -Zofran 8mg PRN   - to bring in supplements     :   - IVF @ 50  - voiding independently  - daily labs, replete lytes PRN  - I&Os    Endo:  - no indication for sliding scale insulin    Heme:  - Hgb stable, no indications for transfusions    ID:  - Zosyn for persistent WBC and fluid collection in soft tissue  - WBC down trending now     DVT ppx:  - LVX  - SCDs in place    Dispo: continue management on RNF.    Discussed attending Dr. Trinidad     Surgical oncology [id number]

## 2024-01-21 LAB
ALBUMIN SERPL BCP-MCNC: 2.4 G/DL (ref 3.4–5)
ANION GAP SERPL CALC-SCNC: 13 MMOL/L (ref 10–20)
BUN SERPL-MCNC: 6 MG/DL (ref 6–23)
CALCIUM SERPL-MCNC: 7.8 MG/DL (ref 8.6–10.6)
CHLORIDE SERPL-SCNC: 112 MMOL/L (ref 98–107)
CO2 SERPL-SCNC: 21 MMOL/L (ref 21–32)
CREAT SERPL-MCNC: 0.65 MG/DL (ref 0.5–1.05)
EGFRCR SERPLBLD CKD-EPI 2021: >90 ML/MIN/1.73M*2
ERYTHROCYTE [DISTWIDTH] IN BLOOD BY AUTOMATED COUNT: 17.9 % (ref 11.5–14.5)
GLUCOSE BLD MANUAL STRIP-MCNC: 67 MG/DL (ref 74–99)
GLUCOSE BLD MANUAL STRIP-MCNC: 73 MG/DL (ref 74–99)
GLUCOSE BLD MANUAL STRIP-MCNC: 74 MG/DL (ref 74–99)
GLUCOSE SERPL-MCNC: 69 MG/DL (ref 74–99)
HCT VFR BLD AUTO: 23.8 % (ref 36–46)
HGB BLD-MCNC: 7.8 G/DL (ref 12–16)
MAGNESIUM SERPL-MCNC: 2.3 MG/DL (ref 1.6–2.4)
MCH RBC QN AUTO: 29.8 PG (ref 26–34)
MCHC RBC AUTO-ENTMCNC: 32.8 G/DL (ref 32–36)
MCV RBC AUTO: 91 FL (ref 80–100)
NRBC BLD-RTO: 0 /100 WBCS (ref 0–0)
PHOSPHATE SERPL-MCNC: 4.6 MG/DL (ref 2.5–4.9)
PLATELET # BLD AUTO: 410 X10*3/UL (ref 150–450)
POTASSIUM SERPL-SCNC: 4.1 MMOL/L (ref 3.5–5.3)
RBC # BLD AUTO: 2.62 X10*6/UL (ref 4–5.2)
SODIUM SERPL-SCNC: 142 MMOL/L (ref 136–145)
WBC # BLD AUTO: 10.8 X10*3/UL (ref 4.4–11.3)

## 2024-01-21 PROCEDURE — 83735 ASSAY OF MAGNESIUM: CPT | Performed by: STUDENT IN AN ORGANIZED HEALTH CARE EDUCATION/TRAINING PROGRAM

## 2024-01-21 PROCEDURE — 2500000005 HC RX 250 GENERAL PHARMACY W/O HCPCS: Performed by: STUDENT IN AN ORGANIZED HEALTH CARE EDUCATION/TRAINING PROGRAM

## 2024-01-21 PROCEDURE — 84100 ASSAY OF PHOSPHORUS: CPT | Performed by: STUDENT IN AN ORGANIZED HEALTH CARE EDUCATION/TRAINING PROGRAM

## 2024-01-21 PROCEDURE — 2500000004 HC RX 250 GENERAL PHARMACY W/ HCPCS (ALT 636 FOR OP/ED): Performed by: STUDENT IN AN ORGANIZED HEALTH CARE EDUCATION/TRAINING PROGRAM

## 2024-01-21 PROCEDURE — 82947 ASSAY GLUCOSE BLOOD QUANT: CPT

## 2024-01-21 PROCEDURE — 85027 COMPLETE CBC AUTOMATED: CPT | Performed by: STUDENT IN AN ORGANIZED HEALTH CARE EDUCATION/TRAINING PROGRAM

## 2024-01-21 PROCEDURE — 1170000001 HC PRIVATE ONCOLOGY ROOM DAILY

## 2024-01-21 PROCEDURE — 2500000001 HC RX 250 WO HCPCS SELF ADMINISTERED DRUGS (ALT 637 FOR MEDICARE OP)

## 2024-01-21 PROCEDURE — 2500000004 HC RX 250 GENERAL PHARMACY W/ HCPCS (ALT 636 FOR OP/ED)

## 2024-01-21 RX ORDER — DEXTROSE MONOHYDRATE AND SODIUM CHLORIDE 5; .9 G/100ML; G/100ML
50 INJECTION, SOLUTION INTRAVENOUS CONTINUOUS
Status: DISCONTINUED | OUTPATIENT
Start: 2024-01-22 | End: 2024-01-23

## 2024-01-21 RX ORDER — DEXTROSE 50 % IN WATER (D50W) INTRAVENOUS SYRINGE
25
Status: DISCONTINUED | OUTPATIENT
Start: 2024-01-21 | End: 2024-01-24 | Stop reason: HOSPADM

## 2024-01-21 RX ORDER — DEXTROSE MONOHYDRATE 100 MG/ML
0.3 INJECTION, SOLUTION INTRAVENOUS ONCE AS NEEDED
Status: DISCONTINUED | OUTPATIENT
Start: 2024-01-21 | End: 2024-01-24 | Stop reason: HOSPADM

## 2024-01-21 RX ADMIN — ACETAMINOPHEN 650 MG: 650 SOLUTION ORAL at 18:14

## 2024-01-21 RX ADMIN — PIPERACILLIN SODIUM AND TAZOBACTAM SODIUM 3.38 G: 3; .375 INJECTION, SOLUTION INTRAVENOUS at 18:14

## 2024-01-21 RX ADMIN — IBUPROFEN 600 MG: 200 SUSPENSION ORAL at 00:40

## 2024-01-21 RX ADMIN — ACETAMINOPHEN 650 MG: 650 SOLUTION ORAL at 00:40

## 2024-01-21 RX ADMIN — ACETAMINOPHEN 650 MG: 650 SOLUTION ORAL at 06:16

## 2024-01-21 RX ADMIN — PIPERACILLIN SODIUM AND TAZOBACTAM SODIUM 3.38 G: 3; .375 INJECTION, SOLUTION INTRAVENOUS at 12:48

## 2024-01-21 RX ADMIN — IBUPROFEN 600 MG: 200 SUSPENSION ORAL at 12:48

## 2024-01-21 RX ADMIN — METOPROLOL TARTRATE 5 MG: 5 INJECTION, SOLUTION INTRAVENOUS at 18:14

## 2024-01-21 RX ADMIN — IBUPROFEN 600 MG: 200 SUSPENSION ORAL at 06:16

## 2024-01-21 RX ADMIN — ENOXAPARIN SODIUM 40 MG: 100 INJECTION SUBCUTANEOUS at 12:48

## 2024-01-21 RX ADMIN — ACETAMINOPHEN 650 MG: 650 SOLUTION ORAL at 12:48

## 2024-01-21 RX ADMIN — IBUPROFEN 600 MG: 200 SUSPENSION ORAL at 18:13

## 2024-01-21 RX ADMIN — METOPROLOL TARTRATE 5 MG: 5 INJECTION, SOLUTION INTRAVENOUS at 12:47

## 2024-01-21 RX ADMIN — ACETAMINOPHEN 650 MG: 650 SOLUTION ORAL at 23:30

## 2024-01-21 RX ADMIN — PIPERACILLIN SODIUM AND TAZOBACTAM SODIUM 3.38 G: 3; .375 INJECTION, SOLUTION INTRAVENOUS at 06:16

## 2024-01-21 RX ADMIN — PIPERACILLIN SODIUM AND TAZOBACTAM SODIUM 3.38 G: 3; .375 INJECTION, SOLUTION INTRAVENOUS at 23:31

## 2024-01-21 RX ADMIN — METOPROLOL TARTRATE 5 MG: 5 INJECTION, SOLUTION INTRAVENOUS at 00:41

## 2024-01-21 RX ADMIN — PIPERACILLIN SODIUM AND TAZOBACTAM SODIUM 3.38 G: 3; .375 INJECTION, SOLUTION INTRAVENOUS at 00:40

## 2024-01-21 ASSESSMENT — COGNITIVE AND FUNCTIONAL STATUS - GENERAL
MOVING FROM LYING ON BACK TO SITTING ON SIDE OF FLAT BED WITH BEDRAILS: A LITTLE
STANDING UP FROM CHAIR USING ARMS: A LITTLE
HELP NEEDED FOR BATHING: A LITTLE
CLIMB 3 TO 5 STEPS WITH RAILING: A LITTLE
MOVING TO AND FROM BED TO CHAIR: A LITTLE
CLIMB 3 TO 5 STEPS WITH RAILING: A LITTLE
WALKING IN HOSPITAL ROOM: A LITTLE
DRESSING REGULAR UPPER BODY CLOTHING: A LITTLE
DAILY ACTIVITIY SCORE: 18
TURNING FROM BACK TO SIDE WHILE IN FLAT BAD: A LITTLE
MOBILITY SCORE: 18
STANDING UP FROM CHAIR USING ARMS: A LITTLE
WALKING IN HOSPITAL ROOM: A LITTLE
TOILETING: A LITTLE
DAILY ACTIVITIY SCORE: 24
MOVING TO AND FROM BED TO CHAIR: A LITTLE
DRESSING REGULAR LOWER BODY CLOTHING: A LITTLE
PERSONAL GROOMING: A LITTLE
MOBILITY SCORE: 20
EATING MEALS: A LITTLE

## 2024-01-21 ASSESSMENT — PAIN SCALES - GENERAL
PAINLEVEL_OUTOF10: 4
PAINLEVEL_OUTOF10: 5 - MODERATE PAIN

## 2024-01-21 NOTE — PROGRESS NOTES
"Shruthi Ramos is a 58 y.o. female on day 11 of admission presenting with GE junction carcinoma (CMS/HCC). S/p total gastrectomy with D2 lymphadenectomy and Juany-en-Y reconstruction and placement of Jejunostomy feeding tube 1/10/24    Subjective   NAEO. Patient doing well this morning. Continued to do well with clears without nausea or vomiting. Passing flatus and had BM x 2 yesterday.    Objective   Physical Exam:   Laying in bed in NAD  RRR  Non-labored breathing  Soft, appropriately tender, non-distended, midline incision with murky purulent drainage packed with nu-gauze, J-tube running trickle feeds at 10cc/hr  Mild LE edema  Appropriate mood and affect    Last Recorded Vitals  Blood pressure 96/64, pulse 83, temperature 36.5 °C (97.7 °F), resp. rate 16, height 1.549 m (5' 1\"), weight 99.6 kg (219 lb 9.3 oz), SpO2 97 %.    Intake/Output last 3 Shifts:  I/O last 3 completed shifts:  In: - (0 mL/kg)   Out: 700 (7 mL/kg) [Urine:700 (0.2 mL/kg/hr)]  Weight: 99.6 kg     Relevant Results  No results found for this or any previous visit (from the past 24 hour(s)).      Assessment/Plan   Principal Problem:    GE junction carcinoma (CMS/HCC)  Active Problems:    Class 3 severe obesity due to excess calories in adult (CMS/HCC)    Shruthi Ramos is a 58 y.o. 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 1/11 had amnestic event lasting 30 min, no stroke workup needed per BAT team. Doing well postoperatively. New leukocytosis prompted CT AP which showed SB pneumatosis distal to J-tube after which feeds were stopped, as well as a soft tissue collection requiring bedside I/D and packing. WBC downtrending, tolerating PO.     Neuro:   - pain control with tylenol, ibuprofen, oxy 2.5/5 PRN through J tube    CV/pulm:  - continue home metoprolol with hold " parameters  - IS  - OOB as tolerated    GI:  - FLD  - Started TF at 10cc/hr on 1/20  - nutrition recs for tube feeds  -- currently will keep her at trickle feeds at 10cc/hr, and only advance diet to FLD  Day 1: Start TwoCal HN @ 25mL/hr & increase by 10mL q 4hrs to goal rate of 55mL/hr (x16hrs total)  Day 2 & onward: Run TwoCal HN @ 55mL/hr x 16hrs daily  - UGI with SBFT on POD5 (1/15) - no evidence suggesting leak   -Zofran 8mg PRN   - to bring in supplements     :   - HLIVF  - voiding independently  - daily labs, replete lytes PRN  - I&Os    Endo:  - no indication for sliding scale insulin    Heme:  - Hgb stable, no indications for transfusions    ID:  - Zosyn for persistent WBC and fluid collection in soft tissue.   - Continuing with dressing changes with Nu-gauze packing  - WBC down trending now     DVT ppx:  - LVX  - SCDs in place    Dispo: continue management on RNF.    Seen and discussed with attending Dr. Vivi Bettencourt MD, MSc  Hepatobiliary Surgery and Surgical Oncology  [facility], [id number]

## 2024-01-22 LAB
ALBUMIN SERPL BCP-MCNC: 2.4 G/DL (ref 3.4–5)
ANION GAP SERPL CALC-SCNC: 12 MMOL/L (ref 10–20)
BUN SERPL-MCNC: 5 MG/DL (ref 6–23)
CALCIUM SERPL-MCNC: 8 MG/DL (ref 8.6–10.6)
CHLORIDE SERPL-SCNC: 112 MMOL/L (ref 98–107)
CO2 SERPL-SCNC: 24 MMOL/L (ref 21–32)
CREAT SERPL-MCNC: 0.67 MG/DL (ref 0.5–1.05)
EGFRCR SERPLBLD CKD-EPI 2021: >90 ML/MIN/1.73M*2
ERYTHROCYTE [DISTWIDTH] IN BLOOD BY AUTOMATED COUNT: 18 % (ref 11.5–14.5)
GLUCOSE BLD MANUAL STRIP-MCNC: 77 MG/DL (ref 74–99)
GLUCOSE BLD MANUAL STRIP-MCNC: 78 MG/DL (ref 74–99)
GLUCOSE SERPL-MCNC: 94 MG/DL (ref 74–99)
HCT VFR BLD AUTO: 25.1 % (ref 36–46)
HGB BLD-MCNC: 8.1 G/DL (ref 12–16)
MAGNESIUM SERPL-MCNC: 2.07 MG/DL (ref 1.6–2.4)
MCH RBC QN AUTO: 29.1 PG (ref 26–34)
MCHC RBC AUTO-ENTMCNC: 32.3 G/DL (ref 32–36)
MCV RBC AUTO: 90 FL (ref 80–100)
NRBC BLD-RTO: 0 /100 WBCS (ref 0–0)
PHOSPHATE SERPL-MCNC: 4.6 MG/DL (ref 2.5–4.9)
PLATELET # BLD AUTO: 483 X10*3/UL (ref 150–450)
POTASSIUM SERPL-SCNC: 3.9 MMOL/L (ref 3.5–5.3)
RBC # BLD AUTO: 2.78 X10*6/UL (ref 4–5.2)
SODIUM SERPL-SCNC: 144 MMOL/L (ref 136–145)
WBC # BLD AUTO: 12.5 X10*3/UL (ref 4.4–11.3)

## 2024-01-22 PROCEDURE — 82947 ASSAY GLUCOSE BLOOD QUANT: CPT

## 2024-01-22 PROCEDURE — 97530 THERAPEUTIC ACTIVITIES: CPT | Mod: GP

## 2024-01-22 PROCEDURE — 2500000004 HC RX 250 GENERAL PHARMACY W/ HCPCS (ALT 636 FOR OP/ED): Performed by: STUDENT IN AN ORGANIZED HEALTH CARE EDUCATION/TRAINING PROGRAM

## 2024-01-22 PROCEDURE — 2500000001 HC RX 250 WO HCPCS SELF ADMINISTERED DRUGS (ALT 637 FOR MEDICARE OP)

## 2024-01-22 PROCEDURE — 99232 SBSQ HOSP IP/OBS MODERATE 35: CPT | Performed by: STUDENT IN AN ORGANIZED HEALTH CARE EDUCATION/TRAINING PROGRAM

## 2024-01-22 PROCEDURE — 2500000005 HC RX 250 GENERAL PHARMACY W/O HCPCS

## 2024-01-22 PROCEDURE — 80069 RENAL FUNCTION PANEL: CPT | Performed by: STUDENT IN AN ORGANIZED HEALTH CARE EDUCATION/TRAINING PROGRAM

## 2024-01-22 PROCEDURE — 83735 ASSAY OF MAGNESIUM: CPT | Performed by: STUDENT IN AN ORGANIZED HEALTH CARE EDUCATION/TRAINING PROGRAM

## 2024-01-22 PROCEDURE — 1170000001 HC PRIVATE ONCOLOGY ROOM DAILY

## 2024-01-22 PROCEDURE — 2500000004 HC RX 250 GENERAL PHARMACY W/ HCPCS (ALT 636 FOR OP/ED)

## 2024-01-22 PROCEDURE — 2500000004 HC RX 250 GENERAL PHARMACY W/ HCPCS (ALT 636 FOR OP/ED): Performed by: NURSE PRACTITIONER

## 2024-01-22 PROCEDURE — 85027 COMPLETE CBC AUTOMATED: CPT | Performed by: STUDENT IN AN ORGANIZED HEALTH CARE EDUCATION/TRAINING PROGRAM

## 2024-01-22 RX ORDER — HEPARIN SODIUM,PORCINE/PF 10 UNIT/ML
50 SYRINGE (ML) INTRAVENOUS EVERY 12 HOURS
Status: DISCONTINUED | OUTPATIENT
Start: 2024-01-22 | End: 2024-01-24 | Stop reason: HOSPADM

## 2024-01-22 RX ORDER — HEPARIN 100 UNIT/ML
500 SYRINGE INTRAVENOUS ONCE AS NEEDED
Status: DISCONTINUED | OUTPATIENT
Start: 2024-01-22 | End: 2024-01-24 | Stop reason: HOSPADM

## 2024-01-22 RX ORDER — HEPARIN SODIUM,PORCINE/PF 10 UNIT/ML
50 SYRINGE (ML) INTRAVENOUS AS NEEDED
Status: DISCONTINUED | OUTPATIENT
Start: 2024-01-22 | End: 2024-01-24 | Stop reason: HOSPADM

## 2024-01-22 RX ADMIN — PIPERACILLIN SODIUM AND TAZOBACTAM SODIUM 3.38 G: 3; .375 INJECTION, SOLUTION INTRAVENOUS at 12:35

## 2024-01-22 RX ADMIN — IBUPROFEN 600 MG: 200 SUSPENSION ORAL at 14:56

## 2024-01-22 RX ADMIN — METOPROLOL TARTRATE 5 MG: 5 INJECTION, SOLUTION INTRAVENOUS at 12:36

## 2024-01-22 RX ADMIN — ENOXAPARIN SODIUM 40 MG: 100 INJECTION SUBCUTANEOUS at 12:35

## 2024-01-22 RX ADMIN — IBUPROFEN 600 MG: 200 SUSPENSION ORAL at 01:58

## 2024-01-22 RX ADMIN — IBUPROFEN 600 MG: 200 SUSPENSION ORAL at 08:44

## 2024-01-22 RX ADMIN — ACETAMINOPHEN 650 MG: 650 SOLUTION ORAL at 12:35

## 2024-01-22 RX ADMIN — IBUPROFEN 600 MG: 200 SUSPENSION ORAL at 20:09

## 2024-01-22 RX ADMIN — METOPROLOL TARTRATE 5 MG: 5 INJECTION, SOLUTION INTRAVENOUS at 18:11

## 2024-01-22 RX ADMIN — PIPERACILLIN SODIUM AND TAZOBACTAM SODIUM 3.38 G: 3; .375 INJECTION, SOLUTION INTRAVENOUS at 06:00

## 2024-01-22 RX ADMIN — METOPROLOL TARTRATE 5 MG: 5 INJECTION, SOLUTION INTRAVENOUS at 04:45

## 2024-01-22 RX ADMIN — ACETAMINOPHEN 650 MG: 650 SOLUTION ORAL at 18:12

## 2024-01-22 RX ADMIN — PIPERACILLIN SODIUM AND TAZOBACTAM SODIUM 3.38 G: 3; .375 INJECTION, SOLUTION INTRAVENOUS at 18:11

## 2024-01-22 RX ADMIN — ACETAMINOPHEN 650 MG: 650 SOLUTION ORAL at 04:46

## 2024-01-22 RX ADMIN — DEXTROSE AND SODIUM CHLORIDE 50 ML/HR: 5; 900 INJECTION, SOLUTION INTRAVENOUS at 00:30

## 2024-01-22 RX ADMIN — HEPARIN, PORCINE (PF) 10 UNIT/ML INTRAVENOUS SYRINGE 50 UNITS: at 18:11

## 2024-01-22 ASSESSMENT — PAIN DESCRIPTION - LOCATION: LOCATION: ABDOMEN

## 2024-01-22 ASSESSMENT — COGNITIVE AND FUNCTIONAL STATUS - GENERAL
MOBILITY SCORE: 24
MOBILITY SCORE: 24
DAILY ACTIVITIY SCORE: 24

## 2024-01-22 ASSESSMENT — PAIN SCALES - GENERAL
PAINLEVEL_OUTOF10: 0 - NO PAIN
PAINLEVEL_OUTOF10: 3
PAINLEVEL_OUTOF10: 2
PAINLEVEL_OUTOF10: 4

## 2024-01-22 ASSESSMENT — PAIN SCALES - WONG BAKER: WONGBAKER_NUMERICALRESPONSE: NO HURT

## 2024-01-22 ASSESSMENT — PAIN - FUNCTIONAL ASSESSMENT
PAIN_FUNCTIONAL_ASSESSMENT: 0-10
PAIN_FUNCTIONAL_ASSESSMENT: 0-10

## 2024-01-22 NOTE — PROGRESS NOTES
Physical Therapy    Physical Therapy Treatment    Patient Name: Shruthi Ramos  MRN: [id number]  Today's Date: 1/22/2024  Time Calculation  Start Time: 1023  Stop Time: 1044  Time Calculation (min): 21 min       Assessment/Plan   PT Assessment  Medical Staff Made Aware: Yes  End of Session Communication: Bedside nurse  Assessment Comment: Pt tolerated session well with increased work of breathing but no SOB. Pt completed all PT goals this session. Demonstrates improved ambulation tolerance and a/descended stairs to enter home safely. Pt does not have any further acute PT needs and does not need any PT upon discharge. PT will sign off but will remain available for reconsult should there be any changes.  End of Session Patient Position: Bed, 3 rail up, Alarm off, not on at start of session  PT Plan  Inpatient/Swing Bed or Outpatient: Inpatient  PT Plan  Treatment/Interventions: Bed mobility, Transfer training, Gait training, Stair training, Balance training, Strengthening, Therapeutic exercise, Therapeutic activity, Home exercise program  PT Plan: Other needs (Comment) (D/C PT)  PT Frequency: Other (Comment) (D/C PT)  PT Discharge Recommendations: No further acute PT, No PT needed after discharge  Equipment Recommended upon Discharge:  (none)  PT Recommended Transfer Status: Independent  PT - OK to Discharge: Yes (eval complete and discharge recommendations made)      General Visit Information:   PT  Visit  PT Received On: 01/22/24  General  Prior to Session Communication: Bedside nurse  Patient Position Received: Up in chair, Alarm off, not on at start of session  General Comment: Pt cleared for PT by RN.  Pt is alert and agreeable to PT.    Subjective   Precautions:  Precautions  Medical Precautions: Abdominal precautions    Objective   Pain:  Pain Assessment  Pain Assessment: 0-10  Pain Score: 2  Pain Type: Surgical pain  Pain Location: Abdomen  Pain Interventions: Ambulation/increased activity  Response to  Interventions: decrease in pain to 0/10  Cognition:  Cognition  Overall Cognitive Status: Within Functional Limits  Postural Control:  Postural Control  Posture Comment: WFL  Static Sitting Balance  Static Sitting-Balance Support: No upper extremity supported, Feet unsupported  Static Sitting-Level of Assistance: Independent  Dynamic Sitting Balance  Dynamic Sitting-Balance Support: No upper extremity supported, Feet unsupported  Dynamic Sitting-Balance: Forward lean  Dynamic Sitting-Comments: Independent  Static Standing Balance  Static Standing-Balance Support: No upper extremity supported  Static Standing-Level of Assistance: Independent  Dynamic Standing Balance  Dynamic Standing-Balance Support: No upper extremity supported  Dynamic Standing-Balance: Turning  Dynamic Standing-Comments: Independent  Activity Tolerance:  Activity Tolerance  Endurance: Tolerates 10 - 20 min exercise with multiple rests  Treatments:  Bed Mobility  Bed Mobility: Yes  Bed Mobility 1  Bed Mobility 1: Sitting to supine, Supine to sitting  Level of Assistance 1: Independent  Bed Mobility Comments 1: HOB elevated due to home adjustable bed    Ambulation/Gait Training  Ambulation/Gait Training Performed: Yes  Ambulation/Gait Training 1  Surface 1: Level tile  Device 1: No device (pt managing IV pole independently)  Assistance 1: Independent  Quality of Gait 1: Wide base of support, Trendelenburg (decreased lb)  Comments/Distance (ft) 1: 160ft  Ambulation/Gait Training 2  Surface 2: Level tile  Device 2: No device (pt managing IV pole independently)  Assistance 2: Independent  Quality of Gait 2: Wide base of support, Trendelenburg (decreased lb)  Comments/Distance (ft) 2: 200ft  Transfers  Transfer: Yes  Transfer 1  Transfer From 1: Chair with arms to  Transfer to 1: Stand  Technique 1: Sit to stand, Stand to sit  Transfer Level of Assistance 1: Independent  Trials/Comments 1: 3x throughout session    Stairs  Stairs:  Yes  Stairs  Rails 1: Right  Device 1: Railing  Assistance 1: Independent  Comment/Number of Steps 1: 2 steps x3 trials to achieve 6 RAJ    Outcome Measures:  [facility] Basic Mobility  Turning from your back to your side while in a flat bed without using bedrails: None  Moving from lying on your back to sitting on the side of a flat bed without using bedrails: None  Moving to and from bed to chair (including a wheelchair): None  Standing up from a chair using your arms (e.g. wheelchair or bedside chair): None  To walk in hospital room: None  Climbing 3-5 steps with railing: None  Basic Mobility - Total Score: 24    Education Documentation  Precautions, taught by Asha Wang PT at 1/22/2024 11:02 AM.  Learner: Patient  Readiness: Acceptance  Method: Explanation  Response: Verbalizes Understanding  Comment: Abdominal precautions    Home Exercise Program, taught by Asha Wang PT at 1/22/2024 11:02 AM.  Learner: Patient  Readiness: Acceptance  Method: Explanation  Response: Verbalizes Understanding  Comment: Abdominal precautions    Mobility Training, taught by Asha Wang PT at 1/22/2024 11:02 AM.  Learner: Patient  Readiness: Acceptance  Method: Explanation  Response: Verbalizes Understanding  Comment: Abdominal precautions    Education Comments  No comments found.          Encounter Problems       Encounter Problems (Resolved)       Balance       Pt will score >/=24 on the Tinetti to indicate low fall risk  (Met)       Start:  01/12/24    Expected End:  01/26/24    Resolved:  01/16/24            Mobility       Pt will complete bed mobility independently utilizing log roll technique (Met)       Start:  01/12/24    Expected End:  01/26/24    Resolved:  01/22/24         Pt will amb >200ft with SBA in prep for safe discharge home  (Met)       Start:  01/12/24    Expected End:  01/26/24    Resolved:  01/22/24         Pt will a/descend 5 steps with B rails and SBA in prep for safe home entry (Met)       Start:   01/12/24    Expected End:  01/26/24    Resolved:  01/22/24

## 2024-01-22 NOTE — CARE PLAN
The patient's goals for the shift include      The clinical goals for the shift include pt will tolerate tube feeds by 1/22/24 1600    Over the shift, the patient did not make progress toward the following goals. Barriers to progression include ***. Recommendations to address these barriers include ***.

## 2024-01-22 NOTE — PROGRESS NOTES
"Shruthi Ramos is a 58 y.o. female on day 12 of admission presenting with GE junction carcinoma (CMS/HCC). S/p total gastrectomy with D2 lymphadenectomy and Juany-en-Y reconstruction and placement of Jejunostomy feeding tube 1/10/24    Subjective   NAEO. Patient doing well this morning. FLD without nausea or vomiting. TF @ 25, to goal today. Passing flatus and had BM x 4 yesterday.    Objective   Physical Exam:   Laying in bed in NAD  RRR  Non-labored breathing  Soft, appropriately tender, non-distended, midline incision with murky purulent drainage packed with nu-gauze, J-tube running trickle feeds at 25 cc/hr  Mild LE edema  Appropriate mood and affect    Last Recorded Vitals  Blood pressure 128/80, pulse 93, temperature 36.6 °C (97.9 °F), temperature source Temporal, resp. rate 16, height 1.549 m (5' 1\"), weight 99.6 kg (219 lb 9.3 oz), SpO2 96 %.    Intake/Output last 3 Shifts:  I/O last 3 completed shifts:  In: 930 (9.3 mL/kg) [P.O.:480; I.V.:400 (4 mL/kg); IV Piggyback:50]  Out: 1100 (11 mL/kg) [Urine:1100 (0.3 mL/kg/hr)]  Weight: 99.6 kg     Relevant Results  Results for orders placed or performed during the hospital encounter of 01/10/24 (from the past 24 hour(s))   POCT GLUCOSE   Result Value Ref Range    POCT Glucose 67 (L) 74 - 99 mg/dL   POCT GLUCOSE   Result Value Ref Range    POCT Glucose 73 (L) 74 - 99 mg/dL   POCT GLUCOSE   Result Value Ref Range    POCT Glucose 74 74 - 99 mg/dL   CBC   Result Value Ref Range    WBC 12.5 (H) 4.4 - 11.3 x10*3/uL    nRBC 0.0 0.0 - 0.0 /100 WBCs    RBC 2.78 (L) 4.00 - 5.20 x10*6/uL    Hemoglobin 8.1 (L) 12.0 - 16.0 g/dL    Hematocrit 25.1 (L) 36.0 - 46.0 %    MCV 90 80 - 100 fL    MCH 29.1 26.0 - 34.0 pg    MCHC 32.3 32.0 - 36.0 g/dL    RDW 18.0 (H) 11.5 - 14.5 %    Platelets 483 (H) 150 - 450 x10*3/uL   Renal Function Panel   Result Value Ref Range    Glucose 94 74 - 99 mg/dL    Sodium 144 136 - 145 mmol/L    Potassium 3.9 3.5 - 5.3 mmol/L    Chloride 112 (H) 98 - " 107 mmol/L    Bicarbonate 24 21 - 32 mmol/L    Anion Gap 12 10 - 20 mmol/L    Urea Nitrogen 5 (L) 6 - 23 mg/dL    Creatinine 0.67 0.50 - 1.05 mg/dL    eGFR >90 >60 mL/min/1.73m*2    Calcium 8.0 (L) 8.6 - 10.6 mg/dL    Phosphorus 4.6 2.5 - 4.9 mg/dL    Albumin 2.4 (L) 3.4 - 5.0 g/dL   Magnesium   Result Value Ref Range    Magnesium 2.07 1.60 - 2.40 mg/dL         Assessment/Plan   Principal Problem:    GE junction carcinoma (CMS/HCC)  Active Problems:    Class 3 severe obesity due to excess calories in adult (CMS/HCC)    Shruthi Ramos is a 58 y.o. 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 1/11 had amnestic event lasting 30 min, no stroke workup needed per BAT team. Doing well postoperatively. New leukocytosis prompted CT AP which showed SB pneumatosis distal to J-tube after which feeds were stopped, as well as a soft tissue collection requiring bedside I/D and packing. WBC downtrending, tolerating PO.     Neuro:   - pain control with tylenol, ibuprofen, oxy 2.5/5 PRN through J tube    CV/pulm:  - continue home metoprolol with hold parameters  - IS  - OOB as tolerated    GI:  - FLD  - Started TF at 10cc/hr on 1/20  - nutrition recs for tube feeds  -- only advance diet to FLD  Day 1: Start TwoCal HN @ 25mL/hr & increase by 5mL q 6hrs to goal rate of 55mL/hr (x16hrs total)  Day 2 & onward: Run TwoCal HN @ 55mL/hr x 16hrs daily  - UGI with SBFT on POD5 (1/15) - no evidence suggesting leak   -Zofran 8mg PRN   - to bring in supplements     :   - HLIVF - D5NS @ 50  - voiding independently  - daily labs, replete lytes PRN  - I&Os    Endo:  - no indication for sliding scale insulin    Heme:  - Hgb stable, no indications for transfusions    ID:  - Zosyn for persistent WBC and fluid collection in soft tissue.   - Continuing with dressing changes  with Nu-gauze packing  - Monitor WBC: 12.5 (10.8)    DVT ppx:  - LVX  - SCDs in place    Dispo: continue management on RNF.    Patient seen with chief Dr. Nieto and discussed with attending Dr. Chavarria.    Cuba Magallanes MD, PGY1   Surgical Oncology [id number]

## 2024-01-23 LAB
ANION GAP SERPL CALC-SCNC: 13 MMOL/L (ref 10–20)
BUN SERPL-MCNC: 7 MG/DL (ref 6–23)
CALCIUM SERPL-MCNC: 7.7 MG/DL (ref 8.6–10.6)
CHLORIDE SERPL-SCNC: 114 MMOL/L (ref 98–107)
CO2 SERPL-SCNC: 22 MMOL/L (ref 21–32)
CREAT SERPL-MCNC: 0.63 MG/DL (ref 0.5–1.05)
EGFRCR SERPLBLD CKD-EPI 2021: >90 ML/MIN/1.73M*2
ERYTHROCYTE [DISTWIDTH] IN BLOOD BY AUTOMATED COUNT: 18.3 % (ref 11.5–14.5)
GLUCOSE BLD MANUAL STRIP-MCNC: 124 MG/DL (ref 74–99)
GLUCOSE BLD MANUAL STRIP-MCNC: 68 MG/DL (ref 74–99)
GLUCOSE BLD MANUAL STRIP-MCNC: 76 MG/DL (ref 74–99)
GLUCOSE BLD MANUAL STRIP-MCNC: 83 MG/DL (ref 74–99)
GLUCOSE SERPL-MCNC: 76 MG/DL (ref 74–99)
HCT VFR BLD AUTO: 24.7 % (ref 36–46)
HGB BLD-MCNC: 7.7 G/DL (ref 12–16)
MAGNESIUM SERPL-MCNC: 2.09 MG/DL (ref 1.6–2.4)
MCH RBC QN AUTO: 28.3 PG (ref 26–34)
MCHC RBC AUTO-ENTMCNC: 31.2 G/DL (ref 32–36)
MCV RBC AUTO: 91 FL (ref 80–100)
NRBC BLD-RTO: 0 /100 WBCS (ref 0–0)
PLATELET # BLD AUTO: 485 X10*3/UL (ref 150–450)
POTASSIUM SERPL-SCNC: 4.2 MMOL/L (ref 3.5–5.3)
RBC # BLD AUTO: 2.72 X10*6/UL (ref 4–5.2)
SODIUM SERPL-SCNC: 145 MMOL/L (ref 136–145)
WBC # BLD AUTO: 10.5 X10*3/UL (ref 4.4–11.3)

## 2024-01-23 PROCEDURE — 2500000001 HC RX 250 WO HCPCS SELF ADMINISTERED DRUGS (ALT 637 FOR MEDICARE OP)

## 2024-01-23 PROCEDURE — 1170000001 HC PRIVATE ONCOLOGY ROOM DAILY

## 2024-01-23 PROCEDURE — 2500000005 HC RX 250 GENERAL PHARMACY W/O HCPCS

## 2024-01-23 PROCEDURE — 83735 ASSAY OF MAGNESIUM: CPT

## 2024-01-23 PROCEDURE — 85027 COMPLETE CBC AUTOMATED: CPT

## 2024-01-23 PROCEDURE — 82947 ASSAY GLUCOSE BLOOD QUANT: CPT

## 2024-01-23 PROCEDURE — 2500000004 HC RX 250 GENERAL PHARMACY W/ HCPCS (ALT 636 FOR OP/ED)

## 2024-01-23 PROCEDURE — 80048 BASIC METABOLIC PNL TOTAL CA: CPT

## 2024-01-23 PROCEDURE — 2500000004 HC RX 250 GENERAL PHARMACY W/ HCPCS (ALT 636 FOR OP/ED): Performed by: NURSE PRACTITIONER

## 2024-01-23 RX ORDER — ENOXAPARIN SODIUM 100 MG/ML
40 INJECTION SUBCUTANEOUS 2 TIMES DAILY
Qty: 30 EACH | Refills: 0 | OUTPATIENT
Start: 2024-01-23 | End: 2024-04-17

## 2024-01-23 RX ORDER — ENOXAPARIN SODIUM 100 MG/ML
40 INJECTION SUBCUTANEOUS EVERY 12 HOURS SCHEDULED
Status: DISCONTINUED | OUTPATIENT
Start: 2024-01-23 | End: 2024-01-24 | Stop reason: HOSPADM

## 2024-01-23 RX ORDER — AMOXICILLIN AND CLAVULANATE POTASSIUM 875; 125 MG/1; MG/1
1 TABLET, FILM COATED ORAL 2 TIMES DAILY
Qty: 14 TABLET | Refills: 0 | Status: SHIPPED | OUTPATIENT
Start: 2024-01-23 | End: 2024-01-30

## 2024-01-23 RX ADMIN — DEXTROSE MONOHYDRATE 25 G: 25 INJECTION, SOLUTION INTRAVENOUS at 17:21

## 2024-01-23 RX ADMIN — ACETAMINOPHEN 650 MG: 650 SOLUTION ORAL at 00:28

## 2024-01-23 RX ADMIN — HEPARIN, PORCINE (PF) 10 UNIT/ML INTRAVENOUS SYRINGE 50 UNITS: at 19:36

## 2024-01-23 RX ADMIN — METOPROLOL TARTRATE 5 MG: 5 INJECTION, SOLUTION INTRAVENOUS at 12:49

## 2024-01-23 RX ADMIN — IBUPROFEN 600 MG: 200 SUSPENSION ORAL at 09:15

## 2024-01-23 RX ADMIN — METOPROLOL TARTRATE 5 MG: 5 INJECTION, SOLUTION INTRAVENOUS at 17:20

## 2024-01-23 RX ADMIN — HEPARIN, PORCINE (PF) 10 UNIT/ML INTRAVENOUS SYRINGE 50 UNITS: at 09:16

## 2024-01-23 RX ADMIN — ACETAMINOPHEN 650 MG: 650 SOLUTION ORAL at 05:33

## 2024-01-23 RX ADMIN — ENOXAPARIN SODIUM 40 MG: 100 INJECTION SUBCUTANEOUS at 17:20

## 2024-01-23 RX ADMIN — ACETAMINOPHEN 650 MG: 650 SOLUTION ORAL at 12:49

## 2024-01-23 RX ADMIN — PIPERACILLIN SODIUM AND TAZOBACTAM SODIUM 3.38 G: 3; .375 INJECTION, SOLUTION INTRAVENOUS at 00:28

## 2024-01-23 RX ADMIN — PIPERACILLIN SODIUM AND TAZOBACTAM SODIUM 3.38 G: 3; .375 INJECTION, SOLUTION INTRAVENOUS at 05:32

## 2024-01-23 RX ADMIN — IBUPROFEN 600 MG: 200 SUSPENSION ORAL at 21:16

## 2024-01-23 RX ADMIN — PIPERACILLIN SODIUM AND TAZOBACTAM SODIUM 3.38 G: 3; .375 INJECTION, SOLUTION INTRAVENOUS at 12:49

## 2024-01-23 RX ADMIN — ACETAMINOPHEN 650 MG: 650 SOLUTION ORAL at 17:13

## 2024-01-23 ASSESSMENT — COGNITIVE AND FUNCTIONAL STATUS - GENERAL
TURNING FROM BACK TO SIDE WHILE IN FLAT BAD: A LITTLE
DRESSING REGULAR LOWER BODY CLOTHING: A LITTLE
CLIMB 3 TO 5 STEPS WITH RAILING: A LITTLE
DRESSING REGULAR UPPER BODY CLOTHING: A LOT
HELP NEEDED FOR BATHING: A LITTLE
HELP NEEDED FOR BATHING: A LITTLE
TURNING FROM BACK TO SIDE WHILE IN FLAT BAD: A LITTLE
TOILETING: A LITTLE
WALKING IN HOSPITAL ROOM: A LITTLE
TOILETING: A LITTLE
PERSONAL GROOMING: A LITTLE
STANDING UP FROM CHAIR USING ARMS: A LITTLE
PERSONAL GROOMING: A LITTLE
DAILY ACTIVITIY SCORE: 18
DRESSING REGULAR UPPER BODY CLOTHING: A LITTLE
MOVING TO AND FROM BED TO CHAIR: A LITTLE
MOVING TO AND FROM BED TO CHAIR: A LITTLE
MOVING FROM LYING ON BACK TO SITTING ON SIDE OF FLAT BED WITH BEDRAILS: A LITTLE
DAILY ACTIVITIY SCORE: 19
STANDING UP FROM CHAIR USING ARMS: A LITTLE
MOBILITY SCORE: 18
CLIMB 3 TO 5 STEPS WITH RAILING: A LITTLE
WALKING IN HOSPITAL ROOM: A LITTLE
MOBILITY SCORE: 19
DRESSING REGULAR LOWER BODY CLOTHING: A LITTLE

## 2024-01-23 ASSESSMENT — PAIN SCALES - GENERAL
PAINLEVEL_OUTOF10: 3
PAINLEVEL_OUTOF10: 3

## 2024-01-23 NOTE — PROGRESS NOTES
01/11/24 1100   Discharge Planning   Living Arrangements Spouse/significant other   Support Systems Spouse/significant other   Assistance Needed none   Type of Residence Private residence   Number of Stairs to Enter Residence 5   Number of Stairs Within Residence 12   Do you have animals or pets at home? Yes   Type of Animals or Pets 0   Who is requesting discharge planning? Provider   Home or Post Acute Services In home services   Type of Home Care Services Home nursing visits;Home PT   Patient expects to be discharged to: home   Does the patient need discharge transport arranged? No     TCC Note    Plan per Medical/Surgical Team:   day 1 of admission presenting with GE junction carcinoma (CMS/HCC). S/p total gastrectomy with D2 lymphadenectomy and Juany-en-Y reconstruction and placement of Jejunostomy feeding tube    Status: inpatient   Payor Source: [employer]   Discharge disposition: home   Expected date of discharge: 1/17  Barriers: unable to find home health care   Primary Oncologist: Dr. Roger Cheung   Preferred home care agency:    TCC met with patient at bedside to discuss anticipated discharge needs. Patient states she was independent with ADLs prior to admission. Demographics and insurance verified with patient. Patient live at home with her  and has not used home health care. Patient agreeable for TCC to send out blanket of referral to home health agencies. Patient will need home care and TF supplies. Will continue to follow patient for any discharge needs.     1/15 200pm- TCC contacted multiple C agencies via phone. Unable to accept patient for C. Team is aware, possible SNF if no accepting home care. TCC will continue to follow for discharge needs.    1/17 330pm- TCC met with patient to discuss anticipated discharge needs. Patient is agreeable to discharge home with no home health care. Patient and her  are agreeable to learning peg and TF care. Updated scripts  and notes sent to [facility]. TCC will continue to follow for discharge needs.    1/23- TCC faxed [place] wound [facility] [phone] and [facility] [phone] patient demographics for outpatient wound care with wound vac. Wound vac referral sent to [facility]/ pending wound team to see patient for measurement size. TCC will continue to follow patient for discharge needs.    1/23 338pm- TCC set appointment for patient [facility] wound [facility]- [place] Friday 1/26/24 915am. TCC gave patient appointment information and patient states she received it on [facility]. TCC will continue to follow patient for discharge needs.     01/23/24 at 3:39 PM - PRABHU TRINIDAD, RN     01/23/24 at 1:54 PM - PRABHU TRINIDAD, RN

## 2024-01-23 NOTE — PROGRESS NOTES
"Shruthi Ramos is a 58 y.o. female on day 13 of admission presenting with GE junction carcinoma (CMS/HCC). S/p total gastrectomy with D2 lymphadenectomy and Juany-en-Y reconstruction and placement of Jejunostomy feeding tube 1/10/24    Subjective   NAEO. Patient doing well this morning. FLD without nausea or vomiting. TF @ 40, to goal today. Continues to have diarrhea. Patient denies N/V, fevers, chills, chest pain, and shortness of breath.      Objective   Physical Exam:   Laying in bed in NAD  RRR  Non-labored breathing  Soft, appropriately tender, non-distended, midline incision without purulent drainage, SS. Packed with nu-gauze. J-tube at 40 cc/hr  Mild LE edema  Appropriate mood and affect    Last Recorded Vitals  Blood pressure 109/76, pulse 103, temperature 36.1 °C (97 °F), temperature source Temporal, resp. rate 16, height 1.549 m (5' 1\"), weight 99.6 kg (219 lb 9.3 oz), SpO2 97 %.    Intake/Output last 3 Shifts:  I/O last 3 completed shifts:  In: 1260 (12.7 mL/kg) [P.O.:750; I.V.:200 (2 mL/kg); NG/GT:310]  Out: 1252 (12.6 mL/kg) [Urine:1250 (0.3 mL/kg/hr); Stool:2]  Weight: 99.6 kg     Relevant Results  Results for orders placed or performed during the hospital encounter of 01/10/24 (from the past 24 hour(s))   POCT GLUCOSE   Result Value Ref Range    POCT Glucose 78 74 - 99 mg/dL   POCT GLUCOSE   Result Value Ref Range    POCT Glucose 77 74 - 99 mg/dL   POCT GLUCOSE   Result Value Ref Range    POCT Glucose 83 74 - 99 mg/dL   POCT GLUCOSE   Result Value Ref Range    POCT Glucose 76 74 - 99 mg/dL       Assessment/Plan   Principal Problem:    GE junction carcinoma (CMS/HCC)  Active Problems:    Class 3 severe obesity due to excess calories in adult (CMS/HCC)    Shruthi Ramos is a 58 y.o. 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 " 1/11 had amnestic event lasting 30 min, no stroke workup needed per BAT team. Doing well postoperatively. New leukocytosis prompted CT AP which showed SB pneumatosis distal to J-tube after which feeds were stopped, as well as a soft tissue collection requiring bedside I/D and packing. WBC downtrending, tolerating PO.     Neuro:   - pain control with tylenol, ibuprofen, oxy 2.5/5 PRN through J tube    CV/pulm:  - continue home metoprolol with hold parameters  - IS  - OOB as tolerated    GI:  - FLD  - Started TF at 10cc/hr on 1/20, to goal of 55 today  - nutrition recs for tube feeds  -- only advance diet to FLD  Day 1: Start TwoCal HN @ 25mL/hr & increase by 5mL q 6hrs to goal rate of 55mL/hr (x16hrs total)  Day 2 & onward: Run TwoCal HN @ 55mL/hr x 16hrs daily  - UGI with SBFT on POD5 (1/15) - no evidence suggesting leak   -Zofran 8mg PRN   - to bring in supplements     :   - HLIV  - voiding independently  - daily labs, replete lytes PRN  - I&Os    Endo:  - no indication for sliding scale insulin    Heme:  - Hgb stable, no indications for transfusions    ID:  - Zosyn for persistent WBC and fluid collection in soft tissue.   - Continuing with dressing changes with Nu-gauze packing  - wound care consult, appreciate recs. May need wound vac  - Monitor WBC: 12.5 (10.8)  - trend WBC, if uptrending will need CT A/P to reevaluate fluid collection    DVT ppx:  - LVX  - SCDs in place    Dispo: continue management on RNF    Patient seen with chief Dr. Nieto and discussed with attending Dr. Chavarria.    Jailyn Mendenhall MD  General Surgery PGY2  Surgical oncology

## 2024-01-23 NOTE — CONSULTS
Wound Care Consult     Visit Date: 1/23/2024      Patient Name: Shruthi Ramos         MRN: [id number]           YOB: 1965     Reason for Consult: midline incision     Wound History: Shruthi Ramos is a 58 y.o. 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 1/11 had amnestic event lasting 30 min, no stroke workup needed per BAT team. Doing well postoperatively. New leukocytosis prompted CT AP which showed SB pneumatosis distal to J-tube after which feeds were stopped, as well as a soft tissue collection requiring bedside I/D and packing.      Pertinent Labs:   Albumin   Date Value Ref Range Status   01/22/2024 2.4 (L) 3.4 - 5.0 g/dL Final       Wound Assessment:  Wound 01/10/24 Incision Abdomen Lower;Medial (Active)   Site Assessment Pink 01/23/24 1500   Destiny-Wound Assessment Intact 01/23/24 1500   Shape linear 01/14/24 1005   Wound Length (cm) 6 cm 01/23/24 1500   Wound Width (cm) 2.4 cm 01/23/24 1500   Wound Surface Area (cm^2) 14.4 cm^2 01/23/24 1500   Wound Depth (cm) 5.5 cm 01/23/24 1500   Wound Volume (cm^3) 79.2 cm^3 01/23/24 1500   State of Healing Undermining 01/23/24 1500   Undermining 6.6 cm 01/23/24 1500   Undermining Clock Position of Wound 12 01/23/24 1500   Margins Not attached 01/23/24 1500   Closure Glue 01/23/24 1500   Sutures/Staple Line Approximated 01/19/24 0900   Drainage Description Serosanguineous 01/23/24 1500   Drainage Amount Small 01/23/24 1500   Dressing Vacuum dressing 01/23/24 1500   Dressing Changed Changed 01/23/24 1500   Dressing Status Occlusive;Clean;Dry 01/23/24 1500       Wound Team Summary Assessment: wound cleaned with normal saline.    Wound Vac applied to midline incision.  (2)  Mepitel over dermabond over approximated incision  (1)  white foam (2) black foam  Pressure; 125 mmhg continuous  pressure  Plan:  Change wound Vac 2 times a week, next planned change Friday   If patient is discharged prior to next dressing change, please disconnect VAC machine, remove foam dressing, and place machine in the soiled utility room on the unit. Call [phone] for pickup immediately upon removal.   Pack wound with wet-to moist NS kerlix and cover with a dry sterile dressing. Patient cannot leave hospital with VAC in place unless patient is being transferred to [facility].      Wound Team Plan: Wound care to follow up on Friday     MICKEY Goldstein, RN, CWCN, COCN   1/23/2024  6:53 PM

## 2024-01-23 NOTE — PROGRESS NOTES
01/11/24 1100   Discharge Planning   Living Arrangements Spouse/significant other   Support Systems Spouse/significant other   Assistance Needed none   Type of Residence Private residence   Number of Stairs to Enter Residence 5   Number of Stairs Within Residence 12   Do you have animals or pets at home? Yes   Type of Animals or Pets 0   Who is requesting discharge planning? Provider   Home or Post Acute Services In home services   Type of Home Care Services Home nursing visits;Home PT   Patient expects to be discharged to: home   Does the patient need discharge transport arranged? No     TCC Note    Plan per Medical/Surgical Team:   day 1 of admission presenting with GE junction carcinoma (CMS/HCC). S/p total gastrectomy with D2 lymphadenectomy and Juany-en-Y reconstruction and placement of Jejunostomy feeding tube    Status: inpatient   Payor Source: [employer] community plan   Discharge disposition: home   Expected date of discharge: 1/17  Barriers: unable to find home health care   Primary Oncologist: Dr. Roger Cheung   Preferred home care agency:    TCC met with patient at bedside to discuss anticipated discharge needs. Patient states she was independent with ADLs prior to admission. Demographics and insurance verified with patient. Patient live at home with her  and has not used home health care. Patient agreeable for TCC to send out blanket of referral to home health agencies. Patient will need home care and TF supplies. Will continue to follow patient for any discharge needs.     1/15 200pm- TCC contacted multiple C agencies via phone. Unable to accept patient for C. Team is aware, possible SNF if no accepting home care. TCC will continue to follow for discharge needs.    1/17 330pm- TCC met with patient to discuss anticipated discharge needs. Patient is agreeable to discharge home with no home health care. Patient and her  are agreeable to learning peg and TF care. Updated scripts  and notes sent to [facility]. TCC will continue to follow for discharge needs.    1/23- TCC faxed [facility] [phone] and [facility] [phone] patient demographics for outpatient wound care with wound vac. Wound vac referral sent to I/ pending wound team to see patient for measurement size. TCC will continue to follow patient for discharge needs.    01/23/24 at 1:54 PM - PRABHU TRINIDAD RN

## 2024-01-23 NOTE — CARE PLAN
The patient's goals for the shift include      The clinical goals for the shift include pt will tolerate nutrional intake    Problem: Pain  Goal: Takes deep breaths with improved pain control throughout the shift  Outcome: Progressing     Problem: Fall/Injury  Goal: Not fall by end of shift  Outcome: Progressing

## 2024-01-24 VITALS
SYSTOLIC BLOOD PRESSURE: 122 MMHG | HEIGHT: 61 IN | TEMPERATURE: 98.1 F | RESPIRATION RATE: 16 BRPM | BODY MASS INDEX: 41.46 KG/M2 | OXYGEN SATURATION: 100 % | HEART RATE: 99 BPM | DIASTOLIC BLOOD PRESSURE: 85 MMHG | WEIGHT: 219.58 LBS

## 2024-01-24 LAB
GLUCOSE BLD MANUAL STRIP-MCNC: 75 MG/DL (ref 74–99)
GLUCOSE BLD MANUAL STRIP-MCNC: 79 MG/DL (ref 74–99)
GLUCOSE BLD MANUAL STRIP-MCNC: 92 MG/DL (ref 74–99)

## 2024-01-24 PROCEDURE — 2500000001 HC RX 250 WO HCPCS SELF ADMINISTERED DRUGS (ALT 637 FOR MEDICARE OP)

## 2024-01-24 PROCEDURE — 2500000005 HC RX 250 GENERAL PHARMACY W/O HCPCS

## 2024-01-24 PROCEDURE — 2500000004 HC RX 250 GENERAL PHARMACY W/ HCPCS (ALT 636 FOR OP/ED): Performed by: NURSE PRACTITIONER

## 2024-01-24 PROCEDURE — 82947 ASSAY GLUCOSE BLOOD QUANT: CPT

## 2024-01-24 PROCEDURE — 99231 SBSQ HOSP IP/OBS SF/LOW 25: CPT | Performed by: NURSE PRACTITIONER

## 2024-01-24 RX ADMIN — ENOXAPARIN SODIUM 40 MG: 100 INJECTION SUBCUTANEOUS at 08:58

## 2024-01-24 RX ADMIN — METOPROLOL TARTRATE 5 MG: 5 INJECTION, SOLUTION INTRAVENOUS at 13:59

## 2024-01-24 RX ADMIN — IBUPROFEN 600 MG: 200 SUSPENSION ORAL at 13:59

## 2024-01-24 RX ADMIN — IBUPROFEN 600 MG: 200 SUSPENSION ORAL at 06:02

## 2024-01-24 RX ADMIN — IBUPROFEN 600 MG: 200 SUSPENSION ORAL at 08:59

## 2024-01-24 RX ADMIN — ACETAMINOPHEN 650 MG: 650 SOLUTION ORAL at 06:02

## 2024-01-24 RX ADMIN — METOPROLOL TARTRATE 5 MG: 5 INJECTION, SOLUTION INTRAVENOUS at 06:02

## 2024-01-24 ASSESSMENT — COGNITIVE AND FUNCTIONAL STATUS - GENERAL
TOILETING: A LITTLE
PERSONAL GROOMING: A LITTLE
DAILY ACTIVITIY SCORE: 19
CLIMB 3 TO 5 STEPS WITH RAILING: A LITTLE
HELP NEEDED FOR BATHING: A LITTLE
DRESSING REGULAR UPPER BODY CLOTHING: A LITTLE
STANDING UP FROM CHAIR USING ARMS: A LITTLE
MOVING FROM LYING ON BACK TO SITTING ON SIDE OF FLAT BED WITH BEDRAILS: A LITTLE
DRESSING REGULAR LOWER BODY CLOTHING: A LITTLE
TURNING FROM BACK TO SIDE WHILE IN FLAT BAD: A LITTLE
MOVING TO AND FROM BED TO CHAIR: A LITTLE
MOBILITY SCORE: 18
WALKING IN HOSPITAL ROOM: A LITTLE

## 2024-01-24 ASSESSMENT — PAIN SCALES - WONG BAKER: WONGBAKER_NUMERICALRESPONSE: HURTS LITTLE BIT

## 2024-01-24 ASSESSMENT — PAIN SCALES - GENERAL: PAINLEVEL_OUTOF10: 3

## 2024-01-24 NOTE — DISCHARGE SUMMARY
"Discharge Diagnosis  GE junction carcinoma (CMS/HCC)    Hospital Course  58 y.o. 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  Post-op course uncomplicated.  Flores removed POD 2 (1/12) and passed TOV.  TF started POD 1 and titrated to goal.  UGI on POD 5 showed no evidence anastomotic leak. 1/15 TF cycled to goal 85 ml/hr. 1/16 multiple BM ON. Patient ordered CT A/P due to persistent WBC demonstrating no Destiny anastomotic  loculated fluid collection or extraluminal gas to suggest leak or developing abscess. Pneumatosis/portal venous gas on read, but team unconvinced.  IV Zosyn started. Held TF with J tube to gravity. 1/16 Pulled right DAVI drain. TF resumed and titrated to goal and diet advanced to FLD.  Incision opened 1/18 at bedside with large amount of drainage; packed with Kerlix; wound vac applied 1/23.  DC home on FLD + nocturnal TF and wound vac.     Pertinent Physical Exam At Time of Discharge  Neurological: Awake, alert, conversive  Respiratory/Thorax: even, unlabored  Genitourinary: voiding  Gastrointestinal: soft, NT.  Incision with wound vac in place.   Jtube with TF infusing  Skin: warm, dry  Musculoskeletal: HUMPHREYS  Eyes: non-icteric  Extremities: generalized edema    Psychological: appropriate mood/affect     /80   Pulse 90   Temp 36.8 °C (98.2 °F)   Resp 16   Ht 1.549 m (5' 1\")   Wt 99.6 kg (219 lb 9.3 oz)   SpO2 97%   BMI 41.49 kg/m²       Home Medications     Medication List      START taking these medications     amoxicillin-pot clavulanate 875-125 mg tablet; Commonly known as:   Augmentin; Take 1 tablet (875 mg) by mouth 2 times a day for 7 days.   enoxaparin 40 mg/0.4 mL syringe; Commonly known as: Lovenox; Inject 0.4   mL (40 mg) under the skin 2 times a day.     CONTINUE taking these medications     " acetaminophen 325 mg tablet; Commonly known as: Tylenol; Take 2 tablets   (650 mg) by mouth every 4 hours if needed for fever (temp greater than   38.0 C) (greater than or equal to 38 C).   atorvastatin 20 mg tablet; Commonly known as: Lipitor   DULoxetine 60 mg DR capsule; Commonly known as: Cymbalta   loperamide 2 mg capsule; Commonly known as: Imodium; Take 2 capsules (4   mg) by mouth 4 times a day as needed for diarrhea.   metoprolol succinate XL 50 mg 24 hr tablet; Commonly known as: Toprol-XL   mirtazapine 15 mg tablet; Commonly known as: Remeron   MULTIVITAMIN WITH IRON ORAL   nystatin 100,000 unit/mL suspension; Commonly known as: Mycostatin; Take   5 mL (500,000 Units) by mouth 4 times a day. Swish in mouth and spit out.   ondansetron 8 mg tablet; Commonly known as: Zofran   spironolactone 25 mg tablet; Commonly known as: Aldactone   traZODone 50 mg tablet; Commonly known as: Desyrel   Vitamin B-12 1,000 mcg tablet; Generic drug: cyanocobalamin   Vitamin D3 25 MCG (1000 UT) tablet; Generic drug: cholecalciferol   white petrolatum 41 % ointment ointment; Commonly known as: Aquaphor;   Apply 1 Application topically every 1 hour if needed (Dry Skin/Itching).     STOP taking these medications     chlorhexidine 0.12 % solution; Commonly known as: Peridex   chlorhexidine 4 % external liquid; Commonly known as: Hibiclens   pantoprazole 40 mg EC tablet; Commonly known as: ProtoNix   scopolamine 1 mg over 3 days patch 3 day; Commonly known as:   Transderm-Scop       Outpatient Follow-Up  Future Appointments   Date Time Provider Department Center   1/30/2024  1:30 PM Zack Chavarria MD [id number] [place]       Elena Doran, APRN-CNP

## 2024-01-24 NOTE — CARE PLAN
The patient's goals for the shift include      The clinical goals for the shift include pain control      Problem: Fall/Injury  Goal: Not fall by end of shift  Outcome: Progressing     Problem: Pain  Goal: Performs ADL's with improved pain control throughout shift  Outcome: Progressing

## 2024-01-24 NOTE — PROGRESS NOTES
Shruthi Ramos is a 58 y.o. female on day 14 of admission presenting with GE junction carcinoma (CMS/HCC).    SW met with pt and spouse, Jhon, to discuss SW needs for discharge. Pt and spouse report that they are losing insurance at the end of February. Jhon and pt are working with an insurance marketplace rep and their [facility] job and family services department for resources they may be eligible for. SW provided pt with the  financial assistance application should they need it. Pt is very grateful for the care she's received here. Pt will discharge on nocturnal TF through [facility] and a wound vac from [facility]. SW will follow and assist as needed. [facility], LSW.

## 2024-01-24 NOTE — CARE PLAN
Problem: Fall/Injury  Goal: Not fall by end of shift  Outcome: Progressing     Problem: Pain  Goal: Takes deep breaths with improved pain control throughout the shift  Outcome: Progressing  Goal: Turns in bed with improved pain control throughout the shift  Outcome: Progressing  Goal: Walks with improved pain control throughout the shift  Outcome: Progressing  Goal: Performs ADL's with improved pain control throughout shift  Outcome: Progressing  Goal: Participates in PT with improved pain control throughout the shift  Outcome: Progressing  Goal: Free from opioid side effects throughout the shift  Outcome: Progressing  Goal: Free from acute confusion related to pain meds throughout the shift  Outcome: Progressing   The patient's goals for the shift include      The clinical goals for the shift include pain control    Over the shift, the patient did not make progress toward the following goals. Barriers to progression include pain control. Recommendations to address these barriers include admin  pain meds.

## 2024-01-25 ENCOUNTER — NUTRITION (OUTPATIENT)
Dept: HEMATOLOGY/ONCOLOGY | Facility: HOSPITAL | Age: 59
End: 2024-01-25
Payer: MEDICAID

## 2024-01-25 NOTE — PROGRESS NOTES
"NUTRITION Assessment NOTE    Nutrition Assessment     Reason for Visit:  Shruthi Ramos is a 58 y.o. female who presents for clarification of TF order and administration.  Received a secure message from RN from surgery team.     is concerned about TF supplies  He also seems to be unsure of TF rate and time of administration.    Chart reviewed   called     Pt was just discharged 1/24/2024 admission was 1-  Surgery (1/10/24) now s/p diagnostic laparoscopy, liver wedge bx, EGD, total gastrectomy with D2 lymphadenectomy, Juany-en-Y reconstruction, and placement of J-tube.    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   Is known to [facility] dietitian     Lab Results   Component Value Date/Time    GLUCOSE 76 01/23/2024 0531     01/23/2024 0531    K 4.2 01/23/2024 0531     (H) 01/23/2024 0531    CO2 22 01/23/2024 0531    ANIONGAP 13 01/23/2024 0531    BUN 7 01/23/2024 0531    CREATININE 0.63 01/23/2024 0531    EGFR >90 01/23/2024 0531    CALCIUM 7.7 (L) 01/23/2024 0531    ALBUMIN 2.4 (L) 01/22/2024 0444    ALKPHOS 76 01/17/2024 0514    PROT 4.1 (L) 01/17/2024 0514    AST 14 01/17/2024 0514    BILITOT 0.8 01/17/2024 0514    ALT 17 01/17/2024 0514     No results found for: \"VITD25\"    Anthropometrics:     Height: 154.9 cm (5' 1\")   Weight: 99.6 kg (219 lb 9.3 oz)   BMI (Calculated): 41.51  IBW/kg (Dietitian Calculated): 47.7 kg  Percent of IBW: 209 %      Wt Readings from Last 10 Encounters:   01/10/24 99.6 kg (219 lb 9.3 oz)   01/05/24 100 kg (220 lb 8 oz)   12/20/23 97.5 kg (214 lb 15.2 oz)   12/19/23 97.5 kg (214 lb 15.2 oz)   12/11/23 103 kg (226 lb 13.7 oz)   11/27/23 102 kg (224 lb 3.3 oz)   11/28/23 102 kg (224 lb 13.9 oz)   11/21/23 100 kg (221 lb 5.5 oz)   11/17/23 102 kg (225 lb 1.4 oz)   11/06/23 102 kg (225 lb)        Food And Nutrient Intake:      Taking TF currently continuously at 55 ml per hour  Has Two Neto HN  This " provides about 2518 calories and 105 g protein  She is eating as well  1/2 cup mashed potatoes last night  Today  1/2 cup of oatmeal with butter and milk  4 ounces of applesauce    She is not hungry  Does have some satiety  Is having liquid to formed BM 1-2 times per day  No nasuea noted    Per secure message and chart review as well as what  tells me- she is Oked for a post gastrectomy soft diet  Her TF should meet 75% of estimated needs     Fluids:  8 ounces of juice  water                                                           Nutrition Focused Physical Exam Findings:  defer: phone                        Energy Needs     Total Energy Estimated Needs (kCal): 8401-1693 kCal  Method for Estimating Needs: MSJ (REE: 1512) x ~1.2  Total Protein Estimated Needs (g): 70 g  Method for Estimating Needs: ~1.5 g/kg x IBW  Total Fluid Estimated Needs (mL): 0800-0115 mL  Method for Estimating Needs: 1mL/kcal or per team      Nutrition Diagnosis      Malnutrition Diagnosis  Patient has Malnutrition Diagnosis: No     Nutrition Diagnosis  Patient has Nutrition Diagnosis: Yes  Diagnosis Status (1): Ongoing  Nutrition Diagnosis 1: Altered GI function  Related to (1): 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 s/p total gastrectomy  As Evidenced by (1): need for supplemental TF via J-tube       Nutrition Interventions/Recommendations   Nutrition Prescription   Continue with post gastrectomy soft diet- continue with volume of 1/2 cup per meal- try to build on frequency- goal 5-6 meals per day  TF- goal is 3 cartons per day- product two joie HN- rate 55ml- might try to increase rate to 60ml (but that is up to them) this will provide >75% of estimated needs - 1425 calories and 59.7 g protein     Food and Nutrition Delivery       Nutrition Education    took notes and read back plan to me  Business phone provided - encouraged to call     Coordination of Care   Surgical oncology team  [facility]  - will run out of current supply of TF on 1-  says order should be there tomorrow     There are no Patient Instructions on file for this visit.    Nutrition Monitoring and Evaluation      Will follow as needed and also let Jules MCKAY know of encounter        Time Spent  Prep time on day of patient encounter: 15 minutes  Time spent directly with patient, family or caregiver: 30 minutes  Additional Time Spent on Patient Care Activities: 10 minutes  Documentation Time: 20 minutes  Other Time Spent: 0 minutes  Total: 75 minutes

## 2024-01-29 NOTE — PROGRESS NOTES
ASSESSMENT AND PLAN  Shruthi Ramos is a 58 y.o. female who is recovering well following total gastrectomy. On final pathology she had a G3 poorly differentiated cancer with partial treatment response, +LVI/PNI, 7/35 lymph nodes positive, pT3N3.  We discussed that this is not completely unexpected and the recommendation will be to complete perioperative chemotherapy.  Once she is strong enough we will have her see Dr. Torres to commence this treatment.    With regard to her nutrition I do not think we are ready to make substantive changes. She will see Cecilia Joseph our nutritionist today to discuss PO nutrition options that may be more palatable. We will see her back in 2 weeks to reassess her progress.    Zack Chavarria MD  Assistant Professor of Surgery  Division of Surgical Oncology  [phone]  [email or web address]      SUBJECTIVE  Shruthi Ramos is a 58 y.o. female who presents for a postoperative clinic visit.  Referring provider: Ollie Torres  Diagnosis: gastric cancer  Surgery: total gastrectomy with J tube on 1/10/24  Postoperative issues: wound infection, pneumatosis with tube feeds that resolved with TPN, bowel rest, and abx.    She is trying to eat more food, tube feeds cycled overnight for 50% of her needs. Her feeding tube hurts, VAC in place, seeing wound care    Physical exam  Vac in place  Skin irritation around J tube    Surgical Pathology  FINAL DIAGNOSIS   A. Liver, segment 4 lesion, biopsy:  - Bile duct adenoma.     B. 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  - Resection margins are negative for neoplasia.  - Six of twenty-nine lymph nodes involved by metastatic carcinoma (6/29).  - Background stomach with reactive gastropathy, intestinal metaplasia, and dieulafoy lesion with accompanying hemorrhage.  - Omentum with no significant pathologic findings.     C. Lymph  nodes, D2, excision:  - One of six lymph nodes involved by metastatic carcinoma (1/6).     D. Final esophageal margin, resection:  - Segment of esophagus, negative for neoplasia.   Electronically signed by Sachi Eubanks MD on 1/18/2024 at 1350        By the signature on this report, the individual or group listed as making the Final Interpretation/Diagnosis certifies that they have reviewed this case.    Case Summary Report   ESOPHAGUS   8th Edition - Protocol posted: 6/22/2022ESOPHAGUS - B  SPECIMEN   Procedure  Esophagogastrectomy   TUMOR   Tumor Site  Esophagogastric junction (EGJ)   Relationship of Tumor to Esophagogastric Junction  Tumor midpoint is located at the esophagogastric junction   Histologic Type  Adenocarcinoma   Histologic Grade  G3, poorly differentiated, undifferentiated   Tumor Size  Greatest Dimension (Centimeters): 2.3 (grossly) cm   Tumor Extent  Invades adventitia   Treatment Effect  Present, with residual cancer showing evident tumor regression, but more than single cells or rare small groups of cancer cells (partial response, score 2)   Lymphovascular Invasion  Present   Perineural Invasion  Present   MARGINS   Margin Status for Invasive Carcinoma  All margins negative for invasive carcinoma   Closest Margin(s) to Invasive Carcinoma  Deep   Distance from Invasive Carcinoma to Closest Margin  1.2 cm   Margin Status for Dysplasia and Intestinal Metaplasia  All margins negative for dysplasia   REGIONAL LYMPH NODES   Regional Lymph Node Status  Tumor present in regional lymph node(s)   Number of Lymph Nodes with Tumor  7   Number of Lymph Nodes Examined  35   PATHOLOGIC STAGE CLASSIFICATION (pTNM, AJCC 8th Edition)   Reporting of pT, pN, and (when applicable) pM categories is based on information available to the pathologist at the time the report is issued. As per the AJCC (Chapter 1, 8th Ed.) it is the managing physician’s responsibility to establish the final pathologic stage based upon all  pertinent information, including but potentially not limited to this pathology report.   TNM Descriptors  y (post-treatment)   pT Category  pT3   pN Category  pN3

## 2024-01-30 ENCOUNTER — NUTRITION (OUTPATIENT)
Dept: HEMATOLOGY/ONCOLOGY | Facility: CLINIC | Age: 59
End: 2024-01-30

## 2024-01-30 ENCOUNTER — APPOINTMENT (OUTPATIENT)
Dept: SURGICAL ONCOLOGY | Facility: CLINIC | Age: 59
End: 2024-01-30
Payer: MEDICAID

## 2024-01-30 ENCOUNTER — OFFICE VISIT (OUTPATIENT)
Dept: SURGICAL ONCOLOGY | Facility: CLINIC | Age: 59
End: 2024-01-30
Payer: MEDICAID

## 2024-01-30 VITALS
DIASTOLIC BLOOD PRESSURE: 82 MMHG | WEIGHT: 216.49 LBS | RESPIRATION RATE: 18 BRPM | SYSTOLIC BLOOD PRESSURE: 143 MMHG | BODY MASS INDEX: 40.91 KG/M2 | HEART RATE: 100 BPM | TEMPERATURE: 97.9 F | OXYGEN SATURATION: 99 %

## 2024-01-30 DIAGNOSIS — C16.0 GE JUNCTION CARCINOMA (MULTI): Primary | ICD-10-CM

## 2024-01-30 PROCEDURE — 1036F TOBACCO NON-USER: CPT | Performed by: STUDENT IN AN ORGANIZED HEALTH CARE EDUCATION/TRAINING PROGRAM

## 2024-01-30 PROCEDURE — 3077F SYST BP >= 140 MM HG: CPT | Performed by: STUDENT IN AN ORGANIZED HEALTH CARE EDUCATION/TRAINING PROGRAM

## 2024-01-30 PROCEDURE — 3079F DIAST BP 80-89 MM HG: CPT | Performed by: STUDENT IN AN ORGANIZED HEALTH CARE EDUCATION/TRAINING PROGRAM

## 2024-01-30 PROCEDURE — 99024 POSTOP FOLLOW-UP VISIT: CPT | Performed by: STUDENT IN AN ORGANIZED HEALTH CARE EDUCATION/TRAINING PROGRAM

## 2024-01-30 ASSESSMENT — PAIN SCALES - GENERAL: PAINLEVEL: 2

## 2024-01-30 NOTE — PROGRESS NOTES
"NUTRITION Follow-up NOTE  Nutrition Assessment       Reason for Visit:  Shruthi Ramos is a 58 y.o. 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 Completed neoadjuvant chemotherapy with FLOT protocol (Docetaxel, oxaliplatin, leucovorin, and 5-fluorouracil).   S/p Surgery (1/10/24) diagnostic laparoscopy, liver wedge bx, EGD, total gastrectomy with D2 lymphadenectomy, Juany-en-Y reconstruction, and placement of J-tube.       Here today in [facility] for follow up visit with surgical oncology.  Was asked to see pt today regarding j-tube feedings and oral nutrition intake.  Noted pt spoke with NELY Oliver last week.        Lab Results   Component Value Date/Time    GLUCOSE 76 01/23/2024 0531     01/23/2024 0531    K 4.2 01/23/2024 0531     (H) 01/23/2024 0531    CO2 22 01/23/2024 0531    ANIONGAP 13 01/23/2024 0531    BUN 7 01/23/2024 0531    CREATININE 0.63 01/23/2024 0531    EGFR >90 01/23/2024 0531    CALCIUM 7.7 (L) 01/23/2024 0531    ALBUMIN 2.4 (L) 01/22/2024 0444    ALKPHOS 76 01/17/2024 0514    PROT 4.1 (L) 01/17/2024 0514    AST 14 01/17/2024 0514    BILITOT 0.8 01/17/2024 0514    ALT 17 01/17/2024 0514       Nutrition Assessment     Anthropometrics:  Anthropometrics  Height: 155 cm (5' 1.02\")  Weight: 98.2 kg (216 lb 7.9 oz)  BMI (Calculated): 40.87  IBW/kg (Dietitian Calculated): 47.72 kg  Percent of IBW: 205 %  Weight Change  Weight History / % Weight Change: lost 3 lbs since her surgery few weeks ago    Wt Readings from Last 10 Encounters:   01/31/24 98.2 kg (216 lb 7.9 oz)   01/30/24 98.2 kg (216 lb 7.9 oz)   01/10/24 99.6 kg (219 lb 9.3 oz)   01/05/24 100 kg (220 lb 8 oz)   12/20/23 97.5 kg (214 lb 15.2 oz)   12/19/23 97.5 kg (214 lb 15.2 oz)   12/11/23 103 kg (226 lb 13.7 oz)   11/27/23 102 kg (224 lb 3.3 oz)   11/28/23 102 kg (224 lb 13.9 oz)   11/21/23 100 kg (221 lb 5.5 oz)        Food And Nutrient Intake:        Food " "Intake  Amount of Food: Trying to increase her oral intake little by little.  They are following recs that RDN provided last week.                        Nutrition Focused Physical Exam:       Energy Needs  Calculated Energy Needs Using Equations  Height: 155 cm (5' 1.02\")  Weight Used for Equation Calculations: 98.2 kg (216 lb 7.9 oz)  [place]- St. Jeor Equation (Overweight or Obese Patients): 1495  Activity Factor: 1.3  Total Energy Needs: 1950  Estimated Fluid Needs  Total Fluid Estimated Needs (mL): 1950 mL  Total Fluid Estimated Needs (mL/kg): 20 mL/kg  Method for Estimating Needs: actual body weight  Estimated Protein Needs  Total Protein Estimated Needs (g): 115 g  Total Protein Estimated Needs (g/kg): 1.2 g/kg  Method for Estimating Needs: actual body weight    Nutrition Diagnosis      Nutrition Diagnosis  Patient has Nutrition Diagnosis: Yes  Diagnosis Status (1): Ongoing  Nutrition Diagnosis 1: Altered GI function  Related to (1): 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  As Evidenced by (2): need for jtube feedings for >/=75% of estimated nutritional needs for now.  Additional Assessment Information (2): on-going s/p gastretomy and need for jtube feedings for >/= 75% of needs    Nutrition Interventions/Recommendations   Food and Nutrition Delivery  Meals & Snacks: Modify Composition of Meals/Snacks  Goals: small bites/snacks of softer foods throughout the day and increasing as tolerated  Enteral Nutrition: Other, specify: (Continue with current enteral nutrition schedule)  Total Nutrition Provided: TwoCal HN via Jtube feedings providing 720ml; 1425 " calories, 59.7g protein, 498ml free water per day.  Goals: continue with 55ml/hr x 12-13 hours at night (to provide 3 cartons per day), until oral intake increases.  Coordination of Nutrition Care by a Nutrition Professional  Collaboration and referral of nutrition care: Collaboration by nutrition professional with other providers  Goals: will continue to collaborate with other RDN at [facility].    Nutrition Monitoring and Evaluation   Food/Nutrient Related History Monitoring  Monitoring and Evaluation Plan: Energy intake  Energy Intake: Estimated energy intake  Criteria: meet needs calculated by RD    Time Spent  Prep time on day of patient encounter: 10 minutes  Time spent directly with patient, family or caregiver: 10 minutes  Additional Time Spent on Patient Care Activities: 5 minutes  Documentation Time: 15 minutes  Other Time Spent: 0 minutes  Total: 40 minutes

## 2024-01-30 NOTE — LETTER
January 30, 2024     Ollie Torres MD  [address]  [facility] [address]    Patient: Shruthi Ramos   YOB: 1965   Date of Visit: 1/30/2024       Dear Dr. Ollie Torres MD:    Thank you for referring Shruthi Ramos to me for evaluation. Below are my notes for this consultation.  If you have questions, please do not hesitate to call me. I look forward to following your patient along with you.       Sincerely,     Zack Chavarria MD      CC: No Recipients  ______________________________________________________________________________________    ASSESSMENT AND PLAN  Shruthi Ramos is a 58 y.o. female who is recovering well following total gastrectomy. On final pathology she had a G3 poorly differentiated cancer with partial treatment response, +LVI/PNI, 7/35 lymph nodes positive, pT3N3.  We discussed that this is not completely unexpected and the recommendation will be to complete perioperative chemotherapy.  Once she is strong enough we will have her see Dr. Torres to commence this treatment.    With regard to her nutrition I do not think we are ready to make substantive changes. She will see Cecilia Joseph our nutritionist today to discuss PO nutrition options that may be more palatable. We will see her back in 2 weeks to reassess her progress.    Zack Chavarria MD  Assistant Professor of Surgery  Division of Surgical Oncology  [phone]  [email or web address]      SUBJECTIVE  Shruthi Ramos is a 58 y.o. female who presents for a postoperative clinic visit.  Referring provider: Ollie Torres  Diagnosis: gastric cancer  Surgery: total gastrectomy with J tube on 1/10/24  Postoperative issues: wound infection, pneumatosis with tube feeds that resolved with TPN, bowel rest, and abx.    She is trying to eat more food, tube feeds cycled overnight for 50% of her needs. Her feeding tube hurts, VAC in place, seeing wound care    Physical exam  Vac in place  Skin irritation around J  tube    Surgical Pathology  FINAL DIAGNOSIS   A. Liver, segment 4 lesion, biopsy:  - Bile duct adenoma.     B. 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  - Resection margins are negative for neoplasia.  - Six of twenty-nine lymph nodes involved by metastatic carcinoma (6/29).  - Background stomach with reactive gastropathy, intestinal metaplasia, and dieulafoy lesion with accompanying hemorrhage.  - Omentum with no significant pathologic findings.     C. Lymph nodes, D2, excision:  - One of six lymph nodes involved by metastatic carcinoma (1/6).     D. Final esophageal margin, resection:  - Segment of esophagus, negative for neoplasia.   Electronically signed by Sachi Eubanks MD on 1/18/2024 at 1350        By the signature on this report, the individual or group listed as making the Final Interpretation/Diagnosis certifies that they have reviewed this case.    Case Summary Report   ESOPHAGUS   8th Edition - Protocol posted: 6/22/2022ESOPHAGUS - B  SPECIMEN   Procedure  Esophagogastrectomy   TUMOR   Tumor Site  Esophagogastric junction (EGJ)   Relationship of Tumor to Esophagogastric Junction  Tumor midpoint is located at the esophagogastric junction   Histologic Type  Adenocarcinoma   Histologic Grade  G3, poorly differentiated, undifferentiated   Tumor Size  Greatest Dimension (Centimeters): 2.3 (grossly) cm   Tumor Extent  Invades adventitia   Treatment Effect  Present, with residual cancer showing evident tumor regression, but more than single cells or rare small groups of cancer cells (partial response, score 2)   Lymphovascular Invasion  Present   Perineural Invasion  Present   MARGINS   Margin Status for Invasive Carcinoma  All margins negative for invasive carcinoma   Closest Margin(s) to Invasive Carcinoma  Deep   Distance from Invasive Carcinoma to Closest Margin  1.2 cm   Margin Status for  Dysplasia and Intestinal Metaplasia  All margins negative for dysplasia   REGIONAL LYMPH NODES   Regional Lymph Node Status  Tumor present in regional lymph node(s)   Number of Lymph Nodes with Tumor  7   Number of Lymph Nodes Examined  35   PATHOLOGIC STAGE CLASSIFICATION (pTNM, AJCC 8th Edition)   Reporting of pT, pN, and (when applicable) pM categories is based on information available to the pathologist at the time the report is issued. As per the AJCC (Chapter 1, 8th Ed.) it is the managing physician’s responsibility to establish the final pathologic stage based upon all pertinent information, including but potentially not limited to this pathology report.   TNM Descriptors  y (post-treatment)   pT Category  pT3   pN Category  pN3

## 2024-01-31 VITALS — HEIGHT: 61 IN | WEIGHT: 216.49 LBS | BODY MASS INDEX: 40.87 KG/M2

## 2024-01-31 NOTE — PATIENT INSTRUCTIONS
Provided Diet and Nutrition after a Gastrectomy Procedure handout and discussed.    Continue with current J-tube feedings as tolerated.

## 2024-02-01 ENCOUNTER — APPOINTMENT (OUTPATIENT)
Dept: SURGICAL ONCOLOGY | Facility: HOSPITAL | Age: 59
End: 2024-02-01
Payer: MEDICAID

## 2024-02-08 ENCOUNTER — NUTRITION (OUTPATIENT)
Dept: RADIATION ONCOLOGY | Facility: CLINIC | Age: 59
End: 2024-02-08
Payer: MEDICAID

## 2024-02-08 NOTE — PROGRESS NOTES
NUTRITION COMMUNICATION NOTE    Shruthi Ramos     REASON FOR COMMUNICATION: RD updated today by patients nutrition team. Primary RD, Cecilia ROCHA out for the day.    Patient was discharged from the hospital on 900kcals TwoCal daily vs recommended 1896kcals/day. Recommend TwoCal, 4 tetras per day (237mL each) to provide 1896kcals and 80g protein/day. Will need an additional 1232mL fluid/day PO and via pump. Fluid being managed by primary RD. Patient is being encouraged to eat by mouth but admits she has been struggling. Reviewed gastrectomy soft diet. If PO intake increases, patient may reduce enteral nutrition to 3 tetras per day.     had questing regarding amount of enteral nutrition to be provided at one time. Encouraged him not to overdo it and stick to RD recommendations of 55-60mL/hr as he admits increasing volume of feeds is causing stomach upset at times. Overall, they are doing well managing with changes. Encouraged them to continue to reach out to Cecilia or REI with any questions or concerns. They are agreeable. Order send to [place], no prior authorization required so shipment of additional nutrition will be shipped promptly.

## 2024-02-12 NOTE — PROGRESS NOTES
ASSESSMENT AND PLAN  Shruthi Ramos is a 58 y.o. female who is recovering well following total gastrectomy. On final pathology she had a G3 poorly differentiated cancer with partial treatment response, +LVI/PNI, 7/35 lymph nodes positive, pT3N3.  We discussed that this is not completely unexpected and the recommendation will be to complete perioperative chemotherapy.  Once she is strong enough we will have her see Dr. Torres to commence this treatment.    With regard to her nutrition I think she's making progress. Cecilia will see her and titrate tube feed today. I have a feeling she'll require tube feed support through her postop chemo.    Her midline wound is improving and VAC will probably be finished in the next couple weeks.    She has a yeast infection 2/2 abx and we will give fluconazole.     Zack Chavarria MD  Assistant Professor of Surgery  Division of Surgical Oncology  [phone]  [email or web address]      SUBJECTIVE  Shruthi Ramos is a 58 y.o. female who presents for a postoperative clinic visit.  Referring provider: Ollie Torres  Diagnosis: gastric cancer  Surgery: total gastrectomy with J tube on 1/10/24  Postoperative issues: wound infection, pneumatosis with tube feeds that resolved with TPN, bowel rest, and abx.    She is trying to eat more food, tube feeds cycled overnight for 50% of her needs. Her feeding tube hurts, VAC in place, seeing wound care.    Last week we did not make any changes to her new nutrition but she met with Ceiclia to discuss plans.  Since then she is eating a little bit more but still <50% likely. She has a yeast infection. Her J tube site is better and midline wound making progress based on pictures they showed me today.    Physical exam  Vac in place  Skin irritation around J tube    Surgical Pathology  FINAL DIAGNOSIS   A. Liver, segment 4 lesion, biopsy:  - Bile duct adenoma.     B. Total stomach and partial esophagus, esophagogastrectomy:  -  Poorly-differentiated adenocarcinoma with partial treatment response, see synoptic report.  - Resection margins are negative for neoplasia.  - Six of twenty-nine lymph nodes involved by metastatic carcinoma (6/29).  - Background stomach with reactive gastropathy, intestinal metaplasia, and dieulafoy lesion with accompanying hemorrhage.  - Omentum with no significant pathologic findings.     C. Lymph nodes, D2, excision:  - One of six lymph nodes involved by metastatic carcinoma (1/6).     D. Final esophageal margin, resection:  - Segment of esophagus, negative for neoplasia.   Electronically signed by Sachi Eubanks MD on 1/18/2024 at 1350        By the signature on this report, the individual or group listed as making the Final Interpretation/Diagnosis certifies that they have reviewed this case.    Case Summary Report   ESOPHAGUS   8th Edition - Protocol posted: 6/22/2022ESOPHAGUS - B  SPECIMEN   Procedure  Esophagogastrectomy   TUMOR   Tumor Site  Esophagogastric junction (EGJ)   Relationship of Tumor to Esophagogastric Junction  Tumor midpoint is located at the esophagogastric junction   Histologic Type  Adenocarcinoma   Histologic Grade  G3, poorly differentiated, undifferentiated   Tumor Size  Greatest Dimension (Centimeters): 2.3 (grossly) cm   Tumor Extent  Invades adventitia   Treatment Effect  Present, with residual cancer showing evident tumor regression, but more than single cells or rare small groups of cancer cells (partial response, score 2)   Lymphovascular Invasion  Present   Perineural Invasion  Present   MARGINS   Margin Status for Invasive Carcinoma  All margins negative for invasive carcinoma   Closest Margin(s) to Invasive Carcinoma  Deep   Distance from Invasive Carcinoma to Closest Margin  1.2 cm   Margin Status for Dysplasia and Intestinal Metaplasia  All margins negative for dysplasia   REGIONAL LYMPH NODES   Regional Lymph Node Status  Tumor present in regional lymph node(s)   Number of  Lymph Nodes with Tumor  7   Number of Lymph Nodes Examined  35   PATHOLOGIC STAGE CLASSIFICATION (pTNM, AJCC 8th Edition)   Reporting of pT, pN, and (when applicable) pM categories is based on information available to the pathologist at the time the report is issued. As per the AJCC (Chapter 1, 8th Ed.) it is the managing physician’s responsibility to establish the final pathologic stage based upon all pertinent information, including but potentially not limited to this pathology report.   TNM Descriptors  y (post-treatment)   pT Category  pT3   pN Category  pN3

## 2024-02-13 ENCOUNTER — OFFICE VISIT (OUTPATIENT)
Dept: SURGICAL ONCOLOGY | Facility: CLINIC | Age: 59
End: 2024-02-13
Payer: MEDICAID

## 2024-02-13 VITALS
TEMPERATURE: 96.8 F | RESPIRATION RATE: 18 BRPM | WEIGHT: 215.8 LBS | DIASTOLIC BLOOD PRESSURE: 81 MMHG | OXYGEN SATURATION: 98 % | SYSTOLIC BLOOD PRESSURE: 135 MMHG | HEART RATE: 87 BPM | BODY MASS INDEX: 40.74 KG/M2

## 2024-02-13 DIAGNOSIS — C16.0 GE JUNCTION CARCINOMA (MULTI): ICD-10-CM

## 2024-02-13 DIAGNOSIS — B37.9 YEAST INFECTION: Primary | ICD-10-CM

## 2024-02-13 PROCEDURE — 1036F TOBACCO NON-USER: CPT | Performed by: STUDENT IN AN ORGANIZED HEALTH CARE EDUCATION/TRAINING PROGRAM

## 2024-02-13 PROCEDURE — 3079F DIAST BP 80-89 MM HG: CPT | Performed by: STUDENT IN AN ORGANIZED HEALTH CARE EDUCATION/TRAINING PROGRAM

## 2024-02-13 PROCEDURE — 99214 OFFICE O/P EST MOD 30 MIN: CPT | Performed by: STUDENT IN AN ORGANIZED HEALTH CARE EDUCATION/TRAINING PROGRAM

## 2024-02-13 PROCEDURE — 3075F SYST BP GE 130 - 139MM HG: CPT | Performed by: STUDENT IN AN ORGANIZED HEALTH CARE EDUCATION/TRAINING PROGRAM

## 2024-02-13 RX ORDER — FLUCONAZOLE 150 MG/1
150 TABLET ORAL DAILY
Qty: 3 TABLET | Refills: 0 | Status: SHIPPED | OUTPATIENT
Start: 2024-02-13 | End: 2024-02-16

## 2024-02-13 ASSESSMENT — ENCOUNTER SYMPTOMS
LOSS OF SENSATION IN FEET: 0
DEPRESSION: 0
OCCASIONAL FEELINGS OF UNSTEADINESS: 0

## 2024-02-13 ASSESSMENT — PAIN SCALES - GENERAL: PAINLEVEL: 0-NO PAIN

## 2024-02-13 NOTE — LETTER
February 13, 2024     Ollie Torres MD  [address] [address]    Patient: Shruthi Ramos   YOB: 1965   Date of Visit: 2/13/2024       Dear Dr. Ollie Torres MD:    Thank you for referring Shruthi Ramos to me for evaluation. Below are my notes for this consultation.  If you have questions, please do not hesitate to call me. I look forward to following your patient along with you.       Sincerely,     Zcak Chavarria MD      CC: No Recipients  ______________________________________________________________________________________    ASSESSMENT AND PLAN  Shruthi Ramos is a 58 y.o. female who is recovering well following total gastrectomy. On final pathology she had a G3 poorly differentiated cancer with partial treatment response, +LVI/PNI, 7/35 lymph nodes positive, pT3N3.  We discussed that this is not completely unexpected and the recommendation will be to complete perioperative chemotherapy.  Once she is strong enough we will have her see Dr. Torres to commence this treatment.    With regard to her nutrition I think she's making progress. Cecilia will see her and titrate tube feed today. I have a feeling she'll require tube feed support through her postop chemo.    Her midline wound is improving and VAC will probably be finished in the next couple weeks.    She has a yeast infection 2/2 abx and we will give fluconazole.     Zack Chavarria MD  Assistant Professor of Surgery  Division of Surgical Oncology  [phone]  [email or web address]      SUBJECTIVE  Shruhti Ramos is a 58 y.o. female who presents for a postoperative clinic visit.  Referring provider: Ollie Torres  Diagnosis: gastric cancer  Surgery: total gastrectomy with J tube on 1/10/24  Postoperative issues: wound infection, pneumatosis with tube feeds that resolved with TPN, bowel rest, and abx.    She is trying to eat more food, tube feeds cycled overnight for 50% of her needs. Her feeding tube hurts,  VAC in place, seeing wound care.    Last week we did not make any changes to her new nutrition but she met with Cecilia to discuss plans.  Since then she is eating a little bit more but still <50% likely. She has a yeast infection. Her J tube site is better and midline wound making progress based on pictures they showed me today.    Physical exam  Vac in place  Skin irritation around J tube    Surgical Pathology  FINAL DIAGNOSIS   A. Liver, segment 4 lesion, biopsy:  - Bile duct adenoma.     B. 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  - Resection margins are negative for neoplasia.  - Six of twenty-nine lymph nodes involved by metastatic carcinoma (6/29).  - Background stomach with reactive gastropathy, intestinal metaplasia, and dieulafoy lesion with accompanying hemorrhage.  - Omentum with no significant pathologic findings.     C. Lymph nodes, D2, excision:  - One of six lymph nodes involved by metastatic carcinoma (1/6).     D. Final esophageal margin, resection:  - Segment of esophagus, negative for neoplasia.   Electronically signed by Sachi Eubanks MD on 1/18/2024 at 1350        By the signature on this report, the individual or group listed as making the Final Interpretation/Diagnosis certifies that they have reviewed this case.    Case Summary Report   ESOPHAGUS   8th Edition - Protocol posted: 6/22/2022ESOPHAGUS - B  SPECIMEN   Procedure  Esophagogastrectomy   TUMOR   Tumor Site  Esophagogastric junction (EGJ)   Relationship of Tumor to Esophagogastric Junction  Tumor midpoint is located at the esophagogastric junction   Histologic Type  Adenocarcinoma   Histologic Grade  G3, poorly differentiated, undifferentiated   Tumor Size  Greatest Dimension (Centimeters): 2.3 (grossly) cm   Tumor Extent  Invades adventitia   Treatment Effect  Present, with residual cancer showing evident tumor regression, but  more than single cells or rare small groups of cancer cells (partial response, score 2)   Lymphovascular Invasion  Present   Perineural Invasion  Present   MARGINS   Margin Status for Invasive Carcinoma  All margins negative for invasive carcinoma   Closest Margin(s) to Invasive Carcinoma  Deep   Distance from Invasive Carcinoma to Closest Margin  1.2 cm   Margin Status for Dysplasia and Intestinal Metaplasia  All margins negative for dysplasia   REGIONAL LYMPH NODES   Regional Lymph Node Status  Tumor present in regional lymph node(s)   Number of Lymph Nodes with Tumor  7   Number of Lymph Nodes Examined  35   PATHOLOGIC STAGE CLASSIFICATION (pTNM, AJCC 8th Edition)   Reporting of pT, pN, and (when applicable) pM categories is based on information available to the pathologist at the time the report is issued. As per the AJCC (Chapter 1, 8th Ed.) it is the managing physician’s responsibility to establish the final pathologic stage based upon all pertinent information, including but potentially not limited to this pathology report.   TNM Descriptors  y (post-treatment)   pT Category  pT3   pN Category  pN3

## 2024-02-15 LAB
GLUCOSE BLD MANUAL STRIP-MCNC: 117 MG/DL (ref 74–99)
GLUCOSE BLD MANUAL STRIP-MCNC: 87 MG/DL (ref 74–99)

## 2024-02-23 ENCOUNTER — OFFICE VISIT (OUTPATIENT)
Dept: HEMATOLOGY/ONCOLOGY | Facility: CLINIC | Age: 59
End: 2024-02-23
Payer: MEDICAID

## 2024-02-23 ENCOUNTER — LAB (OUTPATIENT)
Dept: HEMATOLOGY/ONCOLOGY | Facility: CLINIC | Age: 59
End: 2024-02-23
Payer: MEDICAID

## 2024-02-23 VITALS
HEART RATE: 88 BPM | WEIGHT: 213.85 LBS | HEIGHT: 61 IN | OXYGEN SATURATION: 100 % | RESPIRATION RATE: 20 BRPM | TEMPERATURE: 97.5 F | DIASTOLIC BLOOD PRESSURE: 59 MMHG | BODY MASS INDEX: 40.37 KG/M2 | SYSTOLIC BLOOD PRESSURE: 125 MMHG

## 2024-02-23 DIAGNOSIS — C16.0 GE JUNCTION CARCINOMA (MULTI): ICD-10-CM

## 2024-02-23 LAB
ALBUMIN SERPL BCP-MCNC: 3.9 G/DL (ref 3.4–5)
ALP SERPL-CCNC: 111 U/L (ref 33–110)
ALT SERPL W P-5'-P-CCNC: 16 U/L (ref 7–45)
ANION GAP SERPL CALC-SCNC: 14 MMOL/L (ref 10–20)
AST SERPL W P-5'-P-CCNC: 20 U/L (ref 9–39)
BASOPHILS # BLD AUTO: 0.09 X10*3/UL (ref 0–0.1)
BASOPHILS NFR BLD AUTO: 0.9 %
BILIRUB SERPL-MCNC: 0.4 MG/DL (ref 0–1.2)
BUN SERPL-MCNC: 15 MG/DL (ref 6–23)
CALCIUM SERPL-MCNC: 9.6 MG/DL (ref 8.6–10.3)
CHLORIDE SERPL-SCNC: 106 MMOL/L (ref 98–107)
CO2 SERPL-SCNC: 25 MMOL/L (ref 21–32)
CREAT SERPL-MCNC: 0.68 MG/DL (ref 0.5–1.05)
EGFRCR SERPLBLD CKD-EPI 2021: >90 ML/MIN/1.73M*2
EOSINOPHIL # BLD AUTO: 0.44 X10*3/UL (ref 0–0.7)
EOSINOPHIL NFR BLD AUTO: 4.3 %
ERYTHROCYTE [DISTWIDTH] IN BLOOD BY AUTOMATED COUNT: 15.5 % (ref 11.5–14.5)
GLUCOSE SERPL-MCNC: 114 MG/DL (ref 74–99)
HCT VFR BLD AUTO: 39.1 % (ref 36–46)
HGB BLD-MCNC: 12.2 G/DL (ref 12–16)
IMM GRANULOCYTES # BLD AUTO: 0.04 X10*3/UL (ref 0–0.7)
IMM GRANULOCYTES NFR BLD AUTO: 0.4 % (ref 0–0.9)
LYMPHOCYTES # BLD AUTO: 1.87 X10*3/UL (ref 1.2–4.8)
LYMPHOCYTES NFR BLD AUTO: 18.1 %
MCH RBC QN AUTO: 28.6 PG (ref 26–34)
MCHC RBC AUTO-ENTMCNC: 31.2 G/DL (ref 32–36)
MCV RBC AUTO: 92 FL (ref 80–100)
MONOCYTES # BLD AUTO: 0.78 X10*3/UL (ref 0.1–1)
MONOCYTES NFR BLD AUTO: 7.5 %
NEUTROPHILS # BLD AUTO: 7.12 X10*3/UL (ref 1.2–7.7)
NEUTROPHILS NFR BLD AUTO: 68.8 %
NRBC BLD-RTO: 0 /100 WBCS (ref 0–0)
PLATELET # BLD AUTO: 309 X10*3/UL (ref 150–450)
POTASSIUM SERPL-SCNC: 4.2 MMOL/L (ref 3.5–5.3)
PROT SERPL-MCNC: 6.6 G/DL (ref 6.4–8.2)
RBC # BLD AUTO: 4.27 X10*6/UL (ref 4–5.2)
SODIUM SERPL-SCNC: 141 MMOL/L (ref 136–145)
WBC # BLD AUTO: 10.3 X10*3/UL (ref 4.4–11.3)

## 2024-02-23 PROCEDURE — 3078F DIAST BP <80 MM HG: CPT | Performed by: INTERNAL MEDICINE

## 2024-02-23 PROCEDURE — 99215 OFFICE O/P EST HI 40 MIN: CPT | Performed by: INTERNAL MEDICINE

## 2024-02-23 PROCEDURE — 1036F TOBACCO NON-USER: CPT | Performed by: INTERNAL MEDICINE

## 2024-02-23 PROCEDURE — 85025 COMPLETE CBC W/AUTO DIFF WBC: CPT

## 2024-02-23 PROCEDURE — 36591 DRAW BLOOD OFF VENOUS DEVICE: CPT

## 2024-02-23 PROCEDURE — 2500000004 HC RX 250 GENERAL PHARMACY W/ HCPCS (ALT 636 FOR OP/ED): Performed by: INTERNAL MEDICINE

## 2024-02-23 PROCEDURE — 96523 IRRIG DRUG DELIVERY DEVICE: CPT

## 2024-02-23 PROCEDURE — 3074F SYST BP LT 130 MM HG: CPT | Performed by: INTERNAL MEDICINE

## 2024-02-23 PROCEDURE — 80053 COMPREHEN METABOLIC PANEL: CPT

## 2024-02-23 RX ORDER — HEPARIN SODIUM,PORCINE/PF 10 UNIT/ML
50 SYRINGE (ML) INTRAVENOUS AS NEEDED
Status: CANCELLED | OUTPATIENT
Start: 2024-02-23

## 2024-02-23 RX ORDER — HEPARIN 100 UNIT/ML
500 SYRINGE INTRAVENOUS AS NEEDED
Status: DISCONTINUED | OUTPATIENT
Start: 2024-02-23 | End: 2024-02-23 | Stop reason: HOSPADM

## 2024-02-23 RX ORDER — HEPARIN 100 UNIT/ML
500 SYRINGE INTRAVENOUS AS NEEDED
Status: CANCELLED | OUTPATIENT
Start: 2024-02-23

## 2024-02-23 RX ADMIN — HEPARIN 500 UNITS: 100 SYRINGE at 09:12

## 2024-02-23 ASSESSMENT — PAIN SCALES - GENERAL: PAINLEVEL: 2

## 2024-02-23 NOTE — PROGRESS NOTES
LOCATION:  [facility] at [facility].     HEMATOLOGY & ONCOLOGY PROBLEMS:  1.  Localized gastric adenocarcinoma.       a.  Neoadjuvant chemotherapy with FLOT from Oct to Dec 2023.       b.  S/p total gastrectomy in January 2024.  Postoperative pathology (pT3,pN3)    CHIEF COMPLAINT:    The patient is in the clinic today for follow-up of GE junction adenocarcinoma and for continuation of treatment and management of therapy related effects.                   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  She was having problem with gradually worsening dysphagia with further GI work-up revealing gastric mass with the biopsy confirming  poorly differentiated adenocarcinoma in July 2023.  Prior to that she had a barium esophagogram which was essentially unremarkable.  Patient does have a previous history of GERD and had one time was treated for H. pylori gastritis.  EUS done by Dr. London on 7/17/2023 showed malignancy starting near the GE junction and measuring 2.2 x 1.5 cm towards the cardia along  the lesser curvature / anterior wall with malignant appearing features.  There is loss of interface between the mass and liver along a 7 mm region, concerning for probable  early hepatic involvement.  Enlarged lymph nodes were noted in the diaphragmatic region, and gastrohepatic ligament.  CT chest abdomen pelvis with contrast on 8/9/2023 showed thickening of the distal esophagus extending into the anterior portion of the  gastric cardia.  There is a small amount of liver present directly adjacent to the area of the tumor but there is no altered enhancement pattern in the liver parenchyma.  Slightly inferior to the cardia there  were lymph nodes visible in the adjacent mesentery that were not visible previously measuring up to 6 mm in short axis. PET/CT on 8/31/2023 showed a hypermetabolic focus at the GE junction  with extension into the gastric cardia, no evidence of  hypermetabolic regional or distant metastatic disease. Brain MRI negative for metastasis.  She was treated with neoadjuvant chemotherapy with FLOT protocol from Oct to Dec 2023.  She had a total gastrectomy in 2024.  Postop pathology results showed G3, poorly differentiated cancer with partial treatment response, positive LVI/PNI, 7/25 lymph nodes positive (pT3 pN3).     INTERVAL HISTORY:  She is recovering well from the surgery.  Had issues with a wound infection requiring wound VAC and other supportive care.  Still using tube feed for nutrition.  Surgical wound and other incisions are healing well.    PAST MEDICAL HISTORY:   1.  GE junction adenocarcinoma as detailed above.   2.  Hypertension   3.  Hyperlipidemia   4.  Anxiety/depression   5.  History of complete hysterectomy, ankle surgery, cholecystectomy and 2  procedures     SOCIAL HISTORY:    and lives in [place].  Non-smoker and nonalcoholic.  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  Born and raised in [place].     FAMILY HISTORY:    Father  at age 76 from myocardial infarction mother  from stroke at age 78.  She had 8 siblings.  1 brother  from MI another committed suicide.  A sister also  from myocardial infarction.   She had 2 children.  Her son  from AML.  No other specific history of bleeding, clotting or malignant disorder in the family.     REVIEW OF SYSTEMS:  Pertinent finding as per the history above.  All other systems have been reviewed and generally negative and noncontributory.     PHYSICAL EXAMINATION:    Detailed physical examination not done     ALLERGY & MEDICATIONS:  Allergies and latest outpatient medications list were reviewed in the EMR.    LAB RESULTS:  CBC from today shows a white cell count of 10.3 with hemoglobin of 12.2 and platelets of 309K.  Metabolic profile was normal except for alkaline phosphatase of 145.     RADIOLOGY RESULT:   PET/CT Esophageal Initial [Aug 31 2023   1:12PM]  Impression:  1. Hypermetabolic focus localized at gastroesophageal junction with xtension to the gastric cardia compatible with biopsy-proven gastric adenocarcinoma.  2. No evidence of hypermetabolic regional or distant metastatic disease.  3. Status post cholecystectomy with demonstration of localized pneumobilia.     MRI Brain w/wo Contrast [Aug 29 2023 12:28PM]   Impression:  There is no evidence of mass, infarction or hemorrhage.     PATHOLOGY RESULTS:  Surgical Pathology Exam: [id number]   FINAL DIAGNOSIS   A. Liver, segment 4 lesion, biopsy:  - Bile duct adenoma.     B. 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  - Resection margins are negative for neoplasia.  - Six of twenty-nine lymph nodes involved by metastatic carcinoma (6/29).  - Background stomach with reactive gastropathy, intestinal metaplasia, and dieulafoy lesion with accompanying hemorrhage.  - Omentum with no significant pathologic findings.     C. Lymph nodes, D2, excision:  - One of six lymph nodes involved by metastatic carcinoma (1/6).     D. Final esophageal margin, resection:  - Segment of esophagus, negative for neoplasia.   Electronically signed by Sachi Eubanks MD on 1/18/2024 at 1350        By the signature on this report, the individual or group listed as making the Final Interpretation/Diagnosis certifies that they have reviewed this case.    Case Summary Report   ESOPHAGUS   8th Edition - Protocol posted: 6/22/2022ESOPHAGUS - B  SPECIMEN   Procedure  Esophagogastrectomy   TUMOR   Tumor Site  Esophagogastric junction (EGJ)   Relationship of Tumor to Esophagogastric Junction  Tumor midpoint is located at the esophagogastric junction   Histologic Type  Adenocarcinoma   Histologic Grade  G3, poorly differentiated, undifferentiated   Tumor Size  Greatest Dimension (Centimeters): 2.3 (grossly) cm   Tumor Extent  Invades  adventitia   Treatment Effect  Present, with residual cancer showing evident tumor regression, but more than single cells or rare small groups of cancer cells (partial response, score 2)   Lymphovascular Invasion  Present   Perineural Invasion  Present   MARGINS   Margin Status for Invasive Carcinoma  All margins negative for invasive carcinoma   Closest Margin(s) to Invasive Carcinoma  Deep   Distance from Invasive Carcinoma to Closest Margin  1.2 cm   Margin Status for Dysplasia and Intestinal Metaplasia  All margins negative for dysplasia   REGIONAL LYMPH NODES   Regional Lymph Node Status  Tumor present in regional lymph node(s)   Number of Lymph Nodes with Tumor  7   Number of Lymph Nodes Examined  35   PATHOLOGIC STAGE CLASSIFICATION (pTNM, AJCC 8th Edition)   Reporting of pT, pN, and (when applicable) pM categories is based on information available to the pathologist at the time the report is issued. As per the AJCC (Chapter 1, 8th Ed.) it is the managing physician’s responsibility to establish the final pathologic stage based upon all pertinent information, including but potentially not limited to this pathology report.   TNM Descriptors  y (post-treatment)   pT Category  pT3   pN Category  pN3   .         ASSESSMENT AND PLAN:   1.  GE junction poorly differentiated adenocarcinoma.  Please refer to the details as outlined above.  In summary patient with few month history  of gradually worsening dysphagia to both solid and liquid.  Initial barium esophagogram was normal but EGD showed a GE junction mass in July 2023. Biopsy results are confirmatory of poorly differentiated adenocarcinoma with normal mismatch repair gene  expression. EUS revealed a GE junction lesion with concern regarding direct extension to liver and enlarged pathological lymph nodes.  CT scan showed stomach involvement with local regional lymphadenopathy but no distance organ involvement.  A follow-up  PET scan results were confirmatory of  increased metabolic activity starting at the GE junction with extension into cardia.  There was no finding of any hypermetabolic activity in the liver or any other local or distant metastatic lesions.  Brain MRI was  unremarkable.  After multidisciplinary evaluation of tumor is considered more gastric than GE junction and she has been advised evaluation for neoadjuvant chemotherapy followed by surgical evaluation.  She was treated with neoadjuvant chemotherapy with FLOT protocol from Oct to Dec 2023.  She had a total gastrectomy in Jan 2024.  Postop pathology results showed G3, poorly differentiated cancer with partial treatment response, positive LVI/PNI, 7/25 lymph nodes positive (pT3 pN3).    Long discussion with the patient and her  and they were explained about the postoperative pathology results indicating fairly significant amount of residual disease.  Current recommendation and overall plan is for consideration for adjuvant therapy.  There is no clinical information or data available regarding the role or benefit of additional radiation or trying different chemotherapy in the adjuvant setting.  I think for now we will plan to restart her on adjuvant FLOT for 4 more cycles in the next few weeks once her recovery is more complete.  Probable side effects of neuropathy, weakness, fatigue, myelosuppression, alopecia etc. were explained to her in detail.  She did have significant problems in tolerating chemotherapy during the neoadjuvant phase requiring aggressive hydration and other supportive care.  Will continue with the strategies in the adjuvant setting also.    2.  Follow-up.  Follow-up with me in 2-3 weeks.  Advised to contact us immediately if there are any new questions or concerns.    This note has been transcribed using Dragon voice recognition system and there is a possibility of unintentional typing misprints.

## 2024-02-26 ENCOUNTER — NUTRITION (OUTPATIENT)
Dept: RADIATION ONCOLOGY | Facility: CLINIC | Age: 59
End: 2024-02-26
Payer: MEDICAID

## 2024-02-26 ENCOUNTER — TELEPHONE (OUTPATIENT)
Dept: SURGICAL ONCOLOGY | Facility: HOSPITAL | Age: 59
End: 2024-02-26
Payer: MEDICAID

## 2024-02-26 NOTE — TELEPHONE ENCOUNTER
Patient's  called to report patient nausea; difficulties with feeding tube; they can be reached at [phone] or [phone]

## 2024-02-26 NOTE — PROGRESS NOTES
NUTRITION COMMUNICATION NOTE    Shruthi Ramos     REASON FOR COMMUNICATION:     Visit Type: Clinical Nutrition, Oncology, Telephone Visit:  A telephone visit (audio only) between the patient (at the distant site) and the provider (at the originating site) was utilized to provide this telehealth service.    Verbal Consent for Encounter: Verbal consent was requested and obtained from patient on this date for a telehealth visit.      RD contacted by patients . She was doing well post op (currently six weeks out of surgery), but over the last two days is experiencing early satiety and significant abdominal pain. Advised patient to first reach out to Dr. Chavarria to ask if she should be seen (questionable adhesion?). If surgery states everything is okay, it may  be a matter of slow gastric emptying post surgery and on fairly new enteral nutrition to which it may be helpful to add a stool softener and consider Reglan.  will reach out to surgery now and keep RD updated. Will continue to monitor and assist as able.

## 2024-02-27 ENCOUNTER — TELEPHONE (OUTPATIENT)
Dept: SURGICAL ONCOLOGY | Facility: HOSPITAL | Age: 59
End: 2024-02-27
Payer: MEDICAID

## 2024-02-27 ENCOUNTER — NUTRITION (OUTPATIENT)
Dept: RADIATION ONCOLOGY | Facility: CLINIC | Age: 59
End: 2024-02-27
Payer: MEDICAID

## 2024-02-27 DIAGNOSIS — C16.0 GE JUNCTION CARCINOMA (MULTI): Primary | ICD-10-CM

## 2024-02-28 ENCOUNTER — HOSPITAL ENCOUNTER (OUTPATIENT)
Dept: RADIOLOGY | Facility: CLINIC | Age: 59
Discharge: HOME | End: 2024-02-28
Payer: MEDICAID

## 2024-02-28 DIAGNOSIS — C16.0 GE JUNCTION CARCINOMA (MULTI): Primary | ICD-10-CM

## 2024-02-28 PROCEDURE — 2550000001 HC RX 255 CONTRASTS: Performed by: STUDENT IN AN ORGANIZED HEALTH CARE EDUCATION/TRAINING PROGRAM

## 2024-02-28 PROCEDURE — 74177 CT ABD & PELVIS W/CONTRAST: CPT | Mod: FOREIGN READ | Performed by: RADIOLOGY

## 2024-02-28 PROCEDURE — 71260 CT THORAX DX C+: CPT | Mod: FOREIGN READ | Performed by: RADIOLOGY

## 2024-02-28 PROCEDURE — 74177 CT ABD & PELVIS W/CONTRAST: CPT

## 2024-02-28 RX ADMIN — IOHEXOL 75 ML: 350 INJECTION, SOLUTION INTRAVENOUS at 14:44

## 2024-02-29 NOTE — PROGRESS NOTES
NUTRITION COMMUNICATION NOTE    Shruthi Ramos     REASON FOR COMMUNICATION: Patients  reached out to RD again to discuss nausea with enteral nutrition. RD was able to get in contact with surgeon who will order CT. If CT comes back normal, we can consider anti-emetic use vs peptide formulary change. Will continue to follow.            66

## 2024-03-05 ENCOUNTER — NUTRITION (OUTPATIENT)
Dept: RADIATION ONCOLOGY | Facility: CLINIC | Age: 59
End: 2024-03-05
Payer: COMMERCIAL

## 2024-03-05 NOTE — PROGRESS NOTES
NUTRITION COMMUNICATION NOTE    Shruthi Ramos     REASON FOR COMMUNICATION:     Visit Type: Clinical Nutrition, Oncology, Telephone Visit:  A telephone visit (audio only) between the patient (at the distant site) and the provider (at the originating site) was utilized to provide this telehealth service.    Verbal Consent for Encounter: Verbal consent was requested and obtained from patient on this date for a telehealth visit.      Connor () reached out to RD to provide update. Patient continue to have nausea with new [employer] Peptide formula. Will continue with TwoCal as previous. Bowel movements have improved to once every other day. Between enteral nutrition and PO intake, patient intake of fluid is ~1490mL/day. She declines IV fluid support at this time. Spoke to MD, suspect delayed gastric emptying 2/2 esophagectomy. Recommending Zofran BID for the next week. Will follow up when patient is at clinic next Thursday. Encouraged them to call with any questions or concerns before then.

## 2024-03-11 ENCOUNTER — NUTRITION (OUTPATIENT)
Dept: RADIATION ONCOLOGY | Facility: CLINIC | Age: 59
End: 2024-03-11
Payer: COMMERCIAL

## 2024-03-12 ENCOUNTER — NUTRITION (OUTPATIENT)
Dept: HEMATOLOGY/ONCOLOGY | Facility: CLINIC | Age: 59
End: 2024-03-12

## 2024-03-12 ENCOUNTER — OFFICE VISIT (OUTPATIENT)
Dept: SURGICAL ONCOLOGY | Facility: CLINIC | Age: 59
End: 2024-03-12
Payer: MEDICAID

## 2024-03-12 VITALS
SYSTOLIC BLOOD PRESSURE: 130 MMHG | HEART RATE: 61 BPM | OXYGEN SATURATION: 97 % | BODY MASS INDEX: 40.29 KG/M2 | TEMPERATURE: 95.7 F | DIASTOLIC BLOOD PRESSURE: 83 MMHG | WEIGHT: 213.41 LBS | RESPIRATION RATE: 14 BRPM

## 2024-03-12 DIAGNOSIS — C16.0 GE JUNCTION CARCINOMA (MULTI): Primary | ICD-10-CM

## 2024-03-12 PROCEDURE — 3075F SYST BP GE 130 - 139MM HG: CPT | Performed by: STUDENT IN AN ORGANIZED HEALTH CARE EDUCATION/TRAINING PROGRAM

## 2024-03-12 PROCEDURE — 99024 POSTOP FOLLOW-UP VISIT: CPT | Performed by: STUDENT IN AN ORGANIZED HEALTH CARE EDUCATION/TRAINING PROGRAM

## 2024-03-12 PROCEDURE — 1036F TOBACCO NON-USER: CPT | Performed by: STUDENT IN AN ORGANIZED HEALTH CARE EDUCATION/TRAINING PROGRAM

## 2024-03-12 PROCEDURE — 99213 OFFICE O/P EST LOW 20 MIN: CPT | Performed by: STUDENT IN AN ORGANIZED HEALTH CARE EDUCATION/TRAINING PROGRAM

## 2024-03-12 PROCEDURE — 3079F DIAST BP 80-89 MM HG: CPT | Performed by: STUDENT IN AN ORGANIZED HEALTH CARE EDUCATION/TRAINING PROGRAM

## 2024-03-12 ASSESSMENT — PAIN SCALES - GENERAL: PAINLEVEL: 0-NO PAIN

## 2024-03-12 NOTE — LETTER
March 12, 2024     Ollie Torres MD  [address] [facility] [address]    Patient: Shruthi Ramos   YOB: 1965   Date of Visit: 3/12/2024       Dear Dr. Ollie Torres MD:    Thank you for referring Shruthi Ramos to me for evaluation. Below are my notes for this consultation.  If you have questions, please do not hesitate to call me. I look forward to following your patient along with you.       Sincerely,     Zack Chavarria MD      CC: No Recipients  ______________________________________________________________________________________    ASSESSMENT AND PLAN  Shruthi Ramos is a 58 y.o. female who is recovering well following total gastrectomy. On final pathology she had a G3 poorly differentiated cancer with partial treatment response, +LVI/PNI, 7/35 lymph nodes positive, pT3N3.  We discussed that this is not completely unexpected and the recommendation will be to complete perioperative chemotherapy.  She has plans to start her postoperative therapy this week.    Cecilia our nutritionist and that with Shruthi and her  today and I think the plan is to hold off on tube feeds for couple days and see how well she is able to increase her oral intake.  She did not feel like she needed hydration today and is seeing Dr. Torres tomorrow so she will let him know if she need hydration tomorrow.  I told her we will keep her G-tube in place until she is finished with chemotherapy and is certain she longer needs it and then she may return to our clinic for bedside removal.    Zack Chavarria MD  Assistant Professor of Surgery  Division of Surgical Oncology  [phone]  [email or web address]      SUBJECTIVE  Shruthi Ramos is a 58 y.o. female who presents for a postoperative clinic visit.  Referring provider: Ollie Torres  Diagnosis: gastric cancer  Surgery: total gastrectomy with J tube on 1/10/24  Postoperative issues: wound infection, pneumatosis with tube feeds that resolved with  TPN, bowel rest, and abx.    She is slowly trying to eat more and is having some significant nausea with her tube feeds.  It is difficult to ascertain how much she is eating but her weight is stable and her energy is much improved.  Her midline wound has also healed and her J-tube looks much better.    Physical exam  Healed midline incision  Skin irritation around J tube    Surgical Pathology  FINAL DIAGNOSIS   A. Liver, segment 4 lesion, biopsy:  - Bile duct adenoma.     B. 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  - Resection margins are negative for neoplasia.  - Six of twenty-nine lymph nodes involved by metastatic carcinoma (6/29).  - Background stomach with reactive gastropathy, intestinal metaplasia, and dieulafoy lesion with accompanying hemorrhage.  - Omentum with no significant pathologic findings.     C. Lymph nodes, D2, excision:  - One of six lymph nodes involved by metastatic carcinoma (1/6).     D. Final esophageal margin, resection:  - Segment of esophagus, negative for neoplasia.   Electronically signed by Sachi Eubanks MD on 1/18/2024 at 1350        By the signature on this report, the individual or group listed as making the Final Interpretation/Diagnosis certifies that they have reviewed this case.    Case Summary Report   ESOPHAGUS   8th Edition - Protocol posted: 6/22/2022ESOPHAGUS - B  SPECIMEN   Procedure  Esophagogastrectomy   TUMOR   Tumor Site  Esophagogastric junction (EGJ)   Relationship of Tumor to Esophagogastric Junction  Tumor midpoint is located at the esophagogastric junction   Histologic Type  Adenocarcinoma   Histologic Grade  G3, poorly differentiated, undifferentiated   Tumor Size  Greatest Dimension (Centimeters): 2.3 (grossly) cm   Tumor Extent  Invades adventitia   Treatment Effect  Present, with residual cancer showing evident tumor regression, but more than  single cells or rare small groups of cancer cells (partial response, score 2)   Lymphovascular Invasion  Present   Perineural Invasion  Present   MARGINS   Margin Status for Invasive Carcinoma  All margins negative for invasive carcinoma   Closest Margin(s) to Invasive Carcinoma  Deep   Distance from Invasive Carcinoma to Closest Margin  1.2 cm   Margin Status for Dysplasia and Intestinal Metaplasia  All margins negative for dysplasia   REGIONAL LYMPH NODES   Regional Lymph Node Status  Tumor present in regional lymph node(s)   Number of Lymph Nodes with Tumor  7   Number of Lymph Nodes Examined  35   PATHOLOGIC STAGE CLASSIFICATION (pTNM, AJCC 8th Edition)   Reporting of pT, pN, and (when applicable) pM categories is based on information available to the pathologist at the time the report is issued. As per the AJCC (Chapter 1, 8th Ed.) it is the managing physician’s responsibility to establish the final pathologic stage based upon all pertinent information, including but potentially not limited to this pathology report.   TNM Descriptors  y (post-treatment)   pT Category  pT3   pN Category  pN3

## 2024-03-12 NOTE — PROGRESS NOTES
ASSESSMENT AND PLAN  Shruthi Ramos is a 58 y.o. female who is recovering well following total gastrectomy. On final pathology she had a G3 poorly differentiated cancer with partial treatment response, +LVI/PNI, 7/35 lymph nodes positive, pT3N3.  We discussed that this is not completely unexpected and the recommendation will be to complete perioperative chemotherapy.  She has plans to start her postoperative therapy this week.    Cecilia our nutritionist and that with Shruthi and her  today and I think the plan is to hold off on tube feeds for couple days and see how well she is able to increase her oral intake.  She did not feel like she needed hydration today and is seeing Dr. Torres tomorrow so she will let him know if she need hydration tomorrow.  I told her we will keep her G-tube in place until she is finished with chemotherapy and is certain she longer needs it and then she may return to our clinic for bedside removal.    Zack Chavarria MD  Assistant Professor of Surgery  Division of Surgical Oncology  [phone]  [email or web address]      SUBJECTIVE  Shruthi Ramos is a 58 y.o. female who presents for a postoperative clinic visit.  Referring provider: Ollie Torres  Diagnosis: gastric cancer  Surgery: total gastrectomy with J tube on 1/10/24  Postoperative issues: wound infection, pneumatosis with tube feeds that resolved with TPN, bowel rest, and abx.    She is slowly trying to eat more and is having some significant nausea with her tube feeds.  It is difficult to ascertain how much she is eating but her weight is stable and her energy is much improved.  Her midline wound has also healed and her J-tube looks much better.    Physical exam  Healed midline incision  Skin irritation around J tube    Surgical Pathology  FINAL DIAGNOSIS   A. Liver, segment 4 lesion, biopsy:  - Bile duct adenoma.     B. Total stomach and partial esophagus, esophagogastrectomy:  - Poorly-differentiated  adenocarcinoma with partial treatment response, see synoptic report.  - Resection margins are negative for neoplasia.  - Six of twenty-nine lymph nodes involved by metastatic carcinoma (6/29).  - Background stomach with reactive gastropathy, intestinal metaplasia, and dieulafoy lesion with accompanying hemorrhage.  - Omentum with no significant pathologic findings.     C. Lymph nodes, D2, excision:  - One of six lymph nodes involved by metastatic carcinoma (1/6).     D. Final esophageal margin, resection:  - Segment of esophagus, negative for neoplasia.   Electronically signed by Sachi Eubanks MD on 1/18/2024 at 1350        By the signature on this report, the individual or group listed as making the Final Interpretation/Diagnosis certifies that they have reviewed this case.    Case Summary Report   ESOPHAGUS   8th Edition - Protocol posted: 6/22/2022ESOPHAGUS - B  SPECIMEN   Procedure  Esophagogastrectomy   TUMOR   Tumor Site  Esophagogastric junction (EGJ)   Relationship of Tumor to Esophagogastric Junction  Tumor midpoint is located at the esophagogastric junction   Histologic Type  Adenocarcinoma   Histologic Grade  G3, poorly differentiated, undifferentiated   Tumor Size  Greatest Dimension (Centimeters): 2.3 (grossly) cm   Tumor Extent  Invades adventitia   Treatment Effect  Present, with residual cancer showing evident tumor regression, but more than single cells or rare small groups of cancer cells (partial response, score 2)   Lymphovascular Invasion  Present   Perineural Invasion  Present   MARGINS   Margin Status for Invasive Carcinoma  All margins negative for invasive carcinoma   Closest Margin(s) to Invasive Carcinoma  Deep   Distance from Invasive Carcinoma to Closest Margin  1.2 cm   Margin Status for Dysplasia and Intestinal Metaplasia  All margins negative for dysplasia   REGIONAL LYMPH NODES   Regional Lymph Node Status  Tumor present in regional lymph node(s)   Number of Lymph Nodes with Tumor   7   Number of Lymph Nodes Examined  35   PATHOLOGIC STAGE CLASSIFICATION (pTNM, AJCC 8th Edition)   Reporting of pT, pN, and (when applicable) pM categories is based on information available to the pathologist at the time the report is issued. As per the AJCC (Chapter 1, 8th Ed.) it is the managing physician’s responsibility to establish the final pathologic stage based upon all pertinent information, including but potentially not limited to this pathology report.   TNM Descriptors  y (post-treatment)   pT Category  pT3   pN Category  pN3

## 2024-03-12 NOTE — PROGRESS NOTES
NUTRITION NOTE    Shruthi Ramos is a 58 y.o. 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 Completed neoadjuvant chemotherapy with FLOT protocol (Docetaxel, oxaliplatin, leucovorin, and 5-fluorouracil).   S/p Surgery (1/10/24) diagnostic laparoscopy, liver wedge bx, EGD, total gastrectomy with D2 lymphadenectomy, Juany-en-Y reconstruction, and placement of J-tube.        Here today in [facility] for follow up visit with surgical oncology.  Was asked to see pt today regarding j-tube feedings and oral nutrition intake.       I saw Shruthi today while she was here in [place] after her follow up with Dr. Chavarria. She has been speaking with NELY Roth at [facility], and noted phone conversation from yesterday, and I reiterated what she had recommended to them.     She was complaining today of burning her in throat after start of TF, sounds like reflux symptoms. She had also said she had diarrhea last night. I talked to her about getting fluids today, she was on board but then I didn't see her added to the schedule for fluids today,  she also stated she is seeing the medical oncologist tomorrow.      I suggested she can trial omitting or decreasing the cartons of the TF at night for a night or two to see if that would help increase her oral intake during the day, but then she said she was afraid she would get dehydrated. If she omitted her TF at night for a night or two, she would have to increase her fluid intake, and we discussed she can also use her Jtube for extra water flushes throughout the day.  She asked if she could have crackers during the day, and I encouraged her to increase her oral intake as tolerated.  She stated she wanted to have crackers and chicken salad or egg salad.      I will defer the TF formula back to Ira, noted she had given them few different products to try, as she was having nausea.  Patient and her  stated they are seeing Ira on  Thursday when they are back at [facility].

## 2024-03-12 NOTE — PROGRESS NOTES
NUTRITION COMMUNICATION NOTE    Shruthi Ramos     REASON FOR COMMUNICATION:   Visit Type: Clinical Nutrition, Oncology, Telephone Visit:  A telephone visit (audio only) between the patient (at the distant site) and the provider (at the originating site) was utilized to provide this telehealth service.    Verbal Consent for Encounter: Verbal consent was requested and obtained from patient on this date for a telehealth visit.      RD contacted by patients , Connor. Reports patient is having increased nausea with tube feeds, had an episode of emesis yesterday. She is still not able to meet 50% estimated nutrient needs by mouth. Spoke with surgery and med onc MD regarding options moving forward. Patient was trialed on peptide formula with no improvement. Has been taking Zofran BID, also not providing relief. Patient does not have nausea with PO intake. Goal should be to increase PO intake and decrease tube feeds as soon as possible. RD encouraging 4 small meals per day with 2 tetras Twocal, increasing to 6 small meals daily as able to eliminate the need for enteral nutrition.  also voicing concerns that patient is not meeting fluid needs by mouth or via PEG. Believes she would benefit from IV fluids, however, patient is declining these at this time. If she becomes accepting, we can get her scheduled. RD will continue to follow and assist as able.

## 2024-03-13 ENCOUNTER — INFUSION (OUTPATIENT)
Dept: HEMATOLOGY/ONCOLOGY | Facility: CLINIC | Age: 59
End: 2024-03-13
Payer: COMMERCIAL

## 2024-03-13 ENCOUNTER — OFFICE VISIT (OUTPATIENT)
Dept: HEMATOLOGY/ONCOLOGY | Facility: CLINIC | Age: 59
End: 2024-03-13
Payer: COMMERCIAL

## 2024-03-13 VITALS
RESPIRATION RATE: 20 BRPM | DIASTOLIC BLOOD PRESSURE: 71 MMHG | WEIGHT: 214.62 LBS | BODY MASS INDEX: 40.52 KG/M2 | TEMPERATURE: 97.7 F | SYSTOLIC BLOOD PRESSURE: 152 MMHG | HEIGHT: 61 IN | OXYGEN SATURATION: 98 % | HEART RATE: 80 BPM

## 2024-03-13 DIAGNOSIS — C16.0 GE JUNCTION CARCINOMA (MULTI): Primary | ICD-10-CM

## 2024-03-13 DIAGNOSIS — C16.0 GE JUNCTION CARCINOMA (MULTI): ICD-10-CM

## 2024-03-13 LAB
ALBUMIN SERPL BCP-MCNC: 3.8 G/DL (ref 3.4–5)
ALP SERPL-CCNC: 122 U/L (ref 33–110)
ALT SERPL W P-5'-P-CCNC: 11 U/L (ref 7–45)
ANION GAP SERPL CALC-SCNC: 12 MMOL/L (ref 10–20)
AST SERPL W P-5'-P-CCNC: 16 U/L (ref 9–39)
BASOPHILS # BLD AUTO: 0.07 X10*3/UL (ref 0–0.1)
BASOPHILS NFR BLD AUTO: 0.8 %
BILIRUB SERPL-MCNC: 0.4 MG/DL (ref 0–1.2)
BUN SERPL-MCNC: 16 MG/DL (ref 6–23)
CALCIUM SERPL-MCNC: 8.9 MG/DL (ref 8.6–10.3)
CHLORIDE SERPL-SCNC: 106 MMOL/L (ref 98–107)
CO2 SERPL-SCNC: 27 MMOL/L (ref 21–32)
CREAT SERPL-MCNC: 0.78 MG/DL (ref 0.5–1.05)
EGFRCR SERPLBLD CKD-EPI 2021: 88 ML/MIN/1.73M*2
EOSINOPHIL # BLD AUTO: 0.57 X10*3/UL (ref 0–0.7)
EOSINOPHIL NFR BLD AUTO: 6.8 %
ERYTHROCYTE [DISTWIDTH] IN BLOOD BY AUTOMATED COUNT: 15 % (ref 11.5–14.5)
GLUCOSE SERPL-MCNC: 96 MG/DL (ref 74–99)
HCT VFR BLD AUTO: 39.7 % (ref 36–46)
HGB BLD-MCNC: 12.5 G/DL (ref 12–16)
IMM GRANULOCYTES # BLD AUTO: 0.03 X10*3/UL (ref 0–0.7)
IMM GRANULOCYTES NFR BLD AUTO: 0.4 % (ref 0–0.9)
LYMPHOCYTES # BLD AUTO: 2.09 X10*3/UL (ref 1.2–4.8)
LYMPHOCYTES NFR BLD AUTO: 24.9 %
MCH RBC QN AUTO: 27.7 PG (ref 26–34)
MCHC RBC AUTO-ENTMCNC: 31.5 G/DL (ref 32–36)
MCV RBC AUTO: 88 FL (ref 80–100)
MONOCYTES # BLD AUTO: 0.72 X10*3/UL (ref 0.1–1)
MONOCYTES NFR BLD AUTO: 8.6 %
NEUTROPHILS # BLD AUTO: 4.93 X10*3/UL (ref 1.2–7.7)
NEUTROPHILS NFR BLD AUTO: 58.5 %
NRBC BLD-RTO: 0 /100 WBCS (ref 0–0)
PLATELET # BLD AUTO: 281 X10*3/UL (ref 150–450)
POTASSIUM SERPL-SCNC: 4 MMOL/L (ref 3.5–5.3)
PROT SERPL-MCNC: 6.3 G/DL (ref 6.4–8.2)
RBC # BLD AUTO: 4.51 X10*6/UL (ref 4–5.2)
SODIUM SERPL-SCNC: 141 MMOL/L (ref 136–145)
WBC # BLD AUTO: 8.4 X10*3/UL (ref 4.4–11.3)

## 2024-03-13 PROCEDURE — 99215 OFFICE O/P EST HI 40 MIN: CPT | Performed by: INTERNAL MEDICINE

## 2024-03-13 PROCEDURE — 3078F DIAST BP <80 MM HG: CPT | Performed by: INTERNAL MEDICINE

## 2024-03-13 PROCEDURE — 80053 COMPREHEN METABOLIC PANEL: CPT

## 2024-03-13 PROCEDURE — 3077F SYST BP >= 140 MM HG: CPT | Performed by: INTERNAL MEDICINE

## 2024-03-13 PROCEDURE — 2500000004 HC RX 250 GENERAL PHARMACY W/ HCPCS (ALT 636 FOR OP/ED): Performed by: INTERNAL MEDICINE

## 2024-03-13 PROCEDURE — 96523 IRRIG DRUG DELIVERY DEVICE: CPT

## 2024-03-13 PROCEDURE — 36591 DRAW BLOOD OFF VENOUS DEVICE: CPT

## 2024-03-13 PROCEDURE — 85025 COMPLETE CBC W/AUTO DIFF WBC: CPT

## 2024-03-13 PROCEDURE — 1036F TOBACCO NON-USER: CPT | Performed by: INTERNAL MEDICINE

## 2024-03-13 RX ORDER — HEPARIN SODIUM,PORCINE/PF 10 UNIT/ML
50 SYRINGE (ML) INTRAVENOUS AS NEEDED
Status: CANCELLED | OUTPATIENT
Start: 2024-03-13

## 2024-03-13 RX ORDER — PROCHLORPERAZINE MALEATE 10 MG
10 TABLET ORAL EVERY 6 HOURS PRN
Qty: 30 TABLET | Refills: 2 | Status: SHIPPED | OUTPATIENT
Start: 2024-03-13

## 2024-03-13 RX ORDER — HEPARIN 100 UNIT/ML
500 SYRINGE INTRAVENOUS AS NEEDED
Status: CANCELLED | OUTPATIENT
Start: 2024-03-13

## 2024-03-13 RX ORDER — HEPARIN 100 UNIT/ML
500 SYRINGE INTRAVENOUS AS NEEDED
Status: DISCONTINUED | OUTPATIENT
Start: 2024-03-13 | End: 2024-03-13 | Stop reason: HOSPADM

## 2024-03-13 RX ADMIN — HEPARIN 500 UNITS: 100 SYRINGE at 09:17

## 2024-03-13 ASSESSMENT — PAIN SCALES - GENERAL: PAINLEVEL: 0-NO PAIN

## 2024-03-13 NOTE — PROGRESS NOTES
LOCATION:  [facility].     HEMATOLOGY & ONCOLOGY PROBLEMS:  1.  Localized gastric adenocarcinoma.       a.  Neoadjuvant chemotherapy with FLOT from Oct to Dec 2023.       b.  S/p total gastrectomy in January 2024.  Postoperative pathology (pT3,pN3)    CHIEF COMPLAINT:    The patient is in the clinic today for follow-up of GE junction adenocarcinoma and for continuation of treatment and management of therapy related effects.                   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  She was having problem with gradually worsening dysphagia with further GI work-up revealing gastric mass with the biopsy confirming  poorly differentiated adenocarcinoma in July 2023.  Prior to that she had a barium esophagogram which was essentially unremarkable.  Patient does have a previous history of GERD and had one time was treated for H. pylori gastritis.  EUS done by Dr. London on 7/17/2023 showed malignancy starting near the GE junction and measuring 2.2 x 1.5 cm towards the cardia along  the lesser curvature / anterior wall with malignant appearing features.  There is loss of interface between the mass and liver along a 7 mm region, concerning for probable  early hepatic involvement.  Enlarged lymph nodes were noted in the diaphragmatic region, and gastrohepatic ligament.  CT chest abdomen pelvis with contrast on 8/9/2023 showed thickening of the distal esophagus extending into the anterior portion of the  gastric cardia.  There is a small amount of liver present directly adjacent to the area of the tumor but there is no altered enhancement pattern in the liver parenchyma.  Slightly inferior to the cardia there  were lymph nodes visible in the adjacent mesentery that were not visible previously measuring up to 6 mm in short axis. PET/CT on 8/31/2023 showed a hypermetabolic focus at the GE junction  with extension into the gastric cardia, no evidence of  hypermetabolic regional or distant metastatic disease. Brain MRI negative for metastasis.  She was treated with neoadjuvant chemotherapy with FLOT protocol from Oct to Dec 2023.  She had a total gastrectomy in 2024.  Postop pathology results showed G3, poorly differentiated cancer with partial treatment response, positive LVI/PNI, 7/25 lymph nodes positive (pT3 pN3).     INTERVAL HISTORY:  She is recovering well from the surgery.  Wound infection has healed up well.  Still having issues with nutritional intake and nausea with the use of tube feeds.  Since her last visit she had an PET scan which showed questionable activity in the esophagus but a follow-up EGD was essentially unremarkable.      PAST MEDICAL HISTORY:   1.  GE junction adenocarcinoma as detailed above.   2.  Hypertension   3.  Hyperlipidemia   4.  Anxiety/depression   5.  History of complete hysterectomy, ankle surgery, cholecystectomy and 2  procedures     SOCIAL HISTORY:    and lives in [facility].  Non-smoker and nonalcoholic.  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  Born and raised in [place].     FAMILY HISTORY:    Father  at age 76 from myocardial infarction mother  from stroke at age 78.  She had 8 siblings.  1 brother  from MI another committed suicide.  A sister also  from myocardial infarction.   She had 2 children.  Her son  from AML.  No other specific history of bleeding, clotting or malignant disorder in the family.     REVIEW OF SYSTEMS:  Pertinent finding as per the history above.  All other systems have been reviewed and generally negative and noncontributory.     PHYSICAL EXAMINATION:    Detailed physical examination not done     ALLERGY & MEDICATIONS:  Allergies and latest outpatient medications list were reviewed in the EMR.    LAB RESULTS:  CBC from today shows a white cell count of 8.4 with hemoglobin of 12.5 and platelets of 281K.  Last metabolic profile was normal except for  alkaline phosphatase of 111.     RADIOLOGY RESULT:   PET/CT Esophageal Initial [Aug 31 2023  1:12PM]  Impression:  1. Hypermetabolic focus localized at gastroesophageal junction with xtension to the gastric cardia compatible with biopsy-proven gastric adenocarcinoma.  2. No evidence of hypermetabolic regional or distant metastatic disease.  3. Status post cholecystectomy with demonstration of localized pneumobilia.     MRI Brain w/wo Contrast [Aug 29 2023 12:28PM]   Impression:  There is no evidence of mass, infarction or hemorrhage.     PATHOLOGY RESULTS:  Surgical Pathology Exam: [id number]   FINAL DIAGNOSIS   A. Liver, segment 4 lesion, biopsy:  - Bile duct adenoma.     B. 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  - Resection margins are negative for neoplasia.  - Six of twenty-nine lymph nodes involved by metastatic carcinoma (6/29).  - Background stomach with reactive gastropathy, intestinal metaplasia, and dieulafoy lesion with accompanying hemorrhage.  - Omentum with no significant pathologic findings.     C. Lymph nodes, D2, excision:  - One of six lymph nodes involved by metastatic carcinoma (1/6).     D. Final esophageal margin, resection:  - Segment of esophagus, negative for neoplasia.   Electronically signed by Sachi Eubanks MD on 1/18/2024 at 1350        By the signature on this report, the individual or group listed as making the Final Interpretation/Diagnosis certifies that they have reviewed this case.    Case Summary Report   ESOPHAGUS   8th Edition - Protocol posted: 6/22/2022ESOPHAGUS - B  SPECIMEN   Procedure Esophagogastrectomy   TUMOR   Tumor Site Esophagogastric junction (EGJ)   Relationship of Tumor to Esophagogastric Junction Tumor midpoint is located at the esophagogastric junction   Histologic Type Adenocarcinoma   Histologic Grade G3, poorly differentiated, undifferentiated    Tumor Size Greatest Dimension (Centimeters): 2.3 (grossly) cm   Tumor Extent Invades adventitia   Treatment Effect Present, with residual cancer showing evident tumor regression, but more than single cells or rare small groups of cancer cells (partial response, score 2)   Lymphovascular Invasion Present   Perineural Invasion Present   MARGINS   Margin Status for Invasive Carcinoma All margins negative for invasive carcinoma   Closest Margin(s) to Invasive Carcinoma Deep   Distance from Invasive Carcinoma to Closest Margin 1.2 cm   Margin Status for Dysplasia and Intestinal Metaplasia All margins negative for dysplasia   REGIONAL LYMPH NODES   Regional Lymph Node Status Tumor present in regional lymph node(s)   Number of Lymph Nodes with Tumor 7   Number of Lymph Nodes Examined 35   PATHOLOGIC STAGE CLASSIFICATION (pTNM, AJCC 8th Edition)   Reporting of pT, pN, and (when applicable) pM categories is based on information available to the pathologist at the time the report is issued. As per the AJCC (Chapter 1, 8th Ed.) it is the managing physician’s responsibility to establish the final pathologic stage based upon all pertinent information, including but potentially not limited to this pathology report.   TNM Descriptors y (post-treatment)   pT Category pT3   pN Category pN3         ASSESSMENT AND PLAN:   1.  GE junction poorly differentiated adenocarcinoma.  Please refer to the details as outlined above.  In summary patient with few month history  of gradually worsening dysphagia to both solid and liquid.  Initial barium esophagogram was normal but EGD showed a GE junction mass in July 2023. Biopsy results are confirmatory of poorly differentiated adenocarcinoma with normal mismatch repair gene  expression. EUS revealed a GE junction lesion with concern regarding direct extension to liver and enlarged pathological lymph nodes.  CT scan showed stomach involvement with local regional lymphadenopathy but no distance  organ involvement.  A follow-up  PET scan results were confirmatory of increased metabolic activity starting at the GE junction with extension into cardia.  There was no finding of any hypermetabolic activity in the liver or any other local or distant metastatic lesions.  Brain MRI was  unremarkable.  After multidisciplinary evaluation of tumor is considered more gastric than GE junction and she has been advised evaluation for neoadjuvant chemotherapy followed by surgical evaluation.  She was treated with neoadjuvant chemotherapy with FLOT protocol from Oct to Dec 2023.  She had a total gastrectomy in Jan 2024.  Postop pathology results showed G3, poorly differentiated cancer with partial treatment response, positive LVI/PNI, 7/25 lymph nodes positive (pT3 pN3).    Long discussion with the patient and her  and they were explained about the postoperative pathology results indicating fairly significant amount of residual disease.  Current recommendation and overall plan is for consideration for adjuvant therapy.  There is no clinical information or data available regarding the role or benefit of additional radiation or trying different chemotherapy in the adjuvant setting.  Plan is to restart her on adjuvant FLOT for 4 more cycles.  Probable side effects of neuropathy, weakness, fatigue, myelosuppression, alopecia etc. were explained to her in detail.  She did have significant problems in tolerating chemotherapy during the neoadjuvant phase requiring aggressive hydration and other supportive care.  Will continue with the strategies in the adjuvant setting also.    2.  Follow-up.  Follow-up with me in few weeks.  In the meantime she will be started on adjuvant chemotherapy starting tomorrow..  Advised to contact us immediately if there are any new questions or concerns.    This note has been transcribed using Dragon voice recognition system and there is a possibility of unintentional typing misprints.

## 2024-03-14 ENCOUNTER — NUTRITION (OUTPATIENT)
Dept: RADIATION ONCOLOGY | Facility: CLINIC | Age: 59
End: 2024-03-14

## 2024-03-14 ENCOUNTER — INFUSION (OUTPATIENT)
Dept: HEMATOLOGY/ONCOLOGY | Facility: CLINIC | Age: 59
End: 2024-03-14
Payer: COMMERCIAL

## 2024-03-14 VITALS
OXYGEN SATURATION: 99 % | BODY MASS INDEX: 40.75 KG/M2 | SYSTOLIC BLOOD PRESSURE: 146 MMHG | DIASTOLIC BLOOD PRESSURE: 72 MMHG | HEART RATE: 71 BPM | WEIGHT: 215.83 LBS | RESPIRATION RATE: 18 BRPM | TEMPERATURE: 97.2 F

## 2024-03-14 DIAGNOSIS — C16.0 GE JUNCTION CARCINOMA (MULTI): ICD-10-CM

## 2024-03-14 PROCEDURE — 96523 IRRIG DRUG DELIVERY DEVICE: CPT

## 2024-03-14 PROCEDURE — 2500000004 HC RX 250 GENERAL PHARMACY W/ HCPCS (ALT 636 FOR OP/ED): Performed by: INTERNAL MEDICINE

## 2024-03-14 RX ORDER — ALBUTEROL SULFATE 0.83 MG/ML
3 SOLUTION RESPIRATORY (INHALATION) AS NEEDED
Status: CANCELLED | OUTPATIENT
Start: 2024-03-14

## 2024-03-14 RX ORDER — HEPARIN 100 UNIT/ML
500 SYRINGE INTRAVENOUS AS NEEDED
Status: DISCONTINUED | OUTPATIENT
Start: 2024-03-14 | End: 2024-03-14 | Stop reason: HOSPADM

## 2024-03-14 RX ORDER — FAMOTIDINE 10 MG/ML
20 INJECTION INTRAVENOUS ONCE AS NEEDED
Status: CANCELLED | OUTPATIENT
Start: 2024-03-14

## 2024-03-14 RX ORDER — HEPARIN 100 UNIT/ML
500 SYRINGE INTRAVENOUS AS NEEDED
Status: CANCELLED | OUTPATIENT
Start: 2024-03-14

## 2024-03-14 RX ORDER — DIPHENHYDRAMINE HYDROCHLORIDE 50 MG/ML
50 INJECTION INTRAMUSCULAR; INTRAVENOUS AS NEEDED
Status: CANCELLED | OUTPATIENT
Start: 2024-03-14

## 2024-03-14 RX ORDER — HEPARIN SODIUM,PORCINE/PF 10 UNIT/ML
50 SYRINGE (ML) INTRAVENOUS AS NEEDED
Status: DISCONTINUED | OUTPATIENT
Start: 2024-03-14 | End: 2024-03-14 | Stop reason: HOSPADM

## 2024-03-14 RX ORDER — EPINEPHRINE 0.3 MG/.3ML
0.3 INJECTION SUBCUTANEOUS EVERY 5 MIN PRN
Status: CANCELLED | OUTPATIENT
Start: 2024-03-14

## 2024-03-14 RX ORDER — HEPARIN SODIUM,PORCINE/PF 10 UNIT/ML
50 SYRINGE (ML) INTRAVENOUS AS NEEDED
Status: CANCELLED | OUTPATIENT
Start: 2024-03-14

## 2024-03-14 RX ADMIN — HEPARIN 500 UNITS: 100 SYRINGE at 09:59

## 2024-03-14 ASSESSMENT — PAIN SCALES - GENERAL: PAINLEVEL: 0-NO PAIN

## 2024-03-14 NOTE — SIGNIFICANT EVENT
03/14/24 0924   Prechemo Checklist   Has the patient been in the hospital, ED, or urgent care since last date of service No   Chemo/Immuno Consent Signed Yes   Protocol/Indications Verified Yes   Confirmed to previous date/time of medication Yes   Compared to previous dose Yes   All medications are dated accurately Yes   Pregnancy Test Negative Not applicable   Parameters Met Yes   BSA/Weight-Height Verified Yes   Dose Calculations Verified Yes

## 2024-03-15 ENCOUNTER — APPOINTMENT (OUTPATIENT)
Dept: HEMATOLOGY/ONCOLOGY | Facility: CLINIC | Age: 59
End: 2024-03-15
Payer: COMMERCIAL

## 2024-03-19 ENCOUNTER — INFUSION (OUTPATIENT)
Dept: HEMATOLOGY/ONCOLOGY | Facility: CLINIC | Age: 59
End: 2024-03-19
Payer: COMMERCIAL

## 2024-03-19 VITALS
WEIGHT: 216.05 LBS | SYSTOLIC BLOOD PRESSURE: 128 MMHG | RESPIRATION RATE: 18 BRPM | TEMPERATURE: 97 F | OXYGEN SATURATION: 98 % | DIASTOLIC BLOOD PRESSURE: 70 MMHG | BODY MASS INDEX: 40.79 KG/M2 | HEART RATE: 60 BPM

## 2024-03-19 DIAGNOSIS — C16.0 GE JUNCTION CARCINOMA (MULTI): ICD-10-CM

## 2024-03-19 PROCEDURE — 96375 TX/PRO/DX INJ NEW DRUG ADDON: CPT | Mod: INF

## 2024-03-19 PROCEDURE — 96417 CHEMO IV INFUS EACH ADDL SEQ: CPT

## 2024-03-19 PROCEDURE — 2500000001 HC RX 250 WO HCPCS SELF ADMINISTERED DRUGS (ALT 637 FOR MEDICARE OP): Performed by: INTERNAL MEDICINE

## 2024-03-19 PROCEDURE — 96368 THER/DIAG CONCURRENT INF: CPT

## 2024-03-19 PROCEDURE — 2500000004 HC RX 250 GENERAL PHARMACY W/ HCPCS (ALT 636 FOR OP/ED): Performed by: INTERNAL MEDICINE

## 2024-03-19 PROCEDURE — 96367 TX/PROPH/DG ADDL SEQ IV INF: CPT

## 2024-03-19 PROCEDURE — 96366 THER/PROPH/DIAG IV INF ADDON: CPT | Mod: INF

## 2024-03-19 PROCEDURE — 96413 CHEMO IV INFUSION 1 HR: CPT

## 2024-03-19 PROCEDURE — 96415 CHEMO IV INFUSION ADDL HR: CPT

## 2024-03-19 RX ORDER — PALONOSETRON 0.05 MG/ML
0.25 INJECTION, SOLUTION INTRAVENOUS ONCE
Status: COMPLETED | OUTPATIENT
Start: 2024-03-19 | End: 2024-03-19

## 2024-03-19 RX ORDER — HEPARIN 100 UNIT/ML
500 SYRINGE INTRAVENOUS AS NEEDED
Status: CANCELLED | OUTPATIENT
Start: 2024-03-19

## 2024-03-19 RX ORDER — DIPHENHYDRAMINE HCL 25 MG
25 CAPSULE ORAL ONCE
Status: COMPLETED | OUTPATIENT
Start: 2024-03-19 | End: 2024-03-19

## 2024-03-19 RX ORDER — HEPARIN SODIUM,PORCINE/PF 10 UNIT/ML
50 SYRINGE (ML) INTRAVENOUS AS NEEDED
Status: CANCELLED | OUTPATIENT
Start: 2024-03-19

## 2024-03-19 RX ORDER — EPINEPHRINE 0.3 MG/.3ML
0.3 INJECTION SUBCUTANEOUS EVERY 5 MIN PRN
Status: DISCONTINUED | OUTPATIENT
Start: 2024-03-19 | End: 2024-03-19 | Stop reason: HOSPADM

## 2024-03-19 RX ORDER — DIPHENHYDRAMINE HYDROCHLORIDE 50 MG/ML
50 INJECTION INTRAMUSCULAR; INTRAVENOUS AS NEEDED
Status: DISCONTINUED | OUTPATIENT
Start: 2024-03-19 | End: 2024-03-19 | Stop reason: HOSPADM

## 2024-03-19 RX ORDER — HEPARIN 100 UNIT/ML
500 SYRINGE INTRAVENOUS AS NEEDED
Status: DISCONTINUED | OUTPATIENT
Start: 2024-03-19 | End: 2024-03-19 | Stop reason: HOSPADM

## 2024-03-19 RX ORDER — LORAZEPAM 2 MG/ML
1 INJECTION INTRAMUSCULAR AS NEEDED
Status: DISCONTINUED | OUTPATIENT
Start: 2024-03-19 | End: 2024-03-19 | Stop reason: HOSPADM

## 2024-03-19 RX ORDER — FAMOTIDINE 10 MG/ML
20 INJECTION INTRAVENOUS ONCE AS NEEDED
Status: DISCONTINUED | OUTPATIENT
Start: 2024-03-19 | End: 2024-03-19 | Stop reason: HOSPADM

## 2024-03-19 RX ORDER — ALBUTEROL SULFATE 0.83 MG/ML
3 SOLUTION RESPIRATORY (INHALATION) AS NEEDED
Status: DISCONTINUED | OUTPATIENT
Start: 2024-03-19 | End: 2024-03-19 | Stop reason: HOSPADM

## 2024-03-19 RX ORDER — HEPARIN SODIUM,PORCINE/PF 10 UNIT/ML
50 SYRINGE (ML) INTRAVENOUS AS NEEDED
Status: DISCONTINUED | OUTPATIENT
Start: 2024-03-19 | End: 2024-03-19 | Stop reason: HOSPADM

## 2024-03-19 RX ADMIN — DEXAMETHASONE SODIUM PHOSPHATE 12 MG: 10 INJECTION, SOLUTION INTRAMUSCULAR; INTRAVENOUS at 09:12

## 2024-03-19 RX ADMIN — FLUOROURACIL 4150 MG: 50 INJECTION, SOLUTION INTRAVENOUS at 14:25

## 2024-03-19 RX ADMIN — SODIUM CHLORIDE 150 MG: 9 INJECTION, SOLUTION INTRAVENOUS at 09:31

## 2024-03-19 RX ADMIN — OXALIPLATIN 135 MG: 5 INJECTION, SOLUTION INTRAVENOUS at 11:57

## 2024-03-19 RX ADMIN — DEXTROSE MONOHYDRATE 80 MG: 50 INJECTION, SOLUTION INTRAVENOUS at 10:42

## 2024-03-19 RX ADMIN — PALONOSETRON 250 MCG: 0.05 INJECTION, SOLUTION INTRAVENOUS at 09:12

## 2024-03-19 RX ADMIN — DEXTROSE MONOHYDRATE 320 MG: 50 INJECTION, SOLUTION INTRAVENOUS at 11:57

## 2024-03-19 RX ADMIN — DIPHENHYDRAMINE HYDROCHLORIDE 25 MG: 25 CAPSULE ORAL at 09:11

## 2024-03-19 ASSESSMENT — PAIN SCALES - GENERAL
PAINLEVEL: 0-NO PAIN
PAINLEVEL_OUTOF10: 0-NO PAIN

## 2024-03-19 NOTE — SIGNIFICANT EVENT
03/19/24 0847   Prechemo Checklist   Has the patient been in the hospital, ED, or urgent care since last date of service No   Chemo/Immuno Consent Signed Yes   Protocol/Indications Verified Yes   Confirmed to previous date/time of medication Yes   Compared to previous dose Yes   All medications are dated accurately Yes   Pregnancy Test Negative Not applicable   Parameters Met Yes   BSA/Weight-Height Verified Yes   Dose Calculations Verified Yes

## 2024-03-20 ENCOUNTER — INFUSION (OUTPATIENT)
Dept: HEMATOLOGY/ONCOLOGY | Facility: CLINIC | Age: 59
End: 2024-03-20
Payer: COMMERCIAL

## 2024-03-20 VITALS
OXYGEN SATURATION: 97 % | SYSTOLIC BLOOD PRESSURE: 154 MMHG | HEART RATE: 73 BPM | RESPIRATION RATE: 18 BRPM | DIASTOLIC BLOOD PRESSURE: 91 MMHG | TEMPERATURE: 97.3 F

## 2024-03-20 DIAGNOSIS — C16.0 GE JUNCTION CARCINOMA (MULTI): ICD-10-CM

## 2024-03-20 PROCEDURE — 2500000004 HC RX 250 GENERAL PHARMACY W/ HCPCS (ALT 636 FOR OP/ED): Mod: JZ | Performed by: INTERNAL MEDICINE

## 2024-03-20 PROCEDURE — 96523 IRRIG DRUG DELIVERY DEVICE: CPT

## 2024-03-20 PROCEDURE — 96372 THER/PROPH/DIAG INJ SC/IM: CPT

## 2024-03-20 PROCEDURE — 96372 THER/PROPH/DIAG INJ SC/IM: CPT | Performed by: INTERNAL MEDICINE

## 2024-03-20 RX ORDER — HEPARIN 100 UNIT/ML
500 SYRINGE INTRAVENOUS AS NEEDED
Status: CANCELLED | OUTPATIENT
Start: 2024-03-20

## 2024-03-20 RX ORDER — HEPARIN 100 UNIT/ML
500 SYRINGE INTRAVENOUS AS NEEDED
Status: DISCONTINUED | OUTPATIENT
Start: 2024-03-20 | End: 2024-03-20 | Stop reason: HOSPADM

## 2024-03-20 RX ORDER — HEPARIN SODIUM,PORCINE/PF 10 UNIT/ML
50 SYRINGE (ML) INTRAVENOUS AS NEEDED
Status: CANCELLED | OUTPATIENT
Start: 2024-03-20

## 2024-03-20 RX ADMIN — PEGFILGRASTIM 6 MG: 6 INJECTION SUBCUTANEOUS at 14:52

## 2024-03-20 RX ADMIN — HEPARIN 500 UNITS: 100 SYRINGE at 14:46

## 2024-03-20 ASSESSMENT — PATIENT HEALTH QUESTIONNAIRE - PHQ9
1. LITTLE INTEREST OR PLEASURE IN DOING THINGS: NOT AT ALL
2. FEELING DOWN, DEPRESSED OR HOPELESS: NOT AT ALL
SUM OF ALL RESPONSES TO PHQ9 QUESTIONS 1 AND 2: 0

## 2024-03-20 ASSESSMENT — PAIN SCALES - GENERAL: PAINLEVEL: 0-NO PAIN

## 2024-03-21 ENCOUNTER — APPOINTMENT (OUTPATIENT)
Dept: HEMATOLOGY/ONCOLOGY | Facility: CLINIC | Age: 59
End: 2024-03-21
Payer: COMMERCIAL

## 2024-03-21 ENCOUNTER — NUTRITION (OUTPATIENT)
Dept: RADIATION ONCOLOGY | Facility: CLINIC | Age: 59
End: 2024-03-21
Payer: COMMERCIAL

## 2024-03-21 NOTE — PROGRESS NOTES
NUTRITION COMMUNICATION NOTE    Shruthi Ramos     REASON FOR COMMUNICATION:     Visit Type: Clinical Nutrition, Oncology, Telephone Visit:  A telephone visit (audio only) between the patient (at the distant site) and the provider (at the originating site) was utilized to provide this telehealth service.    Verbal Consent for Encounter: Verbal consent was requested and obtained from patient on this date for a telehealth visit.      RD was contacted by patients . [facility] is not in network with their DME, [employer]. [facility] to send info to [facility]. RD reached out to Mariann to let her know to keep an eye out for paperwork. RD will continue to monitor and assist as able.

## 2024-03-25 ENCOUNTER — NUTRITION (OUTPATIENT)
Dept: RADIATION ONCOLOGY | Facility: CLINIC | Age: 59
End: 2024-03-25
Payer: COMMERCIAL

## 2024-03-26 ENCOUNTER — NUTRITION (OUTPATIENT)
Dept: RADIATION ONCOLOGY | Facility: CLINIC | Age: 59
End: 2024-03-26

## 2024-03-26 ENCOUNTER — INFUSION (OUTPATIENT)
Dept: HEMATOLOGY/ONCOLOGY | Facility: CLINIC | Age: 59
End: 2024-03-26
Payer: COMMERCIAL

## 2024-03-26 VITALS
SYSTOLIC BLOOD PRESSURE: 136 MMHG | TEMPERATURE: 96.4 F | OXYGEN SATURATION: 98 % | WEIGHT: 208.78 LBS | RESPIRATION RATE: 18 BRPM | HEART RATE: 84 BPM | DIASTOLIC BLOOD PRESSURE: 81 MMHG | BODY MASS INDEX: 39.42 KG/M2

## 2024-03-26 DIAGNOSIS — C16.0 GE JUNCTION CARCINOMA (MULTI): ICD-10-CM

## 2024-03-26 PROCEDURE — 2500000004 HC RX 250 GENERAL PHARMACY W/ HCPCS (ALT 636 FOR OP/ED): Performed by: INTERNAL MEDICINE

## 2024-03-26 PROCEDURE — 96360 HYDRATION IV INFUSION INIT: CPT | Mod: INF

## 2024-03-26 RX ORDER — HEPARIN 100 UNIT/ML
500 SYRINGE INTRAVENOUS AS NEEDED
Status: CANCELLED | OUTPATIENT
Start: 2024-03-26

## 2024-03-26 RX ORDER — DIPHENHYDRAMINE HYDROCHLORIDE 50 MG/ML
50 INJECTION INTRAMUSCULAR; INTRAVENOUS AS NEEDED
OUTPATIENT
Start: 2024-03-26

## 2024-03-26 RX ORDER — HEPARIN 100 UNIT/ML
500 SYRINGE INTRAVENOUS AS NEEDED
Status: DISCONTINUED | OUTPATIENT
Start: 2024-03-26 | End: 2024-03-26 | Stop reason: HOSPADM

## 2024-03-26 RX ORDER — ALBUTEROL SULFATE 0.83 MG/ML
3 SOLUTION RESPIRATORY (INHALATION) AS NEEDED
OUTPATIENT
Start: 2024-03-26

## 2024-03-26 RX ORDER — EPINEPHRINE 0.3 MG/.3ML
0.3 INJECTION SUBCUTANEOUS EVERY 5 MIN PRN
OUTPATIENT
Start: 2024-03-26

## 2024-03-26 RX ORDER — HEPARIN SODIUM,PORCINE/PF 10 UNIT/ML
50 SYRINGE (ML) INTRAVENOUS AS NEEDED
Status: CANCELLED | OUTPATIENT
Start: 2024-03-26

## 2024-03-26 RX ORDER — FAMOTIDINE 10 MG/ML
20 INJECTION INTRAVENOUS ONCE AS NEEDED
OUTPATIENT
Start: 2024-03-26

## 2024-03-26 RX ADMIN — HEPARIN 500 UNITS: 100 SYRINGE at 12:36

## 2024-03-26 RX ADMIN — SODIUM CHLORIDE 1000 ML: 9 INJECTION, SOLUTION INTRAVENOUS at 11:35

## 2024-03-26 ASSESSMENT — PAIN SCALES - GENERAL: PAINLEVEL: 0-NO PAIN

## 2024-03-26 NOTE — PROGRESS NOTES
NUTRITION COMMUNICATION NOTE    Shruthi Ramos     REASON FOR COMMUNICATION: Enteral order faxed to [facility] for enteral nutrition. No other concerns at this time. RD will continue to monitor and assist.

## 2024-03-28 ENCOUNTER — APPOINTMENT (OUTPATIENT)
Dept: HEMATOLOGY/ONCOLOGY | Facility: CLINIC | Age: 59
End: 2024-03-28
Payer: COMMERCIAL

## 2024-03-28 ENCOUNTER — OFFICE VISIT (OUTPATIENT)
Dept: SURGICAL ONCOLOGY | Facility: HOSPITAL | Age: 59
End: 2024-03-28
Payer: MEDICAID

## 2024-03-28 VITALS
RESPIRATION RATE: 18 BRPM | HEART RATE: 87 BPM | OXYGEN SATURATION: 97 % | SYSTOLIC BLOOD PRESSURE: 139 MMHG | BODY MASS INDEX: 39.42 KG/M2 | TEMPERATURE: 97 F | WEIGHT: 208.78 LBS | DIASTOLIC BLOOD PRESSURE: 84 MMHG

## 2024-03-28 DIAGNOSIS — R11.2 PONV (POSTOPERATIVE NAUSEA AND VOMITING): ICD-10-CM

## 2024-03-28 DIAGNOSIS — T45.1X5A CHEMOTHERAPY INDUCED DIARRHEA: Primary | ICD-10-CM

## 2024-03-28 DIAGNOSIS — K94.19: ICD-10-CM

## 2024-03-28 DIAGNOSIS — C16.0 GE JUNCTION CARCINOMA (MULTI): ICD-10-CM

## 2024-03-28 DIAGNOSIS — K52.1 CHEMOTHERAPY INDUCED DIARRHEA: Primary | ICD-10-CM

## 2024-03-28 DIAGNOSIS — Z98.890 PONV (POSTOPERATIVE NAUSEA AND VOMITING): ICD-10-CM

## 2024-03-28 PROCEDURE — 99024 POSTOP FOLLOW-UP VISIT: CPT | Performed by: STUDENT IN AN ORGANIZED HEALTH CARE EDUCATION/TRAINING PROGRAM

## 2024-03-28 PROCEDURE — 1036F TOBACCO NON-USER: CPT | Performed by: STUDENT IN AN ORGANIZED HEALTH CARE EDUCATION/TRAINING PROGRAM

## 2024-03-28 PROCEDURE — 3075F SYST BP GE 130 - 139MM HG: CPT | Performed by: STUDENT IN AN ORGANIZED HEALTH CARE EDUCATION/TRAINING PROGRAM

## 2024-03-28 PROCEDURE — 99213 OFFICE O/P EST LOW 20 MIN: CPT | Performed by: STUDENT IN AN ORGANIZED HEALTH CARE EDUCATION/TRAINING PROGRAM

## 2024-03-28 PROCEDURE — 3079F DIAST BP 80-89 MM HG: CPT | Performed by: STUDENT IN AN ORGANIZED HEALTH CARE EDUCATION/TRAINING PROGRAM

## 2024-03-28 RX ORDER — DIPHENOXYLATE HYDROCHLORIDE AND ATROPINE SULFATE 2.5; .025 MG/5ML; MG/5ML
5 SOLUTION ORAL 4 TIMES DAILY PRN
Qty: 200 ML | Refills: 3 | Status: SHIPPED | OUTPATIENT
Start: 2024-03-28 | End: 2024-04-04

## 2024-03-28 ASSESSMENT — PAIN SCALES - GENERAL: PAINLEVEL: 5

## 2024-03-28 NOTE — LETTER
March 28, 2024     Ollie Torres MD  [address] [facility] [address]    Patient: Shruthi Ramos   YOB: 1965   Date of Visit: 3/28/2024       Dear Dr. Ollie Torres MD:    Thank you for referring Shruthi Ramos to me for evaluation. Below are my notes for this consultation.  If you have questions, please do not hesitate to call me. I look forward to following your patient along with you.       Sincerely,     Zack Chavarria MD      CC: No Recipients  ______________________________________________________________________________________    ASSESSMENT AND PLAN  Shruthi Ramos is a 58 y.o. female who is recovering well following total gastrectomy. On final pathology she had a G3 poorly differentiated cancer with partial treatment response, +LVI/PNI, 7/35 lymph nodes positive, pT3N3.  We discussed that this is not completely unexpected and the recommendation will be to complete perioperative chemotherapy.  She has plans to start her postoperative therapy this week.    Shruthi has continued to struggle with nausea and recently had pain around her J-tube and some leaking.  The leaking has stopped and she thinks the pain is from retching.  I examined her tube and the top of the bumper is at 9 cm.  It does not appear to loose and is no longer leaking so I did not make any changes.  We talked about the diarrhea she has had since starting chemo and I will prescribe Lomotil liquid to go through her J-tube.  We will plan to keep her G-tube in place until chemotherapy is finished.    Zack Chavarria MD  Assistant Professor of Surgery  Division of Surgical Oncology  [phone]  [email or web address]      SUBJECTIVE  Shruthi Ramos is a 58 y.o. female who presents for a postoperative clinic visit.  Referring provider: Ollie Torres  Diagnosis: gastric cancer  Surgery: total gastrectomy with J tube on 1/10/24  Postoperative issues: wound infection, pneumatosis with tube feeds that resolved  with TPN, bowel rest, and abx.      Physical exam  Healed midline incision  Skin irritation around J tube    Surgical Pathology  FINAL DIAGNOSIS   A. Liver, segment 4 lesion, biopsy:  - Bile duct adenoma.     B. 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  - Resection margins are negative for neoplasia.  - Six of twenty-nine lymph nodes involved by metastatic carcinoma (6/29).  - Background stomach with reactive gastropathy, intestinal metaplasia, and dieulafoy lesion with accompanying hemorrhage.  - Omentum with no significant pathologic findings.     C. Lymph nodes, D2, excision:  - One of six lymph nodes involved by metastatic carcinoma (1/6).     D. Final esophageal margin, resection:  - Segment of esophagus, negative for neoplasia.   Electronically signed by Sachi Eubanks MD on 1/18/2024 at 1350        By the signature on this report, the individual or group listed as making the Final Interpretation/Diagnosis certifies that they have reviewed this case.    Case Summary Report   ESOPHAGUS   8th Edition - Protocol posted: 6/22/2022ESOPHAGUS - B  SPECIMEN   Procedure  Esophagogastrectomy   TUMOR   Tumor Site  Esophagogastric junction (EGJ)   Relationship of Tumor to Esophagogastric Junction  Tumor midpoint is located at the esophagogastric junction   Histologic Type  Adenocarcinoma   Histologic Grade  G3, poorly differentiated, undifferentiated   Tumor Size  Greatest Dimension (Centimeters): 2.3 (grossly) cm   Tumor Extent  Invades adventitia   Treatment Effect  Present, with residual cancer showing evident tumor regression, but more than single cells or rare small groups of cancer cells (partial response, score 2)   Lymphovascular Invasion  Present   Perineural Invasion  Present   MARGINS   Margin Status for Invasive Carcinoma  All margins negative for invasive carcinoma   Closest Margin(s) to Invasive  Carcinoma  Deep   Distance from Invasive Carcinoma to Closest Margin  1.2 cm   Margin Status for Dysplasia and Intestinal Metaplasia  All margins negative for dysplasia   REGIONAL LYMPH NODES   Regional Lymph Node Status  Tumor present in regional lymph node(s)   Number of Lymph Nodes with Tumor  7   Number of Lymph Nodes Examined  35   PATHOLOGIC STAGE CLASSIFICATION (pTNM, AJCC 8th Edition)   Reporting of pT, pN, and (when applicable) pM categories is based on information available to the pathologist at the time the report is issued. As per the AJCC (Chapter 1, 8th Ed.) it is the managing physician’s responsibility to establish the final pathologic stage based upon all pertinent information, including but potentially not limited to this pathology report.   TNM Descriptors  y (post-treatment)   pT Category  pT3   pN Category  pN3

## 2024-03-28 NOTE — PROGRESS NOTES
"NUTRITION Follow-up NOTE  Reason for Visit:  Shruthi Ramos is a 58 y.o. 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 Currently undergoing neoadjuvant chemotherapy with FLOT protocol (Docetaxel, oxaliplatin, leucovorin, and 5-fluorouracil).    RD was contacted via phone by patients  stating that patient is dehydrated, lethargic, weak. RD able to arrange for 1L normal saline at [facility]. Was able to meet with patient and family during hydration. Patient with clear muscle loss since last visit, evident most predominately in face. Patient has been struggling with nausea with enteral feeds since surgery, but is also unable to meet nutrient needs by mouth. RD and MD's have been encouraging continued increasing of PO foods throughout the day with the goal of eliminating need for further enteral nutrition. Most concerningly, patient has been struggling to meet daily fluid needs. RD has encouraged IV hydration on multiple occasions but patient has been trying to reach fluid goals herself. Is now accepting of intermittent hydration.     Lab Results   Component Value Date/Time    GLUCOSE 96 03/13/2024 0916     03/13/2024 0916    K 4.0 03/13/2024 0916     03/13/2024 0916    CO2 27 03/13/2024 0916    ANIONGAP 12 03/13/2024 0916    BUN 16 03/13/2024 0916    CREATININE 0.78 03/13/2024 0916    EGFR 88 03/13/2024 0916    CALCIUM 8.9 03/13/2024 0916    ALBUMIN 3.8 03/13/2024 0916    ALKPHOS 122 (H) 03/13/2024 0916    PROT 6.3 (L) 03/13/2024 0916    AST 16 03/13/2024 0916    BILITOT 0.4 03/13/2024 0916    ALT 11 03/13/2024 0916     Nutrition Assessment     Anthropometrics:  Anthropometrics  Height: 154.9 cm (5' 0.98\")  Weight: 102 kg (224 lb 13.9 oz)  BMI (Calculated): 42.51    Wt Readings from Last 10 Encounters:   03/26/24 94.7 kg (208 lb 12.4 oz)   03/19/24 98 kg (216 lb 0.8 oz)   03/14/24 97.9 kg (215 lb 13.3 oz)   03/13/24 97.3 kg (214 lb 9.9 oz) "   03/12/24 96.8 kg (213 lb 6.5 oz)   02/23/24 97 kg (213 lb 13.5 oz)   02/13/24 97.9 kg (215 lb 12.8 oz)   01/31/24 98.2 kg (216 lb 7.9 oz)   01/30/24 98.2 kg (216 lb 7.9 oz)   01/10/24 99.6 kg (219 lb 9.3 oz)     5.5% weight loss x 6 weeks, significant, suspect this is partially due to dehydration     Food And Nutrient Intake:  Food and Nutrient History  Food and Nutrient History: Dysphagia continues to improve, but patient does have intermittent nausea which is limiting intake more than previous. Discussed addition of ONS to assist in meeting needs, patient does not like them.   Energy Intake: Good > 75 %, Fair 50-75 %  Fluid Intake: less than 64oz/day  Food Allergy: NKFA  GI Symptoms: constipation (intermittent)  Oral Problems: dysphagia     Food Intake  Amount of Food: Did not provide    Food Preparation  Cooking: Patient, Spouse/Significant Other  Grocery Shopping: Patient, Spouse/Significant Other    Micronutrient Intake  Vitamin Intake: Multivitamin, B12, D    Food Supplement Intake  Oral Nutrition Supplements: Boost Plus (RD recommending Boost Plus BID to assist in meeting nutrient and fluid needs.)    Medication and Complementary/Alternative Medicine Use  Prescription Medication Use: atrovastatin, metoprolol, ondansetron    Nutrition Focused Physical Exam:  Subcutaneous Fat Loss  Orbital Fat Pads: Well nourshed (slightly bulging fat pads)  Buccal Fat Pads: Well nourished (full, rounded cheeks)  Triceps: Well nourished (ample fat tissue)  Ribs: Well nourished (full chest, ribs do not protrude)  Muscle Wasting  Temporalis: Well nourished (well-defined muscle)  Pectoralis (Clavicular Region): Well nourished (clavicle not visible)  Deltoid/Trapezius: Well nourished (rounded appearance at arm, shoulder, neck)  Interosseous: Well nourished (muscle bulges)  Trapezius/Infraspinatus/Supraspinatus (Scapular Region): Well nourished (bones not prominent, muscle taut)  Quadriceps: Well nourished (well developed, well  "rounded)  Gastrocnemius: Well nourished (well developed bulbous muscle)  Edema  Edema: none  Physical Findings (Nutrition Deficiency/Toxicity)  Hair: Negative  Eyes: Negative  Mouth: Negative  Nails: Negative  Skin: Negative    Energy Needs  Calculated Energy Needs Using Equations  Height: 154.9 cm (5' 0.98\")  Daily Nutrient Recommendations (continue as previous):   kcals/day: 6919-7501   gms protein/day: 71-99   mL fluid/day: 1mL/kcal or per MD   kcals/kg/day: 25-30ABW   gm protein/kg/day: 1-1.4ABW     Nutrition Diagnosis   Malnutrition Diagnosis  Patient has Malnutrition Diagnosis: No  Nutrition Diagnosis  Patient has Nutrition Diagnosis: Yes  Diagnosis Status (1): Ongoing  Nutrition Diagnosis 1: Swallowing difficulity  Related to (1): GE junction tumor  As Evidenced by (1): patient reports of dysphagia, especially with dry foods  Additional Nutrition Diagnosis: Diagnosis 2  Diagnosis Status (2): Ongoing  Nutrition Diagnosis 2: Inadequate oral intake  Related to (2): catabolic disease and treatment  As Evidenced by (2): PO intake not consistently meeting 75% estimated nutrient needs, patient reports of continued struggles with PO intake    Nutrition Interventions/Recommendations   Food and Nutrition Delivery  Meals & Snacks: Energy-modified diet  Goals: increased nutrient needs in the setting of cancer  Medical Food Supplement: Premier Protein  Goals: BID  Nutrition Education  Nutrition Education Content: Content related nutrition education  Goals: patient will understand impact of diet on success during treatment, in the healing process, and for survivorship  Nutrition Counseling  Theoretical Basis/Approach: Nutrition counseling based on cognitive behavioral theory approach  Goals: contemplation-->action  Strategies: Nutrition counseling based on motivational interviewing strategy  Coordination of Nutrition Care by a Nutrition Professional  Collaboration and referral of nutrition care: Collaboration by nutrition " professional with other providers    Nutrition Monitoring and Evaluation   Food/Nutrient Related History Monitoring  Monitoring and Evaluation Plan: Energy intake  Energy Intake: Estimated energy intake  Criteria: meet needs calculated by RD  Knowledge/Beliefs/Attitudes  Monitoring and Evaluation Plan: Food and nutrition knowledge  Food and nutrition knowledge: Nutrition knowledge of individual client  Criteria: will continue to work with patient on diet for cancer    Readiness to Change : Good  Level of Understanding : Good  Anticipated Compliant : Good    Time Spent  Prep time on day of patient encounter: 15 minutes  Time spent directly with patient, family or caregiver: 15 minutes  Documentation Time: 5 minutes

## 2024-03-28 NOTE — PROGRESS NOTES
ASSESSMENT AND PLAN  Shruthi Ramos is a 58 y.o. female who is recovering well following total gastrectomy. On final pathology she had a G3 poorly differentiated cancer with partial treatment response, +LVI/PNI, 7/35 lymph nodes positive, pT3N3.  We discussed that this is not completely unexpected and the recommendation will be to complete perioperative chemotherapy.  She has plans to start her postoperative therapy this week.    Shruthi has continued to struggle with nausea and recently had pain around her J-tube and some leaking.  The leaking has stopped and she thinks the pain is from retching.  I examined her tube and the top of the bumper is at 9 cm.  It does not appear to loose and is no longer leaking so I did not make any changes.  We talked about the diarrhea she has had since starting chemo and I will prescribe Lomotil liquid to go through her J-tube.  We will plan to keep her G-tube in place until chemotherapy is finished.    Zack Chavarria MD  Assistant Professor of Surgery  Division of Surgical Oncology  [phone]  [email or web address]      SUBJECTIVE  Shruthi Ramos is a 58 y.o. female who presents for a postoperative clinic visit.  Referring provider: Ollie Torres  Diagnosis: gastric cancer  Surgery: total gastrectomy with J tube on 1/10/24  Postoperative issues: wound infection, pneumatosis with tube feeds that resolved with TPN, bowel rest, and abx.      Physical exam  Healed midline incision  Skin irritation around J tube    Surgical Pathology  FINAL DIAGNOSIS   A. Liver, segment 4 lesion, biopsy:  - Bile duct adenoma.     B. 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  - Resection margins are negative for neoplasia.  - Six of twenty-nine lymph nodes involved by metastatic carcinoma (6/29).  - Background stomach with reactive gastropathy, intestinal metaplasia, and  dieulafoy lesion with accompanying hemorrhage.  - Omentum with no significant pathologic findings.     C. Lymph nodes, D2, excision:  - One of six lymph nodes involved by metastatic carcinoma (1/6).     D. Final esophageal margin, resection:  - Segment of esophagus, negative for neoplasia.   Electronically signed by Sachi Eubanks MD on 1/18/2024 at 1350        By the signature on this report, the individual or group listed as making the Final Interpretation/Diagnosis certifies that they have reviewed this case.    Case Summary Report   ESOPHAGUS   8th Edition - Protocol posted: 6/22/2022ESOPHAGUS - B  SPECIMEN   Procedure  Esophagogastrectomy   TUMOR   Tumor Site  Esophagogastric junction (EGJ)   Relationship of Tumor to Esophagogastric Junction  Tumor midpoint is located at the esophagogastric junction   Histologic Type  Adenocarcinoma   Histologic Grade  G3, poorly differentiated, undifferentiated   Tumor Size  Greatest Dimension (Centimeters): 2.3 (grossly) cm   Tumor Extent  Invades adventitia   Treatment Effect  Present, with residual cancer showing evident tumor regression, but more than single cells or rare small groups of cancer cells (partial response, score 2)   Lymphovascular Invasion  Present   Perineural Invasion  Present   MARGINS   Margin Status for Invasive Carcinoma  All margins negative for invasive carcinoma   Closest Margin(s) to Invasive Carcinoma  Deep   Distance from Invasive Carcinoma to Closest Margin  1.2 cm   Margin Status for Dysplasia and Intestinal Metaplasia  All margins negative for dysplasia   REGIONAL LYMPH NODES   Regional Lymph Node Status  Tumor present in regional lymph node(s)   Number of Lymph Nodes with Tumor  7   Number of Lymph Nodes Examined  35   PATHOLOGIC STAGE CLASSIFICATION (pTNM, AJCC 8th Edition)   Reporting of pT, pN, and (when applicable) pM categories is based on information available to the pathologist at the time the report is issued. As per the AJCC (Chapter  1, 8th Ed.) it is the managing physician’s responsibility to establish the final pathologic stage based upon all pertinent information, including but potentially not limited to this pathology report.   TNM Descriptors  y (post-treatment)   pT Category  pT3   pN Category  pN3         15-Jul-2020 04:09

## 2024-03-29 ENCOUNTER — APPOINTMENT (OUTPATIENT)
Dept: HEMATOLOGY/ONCOLOGY | Facility: CLINIC | Age: 59
End: 2024-03-29
Payer: COMMERCIAL

## 2024-04-02 ENCOUNTER — INFUSION (OUTPATIENT)
Dept: HEMATOLOGY/ONCOLOGY | Facility: CLINIC | Age: 59
End: 2024-04-02
Payer: COMMERCIAL

## 2024-04-02 ENCOUNTER — OFFICE VISIT (OUTPATIENT)
Dept: HEMATOLOGY/ONCOLOGY | Facility: CLINIC | Age: 59
End: 2024-04-02
Payer: COMMERCIAL

## 2024-04-02 VITALS
TEMPERATURE: 97.2 F | DIASTOLIC BLOOD PRESSURE: 81 MMHG | HEIGHT: 61 IN | SYSTOLIC BLOOD PRESSURE: 145 MMHG | HEART RATE: 62 BPM | BODY MASS INDEX: 39.96 KG/M2 | OXYGEN SATURATION: 98 % | WEIGHT: 211.64 LBS | RESPIRATION RATE: 20 BRPM

## 2024-04-02 DIAGNOSIS — C16.0 GE JUNCTION CARCINOMA (MULTI): Primary | ICD-10-CM

## 2024-04-02 DIAGNOSIS — C16.0 GE JUNCTION CARCINOMA (MULTI): ICD-10-CM

## 2024-04-02 LAB
ALBUMIN SERPL BCP-MCNC: 3.5 G/DL (ref 3.4–5)
ALP SERPL-CCNC: 133 U/L (ref 33–110)
ALT SERPL W P-5'-P-CCNC: 15 U/L (ref 7–45)
ANION GAP SERPL CALC-SCNC: 13 MMOL/L (ref 10–20)
AST SERPL W P-5'-P-CCNC: 19 U/L (ref 9–39)
BASOPHILS # BLD AUTO: 0.09 X10*3/UL (ref 0–0.1)
BASOPHILS NFR BLD AUTO: 0.6 %
BILIRUB SERPL-MCNC: 0.2 MG/DL (ref 0–1.2)
BUN SERPL-MCNC: 13 MG/DL (ref 6–23)
CALCIUM SERPL-MCNC: 8.9 MG/DL (ref 8.6–10.3)
CHLORIDE SERPL-SCNC: 103 MMOL/L (ref 98–107)
CO2 SERPL-SCNC: 29 MMOL/L (ref 21–32)
CREAT SERPL-MCNC: 0.67 MG/DL (ref 0.5–1.05)
EGFRCR SERPLBLD CKD-EPI 2021: >90 ML/MIN/1.73M*2
EOSINOPHIL # BLD AUTO: 0.01 X10*3/UL (ref 0–0.7)
EOSINOPHIL NFR BLD AUTO: 0.1 %
ERYTHROCYTE [DISTWIDTH] IN BLOOD BY AUTOMATED COUNT: 15.4 % (ref 11.5–14.5)
GLUCOSE SERPL-MCNC: 98 MG/DL (ref 74–99)
HCT VFR BLD AUTO: 40.5 % (ref 36–46)
HGB BLD-MCNC: 12.7 G/DL (ref 12–16)
IMM GRANULOCYTES # BLD AUTO: 0.65 X10*3/UL (ref 0–0.7)
IMM GRANULOCYTES NFR BLD AUTO: 4.3 % (ref 0–0.9)
LYMPHOCYTES # BLD AUTO: 2.66 X10*3/UL (ref 1.2–4.8)
LYMPHOCYTES NFR BLD AUTO: 17.6 %
MAGNESIUM SERPL-MCNC: 2.05 MG/DL (ref 1.6–2.4)
MCH RBC QN AUTO: 26.7 PG (ref 26–34)
MCHC RBC AUTO-ENTMCNC: 31.4 G/DL (ref 32–36)
MCV RBC AUTO: 85 FL (ref 80–100)
MONOCYTES # BLD AUTO: 1.11 X10*3/UL (ref 0.1–1)
MONOCYTES NFR BLD AUTO: 7.3 %
NEUTROPHILS # BLD AUTO: 10.62 X10*3/UL (ref 1.2–7.7)
NEUTROPHILS NFR BLD AUTO: 70.1 %
NRBC BLD-RTO: 0.1 /100 WBCS (ref 0–0)
PLATELET # BLD AUTO: 221 X10*3/UL (ref 150–450)
POTASSIUM SERPL-SCNC: 3.6 MMOL/L (ref 3.5–5.3)
PROT SERPL-MCNC: 6.3 G/DL (ref 6.4–8.2)
RBC # BLD AUTO: 4.75 X10*6/UL (ref 4–5.2)
SODIUM SERPL-SCNC: 141 MMOL/L (ref 136–145)
WBC # BLD AUTO: 15.1 X10*3/UL (ref 4.4–11.3)

## 2024-04-02 PROCEDURE — 83735 ASSAY OF MAGNESIUM: CPT

## 2024-04-02 PROCEDURE — 99214 OFFICE O/P EST MOD 30 MIN: CPT | Performed by: INTERNAL MEDICINE

## 2024-04-02 PROCEDURE — 96375 TX/PRO/DX INJ NEW DRUG ADDON: CPT | Mod: INF

## 2024-04-02 PROCEDURE — 85025 COMPLETE CBC W/AUTO DIFF WBC: CPT

## 2024-04-02 PROCEDURE — 96413 CHEMO IV INFUSION 1 HR: CPT

## 2024-04-02 PROCEDURE — 3079F DIAST BP 80-89 MM HG: CPT | Performed by: INTERNAL MEDICINE

## 2024-04-02 PROCEDURE — 80053 COMPREHEN METABOLIC PANEL: CPT

## 2024-04-02 PROCEDURE — 2500000001 HC RX 250 WO HCPCS SELF ADMINISTERED DRUGS (ALT 637 FOR MEDICARE OP): Performed by: INTERNAL MEDICINE

## 2024-04-02 PROCEDURE — 3077F SYST BP >= 140 MM HG: CPT | Performed by: INTERNAL MEDICINE

## 2024-04-02 PROCEDURE — 96366 THER/PROPH/DIAG IV INF ADDON: CPT | Mod: INF

## 2024-04-02 PROCEDURE — 96417 CHEMO IV INFUS EACH ADDL SEQ: CPT

## 2024-04-02 PROCEDURE — 96415 CHEMO IV INFUSION ADDL HR: CPT

## 2024-04-02 PROCEDURE — 96367 TX/PROPH/DG ADDL SEQ IV INF: CPT

## 2024-04-02 PROCEDURE — 96368 THER/DIAG CONCURRENT INF: CPT

## 2024-04-02 PROCEDURE — 2500000004 HC RX 250 GENERAL PHARMACY W/ HCPCS (ALT 636 FOR OP/ED): Performed by: INTERNAL MEDICINE

## 2024-04-02 RX ORDER — HEPARIN 100 UNIT/ML
500 SYRINGE INTRAVENOUS AS NEEDED
Status: CANCELLED | OUTPATIENT
Start: 2024-04-02

## 2024-04-02 RX ORDER — PROCHLORPERAZINE MALEATE 10 MG
10 TABLET ORAL EVERY 6 HOURS PRN
Status: DISCONTINUED | OUTPATIENT
Start: 2024-04-02 | End: 2024-04-02 | Stop reason: HOSPADM

## 2024-04-02 RX ORDER — ALBUTEROL SULFATE 0.83 MG/ML
3 SOLUTION RESPIRATORY (INHALATION) AS NEEDED
Status: CANCELLED | OUTPATIENT
Start: 2024-04-03

## 2024-04-02 RX ORDER — HEPARIN SODIUM,PORCINE/PF 10 UNIT/ML
50 SYRINGE (ML) INTRAVENOUS AS NEEDED
Status: CANCELLED | OUTPATIENT
Start: 2024-04-02

## 2024-04-02 RX ORDER — DIPHENHYDRAMINE HYDROCHLORIDE 50 MG/ML
50 INJECTION INTRAMUSCULAR; INTRAVENOUS AS NEEDED
Status: DISCONTINUED | OUTPATIENT
Start: 2024-04-02 | End: 2024-04-02 | Stop reason: HOSPADM

## 2024-04-02 RX ORDER — DIPHENHYDRAMINE HYDROCHLORIDE 50 MG/ML
50 INJECTION INTRAMUSCULAR; INTRAVENOUS AS NEEDED
Status: CANCELLED | OUTPATIENT
Start: 2024-04-03

## 2024-04-02 RX ORDER — ATROPINE SULFATE 0.1 MG/ML
0.4 INJECTION INTRAVENOUS ONCE
Status: CANCELLED
Start: 2024-04-03 | End: 2024-04-03

## 2024-04-02 RX ORDER — FAMOTIDINE 10 MG/ML
20 INJECTION INTRAVENOUS ONCE AS NEEDED
Status: DISCONTINUED | OUTPATIENT
Start: 2024-04-02 | End: 2024-04-02 | Stop reason: HOSPADM

## 2024-04-02 RX ORDER — PROCHLORPERAZINE EDISYLATE 5 MG/ML
10 INJECTION INTRAMUSCULAR; INTRAVENOUS EVERY 6 HOURS PRN
Status: DISCONTINUED | OUTPATIENT
Start: 2024-04-02 | End: 2024-04-02 | Stop reason: HOSPADM

## 2024-04-02 RX ORDER — LORAZEPAM 2 MG/ML
1 INJECTION INTRAMUSCULAR AS NEEDED
Status: DISCONTINUED | OUTPATIENT
Start: 2024-04-02 | End: 2024-04-02 | Stop reason: HOSPADM

## 2024-04-02 RX ORDER — DIPHENHYDRAMINE HCL 25 MG
25 CAPSULE ORAL ONCE
Status: COMPLETED | OUTPATIENT
Start: 2024-04-02 | End: 2024-04-02

## 2024-04-02 RX ORDER — ALBUTEROL SULFATE 0.83 MG/ML
3 SOLUTION RESPIRATORY (INHALATION) AS NEEDED
Status: DISCONTINUED | OUTPATIENT
Start: 2024-04-02 | End: 2024-04-02 | Stop reason: HOSPADM

## 2024-04-02 RX ORDER — FAMOTIDINE 10 MG/ML
20 INJECTION INTRAVENOUS ONCE AS NEEDED
Status: CANCELLED | OUTPATIENT
Start: 2024-04-03

## 2024-04-02 RX ORDER — FAMOTIDINE 10 MG/ML
20 INJECTION INTRAVENOUS ONCE AS NEEDED
Status: CANCELLED | OUTPATIENT
Start: 2024-04-02

## 2024-04-02 RX ORDER — ATROPINE SULFATE 0.1 MG/ML
0.4 INJECTION INTRAVENOUS ONCE
Status: DISCONTINUED | OUTPATIENT
Start: 2024-04-02 | End: 2024-04-02 | Stop reason: ENTERED-IN-ERROR

## 2024-04-02 RX ORDER — EPINEPHRINE 0.3 MG/.3ML
0.3 INJECTION SUBCUTANEOUS EVERY 5 MIN PRN
Status: DISCONTINUED | OUTPATIENT
Start: 2024-04-02 | End: 2024-04-02 | Stop reason: HOSPADM

## 2024-04-02 RX ORDER — EPINEPHRINE 0.3 MG/.3ML
0.3 INJECTION SUBCUTANEOUS EVERY 5 MIN PRN
Status: CANCELLED | OUTPATIENT
Start: 2024-04-03

## 2024-04-02 RX ORDER — DIPHENHYDRAMINE HYDROCHLORIDE 50 MG/ML
50 INJECTION INTRAMUSCULAR; INTRAVENOUS AS NEEDED
Status: CANCELLED | OUTPATIENT
Start: 2024-04-02

## 2024-04-02 RX ORDER — PALONOSETRON 0.05 MG/ML
0.25 INJECTION, SOLUTION INTRAVENOUS ONCE
Status: COMPLETED | OUTPATIENT
Start: 2024-04-02 | End: 2024-04-02

## 2024-04-02 RX ORDER — EPINEPHRINE 0.3 MG/.3ML
0.3 INJECTION SUBCUTANEOUS EVERY 5 MIN PRN
Status: CANCELLED | OUTPATIENT
Start: 2024-04-02

## 2024-04-02 RX ORDER — ALBUTEROL SULFATE 0.83 MG/ML
3 SOLUTION RESPIRATORY (INHALATION) AS NEEDED
Status: CANCELLED | OUTPATIENT
Start: 2024-04-02

## 2024-04-02 RX ADMIN — DEXTROSE MONOHYDRATE 80 MG: 50 INJECTION, SOLUTION INTRAVENOUS at 10:55

## 2024-04-02 RX ADMIN — FLUOROURACIL 4150 MG: 50 INJECTION, SOLUTION INTRAVENOUS at 14:14

## 2024-04-02 RX ADMIN — PALONOSETRON 250 MCG: 0.05 INJECTION, SOLUTION INTRAVENOUS at 10:53

## 2024-04-02 RX ADMIN — OXALIPLATIN 135 MG: 5 INJECTION, SOLUTION INTRAVENOUS at 12:04

## 2024-04-02 RX ADMIN — DIPHENHYDRAMINE HYDROCHLORIDE 25 MG: 25 CAPSULE ORAL at 10:36

## 2024-04-02 RX ADMIN — DEXAMETHASONE SODIUM PHOSPHATE 12 MG: 10 INJECTION, SOLUTION INTRAMUSCULAR; INTRAVENOUS at 09:44

## 2024-04-02 RX ADMIN — LEUCOVORIN CALCIUM 320 MG: 350 INJECTION, POWDER, LYOPHILIZED, FOR SOLUTION INTRAMUSCULAR; INTRAVENOUS at 12:16

## 2024-04-02 RX ADMIN — SODIUM CHLORIDE 150 MG: 9 INJECTION, SOLUTION INTRAVENOUS at 09:59

## 2024-04-02 ASSESSMENT — PAIN SCALES - GENERAL: PAINLEVEL: 0-NO PAIN

## 2024-04-02 NOTE — SIGNIFICANT EVENT
04/02/24 0931   Prechemo Checklist   Has the patient been in the hospital, ED, or urgent care since last date of service No   Chemo/Immuno Consent Signed Yes   Confirmed to previous date/time of medication Yes   Compared to previous dose Yes   All medications are dated accurately Yes   Pregnancy Test Negative Not applicable   Parameters Met Yes   Dose Calculations Verified (current, total, cumulative) Yes

## 2024-04-02 NOTE — PROGRESS NOTES
LOCATION:  [facility].     HEMATOLOGY & ONCOLOGY PROBLEMS:  1.  Localized gastric adenocarcinoma.       a.  Neoadjuvant chemotherapy with FLOT from Oct to Dec 2023.       b.  S/p total gastrectomy in January 2024.  Postoperative pathology (pT3,pN3).       c.  Adjuvant chemotherapy with FLOT beginning March 2024    CHIEF COMPLAINT:    The patient is in the clinic today for follow-up of GE junction adenocarcinoma and for continuation of treatment and management of therapy related effects.                   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  She was having problem with gradually worsening dysphagia with further GI work-up revealing gastric mass with the biopsy confirming  poorly differentiated adenocarcinoma in July 2023.  Prior to that she had a barium esophagogram which was essentially unremarkable.  Patient does have a previous history of GERD and had one time was treated for H. pylori gastritis.  EUS done by Dr. London on 7/17/2023 showed malignancy starting near the GE junction and measuring 2.2 x 1.5 cm towards the cardia along  the lesser curvature / anterior wall with malignant appearing features.  There is loss of interface between the mass and liver along a 7 mm region, concerning for probable  early hepatic involvement.  Enlarged lymph nodes were noted in the diaphragmatic region, and gastrohepatic ligament.  CT chest abdomen pelvis with contrast on 8/9/2023 showed thickening of the distal esophagus extending into the anterior portion of the  gastric cardia.  There is a small amount of liver present directly adjacent to the area of the tumor but there is no altered enhancement pattern in the liver parenchyma.  Slightly inferior to the cardia there  were lymph nodes visible in the adjacent mesentery that were not visible previously measuring up to 6 mm in short axis. PET/CT on 8/31/2023 showed a hypermetabolic focus at the GE junction   with extension into the gastric cardia, no evidence of hypermetabolic regional or distant metastatic disease. Brain MRI negative for metastasis.  She was treated with neoadjuvant chemotherapy with FLOT protocol from Oct to Dec 2023.  She had a total gastrectomy in 2024.  Postop pathology results showed G3, poorly differentiated cancer with partial treatment response, positive LVI/PNI, 7/25 lymph nodes positive (pT3 pN3).  After reevaluation she was started on adjuvant chemotherapy with FLOT beginning 2024.     INTERVAL HISTORY:  She had issues with persistent diarrhea and nausea after the chemotherapy.  She is being supported with aggressive supportive care with intermittent hydration etc. Follow-up blood work from today is unremarkable.    PAST MEDICAL HISTORY:   1.  GE junction adenocarcinoma as detailed above.   2.  Hypertension   3.  Hyperlipidemia   4.  Anxiety/depression   5.  History of complete hysterectomy, ankle surgery, cholecystectomy and 2  procedures     SOCIAL HISTORY:    and lives in [facility].  Non-smoker and nonalcoholic.  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  Born and raised in [place].     FAMILY HISTORY:    Father  at age 76 from myocardial infarction mother  from stroke at age 78.  She had 8 siblings.  1 brother  from MI another committed suicide.  A sister also  from myocardial infarction.   She had 2 children.  Her son  from AML.  No other specific history of bleeding, clotting or malignant disorder in the family.     REVIEW OF SYSTEMS:  Pertinent finding as per the history above.  All other systems have been reviewed and generally negative and noncontributory.     PHYSICAL EXAMINATION:    Detailed physical examination not done     ALLERGY & MEDICATIONS:  Allergies and latest outpatient medications list were reviewed in the EMR.    LAB RESULTS:  CBC and metabolic profile from today is unremarkable.  Last 3 sets of blood work were  reviewed and the trend was noted.     RADIOLOGY RESULT:  PET/CT Esophageal Initial [Aug 31 2023  1:12PM]  Impression:  1. Hypermetabolic focus localized at gastroesophageal junction with xtension to the gastric cardia compatible with biopsy-proven gastric adenocarcinoma.  2. No evidence of hypermetabolic regional or distant metastatic disease.  3. Status post cholecystectomy with demonstration of localized pneumobilia.     MRI Brain w/wo Contrast [Aug 29 2023 12:28PM]   Impression:  There is no evidence of mass, infarction or hemorrhage.     PATHOLOGY RESULTS:  Surgical Pathology Exam: [id number]   FINAL DIAGNOSIS   A. Liver, segment 4 lesion, biopsy:  - Bile duct adenoma.     B. 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  - Resection margins are negative for neoplasia.  - Six of twenty-nine lymph nodes involved by metastatic carcinoma (6/29).  - Background stomach with reactive gastropathy, intestinal metaplasia, and dieulafoy lesion with accompanying hemorrhage.  - Omentum with no significant pathologic findings.     C. Lymph nodes, D2, excision:  - One of six lymph nodes involved by metastatic carcinoma (1/6).     D. Final esophageal margin, resection:  - Segment of esophagus, negative for neoplasia.   Electronically signed by Sachi Eubanks MD on 1/18/2024 at 1350   Case Summary Report   ESOPHAGUS    8th Edition - Protocol posted: 6/22/2022ESOPHAGUS - B  SPECIMEN   Procedure Esophagogastrectomy   TUMOR   Tumor Site Esophagogastric junction (EGJ)   Relationship of Tumor to Esophagogastric Junction Tumor midpoint is located at the esophagogastric junction   Histologic Type Adenocarcinoma   Histologic Grade G3, poorly differentiated, undifferentiated   Tumor Size Greatest Dimension (Centimeters): 2.3 (grossly) cm   Tumor Extent Invades adventitia   Treatment Effect Present, with residual cancer showing  evident tumor regression, but more than single cells or rare small groups of cancer cells (partial response, score 2)   Lymphovascular Invasion Present   Perineural Invasion Present   MARGINS   Margin Status for Invasive Carcinoma All margins negative for invasive carcinoma   Closest Margin(s) to Invasive Carcinoma Deep   Distance from Invasive Carcinoma to Closest Margin 1.2 cm   Margin Status for Dysplasia and Intestinal Metaplasia All margins negative for dysplasia   REGIONAL LYMPH NODES   Regional Lymph Node Status Tumor present in regional lymph node(s)   Number of Lymph Nodes with Tumor 7   Number of Lymph Nodes Examined 35   PATHOLOGIC STAGE CLASSIFICATION (pTNM, AJCC 8th Edition)   TNM Descriptors y (post-treatment)   pT Category pT3   pN Category pN3         ASSESSMENT AND PLAN:   1.  GE junction poorly differentiated adenocarcinoma.  Please refer to the details as outlined above.  In summary patient with few month history  of gradually worsening dysphagia to both solid and liquid.  Initial barium esophagogram was normal but EGD showed a GE junction mass in July 2023. Biopsy results are confirmatory of poorly differentiated adenocarcinoma with normal mismatch repair gene  expression. EUS revealed a GE junction lesion with concern regarding direct extension to liver and enlarged pathological lymph nodes.  CT scan showed stomach involvement with local regional lymphadenopathy but no distance organ involvement.  A follow-up  PET scan results were confirmatory of increased metabolic activity starting at the GE junction with extension into cardia.  There was no finding of any hypermetabolic activity in the liver or any other local or distant metastatic lesions.  Brain MRI was  unremarkable.  After multidisciplinary evaluation of tumor is considered more gastric than GE junction and she has been advised evaluation for neoadjuvant chemotherapy followed by surgical evaluation.  She was treated with neoadjuvant  chemotherapy with FLOT protocol from Oct to Dec 2023.  She had a total gastrectomy in Jan 2024.  Postop pathology results showed G3, poorly differentiated cancer with partial treatment response, positive LVI/PNI, 7/25 lymph nodes positive (pT3 pN3). After reevaluation she was started on adjuvant chemotherapy with FLOT beginning March 2024.    For now she will continue with the chemotherapy at the current dose and schedule.  Plan is to treat her with adjuvant FLOT for 4 more cycles.  Probable side effects of neuropathy, weakness, fatigue, myelosuppression, alopecia etc. were explained to her in detail.  She did have significant problems in tolerating chemotherapy during the neoadjuvant phase requiring aggressive hydration and other supportive care.  Will continue with the strategies in the adjuvant setting also.    2.  Treatment induced diarrhea/nausea.  She will continue to be supported with aggressive supportive care Imodium, Lomotil, Zofran, Compazine and intermittent hydration.    3.  Fluid electrolyte nutrition.  She will continue to follow with with our nutritionist.    4.  Follow-up.  Follow-up with me in 2 weeks.  In the meantime she will come to the clinic for continuation of chemotherapy as per the protocol along with appointments for intermittent hydration.  Advised to contact us immediately if there are any new questions or concerns.    This note has been transcribed using Dragon voice recognition system and there is a possibility of unintentional typing misprints.

## 2024-04-03 ENCOUNTER — INFUSION (OUTPATIENT)
Dept: HEMATOLOGY/ONCOLOGY | Facility: CLINIC | Age: 59
End: 2024-04-03
Payer: COMMERCIAL

## 2024-04-03 VITALS
OXYGEN SATURATION: 97 % | HEART RATE: 67 BPM | TEMPERATURE: 98.2 F | SYSTOLIC BLOOD PRESSURE: 161 MMHG | DIASTOLIC BLOOD PRESSURE: 81 MMHG | RESPIRATION RATE: 18 BRPM

## 2024-04-03 DIAGNOSIS — C16.0 GE JUNCTION CARCINOMA (MULTI): ICD-10-CM

## 2024-04-03 PROCEDURE — 96372 THER/PROPH/DIAG INJ SC/IM: CPT | Performed by: INTERNAL MEDICINE

## 2024-04-03 PROCEDURE — 2500000004 HC RX 250 GENERAL PHARMACY W/ HCPCS (ALT 636 FOR OP/ED): Mod: JZ | Performed by: INTERNAL MEDICINE

## 2024-04-03 PROCEDURE — 96372 THER/PROPH/DIAG INJ SC/IM: CPT

## 2024-04-03 PROCEDURE — 96523 IRRIG DRUG DELIVERY DEVICE: CPT

## 2024-04-03 RX ORDER — HEPARIN 100 UNIT/ML
500 SYRINGE INTRAVENOUS AS NEEDED
Status: DISCONTINUED | OUTPATIENT
Start: 2024-04-03 | End: 2024-04-03 | Stop reason: HOSPADM

## 2024-04-03 RX ORDER — HEPARIN SODIUM,PORCINE/PF 10 UNIT/ML
50 SYRINGE (ML) INTRAVENOUS AS NEEDED
Status: CANCELLED | OUTPATIENT
Start: 2024-04-03

## 2024-04-03 RX ORDER — HEPARIN 100 UNIT/ML
500 SYRINGE INTRAVENOUS AS NEEDED
Status: CANCELLED | OUTPATIENT
Start: 2024-04-03

## 2024-04-03 RX ADMIN — PEGFILGRASTIM 6 MG: 6 INJECTION SUBCUTANEOUS at 14:40

## 2024-04-03 RX ADMIN — HEPARIN 500 UNITS: 100 SYRINGE at 14:36

## 2024-04-09 ENCOUNTER — TELEPHONE (OUTPATIENT)
Dept: HEMATOLOGY/ONCOLOGY | Facility: CLINIC | Age: 59
End: 2024-04-09
Payer: MEDICAID

## 2024-04-09 NOTE — TELEPHONE ENCOUNTER
Pts spouse called to alert staff that pt is having issues with nausea and cannot keep fluids/food down . Reviewed per pharmacy that she can crush her Zofran and Compazine, dissolve in warm water and flush down slowly. Pts spouse verbalizes understanding. Confirmed appointment for tomorrows hydration with pts spouse. Pts spouse is agreeable.

## 2024-04-10 ENCOUNTER — INFUSION (OUTPATIENT)
Dept: HEMATOLOGY/ONCOLOGY | Facility: CLINIC | Age: 59
End: 2024-04-10
Payer: COMMERCIAL

## 2024-04-10 VITALS
OXYGEN SATURATION: 97 % | HEART RATE: 68 BPM | DIASTOLIC BLOOD PRESSURE: 81 MMHG | WEIGHT: 216.49 LBS | TEMPERATURE: 98.1 F | SYSTOLIC BLOOD PRESSURE: 154 MMHG | BODY MASS INDEX: 40.87 KG/M2 | RESPIRATION RATE: 18 BRPM

## 2024-04-10 DIAGNOSIS — R19.7 DIARRHEA, UNSPECIFIED TYPE: Primary | ICD-10-CM

## 2024-04-10 DIAGNOSIS — C16.0 GE JUNCTION CARCINOMA (MULTI): ICD-10-CM

## 2024-04-10 PROCEDURE — 2500000004 HC RX 250 GENERAL PHARMACY W/ HCPCS (ALT 636 FOR OP/ED): Performed by: INTERNAL MEDICINE

## 2024-04-10 PROCEDURE — 96374 THER/PROPH/DIAG INJ IV PUSH: CPT | Mod: INF

## 2024-04-10 PROCEDURE — 96361 HYDRATE IV INFUSION ADD-ON: CPT | Mod: INF

## 2024-04-10 RX ORDER — HEPARIN SODIUM,PORCINE/PF 10 UNIT/ML
50 SYRINGE (ML) INTRAVENOUS AS NEEDED
Status: CANCELLED | OUTPATIENT
Start: 2024-04-10

## 2024-04-10 RX ORDER — LOPERAMIDE HYDROCHLORIDE 2 MG/1
4 CAPSULE ORAL 4 TIMES DAILY PRN
Qty: 90 CAPSULE | Refills: 3 | Status: SHIPPED | OUTPATIENT
Start: 2024-04-10

## 2024-04-10 RX ORDER — HEPARIN 100 UNIT/ML
500 SYRINGE INTRAVENOUS AS NEEDED
Status: DISCONTINUED | OUTPATIENT
Start: 2024-04-10 | End: 2024-04-10 | Stop reason: HOSPADM

## 2024-04-10 RX ORDER — DIPHENHYDRAMINE HYDROCHLORIDE 50 MG/ML
50 INJECTION INTRAMUSCULAR; INTRAVENOUS AS NEEDED
OUTPATIENT
Start: 2024-04-10

## 2024-04-10 RX ORDER — ONDANSETRON HYDROCHLORIDE 2 MG/ML
8 INJECTION, SOLUTION INTRAVENOUS EVERY 4 HOURS PRN
Status: DISCONTINUED | OUTPATIENT
Start: 2024-04-10 | End: 2024-04-10 | Stop reason: HOSPADM

## 2024-04-10 RX ORDER — DIPHENHYDRAMINE HYDROCHLORIDE 50 MG/ML
50 INJECTION INTRAMUSCULAR; INTRAVENOUS AS NEEDED
Status: CANCELLED | OUTPATIENT
Start: 2024-04-10

## 2024-04-10 RX ORDER — HEPARIN 100 UNIT/ML
500 SYRINGE INTRAVENOUS AS NEEDED
Status: CANCELLED | OUTPATIENT
Start: 2024-04-10

## 2024-04-10 RX ORDER — FAMOTIDINE 10 MG/ML
20 INJECTION INTRAVENOUS ONCE AS NEEDED
OUTPATIENT
Start: 2024-04-10

## 2024-04-10 RX ORDER — ALBUTEROL SULFATE 0.83 MG/ML
3 SOLUTION RESPIRATORY (INHALATION) AS NEEDED
OUTPATIENT
Start: 2024-04-10

## 2024-04-10 RX ORDER — EPINEPHRINE 0.3 MG/.3ML
0.3 INJECTION SUBCUTANEOUS EVERY 5 MIN PRN
OUTPATIENT
Start: 2024-04-10

## 2024-04-10 RX ORDER — ALBUTEROL SULFATE 0.83 MG/ML
3 SOLUTION RESPIRATORY (INHALATION) AS NEEDED
Status: CANCELLED | OUTPATIENT
Start: 2024-04-10

## 2024-04-10 RX ORDER — LOPERAMIDE HCL 1MG/7.5ML
2 LIQUID (ML) ORAL 4 TIMES DAILY PRN
Qty: 150 ML | Refills: 3 | Status: SHIPPED | OUTPATIENT
Start: 2024-04-10 | End: 2024-05-06 | Stop reason: SDUPTHER

## 2024-04-10 RX ORDER — ONDANSETRON HYDROCHLORIDE 4 MG/2ML
8 INJECTION, SOLUTION INTRAMUSCULAR; INTRAVENOUS EVERY 4 HOURS PRN
Status: CANCELLED | OUTPATIENT
Start: 2024-04-10

## 2024-04-10 RX ORDER — FAMOTIDINE 10 MG/ML
20 INJECTION INTRAVENOUS ONCE AS NEEDED
Status: CANCELLED | OUTPATIENT
Start: 2024-04-10

## 2024-04-10 RX ORDER — EPINEPHRINE 0.3 MG/.3ML
0.3 INJECTION SUBCUTANEOUS EVERY 5 MIN PRN
Status: CANCELLED | OUTPATIENT
Start: 2024-04-10

## 2024-04-10 RX ADMIN — HEPARIN 500 UNITS: 100 SYRINGE at 15:38

## 2024-04-10 RX ADMIN — ONDANSETRON 8 MG: 2 INJECTION INTRAMUSCULAR; INTRAVENOUS at 15:18

## 2024-04-10 RX ADMIN — SODIUM CHLORIDE 1000 ML: 9 INJECTION, SOLUTION INTRAVENOUS at 13:47

## 2024-04-11 ENCOUNTER — APPOINTMENT (OUTPATIENT)
Dept: HEMATOLOGY/ONCOLOGY | Facility: CLINIC | Age: 59
End: 2024-04-11
Payer: COMMERCIAL

## 2024-04-12 ENCOUNTER — APPOINTMENT (OUTPATIENT)
Dept: HEMATOLOGY/ONCOLOGY | Facility: CLINIC | Age: 59
End: 2024-04-12
Payer: COMMERCIAL

## 2024-04-15 RX ORDER — ATROPINE SULFATE 0.1 MG/ML
0.4 INJECTION INTRAVENOUS ONCE
Status: CANCELLED
Start: 2024-04-17 | End: 2024-04-17

## 2024-04-15 RX ORDER — PROCHLORPERAZINE MALEATE 10 MG
10 TABLET ORAL EVERY 6 HOURS PRN
Status: CANCELLED | OUTPATIENT
Start: 2024-04-16

## 2024-04-15 RX ORDER — ALBUTEROL SULFATE 0.83 MG/ML
3 SOLUTION RESPIRATORY (INHALATION) AS NEEDED
Status: CANCELLED | OUTPATIENT
Start: 2024-04-17

## 2024-04-15 RX ORDER — FAMOTIDINE 10 MG/ML
20 INJECTION INTRAVENOUS ONCE AS NEEDED
Status: CANCELLED | OUTPATIENT
Start: 2024-04-17

## 2024-04-15 RX ORDER — EPINEPHRINE 0.3 MG/.3ML
0.3 INJECTION SUBCUTANEOUS EVERY 5 MIN PRN
Status: CANCELLED | OUTPATIENT
Start: 2024-04-17

## 2024-04-15 RX ORDER — PROCHLORPERAZINE EDISYLATE 5 MG/ML
10 INJECTION INTRAMUSCULAR; INTRAVENOUS EVERY 6 HOURS PRN
Status: CANCELLED | OUTPATIENT
Start: 2024-04-16

## 2024-04-15 RX ORDER — DIPHENHYDRAMINE HYDROCHLORIDE 50 MG/ML
50 INJECTION INTRAMUSCULAR; INTRAVENOUS AS NEEDED
Status: CANCELLED | OUTPATIENT
Start: 2024-04-17

## 2024-04-16 ENCOUNTER — HOSPITAL ENCOUNTER (OUTPATIENT)
Facility: HOSPITAL | Age: 59
Setting detail: OBSERVATION
Discharge: HOME | End: 2024-04-17
Attending: STUDENT IN AN ORGANIZED HEALTH CARE EDUCATION/TRAINING PROGRAM | Admitting: STUDENT IN AN ORGANIZED HEALTH CARE EDUCATION/TRAINING PROGRAM
Payer: COMMERCIAL

## 2024-04-16 ENCOUNTER — INFUSION (OUTPATIENT)
Dept: HEMATOLOGY/ONCOLOGY | Facility: CLINIC | Age: 59
End: 2024-04-16
Payer: COMMERCIAL

## 2024-04-16 ENCOUNTER — NUTRITION (OUTPATIENT)
Dept: RADIATION ONCOLOGY | Facility: CLINIC | Age: 59
End: 2024-04-16

## 2024-04-16 ENCOUNTER — OFFICE VISIT (OUTPATIENT)
Dept: HEMATOLOGY/ONCOLOGY | Facility: CLINIC | Age: 59
End: 2024-04-16
Payer: COMMERCIAL

## 2024-04-16 ENCOUNTER — SOCIAL WORK (OUTPATIENT)
Dept: HEMATOLOGY/ONCOLOGY | Facility: CLINIC | Age: 59
End: 2024-04-16

## 2024-04-16 VITALS
WEIGHT: 213.41 LBS | RESPIRATION RATE: 18 BRPM | SYSTOLIC BLOOD PRESSURE: 122 MMHG | BODY MASS INDEX: 40.29 KG/M2 | OXYGEN SATURATION: 98 % | DIASTOLIC BLOOD PRESSURE: 75 MMHG | TEMPERATURE: 97.3 F | HEART RATE: 64 BPM

## 2024-04-16 VITALS — HEIGHT: 61 IN | BODY MASS INDEX: 40.87 KG/M2

## 2024-04-16 DIAGNOSIS — C16.0 GE JUNCTION CARCINOMA (MULTI): ICD-10-CM

## 2024-04-16 DIAGNOSIS — K94.19: ICD-10-CM

## 2024-04-16 DIAGNOSIS — T85.528A JEJUNOSTOMY TUBE FELL OUT: Primary | ICD-10-CM

## 2024-04-16 DIAGNOSIS — C16.0 GE JUNCTION CARCINOMA (MULTI): Primary | ICD-10-CM

## 2024-04-16 LAB
ALBUMIN SERPL BCP-MCNC: 3.6 G/DL (ref 3.4–5)
ALP SERPL-CCNC: 137 U/L (ref 33–110)
ALT SERPL W P-5'-P-CCNC: 19 U/L (ref 7–45)
ANION GAP SERPL CALC-SCNC: 13 MMOL/L (ref 10–20)
AST SERPL W P-5'-P-CCNC: 26 U/L (ref 9–39)
BASOPHILS # BLD MANUAL: 0 X10*3/UL (ref 0–0.1)
BASOPHILS NFR BLD MANUAL: 0 %
BILIRUB SERPL-MCNC: 0.4 MG/DL (ref 0–1.2)
BUN SERPL-MCNC: 13 MG/DL (ref 6–23)
CALCIUM SERPL-MCNC: 9.2 MG/DL (ref 8.6–10.3)
CHLORIDE SERPL-SCNC: 103 MMOL/L (ref 98–107)
CO2 SERPL-SCNC: 28 MMOL/L (ref 21–32)
CREAT SERPL-MCNC: 0.82 MG/DL (ref 0.5–1.05)
EGFRCR SERPLBLD CKD-EPI 2021: 83 ML/MIN/1.73M*2
EOSINOPHIL # BLD MANUAL: 0 X10*3/UL (ref 0–0.7)
EOSINOPHIL NFR BLD MANUAL: 0 %
ERYTHROCYTE [DISTWIDTH] IN BLOOD BY AUTOMATED COUNT: 16.8 % (ref 11.5–14.5)
GLUCOSE SERPL-MCNC: 92 MG/DL (ref 74–99)
HCT VFR BLD AUTO: 39.4 % (ref 36–46)
HGB BLD-MCNC: 12.3 G/DL (ref 12–16)
IMM GRANULOCYTES # BLD AUTO: 1.07 X10*3/UL (ref 0–0.7)
IMM GRANULOCYTES NFR BLD AUTO: 5.6 % (ref 0–0.9)
LYMPHOCYTES # BLD MANUAL: 2.87 X10*3/UL (ref 1.2–4.8)
LYMPHOCYTES NFR BLD MANUAL: 15 %
MAGNESIUM SERPL-MCNC: 2.01 MG/DL (ref 1.6–2.4)
MCH RBC QN AUTO: 26.1 PG (ref 26–34)
MCHC RBC AUTO-ENTMCNC: 31.2 G/DL (ref 32–36)
MCV RBC AUTO: 84 FL (ref 80–100)
MONOCYTES # BLD MANUAL: 1.72 X10*3/UL (ref 0.1–1)
MONOCYTES NFR BLD MANUAL: 9 %
NEUTROPHILS # BLD MANUAL: 13.94 X10*3/UL (ref 1.2–7.7)
NEUTS BAND # BLD MANUAL: 2.1 X10*3/UL (ref 0–0.7)
NEUTS BAND NFR BLD MANUAL: 11 %
NEUTS SEG # BLD MANUAL: 11.84 X10*3/UL (ref 1.2–7)
NEUTS SEG NFR BLD MANUAL: 62 %
NRBC BLD-RTO: 0.3 /100 WBCS (ref 0–0)
OVALOCYTES BLD QL SMEAR: ABNORMAL
PLATELET # BLD AUTO: 207 X10*3/UL (ref 150–450)
POLYCHROMASIA BLD QL SMEAR: ABNORMAL
POTASSIUM SERPL-SCNC: 3.5 MMOL/L (ref 3.5–5.3)
PROT SERPL-MCNC: 6.2 G/DL (ref 6.4–8.2)
RBC # BLD AUTO: 4.71 X10*6/UL (ref 4–5.2)
RBC MORPH BLD: ABNORMAL
SODIUM SERPL-SCNC: 140 MMOL/L (ref 136–145)
TOTAL CELLS COUNTED BLD: 100
VARIANT LYMPHS # BLD MANUAL: 0.57 X10*3/UL (ref 0–0.5)
VARIANT LYMPHS NFR BLD: 3 %
WBC # BLD AUTO: 19.1 X10*3/UL (ref 4.4–11.3)

## 2024-04-16 PROCEDURE — 96523 IRRIG DRUG DELIVERY DEVICE: CPT

## 2024-04-16 PROCEDURE — 96375 TX/PRO/DX INJ NEW DRUG ADDON: CPT | Mod: INF

## 2024-04-16 PROCEDURE — G0378 HOSPITAL OBSERVATION PER HR: HCPCS

## 2024-04-16 PROCEDURE — 2500000004 HC RX 250 GENERAL PHARMACY W/ HCPCS (ALT 636 FOR OP/ED): Performed by: INTERNAL MEDICINE

## 2024-04-16 PROCEDURE — 83735 ASSAY OF MAGNESIUM: CPT

## 2024-04-16 PROCEDURE — 2500000001 HC RX 250 WO HCPCS SELF ADMINISTERED DRUGS (ALT 637 FOR MEDICARE OP)

## 2024-04-16 PROCEDURE — 99222 1ST HOSP IP/OBS MODERATE 55: CPT | Performed by: REGISTERED NURSE

## 2024-04-16 PROCEDURE — 96415 CHEMO IV INFUSION ADDL HR: CPT

## 2024-04-16 PROCEDURE — 85007 BL SMEAR W/DIFF WBC COUNT: CPT

## 2024-04-16 PROCEDURE — 96417 CHEMO IV INFUS EACH ADDL SEQ: CPT

## 2024-04-16 PROCEDURE — 80053 COMPREHEN METABOLIC PANEL: CPT

## 2024-04-16 PROCEDURE — 99285 EMERGENCY DEPT VISIT HI MDM: CPT | Mod: 25

## 2024-04-16 PROCEDURE — 99215 OFFICE O/P EST HI 40 MIN: CPT | Performed by: INTERNAL MEDICINE

## 2024-04-16 PROCEDURE — 44799 UNLISTED PX SMALL INTESTINE: CPT

## 2024-04-16 PROCEDURE — 85027 COMPLETE CBC AUTOMATED: CPT

## 2024-04-16 PROCEDURE — 99215 OFFICE O/P EST HI 40 MIN: CPT | Mod: 25 | Performed by: INTERNAL MEDICINE

## 2024-04-16 PROCEDURE — 2500000001 HC RX 250 WO HCPCS SELF ADMINISTERED DRUGS (ALT 637 FOR MEDICARE OP): Performed by: INTERNAL MEDICINE

## 2024-04-16 PROCEDURE — 96413 CHEMO IV INFUSION 1 HR: CPT

## 2024-04-16 PROCEDURE — 96368 THER/DIAG CONCURRENT INF: CPT

## 2024-04-16 PROCEDURE — 96416 CHEMO PROLONG INFUSE W/PUMP: CPT

## 2024-04-16 PROCEDURE — 96374 THER/PROPH/DIAG INJ IV PUSH: CPT | Mod: 59,INF

## 2024-04-16 PROCEDURE — 96365 THER/PROPH/DIAG IV INF INIT: CPT | Mod: 59,INF

## 2024-04-16 PROCEDURE — 2500000004 HC RX 250 GENERAL PHARMACY W/ HCPCS (ALT 636 FOR OP/ED)

## 2024-04-16 PROCEDURE — 2720000007 HC OR 272 NO HCPCS

## 2024-04-16 RX ORDER — DIPHENHYDRAMINE HYDROCHLORIDE 50 MG/ML
50 INJECTION INTRAMUSCULAR; INTRAVENOUS AS NEEDED
Status: DISCONTINUED | OUTPATIENT
Start: 2024-04-16 | End: 2024-04-16 | Stop reason: HOSPADM

## 2024-04-16 RX ORDER — ACETAMINOPHEN 160 MG/5ML
650 SOLUTION ORAL EVERY 4 HOURS PRN
Status: DISCONTINUED | OUTPATIENT
Start: 2024-04-16 | End: 2024-04-17 | Stop reason: HOSPADM

## 2024-04-16 RX ORDER — PALONOSETRON 0.05 MG/ML
0.25 INJECTION, SOLUTION INTRAVENOUS ONCE
Status: COMPLETED | OUTPATIENT
Start: 2024-04-16 | End: 2024-04-16

## 2024-04-16 RX ORDER — ACETAMINOPHEN 325 MG/1
650 TABLET ORAL EVERY 4 HOURS PRN
Status: DISCONTINUED | OUTPATIENT
Start: 2024-04-16 | End: 2024-04-17 | Stop reason: HOSPADM

## 2024-04-16 RX ORDER — ONDANSETRON 4 MG/1
8 TABLET, FILM COATED ORAL EVERY 8 HOURS PRN
Status: DISCONTINUED | OUTPATIENT
Start: 2024-04-16 | End: 2024-04-17 | Stop reason: HOSPADM

## 2024-04-16 RX ORDER — ENOXAPARIN SODIUM 100 MG/ML
40 INJECTION SUBCUTANEOUS EVERY 12 HOURS SCHEDULED
Status: DISCONTINUED | OUTPATIENT
Start: 2024-04-16 | End: 2024-04-17 | Stop reason: HOSPADM

## 2024-04-16 RX ORDER — SPIRONOLACTONE 25 MG/1
25 TABLET ORAL
Status: DISCONTINUED | OUTPATIENT
Start: 2024-04-17 | End: 2024-04-17 | Stop reason: HOSPADM

## 2024-04-16 RX ORDER — ACETAMINOPHEN 650 MG/1
650 SUPPOSITORY RECTAL EVERY 4 HOURS PRN
Status: DISCONTINUED | OUTPATIENT
Start: 2024-04-16 | End: 2024-04-17 | Stop reason: HOSPADM

## 2024-04-16 RX ORDER — TALC
3 POWDER (GRAM) TOPICAL NIGHTLY PRN
Status: DISCONTINUED | OUTPATIENT
Start: 2024-04-16 | End: 2024-04-17 | Stop reason: HOSPADM

## 2024-04-16 RX ORDER — POLYETHYLENE GLYCOL 3350 17 G/17G
17 POWDER, FOR SOLUTION ORAL DAILY
Status: DISCONTINUED | OUTPATIENT
Start: 2024-04-16 | End: 2024-04-17

## 2024-04-16 RX ORDER — EPINEPHRINE 0.3 MG/.3ML
0.3 INJECTION SUBCUTANEOUS EVERY 5 MIN PRN
Status: DISCONTINUED | OUTPATIENT
Start: 2024-04-16 | End: 2024-04-16 | Stop reason: HOSPADM

## 2024-04-16 RX ORDER — DIPHENHYDRAMINE HCL 25 MG
25 CAPSULE ORAL ONCE
Status: COMPLETED | OUTPATIENT
Start: 2024-04-16 | End: 2024-04-16

## 2024-04-16 RX ORDER — FAMOTIDINE 10 MG/ML
20 INJECTION INTRAVENOUS ONCE AS NEEDED
Status: DISCONTINUED | OUTPATIENT
Start: 2024-04-16 | End: 2024-04-16 | Stop reason: HOSPADM

## 2024-04-16 RX ORDER — ATROPINE SULFATE 0.1 MG/ML
0.4 INJECTION INTRAVENOUS ONCE
Status: COMPLETED | OUTPATIENT
Start: 2024-04-16 | End: 2024-04-16

## 2024-04-16 RX ORDER — METOPROLOL SUCCINATE 50 MG/1
50 TABLET, EXTENDED RELEASE ORAL
Status: DISCONTINUED | OUTPATIENT
Start: 2024-04-17 | End: 2024-04-17 | Stop reason: HOSPADM

## 2024-04-16 RX ORDER — PROCHLORPERAZINE MALEATE 10 MG
10 TABLET ORAL EVERY 6 HOURS PRN
Status: DISCONTINUED | OUTPATIENT
Start: 2024-04-16 | End: 2024-04-17 | Stop reason: HOSPADM

## 2024-04-16 RX ORDER — ATORVASTATIN CALCIUM 40 MG/1
20 TABLET, FILM COATED ORAL NIGHTLY
Status: DISCONTINUED | OUTPATIENT
Start: 2024-04-16 | End: 2024-04-17 | Stop reason: HOSPADM

## 2024-04-16 RX ORDER — LORAZEPAM 2 MG/ML
1 INJECTION INTRAMUSCULAR AS NEEDED
Status: DISCONTINUED | OUTPATIENT
Start: 2024-04-16 | End: 2024-04-16 | Stop reason: HOSPADM

## 2024-04-16 RX ORDER — DULOXETIN HYDROCHLORIDE 30 MG/1
60 CAPSULE, DELAYED RELEASE ORAL
Status: DISCONTINUED | OUTPATIENT
Start: 2024-04-17 | End: 2024-04-17 | Stop reason: HOSPADM

## 2024-04-16 RX ORDER — ALBUTEROL SULFATE 0.83 MG/ML
3 SOLUTION RESPIRATORY (INHALATION) AS NEEDED
Status: DISCONTINUED | OUTPATIENT
Start: 2024-04-16 | End: 2024-04-16 | Stop reason: HOSPADM

## 2024-04-16 RX ORDER — LOPERAMIDE HCL 1MG/7.5ML
2 LIQUID (ML) ORAL 4 TIMES DAILY PRN
Status: DISCONTINUED | OUTPATIENT
Start: 2024-04-16 | End: 2024-04-17 | Stop reason: HOSPADM

## 2024-04-16 RX ADMIN — DEXAMETHASONE SODIUM PHOSPHATE 12 MG: 10 INJECTION, SOLUTION INTRAMUSCULAR; INTRAVENOUS at 10:09

## 2024-04-16 RX ADMIN — DEXTROSE MONOHYDRATE 80 MG: 50 INJECTION, SOLUTION INTRAVENOUS at 11:29

## 2024-04-16 RX ADMIN — DIPHENHYDRAMINE HYDROCHLORIDE 25 MG: 25 CAPSULE ORAL at 10:00

## 2024-04-16 RX ADMIN — FLUOROURACIL 4150 MG: 50 INJECTION, SOLUTION INTRAVENOUS at 14:47

## 2024-04-16 RX ADMIN — LEUCOVORIN CALCIUM 320 MG: 350 INJECTION, POWDER, LYOPHILIZED, FOR SUSPENSION INTRAMUSCULAR; INTRAVENOUS at 12:40

## 2024-04-16 RX ADMIN — ATORVASTATIN CALCIUM 20 MG: 40 TABLET, FILM COATED ORAL at 21:54

## 2024-04-16 RX ADMIN — PALONOSETRON 250 MCG: 0.05 INJECTION, SOLUTION INTRAVENOUS at 10:00

## 2024-04-16 RX ADMIN — SODIUM CHLORIDE 150 MG: 9 INJECTION, SOLUTION INTRAVENOUS at 10:08

## 2024-04-16 RX ADMIN — OXALIPLATIN 135 MG: 5 INJECTION, SOLUTION INTRAVENOUS at 12:40

## 2024-04-16 RX ADMIN — ATROPINE SULFATE 0.4 MG: 0.1 INJECTION, SOLUTION INTRAVENOUS at 10:00

## 2024-04-16 ASSESSMENT — LIFESTYLE VARIABLES
TOTAL SCORE: 0
EVER HAD A DRINK FIRST THING IN THE MORNING TO STEADY YOUR NERVES TO GET RID OF A HANGOVER: NO
HAVE YOU EVER FELT YOU SHOULD CUT DOWN ON YOUR DRINKING: NO
HAVE PEOPLE ANNOYED YOU BY CRITICIZING YOUR DRINKING: NO
EVER FELT BAD OR GUILTY ABOUT YOUR DRINKING: NO

## 2024-04-16 ASSESSMENT — PAIN SCALES - GENERAL
PAINLEVEL_OUTOF10: 0 - NO PAIN
PAINLEVEL: 0-NO PAIN
PAINLEVEL_OUTOF10: 0 - NO PAIN
PAINLEVEL_OUTOF10: 0 - NO PAIN

## 2024-04-16 ASSESSMENT — PAIN - FUNCTIONAL ASSESSMENT: PAIN_FUNCTIONAL_ASSESSMENT: 0-10

## 2024-04-16 NOTE — CONSULTS
"Reason For Consult  \"J tube fell out\"    History Of Present Illness  Shruthi Ramos is a 58 y.o. female who presented to [facility] ED on 4/16 from [facility] after failed attempt at bedside replacement of J tube.  Patient has a history significant for GE junction adenocarcinoma, fibromyalgia, HTN, and HLD. She had gradually worsening dysphagia with further GI work-up revealing gastric mass with the biopsy confirming poorly differentiated adenocarcinoma in July 2023. She was treated with neoadjuvant chemotherapy with FLOT protocol from Oct to Dec 2023. She had a total gastrectomy with J tube placement in Jan 2024 at [facility] with Dr. Chavarria. Postop pathology results showed G3, poorly differentiated cancer with partial treatment response, positive LVI/PNI, 7/25 lymph nodes positive (pT3 pN3). Her postoperative course was complicated by wound infection and pneumatosis with tube feeds that resolved with TPN, bowel rest, and antibiotics. After reevaluation, she was started on adjuvant chemotherapy with FLOT beginning March 2024. Her oncologist is Dr. Torres.     Patient was standing up from chair to leave for her treatment today when J tube was caught and dislodged. Patient denies any pain. Tube is still indicated as patient receives nocturnal TF to supplement her PO intake.   Dr. Murray was notified by the infusion center and attempted bedside replacement while in the outpatient setting. This was unsuccessful and he advised patient to come to ED for IR consult to replace.     Patient goes for chemotherapy sessions every 2 weeks. She states she receives 4 different medications, the last of which runs over 24 hours via pump through her port. She is due to be disconnected from this med tomorrow at 1450.  Reports diarrhea after all of her chemotherapy sessions, but that it eventually subsides after a week and a half. Her diarrhea subsided about 3 days ago per patient report. Had a soft, brown BM " "this morning. Denies nausea at this time, but often becomes nauseated after treatments.    Denies urinary symptoms at home. Denies pain or swelling of BLE. Endorsing a \"scratchy\" throat and dry cough, but attributing this to recent retching and vomiting this past Friday (). Denies SOB or chest pain. Denies redness or swelling anywhere on her body. She does have allergy to Silk tape and has tiny area of skin irritation from tape in LLQ.  J tube site re-dressed with 4x4 gauze and ABD pad due to leaking of intestinal contents.      Past Medical History  She has a past medical history of Arthritis, Depression, Esophageal cancer (Multi), Fibromyalgia, primary, Hyperlipidemia, Hypertension, Osteoarthritis, PONV (postoperative nausea and vomiting), Restless leg syndrome, and Sleep apnea.    Surgical History  She has a past surgical history that includes Ankle fracture surgery; Cholecystectomy;  section, low transverse; Esophagogastroduodenoscopy; Colonoscopy; Hysterectomy; and Other surgical history.     Social History  She reports that she has never smoked. She has never used smokeless tobacco. She reports that she does not drink alcohol and does not use drugs.    Family History  Family History   Problem Relation Name Age of Onset    Diabetes Mother      Hypertension Mother      Heart disease Father      Diabetes Father      Heart attack Father      Heart attack Sister      Breast cancer Sister      Heart attack Brother          Allergies  Morphine, Sutures, Flu vac-2017 [id number](pf), and Other    Review of Systems  As noted above:   Intermittent nausea/vomiting, no abdominal pain. Diarrhea (3 days ago this ended). No fever or chills.npo     Physical Exam:  Constitutional:         Pleasant adult female resting in hospital bed in CDU with  at bedside. No acute distress  HENT:     MMM, dentures in place, no lacerations or ulcerations within oropharynx noted. No thrush   Eyes:      sclera white, " "anicteric, no discharge  Cardiovascular:      RRR, pulses palpable, no audible murmurs. Right chest Medi-Port accessed- no surrounding erythema or drainage.   Pulmonary:     Room air. Comfortable WOB. Thorax symmetric. Breath sounds clear. No cough. Somewhat of a raspy voice/scratchy throat during lengthy conversation noted   Abdominal:      Abdomen obese, soft to palpation, nontender. Midline scar appreciated- no erythema or drainage. Left mid abdomen previous J tube site with leaking gastric contents and erythema right at opening. Dressing changed as it was completely saturated   Skin:     No jaundice, no systemic rash, no pallor. Warm  Extremities:     No pitting edema. Pulses palpable. Warm extremities   Neurological:      A&O x4  Psychiatric:         Pleasant mood and behaviors      Last Recorded Vitals  Blood pressure 122/71, pulse 70, temperature 36.4 °C (97.5 °F), temperature source Temporal, resp. rate 18, height 1.549 m (5' 1\"), weight 96.6 kg (213 lb), SpO2 97%.    Relevant Results  Results for orders placed or performed in visit on 04/16/24 (from the past 24 hour(s))   CBC and Auto Differential   Result Value Ref Range    WBC 19.1 (H) 4.4 - 11.3 x10*3/uL    nRBC 0.3 (H) 0.0 - 0.0 /100 WBCs    RBC 4.71 4.00 - 5.20 x10*6/uL    Hemoglobin 12.3 12.0 - 16.0 g/dL    Hematocrit 39.4 36.0 - 46.0 %    MCV 84 80 - 100 fL    MCH 26.1 26.0 - 34.0 pg    MCHC 31.2 (L) 32.0 - 36.0 g/dL    RDW 16.8 (H) 11.5 - 14.5 %    Platelets 207 150 - 450 x10*3/uL    Immature Granulocytes %, Automated 5.6 (H) 0.0 - 0.9 %    Immature Granulocytes Absolute, Automated 1.07 (H) 0.00 - 0.70 x10*3/uL   Comprehensive Metabolic Panel   Result Value Ref Range    Glucose 92 74 - 99 mg/dL    Sodium 140 136 - 145 mmol/L    Potassium 3.5 3.5 - 5.3 mmol/L    Chloride 103 98 - 107 mmol/L    Bicarbonate 28 21 - 32 mmol/L    Anion Gap 13 10 - 20 mmol/L    Urea Nitrogen 13 6 - 23 mg/dL    Creatinine 0.82 0.50 - 1.05 mg/dL    eGFR 83 >60 " mL/min/1.73m*2    Calcium 9.2 8.6 - 10.3 mg/dL    Albumin 3.6 3.4 - 5.0 g/dL    Alkaline Phosphatase 137 (H) 33 - 110 U/L    Total Protein 6.2 (L) 6.4 - 8.2 g/dL    AST 26 9 - 39 U/L    Bilirubin, Total 0.4 0.0 - 1.2 mg/dL    ALT 19 7 - 45 U/L   Magnesium   Result Value Ref Range    Magnesium 2.01 1.60 - 2.40 mg/dL   Manual Differential   Result Value Ref Range    Neutrophils %, Manual 62.0 40.0 - 80.0 %    Bands %, Manual 11.0 0.0 - 5.0 %    Lymphocytes %, Manual 15.0 13.0 - 44.0 %    Monocytes %, Manual 9.0 2.0 - 10.0 %    Eosinophils %, Manual 0.0 0.0 - 6.0 %    Basophils %, Manual 0.0 0.0 - 2.0 %    Atypical Lymphocytes %, Manual 3.0 0.0 - 2.0 %    Seg Neutrophils Absolute, Manual 11.84 (H) 1.20 - 7.00 x10*3/uL    Bands Absolute, Manual 2.10 (H) 0.00 - 0.70 x10*3/uL    Lymphocytes Absolute, Manual 2.87 1.20 - 4.80 x10*3/uL    Monocytes Absolute, Manual 1.72 (H) 0.10 - 1.00 x10*3/uL    Eosinophils Absolute, Manual 0.00 0.00 - 0.70 x10*3/uL    Basophils Absolute, Manual 0.00 0.00 - 0.10 x10*3/uL    Atypical Lymphs Absolute, Manual 0.57 (H) 0.00 - 0.50 x10*3/uL    Total Cells Counted 100     Neutrophils Absolute, Manual 13.94 (H) 1.20 - 7.70 x10*3/uL    RBC Morphology See Below     Polychromasia Mild     Ovalocytes Few           Assessment/Plan     58 year old female with GE junction adenocarcinoma s/p neoadjuvant chemotherapy, total gastrectomy and J tube placement (Jan 2024), now on adjuvant chemotherapy presented to [facility] ED on 4/16 after J tube was dislodged that morning.    Impression:  Chemotherapy induced N/V/Diarrhea  Need for enteral access  Leukocytosis     Recommendations:  - consult IR for J tube replacement    (No interventionalist here at this time; procedure likely to be tomorrow afternoon)  - Regular diet now; NPO at midnight  - would consult dietician while inpatient  - would consult oncology while inpatient to comment on leukocytosis as well as manage current chemo infusion that ends after 24  hours total run time  - Frequent dressing changes to previous J tube site to prevent skin breakdown; utilize Calcium alginate to base, cover with 4x4 (folded into 4), then ABD. PAPER tape only  - symptom management per medicine team (needs PRN nausea meds, Imodium PRN)  - workup of leukocytosis per primary team (recommend at least UA and CXR)  - ongoing follow up with outpatient dietician  - follow up with surgical oncologist after completion of chemotherapy treatments to determine when J tube can ultimately be discontinued       The patient was discussed with the attending surgeon Dr. Murray    I spent 30 minutes in the professional and overall care of this patient.  Greater than 50% of this time was spent counseling patient, reviewing plan of care, and in coordination of care.       Shyanne Tong, APRN-CNP    --    Addendum    Patient seen and examined. Chart reviewed where relevant. Discussed findings and clinical plan with note author. Modifications or addendum made only as necessary, and agree with finalized documentation.    Failed replacement with 16 Fr Gastrostomy tube.  Will need IR replacement through jejunostomy site, with max balloon inflation of 3 ml.  Can be fed in meantime. Nutrition consult. Volume is difficult and using tube feeds for supplementation.  No need for surgical intervention unless patient fails replacement AND fails diet trial.  Local wound care in meantime.    Cory Murray MD, PhD  Available via [employer]

## 2024-04-16 NOTE — PROGRESS NOTES
Followed up with pt and her . Pt came in for treatment today. Pt got up from chair prior to coming in and her j tube got stuck and it pulled out. Pt explained she was worried because she was uncertain what to do. Pt covered it and took shower and covered it again. Surgeon called from [facility] to explain what happened and they came to [facility] to see pt while in treatment area. Surgeon will be assisting pt today to replace j tube. Pt and  in good spirits and in a way could not believe this happened. Pt was surprised it did not hurt when it came out at all. Pt otherwise indicates she has a great deal of nausea she fights through but she continues to work with dietitian on this to see if she can get ahead of it. Pt seems to be in good spirits and at this time feels she is coping and managing. Reminded them to call with any needs.

## 2024-04-16 NOTE — Clinical Note
12 Romansh x 25cm M-Drain placed in pathway of old J tube terminating in the jeuodenum , will be exchanged at a later date par Dr. Zheng, secured by [employer]

## 2024-04-16 NOTE — PROGRESS NOTES
LOCATION:  [facility] at [facility].     HEMATOLOGY & ONCOLOGY PROBLEMS:  1.  Localized gastric adenocarcinoma.       a.  Neoadjuvant chemotherapy with FLOT from Oct to Dec 2023.       b.  S/p total gastrectomy in January 2024.  Postoperative pathology (pT3,pN3).       c.  Adjuvant chemotherapy with FLOT beginning March 2024    CHIEF COMPLAINT:    The patient is in the clinic today for follow-up of GE junction adenocarcinoma and for continuation of treatment and management of therapy related effects.                   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  She was having problem with gradually worsening dysphagia with further GI work-up revealing gastric mass with the biopsy confirming  poorly differentiated adenocarcinoma in July 2023.  Prior to that she had a barium esophagogram which was essentially unremarkable.  Patient does have a previous history of GERD and had one time was treated for H. pylori gastritis.  EUS done by Dr. London on 7/17/2023 showed malignancy starting near the GE junction and measuring 2.2 x 1.5 cm towards the cardia along  the lesser curvature / anterior wall with malignant appearing features.  There is loss of interface between the mass and liver along a 7 mm region, concerning for probable  early hepatic involvement.  Enlarged lymph nodes were noted in the diaphragmatic region, and gastrohepatic ligament.  CT chest abdomen pelvis with contrast on 8/9/2023 showed thickening of the distal esophagus extending into the anterior portion of the  gastric cardia.  There is a small amount of liver present directly adjacent to the area of the tumor but there is no altered enhancement pattern in the liver parenchyma.  Slightly inferior to the cardia there  were lymph nodes visible in the adjacent mesentery that were not visible previously measuring up to 6 mm in short axis. PET/CT on 8/31/2023 showed a hypermetabolic focus at the GE junction   with extension into the gastric cardia, no evidence of hypermetabolic regional or distant metastatic disease. Brain MRI negative for metastasis.  She was treated with neoadjuvant chemotherapy with FLOT protocol from Oct to Dec 2023.  She had a total gastrectomy in 2024.  Postop pathology results showed G3, poorly differentiated cancer with partial treatment response, positive LVI/PNI, 7/25 lymph nodes positive (pT3 pN3).  After reevaluation she was started on adjuvant chemotherapy with FLOT beginning 2024.     INTERVAL HISTORY:  Overall stable with good days and bad days.  Persistent intermittent issues with diarrhea and nausea.  She is being supported with aggressive supportive care with intermittent hydration etc. Follow-up blood work from today is unremarkable.  Yesterday her feeding tube got dislodged and she is scheduled for immediate follow-up with surgery team for replacement.    PAST MEDICAL HISTORY:   1.  GE junction adenocarcinoma as detailed above.   2.  Hypertension   3.  Hyperlipidemia   4.  Anxiety/depression   5.  History of complete hysterectomy, ankle surgery, cholecystectomy and 2  procedures     SOCIAL HISTORY:    and lives in [facility].  Non-smoker and nonalcoholic.  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  Born and raised in [place].     FAMILY HISTORY:    Father  at age 76 from myocardial infarction mother  from stroke at age 78.  She had 8 siblings.  1 brother  from MI another committed suicide.  A sister also  from myocardial infarction.   She had 2 children.  Her son  from AML.  No other specific history of bleeding, clotting or malignant disorder in the family.     REVIEW OF SYSTEMS:  Pertinent finding as per the history above.  All other systems have been reviewed and generally negative and noncontributory.     PHYSICAL EXAMINATION:    Detailed physical examination not done     ALLERGY & MEDICATIONS:  Allergies and latest outpatient  medications list were reviewed in the EMR.    LAB RESULTS:  CBC with hemoglobin of 12.3 and platelets of 207.  White cell differential is pending.  Metabolic profile was unremarkable on 4/2/2024 other than alkaline phosphatase of 133.  Last 3 sets of blood work were reviewed and the trend was noted     RADIOLOGY RESULT:  PET/CT Esophageal Initial [Aug 31 2023  1:12PM]  Impression:  1. Hypermetabolic focus localized at gastroesophageal junction with xtension to the gastric cardia compatible with biopsy-proven gastric adenocarcinoma.  2. No evidence of hypermetabolic regional or distant metastatic disease.  3. Status post cholecystectomy with demonstration of localized pneumobilia.     MRI Brain w/wo Contrast [Aug 29 2023 12:28PM]   Impression:  There is no evidence of mass, infarction or hemorrhage.     PATHOLOGY RESULTS:  Surgical Pathology Exam: [id number]   FINAL DIAGNOSIS   A. Liver, segment 4 lesion, biopsy:  - Bile duct adenoma.     B. 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  - Resection margins are negative for neoplasia.  - Six of twenty-nine lymph nodes involved by metastatic carcinoma (6/29).  - Background stomach with reactive gastropathy, intestinal metaplasia, and dieulafoy lesion with accompanying hemorrhage.  - Omentum with no significant pathologic findings.     C. Lymph nodes, D2, excision:  - One of six lymph nodes involved by metastatic carcinoma (1/6).     D. Final esophageal margin, resection:  - Segment of esophagus, negative for neoplasia.   Electronically signed by Sachi Eubanks MD on 1/18/2024 at 1350   Case Summary Report   ESOPHAGUS    8th Edition - Protocol posted: 6/22/2022ESOPHAGUS - B  SPECIMEN   Procedure Esophagogastrectomy   TUMOR   Tumor Site Esophagogastric junction (EGJ)   Relationship of Tumor to Esophagogastric Junction Tumor midpoint is located at the esophagogastric  junction   Histologic Type Adenocarcinoma   Histologic Grade G3, poorly differentiated, undifferentiated   Tumor Size Greatest Dimension (Centimeters): 2.3 (grossly) cm   Tumor Extent Invades adventitia   Treatment Effect Present, with residual cancer showing evident tumor regression, but more than single cells or rare small groups of cancer cells (partial response, score 2)   Lymphovascular Invasion Present   Perineural Invasion Present   MARGINS   Margin Status for Invasive Carcinoma All margins negative for invasive carcinoma   Closest Margin(s) to Invasive Carcinoma Deep   Distance from Invasive Carcinoma to Closest Margin 1.2 cm   Margin Status for Dysplasia and Intestinal Metaplasia All margins negative for dysplasia   REGIONAL LYMPH NODES   Regional Lymph Node Status Tumor present in regional lymph node(s)   Number of Lymph Nodes with Tumor 7   Number of Lymph Nodes Examined 35   PATHOLOGIC STAGE CLASSIFICATION (pTNM, AJCC 8th Edition)   TNM Descriptors y (post-treatment)   pT Category pT3   pN Category pN3         ASSESSMENT AND PLAN:   1.  GE junction poorly differentiated adenocarcinoma.  Please refer to the details as outlined above.  In summary patient with few month history  of gradually worsening dysphagia to both solid and liquid.  Initial barium esophagogram was normal but EGD showed a GE junction mass in July 2023. Biopsy results are confirmatory of poorly differentiated adenocarcinoma with normal mismatch repair gene  expression. EUS revealed a GE junction lesion with concern regarding direct extension to liver and enlarged pathological lymph nodes.  CT scan showed stomach involvement with local regional lymphadenopathy but no distance organ involvement.  A follow-up  PET scan results were confirmatory of increased metabolic activity starting at the GE junction with extension into cardia.  There was no finding of any hypermetabolic activity in the liver or any other local or distant metastatic  lesions.  Brain MRI was  unremarkable.  After multidisciplinary evaluation of tumor is considered more gastric than GE junction and she has been advised evaluation for neoadjuvant chemotherapy followed by surgical evaluation.  She was treated with neoadjuvant chemotherapy with FLOT protocol from Oct to Dec 2023.  She had a total gastrectomy in Jan 2024.  Postop pathology results showed G3, poorly differentiated cancer with partial treatment response, positive LVI/PNI, 7/25 lymph nodes positive (pT3 pN3). After reevaluation she was started on adjuvant chemotherapy with FLOT beginning March 2024.    For now she will continue with the chemotherapy at the current dose and schedule.  Starting cycle #3 today.  Plan is to treat her with adjuvant FLOT for 4 cycles.  Probable side effects of neuropathy, weakness, fatigue, myelosuppression, alopecia etc. were explained to her in detail.  She did have significant problems in tolerating chemotherapy during the neoadjuvant phase requiring aggressive hydration and other supportive care.  Will continue with the strategies in the adjuvant setting also.    2.  Treatment induced diarrhea/nausea.  She will continue to be supported with aggressive supportive care Imodium, Lomotil, Zofran, Compazine and intermittent hydration.    3.  Fluid electrolyte nutrition.  She will continue to follow with with our nutritionist.    4.  Follow-up.  Follow-up with me in 2 weeks.  In the meantime she will come to the clinic for continuation of chemotherapy as per the protocol along with appointments for intermittent hydration.  Advised to contact us immediately if there are any new questions or concerns.    This note has been transcribed using Dragon voice recognition system and there is a possibility of unintentional typing misprints.

## 2024-04-16 NOTE — H&P
History Of Present Illness  Shruthi Ramos is a 58 y.o. female with a significant past medical history of localized gastric adenocarcinoma s/p total gastrectomy geriatric 23 and currently on adjuvant chemotherapy with FLOT presented to hospital after having her J-tube dislodged.  She was at home and was about to stand up and suddenly heard a pop and found the G-tube to be on the floor.  She came to the cancer center at [facility] where surgery team including Dr. Murray attempted to perform bedside replacement however was unsuccessful.  She was advised to come to the ED for an IR consult to replace.  She states that there was a lot of watery output from where the J-tube site was dislodged.  There was no blood other than 1 small clot.  She denies any significant abdominal pain other than mild discomfort.  She did feel she had some vomiting and nausea sensation however did not proceed to vomit.  In January 2024 she had a total gastrectomy with J-tube placement.  She was started on tube feeds through J-tube for nutrition purposes and is indicated for nocturnal tube feeds to supplement her p.o. intake.  She is still has normal bowel movements and is able to eat orally.    On arrival to ED, vital signs are stable.  Significant labs showed WCC 19.  Neutrophils 14.  Patient feels well and is alert and oriented x 3.  A referral to interventional radiology has been placed for J-tube replacement.  She is being admitted to medicine team for further management of J-tube dislodgment.    CODE STATUS: Full code     Past Medical History  Past Medical History:   Diagnosis Date    Arthritis     Depression     Esophageal cancer (Multi)     GE junction carcinoma    Fibromyalgia, primary     Hyperlipidemia     Hypertension     Osteoarthritis     PONV (postoperative nausea and vomiting)     Restless leg syndrome     Sleep apnea        Surgical History  Past Surgical History:   Procedure Laterality Date    ANKLE FRACTURE SURGERY       " SECTION, LOW TRANSVERSE      x2    CHOLECYSTECTOMY      COLONOSCOPY      ESOPHAGOGASTRODUODENOSCOPY      HYSTERECTOMY      OTHER SURGICAL HISTORY      Mediport placement        Social History  She reports that she has never smoked. She has never used smokeless tobacco. She reports that she does not drink alcohol and does not use drugs.    Family History  Family History   Problem Relation Name Age of Onset    Diabetes Mother      Hypertension Mother      Heart disease Father      Diabetes Father      Heart attack Father      Heart attack Sister      Breast cancer Sister      Heart attack Brother          Allergies  Flu vac-2017 [id number](pf), Morphine, and Sutures    Review of Systems   A 12 point review of systems was performed and otherwise negative except as stated in the HPI.   Physical Exam   Physical Exam:  General:  Pleasant and cooperative. No apparent distress.  Obese.  HEENT:  Normocephalic, atraumatic, mucus membranes moist.   Neck:  Trachea midline.  No JVD.    Chest:  Clear to auscultation bilaterally. No wheezes, rales, or rhonchi.  CV:  Regular rate and rhythm.  Positive S1/S2.   Abdomen: J-tube site located in the central abdomen in the lower part.  Covered with dressing.  Nontender.  Soft.  Extremities:  No lower extremity edema or cyanosis.   Neurological:  AAOx3. No focal deficits.  Skin:  Warm and dry.   Last Recorded Vitals  Blood pressure 120/68, pulse 67, temperature 36.4 °C (97.5 °F), temperature source Temporal, resp. rate 18, height 1.549 m (5' 1\"), weight 96.6 kg (213 lb), SpO2 95%.    Relevant Results  All labs and images were reviewed by myself.     Assessment/Plan   Shruthi Ramos is a 58-year-old female with past medical history of GE junction adenocarcinoma s/p neoadjuvant chemotherapy, total gastrectomy, and G-tube placement 2024 currently on adjuvant chemotherapy presented to hospital with J-tube dislodgment.  There is significant leukocytosis.  She is being " admitted to medicine team for further management of J-tube dislodgment.    # J-tube dislodgment  # GE junction adenocarcinoma s/p total gastrectomy  -We will consult interventional radiology team to plan on performing J-tube replacement tomorrow.  Surgery team states that there is no need for surgical intervention unless she fails the replacement and fails a diet trial.  - Leukocytosis likely secondary to event of J-tube dislodgment placement.  Patient denies any fever or chills.  She denies any urinary symptoms or any cough.  - Continue frequent dressing changes per wound team  - Keep n.p.o. at midnight  - Will give Zofran and Compazine as needed for any nausea or vomiting.  - Patient currently having chemotherapy infusion running and is supposed and at 2:50 PM tomorrow 4/17/2024.  We will contact oncology team and ensure that appropriate scheduling is placed.    # HTN, HLD  # Fibromyalgia  # Depression  # Osteoarthritis  # Sleep apnea  - Continue home Lipitor, Cymbalta, Toprol 50 once daily    - DVT PPx: Lovenox-hold      Annamaria Stock MD  PGY1 internal medicine

## 2024-04-16 NOTE — PROGRESS NOTES
Dr. Murray unable to successfully replace tube - advised patient to go directly to ED after her treatment today to see about having IR assist in replacing tube.

## 2024-04-16 NOTE — PROGRESS NOTES
Pharmacy Medication History Review    Shruthi Ramos is a 58 y.o. female admitted for Jejunostomy tube fell out. Pharmacy reviewed the patient's [id number] medications and allergies for accuracy.    The list below reflectives the updated PTA list. Please review each medication in order reconciliation for additional clarification and justification.  Prior to Admission medications    Medication Sig Start Date End Date Taking? Authorizing Provider   acetaminophen (Tylenol) 325 mg tablet Take 2 tablets (650 mg) by mouth every 4 hours if needed for fever (temp greater than 38.0 C) (greater than or equal to 38 C).  Patient taking differently: Take 2 tablets (650 mg) by mouth every 4 hours if needed for fever (temp greater than 38.0 C) or mild pain (1 - 3) (greater than or equal to 38 C). 11/12/23   Gaudencio Burks DO   atorvastatin (Lipitor) 20 mg tablet Take 1 tablet (20 mg) by mouth once daily at bedtime.    Historical Provider, MD   cholecalciferol (Vitamin D3) 25 MCG (1000 UT) tablet Take 1 tablet (1,000 Units) by mouth once daily in the morning. 3/30/22   Historical Provider, MD   cyanocobalamin (Vitamin B-12) 1,000 mcg tablet Take 1 tablet (1,000 mcg) by mouth once daily. 3/30/22   Historical Provider, MD   DULoxetine (Cymbalta) 60 mg DR capsule Take 1 capsule (60 mg) by mouth once daily in the morning. Take before meals. 9/2/23   Historical Provider, MD   loperamide (Anti-DiarrheaL, loperamide,) 1 mg/7.5 mL liquid Take 15 mL (2 mg) by mouth 4 times a day as needed for diarrhea. 4/10/24   Ollie Torres MD   loperamide (Imodium) 2 mg capsule Take 2 capsules (4 mg) by mouth 4 times a day as needed for diarrhea. 4/10/24   Ollie Torres MD   metoprolol succinate XL (Toprol-XL) 50 mg 24 hr tablet Take 1 tablet (50 mg) by mouth once daily in the morning. Take before meals.    Historical Provider, MD   MULTIVITAMIN WITH IRON ORAL Take 1 tablet by mouth once daily.    Historical Provider, MD   ondansetron  (Zofran) 8 mg tablet Take 1 tablet (8 mg) by mouth every 8 hours if needed for nausea or vomiting.    Historical Provider, MD   prochlorperazine (Compazine) 10 mg tablet Take 1 tablet (10 mg) by mouth every 6 hours if needed for nausea or vomiting. 3/13/24   Ollie Torres MD   spironolactone (Aldactone) 25 mg tablet Take 1 tablet (25 mg) by mouth once daily in the morning. Take before meals.    Historical Provider, MD   white petrolatum (Aquaphor) 41 % ointment ointment Apply 1 Application topically every 1 hour if needed (Dry Skin/Itching).  Patient taking differently: Apply 1 Application topically if needed (Dry Skin/Itching). 11/12/23   Gaudencio Burks, DO        The list below reflectives the updated allergy list. Please review each documented allergy for additional clarification and justification.  Allergies  Reviewed by Robyn Kyle, EMT on 4/16/2024        Severity Reactions Comments    Flu Vac-2017 [id number](pf) Medium Swelling     Morphine Medium Nausea/vomiting     Sutures Medium Other Dissolving sutures do not dissolve and become infected            Below are additional concerns with the patient's PTA list.      Vesta Graham

## 2024-04-16 NOTE — ED PROVIDER NOTES
HPI   Chief Complaint   Patient presents with    needs feeding tube re-inserted       Patient is a 58-year-old female with past medical history of esophageal cancer, fibromyalgia, hyperlipidemia, hypertension, who presents ED today due to her J-tube coming out.  She went to see her surgeon earlier today who attempted to replace but was unable to get it back into position.  He would like her admitted for IR placement of her J-tube.      History provided by:  Patient   used: No                        Dragan Coma Scale Score: 15                     Patient History   Past Medical History:   Diagnosis Date    Arthritis     Depression     Esophageal cancer (Multi)     GE junction carcinoma    Fibromyalgia, primary     Hyperlipidemia     Hypertension     Osteoarthritis     PONV (postoperative nausea and vomiting)     Restless leg syndrome     Sleep apnea      Past Surgical History:   Procedure Laterality Date    ANKLE FRACTURE SURGERY       SECTION, LOW TRANSVERSE      x2    CHOLECYSTECTOMY      COLONOSCOPY      ESOPHAGOGASTRODUODENOSCOPY      HYSTERECTOMY      OTHER SURGICAL HISTORY      Mediport placement     Family History   Problem Relation Name Age of Onset    Diabetes Mother      Hypertension Mother      Heart disease Father      Diabetes Father      Heart attack Father      Heart attack Sister      Breast cancer Sister      Heart attack Brother       Social History     Tobacco Use    Smoking status: Never    Smokeless tobacco: Never   Vaping Use    Vaping status: Never Used   Substance Use Topics    Alcohol use: Never    Drug use: Never       Physical Exam   ED Triage Vitals [24 1512]   Temperature Heart Rate Respirations BP   36.4 °C (97.5 °F) 70 18 122/71      Pulse Ox Temp Source Heart Rate Source Patient Position   97 % Temporal Monitor Sitting      BP Location FiO2 (%)     Right arm --       Physical Exam  Vitals and nursing note reviewed.   Constitutional:       General: She  is not in acute distress.     Appearance: She is well-developed.   HENT:      Head: Normocephalic and atraumatic.   Eyes:      Conjunctiva/sclera: Conjunctivae normal.   Cardiovascular:      Rate and Rhythm: Normal rate and regular rhythm.      Heart sounds: No murmur heard.  Pulmonary:      Effort: Pulmonary effort is normal. No respiratory distress.      Breath sounds: Normal breath sounds.   Abdominal:      Palpations: Abdomen is soft.      Tenderness: There is no abdominal tenderness.      Comments: Ostomy site present without erythema or tenderness palpation   Musculoskeletal:         General: No swelling.      Cervical back: Neck supple.   Skin:     General: Skin is warm and dry.      Capillary Refill: Capillary refill takes less than 2 seconds.   Neurological:      Mental Status: She is alert.   Psychiatric:         Mood and Affect: Mood normal.         ED Course & MDM   Diagnoses as of 04/16/24 1519   Jejunostomy tube fell out       Medical Decision Making  Differential diagnosis: Jejunostomy tube fell out, will need replaced.  I spoke with her surgeon who did recommend she be admitted for IR placement of the J-tube.  I did review her most recent lab work which does show a overt leukocytosis over baseline, no other significant changes in her CMP, magnesium, or remaining CBC.  I spoke with her surgeon who states there is no surgical intervention at this time and would recommend medicine admission for IR placement of the J-tube.  If unable to, push this through IR he states that they should do a swallow study and potentially return her to oral intake as opposed to replacing the J-tube surgically but if unable to do this he would replace the J-tube.  I spoke with the hospital attending who accepted the patient to the floor.  A referral to IR was placed so they can replace her J-tube tomorrow.    Amount and/or Complexity of Data Reviewed  Labs:  Decision-making details documented in ED Course.    Risk  Decision  regarding hospitalization.        Procedure  Procedures     Ramirez Hobson, BISMARK-SALVADOR  04/16/24 6544

## 2024-04-16 NOTE — PROCEDURES
Jejunostomy tube exchange attempted in Oncology / Infusion in [facility] today around 12:15 and again at 1:05PM.    Verbal consent obtained.  16 Fr verma placed initially with balloon to 3 ml.  Ultimately attempted replace at 1:05PM with 16 Fr balloon PEG into jejunostomy site. Got to 8 cm but previously at 9 cm. Fascial opening would not accommodate passage. Did not have forceps to apply additional force. Minimal drainage from site. Directed patient to ED for IR guided replacement as patient can only drink about 12 oz of fluid before getting full presently. Getting evening supplements via jejunostomy tube. Placed in January during resection.

## 2024-04-16 NOTE — SIGNIFICANT EVENT
04/16/24 0923   Prechemo Checklist   Has the patient been in the hospital, ED, or urgent care since last date of service No   Chemo/Immuno Consent Signed Yes   Protocol/Indications Verified Yes   Confirmed to previous date/time of medication Yes   Compared to previous dose Yes   All medications are dated accurately Yes   Pregnancy Test Negative Not applicable   Parameters Met Yes   BSA/Weight-Height Verified Yes   Dose Calculations Verified (current, total, cumulative) Yes

## 2024-04-17 ENCOUNTER — HOME HEALTH ADMISSION (OUTPATIENT)
Dept: HOME HEALTH SERVICES | Facility: HOME HEALTH | Age: 59
End: 2024-04-17

## 2024-04-17 ENCOUNTER — DOCUMENTATION (OUTPATIENT)
Dept: HOME HEALTH SERVICES | Facility: HOME HEALTH | Age: 59
End: 2024-04-17

## 2024-04-17 ENCOUNTER — APPOINTMENT (OUTPATIENT)
Dept: RADIOLOGY | Facility: HOSPITAL | Age: 59
End: 2024-04-17
Payer: COMMERCIAL

## 2024-04-17 ENCOUNTER — INFUSION (OUTPATIENT)
Dept: HEMATOLOGY/ONCOLOGY | Facility: CLINIC | Age: 59
End: 2024-04-17
Payer: MEDICAID

## 2024-04-17 VITALS
BODY MASS INDEX: 40.22 KG/M2 | HEIGHT: 61 IN | DIASTOLIC BLOOD PRESSURE: 73 MMHG | WEIGHT: 213 LBS | TEMPERATURE: 97.5 F | RESPIRATION RATE: 18 BRPM | SYSTOLIC BLOOD PRESSURE: 140 MMHG | OXYGEN SATURATION: 100 % | HEART RATE: 64 BPM

## 2024-04-17 DIAGNOSIS — C16.0 GE JUNCTION CARCINOMA (MULTI): ICD-10-CM

## 2024-04-17 LAB
ALBUMIN SERPL BCP-MCNC: 3.4 G/DL (ref 3.4–5)
ANION GAP SERPL CALC-SCNC: 13 MMOL/L (ref 10–20)
BASOPHILS # BLD MANUAL: 0 X10*3/UL (ref 0–0.1)
BASOPHILS NFR BLD MANUAL: 0 %
BUN SERPL-MCNC: 11 MG/DL (ref 6–23)
CALCIUM SERPL-MCNC: 8.7 MG/DL (ref 8.6–10.3)
CHLORIDE SERPL-SCNC: 105 MMOL/L (ref 98–107)
CO2 SERPL-SCNC: 27 MMOL/L (ref 21–32)
CREAT SERPL-MCNC: 0.67 MG/DL (ref 0.5–1.05)
EGFRCR SERPLBLD CKD-EPI 2021: >90 ML/MIN/1.73M*2
EOSINOPHIL # BLD MANUAL: 0 X10*3/UL (ref 0–0.7)
EOSINOPHIL NFR BLD MANUAL: 0 %
ERYTHROCYTE [DISTWIDTH] IN BLOOD BY AUTOMATED COUNT: 17.1 % (ref 11.5–14.5)
ERYTHROCYTE [DISTWIDTH] IN BLOOD BY AUTOMATED COUNT: 17.1 % (ref 11.5–14.5)
GLUCOSE SERPL-MCNC: 131 MG/DL (ref 74–99)
HCT VFR BLD AUTO: 39.4 % (ref 36–46)
HCT VFR BLD AUTO: 39.4 % (ref 36–46)
HGB BLD-MCNC: 12.1 G/DL (ref 12–16)
HGB BLD-MCNC: 12.1 G/DL (ref 12–16)
IMM GRANULOCYTES # BLD AUTO: 1.52 X10*3/UL (ref 0–0.7)
IMM GRANULOCYTES NFR BLD AUTO: 7.5 % (ref 0–0.9)
INR PPP: 1.1 (ref 0.9–1.1)
LYMPHOCYTES # BLD MANUAL: 0.42 X10*3/UL (ref 1.2–4.8)
LYMPHOCYTES NFR BLD MANUAL: 2 %
MCH RBC QN AUTO: 26 PG (ref 26–34)
MCH RBC QN AUTO: 26 PG (ref 26–34)
MCHC RBC AUTO-ENTMCNC: 30.7 G/DL (ref 32–36)
MCHC RBC AUTO-ENTMCNC: 30.7 G/DL (ref 32–36)
MCV RBC AUTO: 85 FL (ref 80–100)
MCV RBC AUTO: 85 FL (ref 80–100)
MONOCYTES # BLD MANUAL: 0.42 X10*3/UL (ref 0.1–1)
MONOCYTES NFR BLD MANUAL: 2 %
NEUTROPHILS # BLD MANUAL: 19.97 X10*3/UL (ref 1.2–7.7)
NEUTS BAND # BLD MANUAL: 1.46 X10*3/UL (ref 0–0.7)
NEUTS BAND NFR BLD MANUAL: 7 %
NEUTS SEG # BLD MANUAL: 18.51 X10*3/UL (ref 1.2–7)
NEUTS SEG NFR BLD MANUAL: 89 %
NRBC BLD-RTO: 0.1 /100 WBCS (ref 0–0)
NRBC BLD-RTO: 0.1 /100 WBCS (ref 0–0)
OVALOCYTES BLD QL SMEAR: ABNORMAL
PHOSPHATE SERPL-MCNC: 4 MG/DL (ref 2.5–4.9)
PLATELET # BLD AUTO: 179 X10*3/UL (ref 150–450)
PLATELET # BLD AUTO: 179 X10*3/UL (ref 150–450)
POLYCHROMASIA BLD QL SMEAR: ABNORMAL
POTASSIUM SERPL-SCNC: 3.6 MMOL/L (ref 3.5–5.3)
PROTHROMBIN TIME: 12 SECONDS (ref 9.8–12.8)
RBC # BLD AUTO: 4.65 X10*6/UL (ref 4–5.2)
RBC # BLD AUTO: 4.65 X10*6/UL (ref 4–5.2)
RBC MORPH BLD: ABNORMAL
SODIUM SERPL-SCNC: 141 MMOL/L (ref 136–145)
TOTAL CELLS COUNTED BLD: 100
WBC # BLD AUTO: 20.8 X10*3/UL (ref 4.4–11.3)
WBC # BLD AUTO: 20.8 X10*3/UL (ref 4.4–11.3)

## 2024-04-17 PROCEDURE — 99153 MOD SED SAME PHYS/QHP EA: CPT | Performed by: RADIOLOGY

## 2024-04-17 PROCEDURE — 2500000004 HC RX 250 GENERAL PHARMACY W/ HCPCS (ALT 636 FOR OP/ED): Performed by: RADIOLOGY

## 2024-04-17 PROCEDURE — C1769 GUIDE WIRE: HCPCS

## 2024-04-17 PROCEDURE — G0378 HOSPITAL OBSERVATION PER HR: HCPCS

## 2024-04-17 PROCEDURE — C1887 CATHETER, GUIDING: HCPCS

## 2024-04-17 PROCEDURE — 99153 MOD SED SAME PHYS/QHP EA: CPT

## 2024-04-17 PROCEDURE — 85027 COMPLETE CBC AUTOMATED: CPT

## 2024-04-17 PROCEDURE — 84100 ASSAY OF PHOSPHORUS: CPT

## 2024-04-17 PROCEDURE — 2720000007 HC OR 272 NO HCPCS

## 2024-04-17 PROCEDURE — 85007 BL SMEAR W/DIFF WBC COUNT: CPT

## 2024-04-17 PROCEDURE — 85027 COMPLETE CBC AUTOMATED: CPT | Performed by: REGISTERED NURSE

## 2024-04-17 PROCEDURE — 85610 PROTHROMBIN TIME: CPT | Performed by: REGISTERED NURSE

## 2024-04-17 PROCEDURE — 49441 PLACE DUOD/JEJ TUBE PERC: CPT | Performed by: RADIOLOGY

## 2024-04-17 PROCEDURE — 99221 1ST HOSP IP/OBS SF/LOW 40: CPT | Performed by: STUDENT IN AN ORGANIZED HEALTH CARE EDUCATION/TRAINING PROGRAM

## 2024-04-17 PROCEDURE — 2780000003 HC OR 278 NO HCPCS

## 2024-04-17 PROCEDURE — C1729 CATH, DRAINAGE: HCPCS

## 2024-04-17 PROCEDURE — 2720000002 HC VAPOTHERM DAILY

## 2024-04-17 PROCEDURE — 2500000005 HC RX 250 GENERAL PHARMACY W/O HCPCS: Performed by: RADIOLOGY

## 2024-04-17 PROCEDURE — 99152 MOD SED SAME PHYS/QHP 5/>YRS: CPT | Performed by: RADIOLOGY

## 2024-04-17 PROCEDURE — 99152 MOD SED SAME PHYS/QHP 5/>YRS: CPT

## 2024-04-17 PROCEDURE — 49441 PLACE DUOD/JEJ TUBE PERC: CPT

## 2024-04-17 PROCEDURE — 80069 RENAL FUNCTION PANEL: CPT

## 2024-04-17 PROCEDURE — 2550000001 HC RX 255 CONTRASTS: Performed by: STUDENT IN AN ORGANIZED HEALTH CARE EDUCATION/TRAINING PROGRAM

## 2024-04-17 RX ORDER — MIDAZOLAM HYDROCHLORIDE 5 MG/ML
INJECTION, SOLUTION INTRAMUSCULAR; INTRAVENOUS
Status: COMPLETED | OUTPATIENT
Start: 2024-04-17 | End: 2024-04-17

## 2024-04-17 RX ORDER — LIDOCAINE HYDROCHLORIDE 10 MG/ML
INJECTION, SOLUTION EPIDURAL; INFILTRATION; INTRACAUDAL; PERINEURAL
Status: COMPLETED | OUTPATIENT
Start: 2024-04-17 | End: 2024-04-17

## 2024-04-17 RX ORDER — FENTANYL CITRATE 50 UG/ML
INJECTION, SOLUTION INTRAMUSCULAR; INTRAVENOUS
Status: COMPLETED | OUTPATIENT
Start: 2024-04-17 | End: 2024-04-17

## 2024-04-17 RX ORDER — SODIUM CHLORIDE 9 MG/ML
INJECTION, SOLUTION INTRAVENOUS CONTINUOUS PRN
Status: COMPLETED | OUTPATIENT
Start: 2024-04-17 | End: 2024-04-17

## 2024-04-17 RX ADMIN — MIDAZOLAM HYDROCHLORIDE 1 MG: 5 INJECTION, SOLUTION INTRAMUSCULAR; INTRAVENOUS at 12:17

## 2024-04-17 RX ADMIN — FENTANYL CITRATE 50 MCG: 50 INJECTION, SOLUTION INTRAMUSCULAR; INTRAVENOUS at 12:17

## 2024-04-17 RX ADMIN — SODIUM CHLORIDE 50 ML/HR: 9 INJECTION, SOLUTION INTRAVENOUS at 12:38

## 2024-04-17 RX ADMIN — FENTANYL CITRATE 50 MCG: 50 INJECTION, SOLUTION INTRAMUSCULAR; INTRAVENOUS at 12:38

## 2024-04-17 RX ADMIN — LIDOCAINE HYDROCHLORIDE 2 ML: 10 INJECTION, SOLUTION EPIDURAL; INFILTRATION; INTRACAUDAL; PERINEURAL at 12:40

## 2024-04-17 RX ADMIN — IOHEXOL 60 ML: 350 INJECTION, SOLUTION INTRAVENOUS at 13:15

## 2024-04-17 RX ADMIN — MIDAZOLAM HYDROCHLORIDE 1 MG: 5 INJECTION, SOLUTION INTRAMUSCULAR; INTRAVENOUS at 12:38

## 2024-04-17 RX ADMIN — Medication: at 12:17

## 2024-04-17 SDOH — SOCIAL STABILITY: SOCIAL INSECURITY: HAVE YOU HAD THOUGHTS OF HARMING ANYONE ELSE?: NO

## 2024-04-17 SDOH — SOCIAL STABILITY: SOCIAL INSECURITY: DO YOU FEEL UNSAFE GOING BACK TO THE PLACE WHERE YOU ARE LIVING?: NO

## 2024-04-17 SDOH — SOCIAL STABILITY: SOCIAL INSECURITY: DO YOU FEEL ANYONE HAS EXPLOITED OR TAKEN ADVANTAGE OF YOU FINANCIALLY OR OF YOUR PERSONAL PROPERTY?: NO

## 2024-04-17 SDOH — SOCIAL STABILITY: SOCIAL INSECURITY: HAS ANYONE EVER THREATENED TO HURT YOUR FAMILY OR YOUR PETS?: NO

## 2024-04-17 SDOH — SOCIAL STABILITY: SOCIAL INSECURITY: ABUSE: ADULT

## 2024-04-17 SDOH — SOCIAL STABILITY: SOCIAL INSECURITY: ARE YOU OR HAVE YOU BEEN THREATENED OR ABUSED PHYSICALLY, EMOTIONALLY, OR SEXUALLY BY ANYONE?: NO

## 2024-04-17 SDOH — SOCIAL STABILITY: SOCIAL INSECURITY: WERE YOU ABLE TO COMPLETE ALL THE BEHAVIORAL HEALTH SCREENINGS?: YES

## 2024-04-17 SDOH — SOCIAL STABILITY: SOCIAL INSECURITY: ARE THERE ANY APPARENT SIGNS OF INJURIES/BEHAVIORS THAT COULD BE RELATED TO ABUSE/NEGLECT?: NO

## 2024-04-17 SDOH — SOCIAL STABILITY: SOCIAL INSECURITY: DOES ANYONE TRY TO KEEP YOU FROM HAVING/CONTACTING OTHER FRIENDS OR DOING THINGS OUTSIDE YOUR HOME?: NO

## 2024-04-17 SDOH — SOCIAL STABILITY: SOCIAL INSECURITY: HAVE YOU HAD ANY THOUGHTS OF HARMING ANYONE ELSE?: NO

## 2024-04-17 ASSESSMENT — PAIN SCALES - GENERAL
PAINLEVEL_OUTOF10: 0 - NO PAIN
PAINLEVEL_OUTOF10: 0 - NO PAIN
PAINLEVEL_OUTOF10: 5 - MODERATE PAIN
PAINLEVEL_OUTOF10: 0 - NO PAIN

## 2024-04-17 ASSESSMENT — ACTIVITIES OF DAILY LIVING (ADL)
HEARING - LEFT EAR: FUNCTIONAL
BATHING: INDEPENDENT
ADEQUATE_TO_COMPLETE_ADL: YES
DRESSING YOURSELF: INDEPENDENT
TOILETING: INDEPENDENT
WALKS IN HOME: INDEPENDENT
GROOMING: INDEPENDENT
HEARING - RIGHT EAR: FUNCTIONAL
PATIENT'S MEMORY ADEQUATE TO SAFELY COMPLETE DAILY ACTIVITIES?: YES
LACK_OF_TRANSPORTATION: NO
JUDGMENT_ADEQUATE_SAFELY_COMPLETE_DAILY_ACTIVITIES: YES
FEEDING YOURSELF: NEEDS ASSISTANCE

## 2024-04-17 ASSESSMENT — COGNITIVE AND FUNCTIONAL STATUS - GENERAL
CLIMB 3 TO 5 STEPS WITH RAILING: A LITTLE
MOBILITY SCORE: 22
WALKING IN HOSPITAL ROOM: A LITTLE
PATIENT BASELINE BEDBOUND: NO

## 2024-04-17 ASSESSMENT — LIFESTYLE VARIABLES
SUBSTANCE_ABUSE_PAST_12_MONTHS: NO
AUDIT-C TOTAL SCORE: 0
PRESCIPTION_ABUSE_PAST_12_MONTHS: NO
HOW OFTEN DO YOU HAVE A DRINK CONTAINING ALCOHOL: NEVER
AUDIT-C TOTAL SCORE: 0
SKIP TO QUESTIONS 9-10: 1
HOW MANY STANDARD DRINKS CONTAINING ALCOHOL DO YOU HAVE ON A TYPICAL DAY: PATIENT DOES NOT DRINK
HOW OFTEN DO YOU HAVE 6 OR MORE DRINKS ON ONE OCCASION: NEVER

## 2024-04-17 ASSESSMENT — PAIN - FUNCTIONAL ASSESSMENT
PAIN_FUNCTIONAL_ASSESSMENT: 0-10

## 2024-04-17 ASSESSMENT — PAIN DESCRIPTION - DESCRIPTORS: DESCRIPTORS: SHARP

## 2024-04-17 ASSESSMENT — PATIENT HEALTH QUESTIONNAIRE - PHQ9
1. LITTLE INTEREST OR PLEASURE IN DOING THINGS: NOT AT ALL
SUM OF ALL RESPONSES TO PHQ9 QUESTIONS 1 & 2: 0
2. FEELING DOWN, DEPRESSED OR HOPELESS: NOT AT ALL

## 2024-04-17 NOTE — DISCHARGE SUMMARY
Discharge Diagnosis  Jejunostomy tube fell out    Issues Requiring Follow-Up  M drain placement after jejunostomy tube fell out    Discharge Meds     Your medication list        CONTINUE taking these medications        Instructions Last Dose Given Next Dose Due   atorvastatin 20 mg tablet  Commonly known as: Lipitor           DULoxetine 60 mg DR capsule  Commonly known as: Cymbalta           loperamide 2 mg capsule  Commonly known as: Imodium      Take 2 capsules (4 mg) by mouth 4 times a day as needed for diarrhea.       loperamide 1 mg/7.5 mL liquid  Commonly known as: Anti-DiarrheaL (loperamide)      Take 15 mL (2 mg) by mouth 4 times a day as needed for diarrhea.       metoprolol succinate XL 50 mg 24 hr tablet  Commonly known as: Toprol-XL           ondansetron 8 mg tablet  Commonly known as: Zofran           prochlorperazine 10 mg tablet  Commonly known as: Compazine      Take 1 tablet (10 mg) by mouth every 6 hours if needed for nausea or vomiting.       spironolactone 25 mg tablet  Commonly known as: Aldactone                  STOP taking these medications      acetaminophen 325 mg tablet  Commonly known as: Tylenol        enoxaparin 40 mg/0.4 mL syringe  Commonly known as: Lovenox        MULTIVITAMIN WITH IRON ORAL        Vitamin B-12 1,000 mcg tablet  Generic drug: cyanocobalamin        Vitamin D3 25 MCG (1000 UT) tablet  Generic drug: cholecalciferol        white petrolatum 41 % ointment ointment  Commonly known as: Aquaphor                 Test Results Pending At Discharge  Pending Labs       No current pending labs.            Hospital Course   Patient is a 58-year-old female with past medical history of GE adenocarcinoma who presents to [facility] emergency department due to her J-tube being dislodged. Patient was started on chemotherapy yesterday and during chemotherapy, J-tube fell out. Unable to replace and subsequently recommended to go to the emergency department for IR  guided placement.  Unfortunately the interventional etiology department was unable to place the same type of feeding tube that she had prior.  Patient is to use adapter which will work with her continuous feeds at home.  If needed she can do bolus feeds.  We have patient appointment with interventional radiology team at [facility] on 4/14/2024 at 10 AM.  This appointment will attempt to exchange for J stent.  Patient is to follow-up with surgical oncology team outpatient.  Patient declined home health care to assist her with tube feeds and dressing changes.  Patient stable for discharge home.      Pertinent Physical Exam At Time of Discharge  Physical Exam  Constitutional: obese, awake, alert, no acute distress  ENMT: mucous membranes moist, EOMI, conjunctivae clear  Head/Neck: normocephalic, atraumatic; supple, trachea midline  Respiratory/Thorax: patent airways, CTAB; no wheezes, rales, or rhonchi  Cardiovascular: RRR, no murmur  Gastrointestinal: soft, nondistended, non-tender, bowel sounds appreciated  Extremities: palpable peripheral pulses, no edema or cyanosis  Neurological: AO x3, no focal deficits  Psychological: appropriate mood and behavior  Skin: warm and dry      Outpatient Follow-Up  Future Appointments   Date Time Provider Department Center   4/19/2024  2:00 PM INF 04 PARMA [id number] [place]   4/22/2024  2:00 PM INF 10 PARMA [id number] [place]   4/26/2024  2:00 PM INF 04 PARMA [id number] [place]   4/30/2024  8:30 AM INF 03 PARMA [id number] [place]   4/30/2024  9:00 AM Ollie Torres MD [id number] [place]   4/30/2024  9:30 AM INF 06 PARMA [id number] [place]   5/1/2024  2:00 PM INF 03 PARMA [id number] [place]       Ramirez Gutierrez DO, PhD  Internal Medicine PGY1

## 2024-04-17 NOTE — DISCHARGE INSTRUCTIONS
1) Unfortunately the interventional radiology department was not able to place the same type of feeding tube that she had prior.  Please use the adapter which will work with your continuous feeds at home.  If needed you can do bolus feeds as well, discussed with your dietitian regarding the amount.    2) We have made you an appointment with the interventional radiology team at [facility] 05/14 at 10 AM.  At this appointment they will attempt to exchange your tube for the one you had initially.    3) We discussed with the general surgery team who recommended that you continue to follow with your surgical oncology team as an outpatient regarding her care.    
Statement Selected

## 2024-04-17 NOTE — PROCEDURES
Interventional Radiology Brief Postprocedure Note    Attending: Neo Zheng MD    Assistant:   Staff Role   Jailyn Rosado MT Radiology Technologist   Neo Zheng MD Radiologist   Delaney Pompa, RN Radiology Nurse       Diagnosis:   1. Jejunostomy tube fell out  Consult to Interventional Radiology          Description of procedure: IR GI J tube placement using sharp recan of prior J tube tract    Timeout:  Yes    Procedure Area: Procedure Area     Anesthesia:   Conscious Sedation    Complications: None    Estimated Blood Loss: minimal    Medications (Filter: Administrations occurring from 1411 to 1411 on 04/17/24) As of 04/17/24 1411      None          No specimens collected      See detailed result report with images in PACS.    The patient tolerated the procedure well without incident or complication and is in stable condition.

## 2024-04-17 NOTE — ED NOTES
Pt dispo to [facility] where chemo nurse stopped chemo and took out her port     Pam Block, SHI  04/17/24 1093

## 2024-04-17 NOTE — CONSULTS
Inpatient consult to Hematology-Oncology  Consult performed by: Raphael Houston DO  Consult ordered by: Jesus Diane MD          Reason For Consult  Patient is receiving chemotherapy that is post and today, J-tube fell out    HPI  Patient is a 58-year-old female with past medical history of GE adenocarcinoma who presents to [facility] emergency department due to her J-tube being dislodged.  Patient was started on chemotherapy yesterday and during chemotherapy, J-tube fell out.  Unable to replace and subsequently recommended to go to the emergency department for IR guided placement.  Cancer history, she was having problem with gradually worsening dysphagia with further GI work-up revealing gastric mass with the biopsy confirming  poorly differentiated adenocarcinoma in July 2023.  Prior to that she had a barium esophagogram which was essentially unremarkable.  Patient does have a previous history of GERD and had one time was treated for H. pylori gastritis.  EUS done by Dr. London on 7/17/2023 showed malignancy starting near the GE junction and measuring 2.2 x 1.5 cm towards the cardia along  the lesser curvature / anterior wall with malignant appearing features.  There is loss of interface between the mass and liver along a 7 mm region, concerning for probable  early hepatic involvement.  Enlarged lymph nodes were noted in the diaphragmatic region, and gastrohepatic ligament.  CT chest abdomen pelvis with contrast on 8/9/2023 showed thickening of the distal esophagus extending into the anterior portion of the  gastric cardia.  There is a small amount of liver present directly adjacent to the area of the tumor but there is no altered enhancement pattern in the liver parenchyma.  Slightly inferior to the cardia there  were lymph nodes visible in the adjacent mesentery that were not visible previously measuring up to 6 mm in short axis. PET/CT on 8/31/2023 showed a hypermetabolic focus at the  GE junction  with extension into the gastric cardia, no evidence of hypermetabolic regional or distant metastatic disease. Brain MRI negative for metastasis.  She was treated with neoadjuvant chemotherapy with FLOT protocol from Oct to Dec 2023.  She had a total gastrectomy in 2024.  Postop pathology results showed G3, poorly differentiated cancer with partial treatment response, positive LVI/PNI, 7/25 lymph nodes positive (pT3 pN3).  After reevaluation she was started on adjuvant chemotherapy with FLOT beginning 2024.     Past medical history: As above  Surgical history: Ankle surgery,  x 2, cholecystectomy, EGD, colonoscopy, hysterectomy  Family history: Diabetes, hypertension, heart disease, MI  Social history: Denies tobacco, alcohol, illicit drug use    Review of Systems  10 point review of system was performed and negative except as stated above.    OBJECTIVE  Physical Exam:  General:  Pleasant and cooperative. No apparent distress.  HEENT:  Normocephalic, atraumatic, mucus membranes moist.   Neck:  Trachea midline.  No JVD.    Chest:  Clear to auscultation bilaterally. No wheezes, rales, or rhonchi.  CV:  Regular rate and rhythm.  Positive S1/S2.   Abdomen: Bowel sounds present in all four quadrants, abdomen is soft, non-tender, non-distended.  Extremities:  No lower extremity edema or cyanosis.   Neurological:  AAOx3. No focal deficits.  Skin:  Warm and dry.    ASSESSMENT & PLAN  1.  GE junction poorly differentiated adenocarcinoma: Detailed cancer history as described above.  Patient was undergoing chemotherapy on  when tube came out.  Unable to replace tube in office.  Chemotherapy set to be completed at 1450 today.  Patient was instructed on how to disconnect chemotherapy upon completion.  Patient is to maintain regularly scheduled outpatient follow-up. Stable for discharge after replacement of J-tube.

## 2024-04-17 NOTE — ED NOTES
Assumed care of pt at 0700. Pt placed on telemertry.  Pt is A&Ox3      Pam Block RN  04/17/24 0735

## 2024-04-17 NOTE — NURSING NOTE
M-Drain placed in old tract of J-tube, patient has concerns if this will connect to her home equipment or if bolus feeding is appropriate, Dr. Hardin made aware via telephone conversation that patient can return home with M-Drain in place and resume feeds, M-drain will need exchanged at a later date per Dr. Zheng, Dr. Hadrin also aware of patient concerns, report called to SHI Avila assuming care in ER and aware of above

## 2024-04-17 NOTE — HH CARE COORDINATION
Home Care received a referral for Nursing and Physical Therapy. Unfortunately, we are unable to accept and process the referral at this time.    Patients, please reach out to the referring provider or your PCP to assist in obtaining an alternative home care agency and/or guidance to meet your needs.    Providers, please reach out to  Home Care with any questions regarding the declined referral.

## 2024-04-17 NOTE — PROGRESS NOTES
04/17/24 1422   Discharge Planning   Living Arrangements Spouse/significant other   Support Systems Spouse/significant other   Assistance Needed Independent   Type of Residence Private residence   Number of Stairs to Enter Residence 5   Number of Stairs Within Residence 12   Home or Post Acute Services None   Patient expects to be discharged to: Home, declined HHC   Does the patient need discharge transport arranged? No   Patient Choice   Patient / Family choosing to utilize agency / facility established prior to hospitalization No     Message from provider for HHC arrangements for wound care. Spoke to patient at bedside. Patient declined HHC and states has done dressing changes at home before, and had a hard time getting HHC to accept in the past. Encouraged patient to contact PCP if patient feels HHC needed once patient gets home. Patient states understanding. Provider updated.       NATASHA ALMANZA RN [facility]

## 2024-04-17 NOTE — PRE-PROCEDURE NOTE
Interventional Radiology Preprocedure Note    Indication for procedure: The encounter diagnosis was Jejunostomy tube fell out.    Relevant review of systems: NA    Relevant Labs:   Lab Results   Component Value Date    CREATININE 0.67 04/17/2024    EGFR >90 04/17/2024    INR 1.1 04/17/2024    PROTIME 12.0 04/17/2024       Planned Sedation/Anesthesia: Moderate    Airway assessment: normal    Directed physical examination:    Aox3  No increased work of breathing.   No acute distress      Mallampati: II (hard and soft palate, upper portion of tonsils anduvula visible)    ASA Score: ASA 3 - Patient with moderate systemic disease with functional limitations    Benefits, risks and alternatives of procedure and planned sedation have been discussed with the patient and/or their representative. All questions answered and they agree to proceed.

## 2024-04-19 ENCOUNTER — INFUSION (OUTPATIENT)
Dept: HEMATOLOGY/ONCOLOGY | Facility: CLINIC | Age: 59
End: 2024-04-19
Payer: MEDICAID

## 2024-04-19 VITALS
TEMPERATURE: 97.9 F | HEART RATE: 83 BPM | OXYGEN SATURATION: 97 % | SYSTOLIC BLOOD PRESSURE: 156 MMHG | RESPIRATION RATE: 18 BRPM | DIASTOLIC BLOOD PRESSURE: 83 MMHG

## 2024-04-19 DIAGNOSIS — C16.0 GE JUNCTION CARCINOMA (MULTI): ICD-10-CM

## 2024-04-19 PROCEDURE — 96374 THER/PROPH/DIAG INJ IV PUSH: CPT | Mod: 59,INF

## 2024-04-19 PROCEDURE — 96360 HYDRATION IV INFUSION INIT: CPT | Mod: INF

## 2024-04-19 PROCEDURE — 2500000004 HC RX 250 GENERAL PHARMACY W/ HCPCS (ALT 636 FOR OP/ED): Performed by: INTERNAL MEDICINE

## 2024-04-19 PROCEDURE — 2500000004 HC RX 250 GENERAL PHARMACY W/ HCPCS (ALT 636 FOR OP/ED): Mod: JZ | Performed by: INTERNAL MEDICINE

## 2024-04-19 RX ORDER — EPINEPHRINE 0.3 MG/.3ML
0.3 INJECTION SUBCUTANEOUS EVERY 5 MIN PRN
Status: DISCONTINUED | OUTPATIENT
Start: 2024-04-19 | End: 2024-04-19 | Stop reason: HOSPADM

## 2024-04-19 RX ORDER — DIPHENHYDRAMINE HYDROCHLORIDE 50 MG/ML
50 INJECTION INTRAMUSCULAR; INTRAVENOUS AS NEEDED
Status: DISCONTINUED | OUTPATIENT
Start: 2024-04-19 | End: 2024-04-19 | Stop reason: HOSPADM

## 2024-04-19 RX ORDER — ONDANSETRON HYDROCHLORIDE 2 MG/ML
8 INJECTION, SOLUTION INTRAVENOUS ONCE
Status: COMPLETED | OUTPATIENT
Start: 2024-04-19 | End: 2024-04-19

## 2024-04-19 RX ORDER — HEPARIN 100 UNIT/ML
500 SYRINGE INTRAVENOUS AS NEEDED
Status: DISCONTINUED | OUTPATIENT
Start: 2024-04-19 | End: 2024-04-19 | Stop reason: HOSPADM

## 2024-04-19 RX ORDER — FAMOTIDINE 10 MG/ML
20 INJECTION INTRAVENOUS ONCE AS NEEDED
Status: DISCONTINUED | OUTPATIENT
Start: 2024-04-19 | End: 2024-04-19 | Stop reason: HOSPADM

## 2024-04-19 RX ORDER — SODIUM CHLORIDE 9 MG/ML
1000 INJECTION, SOLUTION INTRAVENOUS AS NEEDED
Status: DISCONTINUED | OUTPATIENT
Start: 2024-04-19 | End: 2024-04-19 | Stop reason: HOSPADM

## 2024-04-19 RX ORDER — HEPARIN SODIUM,PORCINE/PF 10 UNIT/ML
50 SYRINGE (ML) INTRAVENOUS AS NEEDED
Status: DISCONTINUED | OUTPATIENT
Start: 2024-04-19 | End: 2024-04-19 | Stop reason: HOSPADM

## 2024-04-19 RX ORDER — HEPARIN 100 UNIT/ML
500 SYRINGE INTRAVENOUS AS NEEDED
Status: CANCELLED | OUTPATIENT
Start: 2024-04-19

## 2024-04-19 RX ORDER — ALBUTEROL SULFATE 0.83 MG/ML
3 SOLUTION RESPIRATORY (INHALATION) AS NEEDED
Status: CANCELLED | OUTPATIENT
Start: 2024-04-19

## 2024-04-19 RX ORDER — FAMOTIDINE 10 MG/ML
20 INJECTION INTRAVENOUS ONCE AS NEEDED
Status: CANCELLED | OUTPATIENT
Start: 2024-04-19

## 2024-04-19 RX ORDER — HEPARIN SODIUM,PORCINE/PF 10 UNIT/ML
50 SYRINGE (ML) INTRAVENOUS AS NEEDED
Status: CANCELLED | OUTPATIENT
Start: 2024-04-19

## 2024-04-19 RX ORDER — ALBUTEROL SULFATE 0.83 MG/ML
3 SOLUTION RESPIRATORY (INHALATION) AS NEEDED
Status: DISCONTINUED | OUTPATIENT
Start: 2024-04-19 | End: 2024-04-19 | Stop reason: HOSPADM

## 2024-04-19 RX ORDER — EPINEPHRINE 0.3 MG/.3ML
0.3 INJECTION SUBCUTANEOUS EVERY 5 MIN PRN
Status: CANCELLED | OUTPATIENT
Start: 2024-04-19

## 2024-04-19 RX ORDER — SODIUM CHLORIDE 9 MG/ML
1000 INJECTION, SOLUTION INTRAVENOUS AS NEEDED
Status: CANCELLED | OUTPATIENT
Start: 2024-04-19

## 2024-04-19 RX ORDER — DIPHENHYDRAMINE HYDROCHLORIDE 50 MG/ML
50 INJECTION INTRAMUSCULAR; INTRAVENOUS AS NEEDED
Status: CANCELLED | OUTPATIENT
Start: 2024-04-19

## 2024-04-19 RX ADMIN — ONDANSETRON 8 MG: 2 INJECTION INTRAMUSCULAR; INTRAVENOUS at 15:12

## 2024-04-19 RX ADMIN — HEPARIN 500 UNITS: 100 SYRINGE at 15:12

## 2024-04-19 RX ADMIN — SODIUM CHLORIDE 1000 ML/HR: 9 INJECTION, SOLUTION INTRAVENOUS at 13:50

## 2024-04-19 RX ADMIN — PEGFILGRASTIM 6 MG: 6 INJECTION SUBCUTANEOUS at 14:08

## 2024-04-19 ASSESSMENT — PAIN SCALES - GENERAL: PAINLEVEL: 10-WORST PAIN EVER

## 2024-04-22 ENCOUNTER — INFUSION (OUTPATIENT)
Dept: HEMATOLOGY/ONCOLOGY | Facility: CLINIC | Age: 59
End: 2024-04-22
Payer: COMMERCIAL

## 2024-04-22 VITALS
SYSTOLIC BLOOD PRESSURE: 169 MMHG | HEART RATE: 83 BPM | DIASTOLIC BLOOD PRESSURE: 83 MMHG | TEMPERATURE: 97 F | OXYGEN SATURATION: 97 % | RESPIRATION RATE: 18 BRPM

## 2024-04-22 DIAGNOSIS — C16.0 GE JUNCTION CARCINOMA (MULTI): ICD-10-CM

## 2024-04-22 PROCEDURE — 2500000004 HC RX 250 GENERAL PHARMACY W/ HCPCS (ALT 636 FOR OP/ED): Performed by: INTERNAL MEDICINE

## 2024-04-22 PROCEDURE — 96374 THER/PROPH/DIAG INJ IV PUSH: CPT | Mod: INF

## 2024-04-22 PROCEDURE — 96361 HYDRATE IV INFUSION ADD-ON: CPT | Mod: INF

## 2024-04-22 RX ORDER — SODIUM CHLORIDE 9 MG/ML
1000 INJECTION, SOLUTION INTRAVENOUS AS NEEDED
Status: DISCONTINUED | OUTPATIENT
Start: 2024-04-22 | End: 2024-04-22 | Stop reason: HOSPADM

## 2024-04-22 RX ORDER — HEPARIN 100 UNIT/ML
500 SYRINGE INTRAVENOUS AS NEEDED
Status: CANCELLED | OUTPATIENT
Start: 2024-04-22

## 2024-04-22 RX ORDER — ONDANSETRON HYDROCHLORIDE 2 MG/ML
8 INJECTION, SOLUTION INTRAVENOUS EVERY 4 HOURS PRN
Status: DISCONTINUED | OUTPATIENT
Start: 2024-04-22 | End: 2024-04-22 | Stop reason: HOSPADM

## 2024-04-22 RX ORDER — EPINEPHRINE 0.3 MG/.3ML
0.3 INJECTION SUBCUTANEOUS EVERY 5 MIN PRN
Status: DISCONTINUED | OUTPATIENT
Start: 2024-04-22 | End: 2024-04-22 | Stop reason: HOSPADM

## 2024-04-22 RX ORDER — HEPARIN SODIUM,PORCINE/PF 10 UNIT/ML
50 SYRINGE (ML) INTRAVENOUS AS NEEDED
Status: DISCONTINUED | OUTPATIENT
Start: 2024-04-22 | End: 2024-04-22 | Stop reason: HOSPADM

## 2024-04-22 RX ORDER — SODIUM CHLORIDE 9 MG/ML
1000 INJECTION, SOLUTION INTRAVENOUS AS NEEDED
Status: CANCELLED | OUTPATIENT
Start: 2024-04-22

## 2024-04-22 RX ORDER — EPINEPHRINE 0.3 MG/.3ML
0.3 INJECTION SUBCUTANEOUS EVERY 5 MIN PRN
Status: CANCELLED | OUTPATIENT
Start: 2024-04-22

## 2024-04-22 RX ORDER — ALBUTEROL SULFATE 0.83 MG/ML
3 SOLUTION RESPIRATORY (INHALATION) AS NEEDED
Status: CANCELLED | OUTPATIENT
Start: 2024-04-22

## 2024-04-22 RX ORDER — ALBUTEROL SULFATE 0.83 MG/ML
3 SOLUTION RESPIRATORY (INHALATION) AS NEEDED
Status: DISCONTINUED | OUTPATIENT
Start: 2024-04-22 | End: 2024-04-22 | Stop reason: HOSPADM

## 2024-04-22 RX ORDER — DIPHENHYDRAMINE HYDROCHLORIDE 50 MG/ML
50 INJECTION INTRAMUSCULAR; INTRAVENOUS AS NEEDED
Status: DISCONTINUED | OUTPATIENT
Start: 2024-04-22 | End: 2024-04-22 | Stop reason: HOSPADM

## 2024-04-22 RX ORDER — ONDANSETRON HYDROCHLORIDE 4 MG/2ML
8 INJECTION, SOLUTION INTRAMUSCULAR; INTRAVENOUS EVERY 4 HOURS PRN
Status: CANCELLED | OUTPATIENT
Start: 2024-04-22

## 2024-04-22 RX ORDER — FAMOTIDINE 10 MG/ML
20 INJECTION INTRAVENOUS ONCE AS NEEDED
Status: CANCELLED | OUTPATIENT
Start: 2024-04-22

## 2024-04-22 RX ORDER — FAMOTIDINE 10 MG/ML
20 INJECTION INTRAVENOUS ONCE AS NEEDED
Status: DISCONTINUED | OUTPATIENT
Start: 2024-04-22 | End: 2024-04-22 | Stop reason: HOSPADM

## 2024-04-22 RX ORDER — DIPHENHYDRAMINE HYDROCHLORIDE 50 MG/ML
50 INJECTION INTRAMUSCULAR; INTRAVENOUS AS NEEDED
Status: CANCELLED | OUTPATIENT
Start: 2024-04-22

## 2024-04-22 RX ORDER — HEPARIN SODIUM,PORCINE/PF 10 UNIT/ML
50 SYRINGE (ML) INTRAVENOUS AS NEEDED
Status: CANCELLED | OUTPATIENT
Start: 2024-04-22

## 2024-04-22 RX ORDER — HEPARIN 100 UNIT/ML
500 SYRINGE INTRAVENOUS AS NEEDED
Status: DISCONTINUED | OUTPATIENT
Start: 2024-04-22 | End: 2024-04-22 | Stop reason: HOSPADM

## 2024-04-22 RX ADMIN — ONDANSETRON 8 MG: 2 INJECTION INTRAMUSCULAR; INTRAVENOUS at 14:19

## 2024-04-22 RX ADMIN — HEPARIN 500 UNITS: 100 SYRINGE at 16:01

## 2024-04-22 RX ADMIN — SODIUM CHLORIDE 1000 ML/HR: 9 INJECTION, SOLUTION INTRAVENOUS at 14:16

## 2024-04-22 ASSESSMENT — PAIN SCALES - GENERAL: PAINLEVEL: 0-NO PAIN

## 2024-04-22 NOTE — PROGRESS NOTES
NUTRITION COMMUNICATION NOTE    Shruthi Ramos     REASON FOR COMMUNICATION: Patients  called with concerns over feeding tube placement. They would like the procedure performed by Dr. Chavarria. Reached out to his nurse who will call to assist.     Also have questions regarding insurance change. RD was able to put them in contact with [facility] who will assist.     Will call with further questions or concerns.

## 2024-04-25 ENCOUNTER — APPOINTMENT (OUTPATIENT)
Dept: HEMATOLOGY/ONCOLOGY | Facility: CLINIC | Age: 59
End: 2024-04-25
Payer: COMMERCIAL

## 2024-04-25 ENCOUNTER — NUTRITION (OUTPATIENT)
Dept: RADIATION ONCOLOGY | Facility: CLINIC | Age: 59
End: 2024-04-25
Payer: COMMERCIAL

## 2024-04-25 NOTE — PROGRESS NOTES
NUTRITION COMMUNICATION NOTE    Shruthi Ramos     REASON FOR COMMUNICATION:   Visit Type: Clinical Nutrition, Oncology, Telephone Visit:  A telephone visit (audio only) between the patient (at the distant site) and the provider (at the originating site) was utilized to provide this telehealth service.    Verbal Consent for Encounter: Verbal consent was requested and obtained from patient on this date for a telehealth visit.      , Connor, reached out to patient via phone. She has recently used up her supply of TwoCal, was sent equivalent product, Nutren 2.0 by DME, [employer]. Unfortunately, given patients sensitives and limited tolerance, she is having nausea and diarrhea with Nutren. Spoke to [employer] who will send new order  of TwoCal to patient. Will continue to work with patient and DME, grateful for support.

## 2024-04-26 ENCOUNTER — APPOINTMENT (OUTPATIENT)
Dept: HEMATOLOGY/ONCOLOGY | Facility: CLINIC | Age: 59
End: 2024-04-26
Payer: COMMERCIAL

## 2024-04-26 ENCOUNTER — INFUSION (OUTPATIENT)
Dept: HEMATOLOGY/ONCOLOGY | Facility: CLINIC | Age: 59
End: 2024-04-26
Payer: MEDICAID

## 2024-04-26 VITALS
RESPIRATION RATE: 18 BRPM | TEMPERATURE: 96.6 F | DIASTOLIC BLOOD PRESSURE: 67 MMHG | SYSTOLIC BLOOD PRESSURE: 113 MMHG | HEART RATE: 80 BPM | OXYGEN SATURATION: 99 %

## 2024-04-26 DIAGNOSIS — C16.0 GE JUNCTION CARCINOMA (MULTI): ICD-10-CM

## 2024-04-26 PROCEDURE — 96360 HYDRATION IV INFUSION INIT: CPT | Mod: INF

## 2024-04-26 PROCEDURE — 2500000004 HC RX 250 GENERAL PHARMACY W/ HCPCS (ALT 636 FOR OP/ED): Performed by: INTERNAL MEDICINE

## 2024-04-26 PROCEDURE — 96374 THER/PROPH/DIAG INJ IV PUSH: CPT | Mod: INF

## 2024-04-26 RX ORDER — DIPHENHYDRAMINE HYDROCHLORIDE 50 MG/ML
50 INJECTION INTRAMUSCULAR; INTRAVENOUS AS NEEDED
Status: CANCELLED | OUTPATIENT
Start: 2024-04-26

## 2024-04-26 RX ORDER — ALBUTEROL SULFATE 0.83 MG/ML
3 SOLUTION RESPIRATORY (INHALATION) AS NEEDED
Status: CANCELLED | OUTPATIENT
Start: 2024-04-26

## 2024-04-26 RX ORDER — ALBUTEROL SULFATE 0.83 MG/ML
3 SOLUTION RESPIRATORY (INHALATION) AS NEEDED
Status: CANCELLED | OUTPATIENT
Start: 2024-05-01

## 2024-04-26 RX ORDER — HEPARIN SODIUM,PORCINE/PF 10 UNIT/ML
50 SYRINGE (ML) INTRAVENOUS AS NEEDED
Status: CANCELLED | OUTPATIENT
Start: 2024-04-26

## 2024-04-26 RX ORDER — ATROPINE SULFATE 0.1 MG/ML
0.4 INJECTION INTRAVENOUS ONCE
Status: CANCELLED
Start: 2024-04-30 | End: 2024-04-30

## 2024-04-26 RX ORDER — EPINEPHRINE 0.3 MG/.3ML
0.3 INJECTION SUBCUTANEOUS EVERY 5 MIN PRN
Status: CANCELLED | OUTPATIENT
Start: 2024-05-01

## 2024-04-26 RX ORDER — DIPHENHYDRAMINE HYDROCHLORIDE 50 MG/ML
50 INJECTION INTRAMUSCULAR; INTRAVENOUS AS NEEDED
Status: CANCELLED | OUTPATIENT
Start: 2024-05-01

## 2024-04-26 RX ORDER — FAMOTIDINE 10 MG/ML
20 INJECTION INTRAVENOUS ONCE AS NEEDED
Status: CANCELLED | OUTPATIENT
Start: 2024-05-01

## 2024-04-26 RX ORDER — ATROPINE SULFATE 0.1 MG/ML
0.4 INJECTION INTRAVENOUS ONCE
Status: CANCELLED
Start: 2024-05-01 | End: 2024-05-01

## 2024-04-26 RX ORDER — FAMOTIDINE 10 MG/ML
20 INJECTION INTRAVENOUS ONCE AS NEEDED
Status: CANCELLED | OUTPATIENT
Start: 2024-04-26

## 2024-04-26 RX ORDER — ONDANSETRON HYDROCHLORIDE 4 MG/2ML
8 INJECTION, SOLUTION INTRAMUSCULAR; INTRAVENOUS EVERY 4 HOURS PRN
Status: CANCELLED | OUTPATIENT
Start: 2024-04-26

## 2024-04-26 RX ORDER — EPINEPHRINE 0.3 MG/.3ML
0.3 INJECTION SUBCUTANEOUS EVERY 5 MIN PRN
Status: CANCELLED | OUTPATIENT
Start: 2024-04-26

## 2024-04-26 RX ORDER — SODIUM CHLORIDE 9 MG/ML
1000 INJECTION, SOLUTION INTRAVENOUS AS NEEDED
Status: CANCELLED | OUTPATIENT
Start: 2024-04-26

## 2024-04-26 RX ORDER — ONDANSETRON HYDROCHLORIDE 2 MG/ML
8 INJECTION, SOLUTION INTRAVENOUS ONCE
Status: COMPLETED | OUTPATIENT
Start: 2024-04-26 | End: 2024-04-26

## 2024-04-26 RX ORDER — SODIUM CHLORIDE 9 MG/ML
1000 INJECTION, SOLUTION INTRAVENOUS AS NEEDED
Status: DISCONTINUED | OUTPATIENT
Start: 2024-04-26 | End: 2024-04-26 | Stop reason: HOSPADM

## 2024-04-26 RX ORDER — PROCHLORPERAZINE EDISYLATE 5 MG/ML
10 INJECTION INTRAMUSCULAR; INTRAVENOUS EVERY 6 HOURS PRN
Status: CANCELLED | OUTPATIENT
Start: 2024-04-30

## 2024-04-26 RX ORDER — HEPARIN 100 UNIT/ML
500 SYRINGE INTRAVENOUS AS NEEDED
Status: DISCONTINUED | OUTPATIENT
Start: 2024-04-26 | End: 2024-04-26 | Stop reason: HOSPADM

## 2024-04-26 RX ORDER — PROCHLORPERAZINE MALEATE 10 MG
10 TABLET ORAL EVERY 6 HOURS PRN
Status: CANCELLED | OUTPATIENT
Start: 2024-04-30

## 2024-04-26 RX ORDER — HEPARIN 100 UNIT/ML
500 SYRINGE INTRAVENOUS AS NEEDED
Status: CANCELLED | OUTPATIENT
Start: 2024-04-26

## 2024-04-26 RX ORDER — LORAZEPAM 2 MG/ML
1 INJECTION INTRAMUSCULAR AS NEEDED
Status: CANCELLED | OUTPATIENT
Start: 2024-04-30

## 2024-04-26 RX ADMIN — SODIUM CHLORIDE 1000 ML/HR: 9 INJECTION, SOLUTION INTRAVENOUS at 13:10

## 2024-04-26 RX ADMIN — HEPARIN 500 UNITS: 100 SYRINGE at 14:39

## 2024-04-26 RX ADMIN — ONDANSETRON 8 MG: 2 INJECTION INTRAMUSCULAR; INTRAVENOUS at 13:37

## 2024-04-26 ASSESSMENT — PAIN SCALES - GENERAL
PAINLEVEL_OUTOF10: 0-NO PAIN
PAINLEVEL: 0-NO PAIN

## 2024-04-26 NOTE — PROGRESS NOTES
1450 VSS, pt aliza IV hydration well, feeling much better after fluids & IV antiemetic. Pt disch amb accompanied by her .

## 2024-04-30 ENCOUNTER — INFUSION (OUTPATIENT)
Dept: HEMATOLOGY/ONCOLOGY | Facility: CLINIC | Age: 59
End: 2024-04-30
Payer: COMMERCIAL

## 2024-04-30 ENCOUNTER — NUTRITION (OUTPATIENT)
Dept: RADIATION ONCOLOGY | Facility: CLINIC | Age: 59
End: 2024-04-30

## 2024-04-30 ENCOUNTER — OFFICE VISIT (OUTPATIENT)
Dept: HEMATOLOGY/ONCOLOGY | Facility: CLINIC | Age: 59
End: 2024-04-30
Payer: COMMERCIAL

## 2024-04-30 VITALS
DIASTOLIC BLOOD PRESSURE: 73 MMHG | OXYGEN SATURATION: 97 % | HEART RATE: 79 BPM | TEMPERATURE: 96.1 F | RESPIRATION RATE: 18 BRPM | SYSTOLIC BLOOD PRESSURE: 122 MMHG

## 2024-04-30 VITALS
SYSTOLIC BLOOD PRESSURE: 131 MMHG | TEMPERATURE: 98.1 F | HEART RATE: 92 BPM | HEIGHT: 61 IN | WEIGHT: 209.44 LBS | DIASTOLIC BLOOD PRESSURE: 84 MMHG | RESPIRATION RATE: 20 BRPM | OXYGEN SATURATION: 96 % | BODY MASS INDEX: 39.54 KG/M2

## 2024-04-30 DIAGNOSIS — C16.0 GE JUNCTION CARCINOMA (MULTI): Primary | ICD-10-CM

## 2024-04-30 DIAGNOSIS — C16.0 GE JUNCTION CARCINOMA (MULTI): ICD-10-CM

## 2024-04-30 LAB
ALBUMIN SERPL BCP-MCNC: 3.7 G/DL (ref 3.4–5)
ALP SERPL-CCNC: 203 U/L (ref 33–110)
ALT SERPL W P-5'-P-CCNC: 18 U/L (ref 7–45)
ANION GAP SERPL CALC-SCNC: 17 MMOL/L (ref 10–20)
AST SERPL W P-5'-P-CCNC: 29 U/L (ref 9–39)
BASOPHILS # BLD MANUAL: 0 X10*3/UL (ref 0–0.1)
BASOPHILS NFR BLD MANUAL: 0 %
BILIRUB SERPL-MCNC: 0.4 MG/DL (ref 0–1.2)
BUN SERPL-MCNC: 16 MG/DL (ref 6–23)
CALCIUM SERPL-MCNC: 9.1 MG/DL (ref 8.6–10.3)
CHLORIDE SERPL-SCNC: 102 MMOL/L (ref 98–107)
CO2 SERPL-SCNC: 25 MMOL/L (ref 21–32)
CREAT SERPL-MCNC: 0.78 MG/DL (ref 0.5–1.05)
EGFRCR SERPLBLD CKD-EPI 2021: 88 ML/MIN/1.73M*2
EOSINOPHIL # BLD MANUAL: 0 X10*3/UL (ref 0–0.7)
EOSINOPHIL NFR BLD MANUAL: 0 %
ERYTHROCYTE [DISTWIDTH] IN BLOOD BY AUTOMATED COUNT: 18.7 % (ref 11.5–14.5)
GLUCOSE SERPL-MCNC: 108 MG/DL (ref 74–99)
HCT VFR BLD AUTO: 41.1 % (ref 36–46)
HGB BLD-MCNC: 12.6 G/DL (ref 12–16)
IMM GRANULOCYTES # BLD AUTO: 2.03 X10*3/UL (ref 0–0.7)
IMM GRANULOCYTES NFR BLD AUTO: 6.4 % (ref 0–0.9)
LYMPHOCYTES # BLD MANUAL: 2.21 X10*3/UL (ref 1.2–4.8)
LYMPHOCYTES NFR BLD MANUAL: 7 %
MCH RBC QN AUTO: 25.5 PG (ref 26–34)
MCHC RBC AUTO-ENTMCNC: 30.7 G/DL (ref 32–36)
MCV RBC AUTO: 83 FL (ref 80–100)
METAMYELOCYTES # BLD MANUAL: 0.95 X10*3/UL
METAMYELOCYTES NFR BLD MANUAL: 3 %
MONOCYTES # BLD MANUAL: 2.21 X10*3/UL (ref 0.1–1)
MONOCYTES NFR BLD MANUAL: 7 %
NEUTROPHILS # BLD MANUAL: 26.23 X10*3/UL (ref 1.2–7.7)
NEUTS BAND # BLD MANUAL: 5.37 X10*3/UL (ref 0–0.7)
NEUTS BAND NFR BLD MANUAL: 17 %
NEUTS SEG # BLD MANUAL: 20.86 X10*3/UL (ref 1.2–7)
NEUTS SEG NFR BLD MANUAL: 66 %
NRBC BLD-RTO: 0.4 /100 WBCS (ref 0–0)
OVALOCYTES BLD QL SMEAR: ABNORMAL
PLATELET # BLD AUTO: 191 X10*3/UL (ref 150–450)
POLYCHROMASIA BLD QL SMEAR: ABNORMAL
POTASSIUM SERPL-SCNC: 3.5 MMOL/L (ref 3.5–5.3)
PROT SERPL-MCNC: 6.3 G/DL (ref 6.4–8.2)
RBC # BLD AUTO: 4.94 X10*6/UL (ref 4–5.2)
RBC MORPH BLD: ABNORMAL
SODIUM SERPL-SCNC: 140 MMOL/L (ref 136–145)
TOTAL CELLS COUNTED BLD: 100
TOXIC GRANULES BLD QL SMEAR: PRESENT
WBC # BLD AUTO: 31.6 X10*3/UL (ref 4.4–11.3)

## 2024-04-30 PROCEDURE — 96375 TX/PRO/DX INJ NEW DRUG ADDON: CPT | Mod: INF

## 2024-04-30 PROCEDURE — 80053 COMPREHEN METABOLIC PANEL: CPT

## 2024-04-30 PROCEDURE — 96367 TX/PROPH/DG ADDL SEQ IV INF: CPT

## 2024-04-30 PROCEDURE — 96415 CHEMO IV INFUSION ADDL HR: CPT

## 2024-04-30 PROCEDURE — 85027 COMPLETE CBC AUTOMATED: CPT

## 2024-04-30 PROCEDURE — 85007 BL SMEAR W/DIFF WBC COUNT: CPT

## 2024-04-30 PROCEDURE — 3079F DIAST BP 80-89 MM HG: CPT | Performed by: INTERNAL MEDICINE

## 2024-04-30 PROCEDURE — 96413 CHEMO IV INFUSION 1 HR: CPT

## 2024-04-30 PROCEDURE — 2500000001 HC RX 250 WO HCPCS SELF ADMINISTERED DRUGS (ALT 637 FOR MEDICARE OP): Performed by: INTERNAL MEDICINE

## 2024-04-30 PROCEDURE — 99214 OFFICE O/P EST MOD 30 MIN: CPT | Performed by: INTERNAL MEDICINE

## 2024-04-30 PROCEDURE — 96417 CHEMO IV INFUS EACH ADDL SEQ: CPT

## 2024-04-30 PROCEDURE — 3075F SYST BP GE 130 - 139MM HG: CPT | Performed by: INTERNAL MEDICINE

## 2024-04-30 PROCEDURE — 96366 THER/PROPH/DIAG IV INF ADDON: CPT | Mod: INF

## 2024-04-30 PROCEDURE — 2500000004 HC RX 250 GENERAL PHARMACY W/ HCPCS (ALT 636 FOR OP/ED): Performed by: INTERNAL MEDICINE

## 2024-04-30 RX ORDER — DIPHENHYDRAMINE HYDROCHLORIDE 50 MG/ML
50 INJECTION INTRAMUSCULAR; INTRAVENOUS AS NEEDED
Status: DISCONTINUED | OUTPATIENT
Start: 2024-04-30 | End: 2024-04-30 | Stop reason: HOSPADM

## 2024-04-30 RX ORDER — ALBUTEROL SULFATE 0.83 MG/ML
3 SOLUTION RESPIRATORY (INHALATION) AS NEEDED
Status: DISCONTINUED | OUTPATIENT
Start: 2024-04-30 | End: 2024-04-30 | Stop reason: HOSPADM

## 2024-04-30 RX ORDER — HEPARIN 100 UNIT/ML
500 SYRINGE INTRAVENOUS AS NEEDED
Status: DISCONTINUED | OUTPATIENT
Start: 2024-04-30 | End: 2024-04-30 | Stop reason: HOSPADM

## 2024-04-30 RX ORDER — HEPARIN 100 UNIT/ML
500 SYRINGE INTRAVENOUS AS NEEDED
Status: CANCELLED | OUTPATIENT
Start: 2024-04-30

## 2024-04-30 RX ORDER — HEPARIN SODIUM,PORCINE/PF 10 UNIT/ML
50 SYRINGE (ML) INTRAVENOUS AS NEEDED
Status: DISCONTINUED | OUTPATIENT
Start: 2024-04-30 | End: 2024-04-30 | Stop reason: HOSPADM

## 2024-04-30 RX ORDER — EPINEPHRINE 0.3 MG/.3ML
0.3 INJECTION SUBCUTANEOUS EVERY 5 MIN PRN
Status: DISCONTINUED | OUTPATIENT
Start: 2024-04-30 | End: 2024-04-30 | Stop reason: HOSPADM

## 2024-04-30 RX ORDER — HEPARIN SODIUM,PORCINE/PF 10 UNIT/ML
50 SYRINGE (ML) INTRAVENOUS AS NEEDED
Status: CANCELLED | OUTPATIENT
Start: 2024-04-30

## 2024-04-30 RX ORDER — DIPHENHYDRAMINE HCL 25 MG
25 CAPSULE ORAL ONCE
Status: COMPLETED | OUTPATIENT
Start: 2024-04-30 | End: 2024-04-30

## 2024-04-30 RX ORDER — FAMOTIDINE 10 MG/ML
20 INJECTION INTRAVENOUS ONCE AS NEEDED
Status: DISCONTINUED | OUTPATIENT
Start: 2024-04-30 | End: 2024-04-30 | Stop reason: HOSPADM

## 2024-04-30 RX ORDER — PALONOSETRON 0.05 MG/ML
0.25 INJECTION, SOLUTION INTRAVENOUS ONCE
Status: COMPLETED | OUTPATIENT
Start: 2024-04-30 | End: 2024-04-30

## 2024-04-30 RX ADMIN — FLUOROURACIL 4150 MG: 50 INJECTION, SOLUTION INTRAVENOUS at 15:02

## 2024-04-30 RX ADMIN — DIPHENHYDRAMINE HYDROCHLORIDE 25 MG: 25 CAPSULE ORAL at 10:23

## 2024-04-30 RX ADMIN — DEXAMETHASONE SODIUM PHOSPHATE 12 MG: 10 INJECTION, SOLUTION INTRAMUSCULAR; INTRAVENOUS at 11:00

## 2024-04-30 RX ADMIN — OXALIPLATIN 135 MG: 5 INJECTION, SOLUTION INTRAVENOUS at 12:44

## 2024-04-30 RX ADMIN — SODIUM CHLORIDE 150 MG: 9 INJECTION, SOLUTION INTRAVENOUS at 10:22

## 2024-04-30 RX ADMIN — LEUCOVORIN CALCIUM 320 MG: 350 INJECTION, POWDER, LYOPHILIZED, FOR SUSPENSION INTRAMUSCULAR; INTRAVENOUS at 12:44

## 2024-04-30 RX ADMIN — DOCETAXEL 80 MG: 20 INJECTION, SOLUTION, CONCENTRATE INTRAVENOUS at 11:20

## 2024-04-30 RX ADMIN — PALONOSETRON 250 MCG: 0.05 INJECTION, SOLUTION INTRAVENOUS at 10:23

## 2024-04-30 ASSESSMENT — PAIN SCALES - GENERAL: PAINLEVEL: 1

## 2024-04-30 NOTE — PROGRESS NOTES
LOCATION:  [facility].     HEMATOLOGY & ONCOLOGY PROBLEMS:  1.  Localized gastric adenocarcinoma.       a.  Neoadjuvant chemotherapy with FLOT from Oct to Dec 2023.       b.  S/p total gastrectomy in January 2024.  Postoperative pathology (pT3,pN3).       c.  Adjuvant chemotherapy with FLOT beginning March 2024    CHIEF COMPLAINT:    The patient is in the clinic today for follow-up of GE junction adenocarcinoma and for continuation of treatment and management of therapy related effects.                   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  She was having problem with gradually worsening dysphagia with further GI work-up revealing gastric mass with the biopsy confirming  poorly differentiated adenocarcinoma in July 2023.  Prior to that she had a barium esophagogram which was essentially unremarkable.  Patient does have a previous history of GERD and had one time was treated for H. pylori gastritis.  EUS done by Dr. London on 7/17/2023 showed malignancy starting near the GE junction and measuring 2.2 x 1.5 cm towards the cardia along  the lesser curvature / anterior wall with malignant appearing features.  There is loss of interface between the mass and liver along a 7 mm region, concerning for probable  early hepatic involvement.  Enlarged lymph nodes were noted in the diaphragmatic region, and gastrohepatic ligament.  CT chest abdomen pelvis with contrast on 8/9/2023 showed thickening of the distal esophagus extending into the anterior portion of the  gastric cardia.  There is a small amount of liver present directly adjacent to the area of the tumor but there is no altered enhancement pattern in the liver parenchyma.  Slightly inferior to the cardia there  were lymph nodes visible in the adjacent mesentery that were not visible previously measuring up to 6 mm in short axis. PET/CT on 8/31/2023 showed a hypermetabolic focus at the GE junction   with extension into the gastric cardia, no evidence of hypermetabolic regional or distant metastatic disease. Brain MRI negative for metastasis.  She was treated with neoadjuvant chemotherapy with FLOT protocol from Oct to Dec 2023.  She had a total gastrectomy in 2024.  Postop pathology results showed G3, poorly differentiated cancer with partial treatment response, positive LVI/PNI, 7/25 lymph nodes positive (pT3 pN3).  After reevaluation she was started on adjuvant chemotherapy with FLOT beginning 2024.     INTERVAL HISTORY:  Issues with persistent nausea, vomiting and diarrhea after the last round of chemo.  She was supported with aggressive hydration and supportive care. Follow-up blood work from today is essentially unremarkable.  There are plans for her to have temporary feeding tube changed to permanent 1 in the next few weeks.    PAST MEDICAL HISTORY:   1.  GE junction adenocarcinoma as detailed above.   2.  Hypertension   3.  Hyperlipidemia   4.  Anxiety/depression   5.  History of complete hysterectomy, ankle surgery, cholecystectomy and 2  procedures     SOCIAL HISTORY:    and lives in [facility].  Non-smoker and nonalcoholic.  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  Born and raised in [place].     FAMILY HISTORY:    Father  at age 76 from myocardial infarction mother  from stroke at age 78.  She had 8 siblings.  1 brother  from MI another committed suicide.  A sister also  from myocardial infarction.   She had 2 children.  Her son  from AML.  No other specific history of bleeding, clotting or malignant disorder in the family.     REVIEW OF SYSTEMS:  Pertinent finding as per the history above.  All other systems have been reviewed and generally negative and noncontributory.     PHYSICAL EXAMINATION:    Detailed physical examination not done     ALLERGY & MEDICATIONS:  Allergies and latest outpatient medications list were reviewed in the EMR.    LAB  RESULTS:  CBC with hemoglobin of 12.6 and platelets of 191.  White cell is 31.6 and differential is pending.  Metabolic profile is unremarkable  other than alkaline phosphatase of 203.  Last 3 sets of blood work were reviewed and the trend was noted     RADIOLOGY RESULT:  PET/CT Esophageal Initial [Aug 31 2023  1:12PM]  Impression:  1. Hypermetabolic focus localized at gastroesophageal junction with xtension to the gastric cardia compatible with biopsy-proven gastric adenocarcinoma.  2. No evidence of hypermetabolic regional or distant metastatic disease.  3. Status post cholecystectomy with demonstration of localized pneumobilia.     MRI Brain w/wo Contrast [Aug 29 2023 12:28PM]   Impression:  There is no evidence of mass, infarction or hemorrhage.     PATHOLOGY RESULTS:  Surgical Pathology Exam: [id number]   FINAL DIAGNOSIS   A. Liver, segment 4 lesion, biopsy:  - Bile duct adenoma.     B. 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  - Resection margins are negative for neoplasia.  - Six of twenty-nine lymph nodes involved by metastatic carcinoma (6/29).  - Background stomach with reactive gastropathy, intestinal metaplasia, and dieulafoy lesion with accompanying hemorrhage.  - Omentum with no significant pathologic findings.     C. Lymph nodes, D2, excision:  - One of six lymph nodes involved by metastatic carcinoma (1/6).     D. Final esophageal margin, resection:  - Segment of esophagus, negative for neoplasia.   Electronically signed by Sachi Eubanks MD on 1/18/2024 at 1350   Case Summary Report   ESOPHAGUS    8th Edition - Protocol posted: 6/22/2022ESOPHAGUS - B  SPECIMEN   Procedure Esophagogastrectomy   TUMOR   Tumor Site Esophagogastric junction (EGJ)   Relationship of Tumor to Esophagogastric Junction Tumor midpoint is located at the esophagogastric junction   Histologic Type Adenocarcinoma    Histologic Grade G3, poorly differentiated, undifferentiated   Tumor Size Greatest Dimension (Centimeters): 2.3 (grossly) cm   Tumor Extent Invades adventitia   Treatment Effect Present, with residual cancer showing evident tumor regression, but more than single cells or rare small groups of cancer cells (partial response, score 2)   Lymphovascular Invasion Present   Perineural Invasion Present   MARGINS   Margin Status for Invasive Carcinoma All margins negative for invasive carcinoma   Closest Margin(s) to Invasive Carcinoma Deep   Distance from Invasive Carcinoma to Closest Margin 1.2 cm   Margin Status for Dysplasia and Intestinal Metaplasia All margins negative for dysplasia   REGIONAL LYMPH NODES   Regional Lymph Node Status Tumor present in regional lymph node(s)   Number of Lymph Nodes with Tumor 7   Number of Lymph Nodes Examined 35   PATHOLOGIC STAGE CLASSIFICATION (pTNM, AJCC 8th Edition)   TNM Descriptors y (post-treatment)   pT Category pT3   pN Category pN3         ASSESSMENT AND PLAN:   1.  GE junction poorly differentiated adenocarcinoma.  Please refer to the details as outlined above.  In summary patient with few month history  of gradually worsening dysphagia to both solid and liquid.  Initial barium esophagogram was normal but EGD showed a GE junction mass in July 2023. Biopsy results are confirmatory of poorly differentiated adenocarcinoma with normal mismatch repair gene  expression. EUS revealed a GE junction lesion with concern regarding direct extension to liver and enlarged pathological lymph nodes.  CT scan showed stomach involvement with local regional lymphadenopathy but no distance organ involvement.  A follow-up  PET scan results were confirmatory of increased metabolic activity starting at the GE junction with extension into cardia.  There was no finding of any hypermetabolic activity in the liver or any other local or distant metastatic lesions.  Brain MRI was  unremarkable.  After  multidisciplinary evaluation of tumor is considered more gastric than GE junction and she has been advised evaluation for neoadjuvant chemotherapy followed by surgical evaluation.  She was treated with neoadjuvant chemotherapy with FLOT protocol from Oct to Dec 2023.  She had a total gastrectomy in Jan 2024.  Postop pathology results showed G3, poorly differentiated cancer with partial treatment response, positive LVI/PNI, 7/25 lymph nodes positive (pT3 pN3). After reevaluation she was started on adjuvant chemotherapy with FLOT beginning March 2024.    For now she will continue with the chemotherapy at the current dose and schedule.  Starting cycle planned last cycle #4 today.  Probable side effects of neuropathy, weakness, fatigue, myelosuppression, alopecia etc. were explained to her in detail.  She did have significant problems in tolerating chemotherapy during the neoadjuvant phase requiring aggressive hydration and other supportive care.  Will continue with the strategies in the adjuvant setting also.    2.  Treatment induced diarrhea/nausea.  She will continue to be supported with aggressive supportive care Imodium, Lomotil, Zofran, Compazine and intermittent hydration.    3.  Fluid electrolyte nutrition.  She will continue to follow with with our nutritionist.    4.  Leukocytosis.  Most likely multifactorial secondary to Neulasta effect, prechemo steroid use etc.   There is also possibility of inflammation and infection along the PEG tube site.  Will hold the Neulasta to this round of chemo.  Will continue monitor her closely.    5.  Follow-up.  Follow-up with me in 2 -4 weeks.  Advised to contact us immediately if there are any new questions or concerns.    This note has been transcribed using Dragon voice recognition system and there is a possibility of unintentional typing misprints.

## 2024-04-30 NOTE — SIGNIFICANT EVENT

## 2024-05-01 ENCOUNTER — INFUSION (OUTPATIENT)
Dept: HEMATOLOGY/ONCOLOGY | Facility: CLINIC | Age: 59
End: 2024-05-01
Payer: COMMERCIAL

## 2024-05-01 VITALS
TEMPERATURE: 97.2 F | SYSTOLIC BLOOD PRESSURE: 145 MMHG | DIASTOLIC BLOOD PRESSURE: 69 MMHG | OXYGEN SATURATION: 96 % | RESPIRATION RATE: 16 BRPM | HEART RATE: 94 BPM

## 2024-05-01 DIAGNOSIS — C16.0 GE JUNCTION CARCINOMA (MULTI): ICD-10-CM

## 2024-05-01 PROCEDURE — 2500000004 HC RX 250 GENERAL PHARMACY W/ HCPCS (ALT 636 FOR OP/ED): Performed by: INTERNAL MEDICINE

## 2024-05-01 PROCEDURE — 96523 IRRIG DRUG DELIVERY DEVICE: CPT

## 2024-05-01 RX ORDER — HEPARIN SODIUM,PORCINE/PF 10 UNIT/ML
50 SYRINGE (ML) INTRAVENOUS AS NEEDED
Status: CANCELLED | OUTPATIENT
Start: 2024-05-01

## 2024-05-01 RX ORDER — HEPARIN 100 UNIT/ML
500 SYRINGE INTRAVENOUS AS NEEDED
Status: CANCELLED | OUTPATIENT
Start: 2024-05-01

## 2024-05-01 RX ORDER — HEPARIN 100 UNIT/ML
500 SYRINGE INTRAVENOUS AS NEEDED
Status: DISCONTINUED | OUTPATIENT
Start: 2024-05-01 | End: 2024-05-01 | Stop reason: HOSPADM

## 2024-05-01 RX ADMIN — HEPARIN 500 UNITS: 100 SYRINGE at 15:10

## 2024-05-01 ASSESSMENT — PAIN SCALES - GENERAL: PAINLEVEL: 0-NO PAIN

## 2024-05-06 DIAGNOSIS — R19.7 DIARRHEA, UNSPECIFIED TYPE: ICD-10-CM

## 2024-05-06 DIAGNOSIS — C16.0 GE JUNCTION CARCINOMA (MULTI): Primary | ICD-10-CM

## 2024-05-06 RX ORDER — LOPERAMIDE HCL 1MG/7.5ML
2 LIQUID (ML) ORAL 4 TIMES DAILY PRN
Qty: 150 ML | Refills: 3 | Status: SHIPPED | OUTPATIENT
Start: 2024-05-06

## 2024-05-06 NOTE — PROGRESS NOTES
NUTRITION COMMUNICATION NOTE    Shruthi Ramos     REASON FOR COMMUNICATION: Patient awaiting order of TwoCal. RD was able to find a case at [facility] for patient. They are grateful for support. Will continue to monitor and assist as able.

## 2024-05-07 ENCOUNTER — INFUSION (OUTPATIENT)
Dept: HEMATOLOGY/ONCOLOGY | Facility: CLINIC | Age: 59
End: 2024-05-07
Payer: COMMERCIAL

## 2024-05-07 ENCOUNTER — NUTRITION (OUTPATIENT)
Dept: RADIATION ONCOLOGY | Facility: CLINIC | Age: 59
End: 2024-05-07

## 2024-05-07 VITALS
HEART RATE: 90 BPM | TEMPERATURE: 97.9 F | OXYGEN SATURATION: 96 % | DIASTOLIC BLOOD PRESSURE: 73 MMHG | SYSTOLIC BLOOD PRESSURE: 145 MMHG | RESPIRATION RATE: 16 BRPM

## 2024-05-07 DIAGNOSIS — C16.0 GE JUNCTION CARCINOMA (MULTI): Primary | ICD-10-CM

## 2024-05-07 PROCEDURE — 96361 HYDRATE IV INFUSION ADD-ON: CPT | Mod: INF

## 2024-05-07 PROCEDURE — 96374 THER/PROPH/DIAG INJ IV PUSH: CPT | Mod: INF

## 2024-05-07 PROCEDURE — 2500000004 HC RX 250 GENERAL PHARMACY W/ HCPCS (ALT 636 FOR OP/ED): Performed by: INTERNAL MEDICINE

## 2024-05-07 RX ORDER — HEPARIN 100 UNIT/ML
500 SYRINGE INTRAVENOUS AS NEEDED
Status: CANCELLED | OUTPATIENT
Start: 2024-05-07

## 2024-05-07 RX ORDER — FAMOTIDINE 10 MG/ML
20 INJECTION INTRAVENOUS ONCE AS NEEDED
OUTPATIENT
Start: 2024-05-07

## 2024-05-07 RX ORDER — FLUCONAZOLE 200 MG/1
200 TABLET ORAL DAILY
Qty: 14 TABLET | Refills: 0 | Status: SHIPPED | OUTPATIENT
Start: 2024-05-07 | End: 2024-05-21

## 2024-05-07 RX ORDER — FAMOTIDINE 10 MG/ML
20 INJECTION INTRAVENOUS ONCE AS NEEDED
Status: CANCELLED | OUTPATIENT
Start: 2024-05-07

## 2024-05-07 RX ORDER — SODIUM CHLORIDE 9 MG/ML
1000 INJECTION, SOLUTION INTRAVENOUS AS NEEDED
Status: DISCONTINUED | OUTPATIENT
Start: 2024-05-07 | End: 2024-05-07 | Stop reason: HOSPADM

## 2024-05-07 RX ORDER — EPINEPHRINE 0.3 MG/.3ML
0.3 INJECTION SUBCUTANEOUS EVERY 5 MIN PRN
Status: CANCELLED | OUTPATIENT
Start: 2024-05-07

## 2024-05-07 RX ORDER — HEPARIN 100 UNIT/ML
500 SYRINGE INTRAVENOUS AS NEEDED
Status: DISCONTINUED | OUTPATIENT
Start: 2024-05-07 | End: 2024-05-07 | Stop reason: HOSPADM

## 2024-05-07 RX ORDER — HEPARIN SODIUM,PORCINE/PF 10 UNIT/ML
50 SYRINGE (ML) INTRAVENOUS AS NEEDED
Status: CANCELLED | OUTPATIENT
Start: 2024-05-07

## 2024-05-07 RX ORDER — ALBUTEROL SULFATE 0.83 MG/ML
3 SOLUTION RESPIRATORY (INHALATION) AS NEEDED
Status: CANCELLED | OUTPATIENT
Start: 2024-05-07

## 2024-05-07 RX ORDER — EPINEPHRINE 0.3 MG/.3ML
0.3 INJECTION SUBCUTANEOUS EVERY 5 MIN PRN
OUTPATIENT
Start: 2024-05-07

## 2024-05-07 RX ORDER — ONDANSETRON HYDROCHLORIDE 2 MG/ML
8 INJECTION, SOLUTION INTRAVENOUS ONCE
Status: COMPLETED | OUTPATIENT
Start: 2024-05-07 | End: 2024-05-07

## 2024-05-07 RX ORDER — DIPHENHYDRAMINE HYDROCHLORIDE 50 MG/ML
50 INJECTION INTRAMUSCULAR; INTRAVENOUS AS NEEDED
Status: CANCELLED | OUTPATIENT
Start: 2024-05-07

## 2024-05-07 RX ORDER — ONDANSETRON HYDROCHLORIDE 4 MG/2ML
8 INJECTION, SOLUTION INTRAMUSCULAR; INTRAVENOUS EVERY 4 HOURS PRN
Status: DISCONTINUED | OUTPATIENT
Start: 2024-05-07 | End: 2024-05-07 | Stop reason: ENTERED-IN-ERROR

## 2024-05-07 RX ORDER — DIPHENHYDRAMINE HYDROCHLORIDE 50 MG/ML
50 INJECTION INTRAMUSCULAR; INTRAVENOUS AS NEEDED
OUTPATIENT
Start: 2024-05-07

## 2024-05-07 RX ORDER — ALBUTEROL SULFATE 0.83 MG/ML
3 SOLUTION RESPIRATORY (INHALATION) AS NEEDED
OUTPATIENT
Start: 2024-05-07

## 2024-05-07 RX ORDER — ONDANSETRON HYDROCHLORIDE 4 MG/2ML
8 INJECTION, SOLUTION INTRAMUSCULAR; INTRAVENOUS EVERY 4 HOURS PRN
OUTPATIENT
Start: 2024-05-07

## 2024-05-07 RX ORDER — SODIUM CHLORIDE 9 MG/ML
1000 INJECTION, SOLUTION INTRAVENOUS AS NEEDED
Status: CANCELLED | OUTPATIENT
Start: 2024-05-07

## 2024-05-07 RX ORDER — DIPHENOXYLATE HYDROCHLORIDE AND ATROPINE SULFATE 2.5; .025 MG/1; MG/1
1 TABLET ORAL 4 TIMES DAILY PRN
Qty: 30 TABLET | Refills: 2 | Status: SHIPPED | OUTPATIENT
Start: 2024-05-07

## 2024-05-07 RX ADMIN — HEPARIN 500 UNITS: 100 SYRINGE at 13:13

## 2024-05-07 RX ADMIN — ONDANSETRON 8 MG: 2 INJECTION INTRAMUSCULAR; INTRAVENOUS at 11:32

## 2024-05-07 RX ADMIN — SODIUM CHLORIDE 1000 ML/HR: 9 INJECTION, SOLUTION INTRAVENOUS at 11:29

## 2024-05-07 NOTE — PROGRESS NOTES
NUTRITION COMMUNICATION NOTE    Shruthi Ramos     REASON FOR COMMUNICATION: RD briefly met with patient/ during hydration. She reports she was up all night with vomiting and diarrhea. Not significantly improved with medication. IV Zofran provided with fluid. Team continues to encourage Zofran and Compazine PO as ordered. Spoke with MD regarding diarrhea, will order Lomotil. Finally, patient reporting thrush. Had Nystatin at home from previous infection but is about to run out. Rosa updated. RD will continue to follow and assist as able.

## 2024-05-13 ENCOUNTER — INFUSION (OUTPATIENT)
Dept: HEMATOLOGY/ONCOLOGY | Facility: CLINIC | Age: 59
End: 2024-05-13
Payer: COMMERCIAL

## 2024-05-13 VITALS
RESPIRATION RATE: 18 BRPM | OXYGEN SATURATION: 100 % | SYSTOLIC BLOOD PRESSURE: 117 MMHG | HEART RATE: 82 BPM | DIASTOLIC BLOOD PRESSURE: 60 MMHG | TEMPERATURE: 96.4 F

## 2024-05-13 DIAGNOSIS — C16.0 GE JUNCTION CARCINOMA (MULTI): ICD-10-CM

## 2024-05-13 PROCEDURE — 2500000004 HC RX 250 GENERAL PHARMACY W/ HCPCS (ALT 636 FOR OP/ED): Performed by: INTERNAL MEDICINE

## 2024-05-13 PROCEDURE — 96360 HYDRATION IV INFUSION INIT: CPT | Mod: INF

## 2024-05-13 RX ORDER — EPINEPHRINE 0.3 MG/.3ML
0.3 INJECTION SUBCUTANEOUS EVERY 5 MIN PRN
Status: DISCONTINUED | OUTPATIENT
Start: 2024-05-13 | End: 2024-05-13 | Stop reason: HOSPADM

## 2024-05-13 RX ORDER — HEPARIN 100 UNIT/ML
500 SYRINGE INTRAVENOUS AS NEEDED
Status: DISCONTINUED | OUTPATIENT
Start: 2024-05-13 | End: 2024-05-13 | Stop reason: HOSPADM

## 2024-05-13 RX ORDER — DIPHENHYDRAMINE HYDROCHLORIDE 50 MG/ML
50 INJECTION INTRAMUSCULAR; INTRAVENOUS AS NEEDED
Status: DISCONTINUED | OUTPATIENT
Start: 2024-05-13 | End: 2024-05-13 | Stop reason: HOSPADM

## 2024-05-13 RX ORDER — FAMOTIDINE 10 MG/ML
20 INJECTION INTRAVENOUS ONCE AS NEEDED
Status: CANCELLED | OUTPATIENT
Start: 2024-05-13

## 2024-05-13 RX ORDER — ALBUTEROL SULFATE 0.83 MG/ML
3 SOLUTION RESPIRATORY (INHALATION) AS NEEDED
Status: CANCELLED | OUTPATIENT
Start: 2024-05-13

## 2024-05-13 RX ORDER — EPINEPHRINE 0.3 MG/.3ML
0.3 INJECTION SUBCUTANEOUS EVERY 5 MIN PRN
Status: CANCELLED | OUTPATIENT
Start: 2024-05-13

## 2024-05-13 RX ORDER — SODIUM CHLORIDE 9 MG/ML
1000 INJECTION, SOLUTION INTRAVENOUS AS NEEDED
Status: DISCONTINUED | OUTPATIENT
Start: 2024-05-13 | End: 2024-05-13 | Stop reason: HOSPADM

## 2024-05-13 RX ORDER — SODIUM CHLORIDE 9 MG/ML
1000 INJECTION, SOLUTION INTRAVENOUS AS NEEDED
Status: CANCELLED | OUTPATIENT
Start: 2024-05-13

## 2024-05-13 RX ORDER — DIPHENHYDRAMINE HYDROCHLORIDE 50 MG/ML
50 INJECTION INTRAMUSCULAR; INTRAVENOUS AS NEEDED
Status: CANCELLED | OUTPATIENT
Start: 2024-05-13

## 2024-05-13 RX ORDER — ALBUTEROL SULFATE 0.83 MG/ML
3 SOLUTION RESPIRATORY (INHALATION) AS NEEDED
Status: DISCONTINUED | OUTPATIENT
Start: 2024-05-13 | End: 2024-05-13 | Stop reason: HOSPADM

## 2024-05-13 RX ORDER — HEPARIN 100 UNIT/ML
500 SYRINGE INTRAVENOUS AS NEEDED
Status: CANCELLED | OUTPATIENT
Start: 2024-05-13

## 2024-05-13 RX ORDER — FAMOTIDINE 10 MG/ML
20 INJECTION INTRAVENOUS ONCE AS NEEDED
Status: DISCONTINUED | OUTPATIENT
Start: 2024-05-13 | End: 2024-05-13 | Stop reason: HOSPADM

## 2024-05-13 RX ORDER — HEPARIN SODIUM,PORCINE/PF 10 UNIT/ML
50 SYRINGE (ML) INTRAVENOUS AS NEEDED
Status: DISCONTINUED | OUTPATIENT
Start: 2024-05-13 | End: 2024-05-13 | Stop reason: HOSPADM

## 2024-05-13 RX ORDER — HEPARIN SODIUM,PORCINE/PF 10 UNIT/ML
50 SYRINGE (ML) INTRAVENOUS AS NEEDED
Status: CANCELLED | OUTPATIENT
Start: 2024-05-13

## 2024-05-13 RX ADMIN — SODIUM CHLORIDE 1000 ML/HR: 9 INJECTION, SOLUTION INTRAVENOUS at 13:11

## 2024-05-13 RX ADMIN — HEPARIN 500 UNITS: 100 SYRINGE at 14:37

## 2024-05-13 ASSESSMENT — PAIN SCALES - GENERAL
PAINLEVEL: 0-NO PAIN
PAINLEVEL_OUTOF10: 0-NO PAIN

## 2024-05-14 ENCOUNTER — HOSPITAL ENCOUNTER (OUTPATIENT)
Dept: RADIOLOGY | Facility: HOSPITAL | Age: 59
Discharge: HOME | End: 2024-05-14
Payer: COMMERCIAL

## 2024-05-14 VITALS
BODY MASS INDEX: 39.27 KG/M2 | WEIGHT: 208 LBS | SYSTOLIC BLOOD PRESSURE: 112 MMHG | DIASTOLIC BLOOD PRESSURE: 79 MMHG | TEMPERATURE: 98.2 F | RESPIRATION RATE: 20 BRPM | OXYGEN SATURATION: 97 % | HEIGHT: 61 IN | HEART RATE: 85 BPM

## 2024-05-14 DIAGNOSIS — T85.528A JEJUNOSTOMY TUBE FELL OUT: ICD-10-CM

## 2024-05-14 PROCEDURE — 2550000001 HC RX 255 CONTRASTS: Performed by: RADIOLOGY

## 2024-05-14 PROCEDURE — 49441 PLACE DUOD/JEJ TUBE PERC: CPT

## 2024-05-14 PROCEDURE — C1769 GUIDE WIRE: HCPCS

## 2024-05-14 PROCEDURE — 49465 FLUORO EXAM OF G/COLON TUBE: CPT

## 2024-05-14 PROCEDURE — 2720000007 HC OR 272 NO HCPCS

## 2024-05-14 PROCEDURE — C1725 CATH, TRANSLUMIN NON-LASER: HCPCS

## 2024-05-14 PROCEDURE — 2500000004 HC RX 250 GENERAL PHARMACY W/ HCPCS (ALT 636 FOR OP/ED): Performed by: RADIOLOGY

## 2024-05-14 PROCEDURE — 2780000003 HC OR 278 NO HCPCS

## 2024-05-14 PROCEDURE — C1887 CATHETER, GUIDING: HCPCS

## 2024-05-14 PROCEDURE — 99152 MOD SED SAME PHYS/QHP 5/>YRS: CPT | Performed by: RADIOLOGY

## 2024-05-14 PROCEDURE — 99152 MOD SED SAME PHYS/QHP 5/>YRS: CPT

## 2024-05-14 PROCEDURE — 2500000005 HC RX 250 GENERAL PHARMACY W/O HCPCS: Performed by: RADIOLOGY

## 2024-05-14 RX ORDER — MIDAZOLAM HYDROCHLORIDE 1 MG/ML
INJECTION INTRAMUSCULAR; INTRAVENOUS
Status: COMPLETED | OUTPATIENT
Start: 2024-05-14 | End: 2024-05-14

## 2024-05-14 RX ORDER — FENTANYL CITRATE 50 UG/ML
INJECTION, SOLUTION INTRAMUSCULAR; INTRAVENOUS
Status: COMPLETED | OUTPATIENT
Start: 2024-05-14 | End: 2024-05-14

## 2024-05-14 RX ORDER — ONDANSETRON HYDROCHLORIDE 2 MG/ML
4 INJECTION, SOLUTION INTRAVENOUS ONCE
Status: COMPLETED | OUTPATIENT
Start: 2024-05-14 | End: 2024-05-14

## 2024-05-14 RX ORDER — LIDOCAINE HYDROCHLORIDE 10 MG/ML
INJECTION, SOLUTION EPIDURAL; INFILTRATION; INTRACAUDAL; PERINEURAL
Status: COMPLETED | OUTPATIENT
Start: 2024-05-14 | End: 2024-05-14

## 2024-05-14 RX ORDER — SODIUM CHLORIDE 9 MG/ML
INJECTION, SOLUTION INTRAVENOUS CONTINUOUS PRN
Status: COMPLETED | OUTPATIENT
Start: 2024-05-14 | End: 2024-05-14

## 2024-05-14 RX ADMIN — ONDANSETRON 4 MG: 2 INJECTION INTRAMUSCULAR; INTRAVENOUS at 12:10

## 2024-05-14 RX ADMIN — Medication 3 L/MIN: at 12:00

## 2024-05-14 RX ADMIN — IOHEXOL 50 ML: 350 INJECTION, SOLUTION INTRAVENOUS at 12:52

## 2024-05-14 RX ADMIN — FENTANYL CITRATE 50 MCG: 50 INJECTION, SOLUTION INTRAMUSCULAR; INTRAVENOUS at 12:24

## 2024-05-14 RX ADMIN — LIDOCAINE HYDROCHLORIDE 5 ML: 10 INJECTION, SOLUTION EPIDURAL; INFILTRATION; INTRACAUDAL; PERINEURAL at 12:15

## 2024-05-14 RX ADMIN — MIDAZOLAM HYDROCHLORIDE 0.5 MG: 1 INJECTION, SOLUTION INTRAMUSCULAR; INTRAVENOUS at 12:24

## 2024-05-14 RX ADMIN — MIDAZOLAM HYDROCHLORIDE 0.5 MG: 1 INJECTION, SOLUTION INTRAMUSCULAR; INTRAVENOUS at 12:10

## 2024-05-14 RX ADMIN — SODIUM CHLORIDE 50 ML/HR: 9 INJECTION, SOLUTION INTRAVENOUS at 12:00

## 2024-05-14 RX ADMIN — FENTANYL CITRATE 50 MCG: 50 INJECTION, SOLUTION INTRAMUSCULAR; INTRAVENOUS at 12:10

## 2024-05-14 ASSESSMENT — PAIN SCALES - GENERAL
PAINLEVEL_OUTOF10: 0 - NO PAIN

## 2024-05-14 ASSESSMENT — PAIN - FUNCTIONAL ASSESSMENT
PAIN_FUNCTIONAL_ASSESSMENT: 0-10

## 2024-05-14 NOTE — POST-PROCEDURE NOTE
Interventional Radiology Brief Postprocedure Note    Attending: Rohan Brooke MD    Assistant:   Staff Role   Margarita Farmer MT Radiology Technologist   Rohan Brooke MD Radiologist   Flora Bailey, RN Radiology Nurse   Dav Rosario MT Radiology Technologist       Diagnosis:   1. Jejunostomy tube fell out  IR GI J tube placement    IR GI J tube placement          Description of procedure: Image guided exchange of 16F drain to an 18F jejunostomy tube. The tube is ready to use.    Timeout:  Yes    Procedure Area: Procedure Area     Anesthesia:   Conscious Sedation    Complications: None    Estimated Blood Loss: minimal    Medications (Filter: Administrations occurring from 1148 to 1235 on 05/14/24) As of 05/14/24 1235      sodium chloride 0.9% infusion (mL/hr) Total volume:  Not documented*   *Total volume has not been documented. View each administration to see the amount administered.     Date/Time Rate/Dose/Volume Action       05/14/24  1200 50 mL/hr New Bag               oxygen (O2) therapy (L/min) Total volume:  Not documented*   *Total volume has not been documented. View each administration to see the amount administered.     Date/Time Rate/Dose/Volume Action       05/14/24  1200 3 L/min - 180,000 mL/hr New Bag               lidocaine PF (Xylocaine) 10 mg/mL (1 %) injection (mL) Total volume:  5 mL      Date/Time Rate/Dose/Volume Action       05/14/24  1215 5 mL Given               fentaNYL PF (Sublimaze) injection (mcg) Total dose:  100 mcg      Date/Time Rate/Dose/Volume Action       05/14/24  1210 50 mcg Given      1224 50 mcg Given               midazolam (Versed) injection (mg) Total dose:  1 mg      Date/Time Rate/Dose/Volume Action       05/14/24  1210 0.5 mg Given      1224 0.5 mg Given                   No specimens collected      See detailed result report with images in PACS.    The patient tolerated the procedure well without incident or complication and is in stable condition.

## 2024-05-14 NOTE — Clinical Note
Flores cath exchanged/ 18 FR  jtube inserted with 10 ml sterile water balloon inflated. Tolerated well.

## 2024-05-21 ENCOUNTER — INFUSION (OUTPATIENT)
Dept: HEMATOLOGY/ONCOLOGY | Facility: CLINIC | Age: 59
End: 2024-05-21
Payer: MEDICAID

## 2024-05-21 VITALS
HEIGHT: 61 IN | BODY MASS INDEX: 39.71 KG/M2 | RESPIRATION RATE: 18 BRPM | TEMPERATURE: 98.1 F | WEIGHT: 210.32 LBS | HEART RATE: 97 BPM | OXYGEN SATURATION: 97 % | DIASTOLIC BLOOD PRESSURE: 85 MMHG | SYSTOLIC BLOOD PRESSURE: 142 MMHG

## 2024-05-21 DIAGNOSIS — C16.0 GE JUNCTION CARCINOMA (MULTI): ICD-10-CM

## 2024-05-21 PROCEDURE — 2500000004 HC RX 250 GENERAL PHARMACY W/ HCPCS (ALT 636 FOR OP/ED): Performed by: INTERNAL MEDICINE

## 2024-05-21 PROCEDURE — 96360 HYDRATION IV INFUSION INIT: CPT | Mod: INF

## 2024-05-21 RX ORDER — HEPARIN 100 UNIT/ML
500 SYRINGE INTRAVENOUS AS NEEDED
Status: CANCELLED | OUTPATIENT
Start: 2024-05-21

## 2024-05-21 RX ORDER — FAMOTIDINE 10 MG/ML
20 INJECTION INTRAVENOUS ONCE AS NEEDED
Status: CANCELLED | OUTPATIENT
Start: 2024-05-21

## 2024-05-21 RX ORDER — HEPARIN SODIUM,PORCINE/PF 10 UNIT/ML
50 SYRINGE (ML) INTRAVENOUS AS NEEDED
Status: CANCELLED | OUTPATIENT
Start: 2024-05-21

## 2024-05-21 RX ORDER — DIPHENHYDRAMINE HYDROCHLORIDE 50 MG/ML
50 INJECTION INTRAMUSCULAR; INTRAVENOUS AS NEEDED
Status: CANCELLED | OUTPATIENT
Start: 2024-05-21

## 2024-05-21 RX ORDER — HEPARIN 100 UNIT/ML
500 SYRINGE INTRAVENOUS AS NEEDED
Status: DISCONTINUED | OUTPATIENT
Start: 2024-05-21 | End: 2024-05-21 | Stop reason: HOSPADM

## 2024-05-21 RX ORDER — SODIUM CHLORIDE 9 MG/ML
1000 INJECTION, SOLUTION INTRAVENOUS AS NEEDED
Status: CANCELLED | OUTPATIENT
Start: 2024-05-21

## 2024-05-21 RX ORDER — ALBUTEROL SULFATE 0.83 MG/ML
3 SOLUTION RESPIRATORY (INHALATION) AS NEEDED
Status: CANCELLED | OUTPATIENT
Start: 2024-05-21

## 2024-05-21 RX ORDER — EPINEPHRINE 0.3 MG/.3ML
0.3 INJECTION SUBCUTANEOUS EVERY 5 MIN PRN
Status: CANCELLED | OUTPATIENT
Start: 2024-05-21

## 2024-05-21 RX ORDER — SODIUM CHLORIDE 9 MG/ML
1000 INJECTION, SOLUTION INTRAVENOUS AS NEEDED
Status: DISCONTINUED | OUTPATIENT
Start: 2024-05-21 | End: 2024-05-21 | Stop reason: HOSPADM

## 2024-05-21 RX ADMIN — HEPARIN 500 UNITS: 100 SYRINGE at 14:45

## 2024-05-21 RX ADMIN — SODIUM CHLORIDE 1000 ML/HR: 9 INJECTION, SOLUTION INTRAVENOUS at 13:28

## 2024-05-21 ASSESSMENT — PAIN SCALES - GENERAL: PAINLEVEL: 0-NO PAIN

## 2024-05-28 ENCOUNTER — INFUSION (OUTPATIENT)
Dept: HEMATOLOGY/ONCOLOGY | Facility: CLINIC | Age: 59
End: 2024-05-28
Payer: MEDICAID

## 2024-05-28 VITALS
OXYGEN SATURATION: 100 % | SYSTOLIC BLOOD PRESSURE: 119 MMHG | TEMPERATURE: 97.3 F | RESPIRATION RATE: 18 BRPM | DIASTOLIC BLOOD PRESSURE: 83 MMHG | HEART RATE: 91 BPM

## 2024-05-28 DIAGNOSIS — C16.0 GE JUNCTION CARCINOMA (MULTI): ICD-10-CM

## 2024-05-28 PROCEDURE — 2500000004 HC RX 250 GENERAL PHARMACY W/ HCPCS (ALT 636 FOR OP/ED): Performed by: INTERNAL MEDICINE

## 2024-05-28 PROCEDURE — 96360 HYDRATION IV INFUSION INIT: CPT | Mod: INF

## 2024-05-28 RX ORDER — ALBUTEROL SULFATE 0.83 MG/ML
3 SOLUTION RESPIRATORY (INHALATION) AS NEEDED
Status: CANCELLED | OUTPATIENT
Start: 2024-05-28

## 2024-05-28 RX ORDER — FAMOTIDINE 10 MG/ML
20 INJECTION INTRAVENOUS ONCE AS NEEDED
Status: CANCELLED | OUTPATIENT
Start: 2024-05-28

## 2024-05-28 RX ORDER — EPINEPHRINE 0.3 MG/.3ML
0.3 INJECTION SUBCUTANEOUS EVERY 5 MIN PRN
Status: CANCELLED | OUTPATIENT
Start: 2024-05-28

## 2024-05-28 RX ORDER — SODIUM CHLORIDE 9 MG/ML
1000 INJECTION, SOLUTION INTRAVENOUS AS NEEDED
Status: DISCONTINUED | OUTPATIENT
Start: 2024-05-28 | End: 2024-05-28 | Stop reason: HOSPADM

## 2024-05-28 RX ORDER — HEPARIN SODIUM,PORCINE/PF 10 UNIT/ML
50 SYRINGE (ML) INTRAVENOUS AS NEEDED
Status: CANCELLED | OUTPATIENT
Start: 2024-05-28

## 2024-05-28 RX ORDER — HEPARIN 100 UNIT/ML
500 SYRINGE INTRAVENOUS AS NEEDED
Status: DISCONTINUED | OUTPATIENT
Start: 2024-05-28 | End: 2024-05-28 | Stop reason: HOSPADM

## 2024-05-28 RX ORDER — DIPHENHYDRAMINE HYDROCHLORIDE 50 MG/ML
50 INJECTION INTRAMUSCULAR; INTRAVENOUS AS NEEDED
Status: CANCELLED | OUTPATIENT
Start: 2024-05-28

## 2024-05-28 RX ORDER — HEPARIN 100 UNIT/ML
500 SYRINGE INTRAVENOUS AS NEEDED
Status: CANCELLED | OUTPATIENT
Start: 2024-05-28

## 2024-05-28 RX ORDER — SODIUM CHLORIDE 9 MG/ML
1000 INJECTION, SOLUTION INTRAVENOUS AS NEEDED
Status: CANCELLED | OUTPATIENT
Start: 2024-05-28

## 2024-05-28 RX ADMIN — HEPARIN 500 UNITS: 100 SYRINGE at 14:42

## 2024-05-28 RX ADMIN — SODIUM CHLORIDE 1000 ML/HR: 9 INJECTION, SOLUTION INTRAVENOUS at 13:37

## 2024-05-28 ASSESSMENT — PAIN SCALES - GENERAL: PAINLEVEL: 0-NO PAIN

## 2024-06-04 ENCOUNTER — APPOINTMENT (OUTPATIENT)
Dept: HEMATOLOGY/ONCOLOGY | Facility: CLINIC | Age: 59
End: 2024-06-04
Payer: COMMERCIAL

## 2024-06-11 ENCOUNTER — INFUSION (OUTPATIENT)
Dept: HEMATOLOGY/ONCOLOGY | Facility: CLINIC | Age: 59
End: 2024-06-11
Payer: COMMERCIAL

## 2024-06-11 VITALS
HEART RATE: 59 BPM | TEMPERATURE: 97.7 F | SYSTOLIC BLOOD PRESSURE: 131 MMHG | DIASTOLIC BLOOD PRESSURE: 79 MMHG | OXYGEN SATURATION: 100 % | RESPIRATION RATE: 18 BRPM

## 2024-06-11 DIAGNOSIS — C16.0 GE JUNCTION CARCINOMA (MULTI): ICD-10-CM

## 2024-06-11 PROCEDURE — 96360 HYDRATION IV INFUSION INIT: CPT | Mod: INF

## 2024-06-11 PROCEDURE — 2500000004 HC RX 250 GENERAL PHARMACY W/ HCPCS (ALT 636 FOR OP/ED): Performed by: INTERNAL MEDICINE

## 2024-06-11 PROCEDURE — 96361 HYDRATE IV INFUSION ADD-ON: CPT | Mod: INF

## 2024-06-11 RX ORDER — SODIUM CHLORIDE 9 MG/ML
1000 INJECTION, SOLUTION INTRAVENOUS AS NEEDED
Status: DISCONTINUED | OUTPATIENT
Start: 2024-06-11 | End: 2024-06-11 | Stop reason: HOSPADM

## 2024-06-11 RX ORDER — HEPARIN 100 UNIT/ML
500 SYRINGE INTRAVENOUS AS NEEDED
Status: DISCONTINUED | OUTPATIENT
Start: 2024-06-11 | End: 2024-06-11 | Stop reason: HOSPADM

## 2024-06-11 RX ORDER — SODIUM CHLORIDE 9 MG/ML
1000 INJECTION, SOLUTION INTRAVENOUS AS NEEDED
OUTPATIENT
Start: 2024-06-11

## 2024-06-11 RX ORDER — ALBUTEROL SULFATE 0.83 MG/ML
3 SOLUTION RESPIRATORY (INHALATION) AS NEEDED
OUTPATIENT
Start: 2024-06-11

## 2024-06-11 RX ORDER — EPINEPHRINE 0.3 MG/.3ML
0.3 INJECTION SUBCUTANEOUS EVERY 5 MIN PRN
OUTPATIENT
Start: 2024-06-11

## 2024-06-11 RX ORDER — FAMOTIDINE 10 MG/ML
20 INJECTION INTRAVENOUS ONCE AS NEEDED
OUTPATIENT
Start: 2024-06-11

## 2024-06-11 RX ORDER — DIPHENHYDRAMINE HYDROCHLORIDE 50 MG/ML
50 INJECTION INTRAMUSCULAR; INTRAVENOUS AS NEEDED
OUTPATIENT
Start: 2024-06-11

## 2024-06-11 RX ORDER — HEPARIN SODIUM,PORCINE/PF 10 UNIT/ML
50 SYRINGE (ML) INTRAVENOUS AS NEEDED
OUTPATIENT
Start: 2024-06-11

## 2024-06-11 RX ORDER — HEPARIN 100 UNIT/ML
500 SYRINGE INTRAVENOUS AS NEEDED
OUTPATIENT
Start: 2024-06-11

## 2024-06-11 RX ADMIN — SODIUM CHLORIDE 1000 ML/HR: 9 INJECTION, SOLUTION INTRAVENOUS at 13:50

## 2024-06-11 RX ADMIN — HEPARIN 500 UNITS: 100 SYRINGE at 15:14

## 2024-06-11 ASSESSMENT — PAIN SCALES - GENERAL: PAINLEVEL: 0-NO PAIN

## 2024-06-18 ENCOUNTER — APPOINTMENT (OUTPATIENT)
Dept: HEMATOLOGY/ONCOLOGY | Facility: CLINIC | Age: 59
End: 2024-06-18
Payer: COMMERCIAL

## 2024-06-25 ENCOUNTER — NUTRITION (OUTPATIENT)
Dept: RADIATION ONCOLOGY | Facility: CLINIC | Age: 59
End: 2024-06-25
Payer: MEDICAID

## 2024-06-25 NOTE — PROGRESS NOTES
"NUTRITION Follow-up NOTE  Reason for Visit:  Shruthi Ramos is a 58 y.o. 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 Currently undergoing neoadjuvant chemotherapy with FLOT protocol (Docetaxel, oxaliplatin, leucovorin, and 5-fluorouracil).    Visit Type: Clinical Nutrition, Oncology, Telephone Visit:  A telephone visit (audio only) between the patient (at the distant site) and the provider (at the originating site) was utilized to provide this telehealth service.    Verbal Consent for Encounter: Verbal consent was requested and obtained from patient on this date for a telehealth visit.     RD reached out via phone to check in. Shruthi reports she is doing much better, feeling well. She continues to struggle with intake, admits that it may be partially mental that is limiting her meeting needs. RD recommend SLP or perhaps OncPsych, patient declining at this time. Will continue to receive IV hydration. RD also trying to procure an elemental formula for patient to try. Will continue to monitor and assist as able.     Lab Results   Component Value Date/Time    GLUCOSE 91 06/26/2024 1212     06/26/2024 1212    K 3.9 06/26/2024 1212     06/26/2024 1212    CO2 25 06/26/2024 1212    ANIONGAP 14 06/26/2024 1212    BUN 13 06/26/2024 1212    CREATININE 0.58 06/26/2024 1212    EGFR >90 06/26/2024 1212    CALCIUM 9.2 06/26/2024 1212    ALBUMIN 3.9 06/26/2024 1212    ALKPHOS 148 (H) 06/26/2024 1212    PROT 6.6 06/26/2024 1212    AST 24 06/26/2024 1212    BILITOT 0.7 06/26/2024 1212    ALT 20 06/26/2024 1212      Nutrition Assessment     Anthropometrics:  Anthropometrics  Height: 154.9 cm (5' 0.98\")  Weight: 94kg   BMI (Calculated): 39.13    Wt Readings from Last 20 Encounters:   06/26/24 94 kg (207 lb 3.7 oz)   05/21/24 95.4 kg (210 lb 5.1 oz)   05/14/24 94.3 kg (208 lb)   04/30/24 95 kg (209 lb 7 oz)   04/16/24 96.6 kg (213 lb)   04/16/24 96.8 kg (213 lb 6.5 " oz)   04/10/24 98.2 kg (216 lb 7.9 oz)   04/02/24 96 kg (211 lb 10.3 oz)   03/28/24 94.7 kg (208 lb 12.4 oz)   03/26/24 94.7 kg (208 lb 12.4 oz)   03/19/24 98 kg (216 lb 0.8 oz)   03/14/24 97.9 kg (215 lb 13.3 oz)   03/13/24 97.3 kg (214 lb 9.9 oz)   03/12/24 96.8 kg (213 lb 6.5 oz)   02/23/24 97 kg (213 lb 13.5 oz)   02/13/24 97.9 kg (215 lb 12.8 oz)   01/31/24 98.2 kg (216 lb 7.9 oz)   01/30/24 98.2 kg (216 lb 7.9 oz)   01/10/24 99.6 kg (219 lb 9.3 oz)   01/05/24 100 kg (220 lb 8 oz)     6% weight loss x 6 months    Food And Nutrient Intake:  Food and Nutrient History  Food and Nutrient History: Continues to rely on nocturnal feeds for majority of nutrition. Unable to hydrate by mouth 2/2 dehydration. Taking anti-emetics sporadically.   Energy Intake: Good > 75 %, Fair 50-75 %  Fluid Intake: less than 64oz/day  Food Allergy: NKFA  GI Symptoms: constipation (intermittent)  Oral Problems: dysphagia     Food Intake  Amount of Food: Did not provide    Food Preparation  Cooking: Patient, Spouse/Significant Other  Grocery Shopping: Patient, Spouse/Significant Other    Micronutrient Intake  Vitamin Intake: Multivitamin, B12, D    Food Supplement Intake  Oral Nutrition Supplements: Boost Plus (RD recommending Boost Plus BID to assist in meeting nutrient and fluid needs.)    Medication and Complementary/Alternative Medicine Use  Prescription Medication Use: atrovastatin, metoprolol, ondansetron    Nutrition Focused Physical Exam: (will need reassessed next visit)  Subcutaneous Fat Loss  Orbital Fat Pads: Well nourshed (slightly bulging fat pads)  Buccal Fat Pads: Well nourished (full, rounded cheeks)  Triceps: Well nourished (ample fat tissue)  Ribs: Well nourished (full chest, ribs do not protrude)  Muscle Wasting  Temporalis: Well nourished (well-defined muscle)  Pectoralis (Clavicular Region): Well nourished (clavicle not visible)  Deltoid/Trapezius: Well nourished (rounded appearance at arm, shoulder,  "neck)  Interosseous: Well nourished (muscle bulges)  Trapezius/Infraspinatus/Supraspinatus (Scapular Region): Well nourished (bones not prominent, muscle taut)  Quadriceps: Well nourished (well developed, well rounded)  Gastrocnemius: Well nourished (well developed bulbous muscle)  Edema  Edema: none  Physical Findings (Nutrition Deficiency/Toxicity)  Hair: Negative  Eyes: Negative  Mouth: Negative  Nails: Negative  Skin: Negative    Energy Needs   Calculated Energy Needs Using Equations  Height: 154.9 cm (5' 0.98\")  Daily Nutrient Recommendations (continue as previous):   kcals/day: 7258-7359   gms protein/day: 71-99   mL fluid/day: 1mL/kcal or per MD   kcals/kg/day: 25-30ABW   gm protein/kg/day: 1-1.4ABW     Nutrition Diagnosis   Malnutrition Diagnosis  Patient has Malnutrition Diagnosis: No  Nutrition Diagnosis  Patient has Nutrition Diagnosis: Yes  Diagnosis Status (1): Ongoing  Nutrition Diagnosis 1: Swallowing difficulity  Related to (1): GE junction tumor  As Evidenced by (1): patient reports of dysphagia, especially with dry foods  Additional Nutrition Diagnosis: Diagnosis 2  Diagnosis Status (2): Ongoing  Nutrition Diagnosis 2: Inadequate oral intake  Related to (2): catabolic disease and treatment  As Evidenced by (2): PO intake not consistently meeting 75% estimated nutrient needs, patient reports of continued struggles with PO intake    Nutrition Interventions/Recommendations (as previous)  Food and Nutrition Delivery  Meals & Snacks: Energy-modified diet  Goals: increased nutrient needs in the setting of cancer  Medical Food Supplement: Premier Protein  Goals: BID  Nutrition Education  Nutrition Education Content: Content related nutrition education  Goals: patient will understand impact of diet on success during treatment, in the healing process, and for survivorship  Nutrition Counseling  Theoretical Basis/Approach: Nutrition counseling based on cognitive behavioral theory approach  Goals: " contemplation-->action  Strategies: Nutrition counseling based on motivational interviewing strategy  Coordination of Nutrition Care by a Nutrition Professional  Collaboration and referral of nutrition care: Collaboration by nutrition professional with other providers    Nutrition Monitoring and Evaluation (as previous)  Food/Nutrient Related History Monitoring  Monitoring and Evaluation Plan: Energy intake  Energy Intake: Estimated energy intake  Criteria: meet needs calculated by RD  Knowledge/Beliefs/Attitudes  Monitoring and Evaluation Plan: Food and nutrition knowledge  Food and nutrition knowledge: Nutrition knowledge of individual client  Criteria: will continue to work with patient on diet for cancer    Readiness to Change : Good  Level of Understanding : Good  Anticipated Compliant : Good    Time Spent  Prep time on day of patient encounter: 15 minutes  Time spent directly with patient, family or caregiver: 15 minutes  Documentation Time: 15 minutes

## 2024-06-26 ENCOUNTER — TELEPHONE (OUTPATIENT)
Dept: HEMATOLOGY/ONCOLOGY | Facility: CLINIC | Age: 59
End: 2024-06-26
Payer: MEDICAID

## 2024-06-26 ENCOUNTER — OFFICE VISIT (OUTPATIENT)
Dept: HEMATOLOGY/ONCOLOGY | Facility: CLINIC | Age: 59
End: 2024-06-26
Payer: MEDICAID

## 2024-06-26 ENCOUNTER — INFUSION (OUTPATIENT)
Dept: HEMATOLOGY/ONCOLOGY | Facility: CLINIC | Age: 59
End: 2024-06-26
Payer: MEDICAID

## 2024-06-26 VITALS
DIASTOLIC BLOOD PRESSURE: 86 MMHG | RESPIRATION RATE: 20 BRPM | BODY MASS INDEX: 39.13 KG/M2 | OXYGEN SATURATION: 98 % | TEMPERATURE: 98.4 F | WEIGHT: 207.23 LBS | SYSTOLIC BLOOD PRESSURE: 137 MMHG | HEIGHT: 61 IN | HEART RATE: 71 BPM

## 2024-06-26 DIAGNOSIS — C16.0 GE JUNCTION CARCINOMA (MULTI): Primary | ICD-10-CM

## 2024-06-26 DIAGNOSIS — R53.1 WEAKNESS: ICD-10-CM

## 2024-06-26 DIAGNOSIS — C16.0 GE JUNCTION CARCINOMA (MULTI): ICD-10-CM

## 2024-06-26 LAB
ALBUMIN SERPL BCP-MCNC: 3.9 G/DL (ref 3.4–5)
ALP SERPL-CCNC: 148 U/L (ref 33–110)
ALT SERPL W P-5'-P-CCNC: 20 U/L (ref 7–45)
ANION GAP SERPL CALC-SCNC: 14 MMOL/L (ref 10–20)
AST SERPL W P-5'-P-CCNC: 24 U/L (ref 9–39)
BASOPHILS # BLD AUTO: 0.07 X10*3/UL (ref 0–0.1)
BASOPHILS NFR BLD AUTO: 0.8 %
BILIRUB SERPL-MCNC: 0.7 MG/DL (ref 0–1.2)
BUN SERPL-MCNC: 13 MG/DL (ref 6–23)
CALCIUM SERPL-MCNC: 9.2 MG/DL (ref 8.6–10.3)
CHLORIDE SERPL-SCNC: 105 MMOL/L (ref 98–107)
CO2 SERPL-SCNC: 25 MMOL/L (ref 21–32)
CREAT SERPL-MCNC: 0.58 MG/DL (ref 0.5–1.05)
DACRYOCYTES BLD QL SMEAR: NORMAL
EGFRCR SERPLBLD CKD-EPI 2021: >90 ML/MIN/1.73M*2
EOSINOPHIL # BLD AUTO: 0.48 X10*3/UL (ref 0–0.7)
EOSINOPHIL NFR BLD AUTO: 5.6 %
ERYTHROCYTE [DISTWIDTH] IN BLOOD BY AUTOMATED COUNT: 20.7 % (ref 11.5–14.5)
FERRITIN SERPL-MCNC: 58 NG/ML (ref 8–150)
GLUCOSE SERPL-MCNC: 91 MG/DL (ref 74–99)
HCT VFR BLD AUTO: 39.2 % (ref 36–46)
HGB BLD-MCNC: 12.5 G/DL (ref 12–16)
IMM GRANULOCYTES # BLD AUTO: 0.03 X10*3/UL (ref 0–0.7)
IMM GRANULOCYTES NFR BLD AUTO: 0.4 % (ref 0–0.9)
IRON SATN MFR SERPL: 6 % (ref 25–45)
IRON SERPL-MCNC: 22 UG/DL (ref 35–150)
LYMPHOCYTES # BLD AUTO: 1.75 X10*3/UL (ref 1.2–4.8)
LYMPHOCYTES NFR BLD AUTO: 20.6 %
MCH RBC QN AUTO: 28.9 PG (ref 26–34)
MCHC RBC AUTO-ENTMCNC: 31.9 G/DL (ref 32–36)
MCV RBC AUTO: 91 FL (ref 80–100)
MONOCYTES # BLD AUTO: 0.68 X10*3/UL (ref 0.1–1)
MONOCYTES NFR BLD AUTO: 8 %
NEUTROPHILS # BLD AUTO: 5.49 X10*3/UL (ref 1.2–7.7)
NEUTROPHILS NFR BLD AUTO: 64.6 %
NRBC BLD-RTO: 0 /100 WBCS (ref 0–0)
OVALOCYTES BLD QL SMEAR: NORMAL
PLATELET # BLD AUTO: 201 X10*3/UL (ref 150–450)
POTASSIUM SERPL-SCNC: 3.9 MMOL/L (ref 3.5–5.3)
PROT SERPL-MCNC: 6.6 G/DL (ref 6.4–8.2)
RBC # BLD AUTO: 4.33 X10*6/UL (ref 4–5.2)
RBC MORPH BLD: NORMAL
SODIUM SERPL-SCNC: 140 MMOL/L (ref 136–145)
TIBC SERPL-MCNC: 400 UG/DL (ref 240–445)
UIBC SERPL-MCNC: 378 UG/DL (ref 110–370)
WBC # BLD AUTO: 8.5 X10*3/UL (ref 4.4–11.3)

## 2024-06-26 PROCEDURE — 83540 ASSAY OF IRON: CPT

## 2024-06-26 PROCEDURE — 85025 COMPLETE CBC W/AUTO DIFF WBC: CPT

## 2024-06-26 PROCEDURE — 2500000004 HC RX 250 GENERAL PHARMACY W/ HCPCS (ALT 636 FOR OP/ED): Performed by: INTERNAL MEDICINE

## 2024-06-26 PROCEDURE — 99215 OFFICE O/P EST HI 40 MIN: CPT | Performed by: INTERNAL MEDICINE

## 2024-06-26 PROCEDURE — 3079F DIAST BP 80-89 MM HG: CPT | Performed by: INTERNAL MEDICINE

## 2024-06-26 PROCEDURE — 80053 COMPREHEN METABOLIC PANEL: CPT

## 2024-06-26 PROCEDURE — 82728 ASSAY OF FERRITIN: CPT | Mod: PARLAB

## 2024-06-26 PROCEDURE — 3075F SYST BP GE 130 - 139MM HG: CPT | Performed by: INTERNAL MEDICINE

## 2024-06-26 PROCEDURE — 96360 HYDRATION IV INFUSION INIT: CPT | Mod: INF

## 2024-06-26 RX ORDER — HEPARIN 100 UNIT/ML
500 SYRINGE INTRAVENOUS AS NEEDED
Status: DISCONTINUED | OUTPATIENT
Start: 2024-06-26 | End: 2024-06-26 | Stop reason: HOSPADM

## 2024-06-26 RX ORDER — ALBUTEROL SULFATE 0.83 MG/ML
3 SOLUTION RESPIRATORY (INHALATION) AS NEEDED
OUTPATIENT
Start: 2024-06-26

## 2024-06-26 RX ORDER — HEPARIN SODIUM,PORCINE/PF 10 UNIT/ML
50 SYRINGE (ML) INTRAVENOUS AS NEEDED
OUTPATIENT
Start: 2024-06-26

## 2024-06-26 RX ORDER — HEPARIN 100 UNIT/ML
500 SYRINGE INTRAVENOUS AS NEEDED
OUTPATIENT
Start: 2024-06-26

## 2024-06-26 RX ORDER — EPINEPHRINE 0.3 MG/.3ML
0.3 INJECTION SUBCUTANEOUS EVERY 5 MIN PRN
Status: DISCONTINUED | OUTPATIENT
Start: 2024-06-26 | End: 2024-06-26 | Stop reason: HOSPADM

## 2024-06-26 RX ORDER — SODIUM CHLORIDE 9 MG/ML
1000 INJECTION, SOLUTION INTRAVENOUS AS NEEDED
OUTPATIENT
Start: 2024-06-26

## 2024-06-26 RX ORDER — HEPARIN SODIUM,PORCINE/PF 10 UNIT/ML
50 SYRINGE (ML) INTRAVENOUS AS NEEDED
Status: DISCONTINUED | OUTPATIENT
Start: 2024-06-26 | End: 2024-06-26 | Stop reason: HOSPADM

## 2024-06-26 RX ORDER — FAMOTIDINE 10 MG/ML
20 INJECTION INTRAVENOUS ONCE AS NEEDED
OUTPATIENT
Start: 2024-06-26

## 2024-06-26 RX ORDER — EPINEPHRINE 0.3 MG/.3ML
0.3 INJECTION SUBCUTANEOUS EVERY 5 MIN PRN
OUTPATIENT
Start: 2024-06-26

## 2024-06-26 RX ORDER — DIPHENHYDRAMINE HYDROCHLORIDE 50 MG/ML
50 INJECTION INTRAMUSCULAR; INTRAVENOUS AS NEEDED
Status: DISCONTINUED | OUTPATIENT
Start: 2024-06-26 | End: 2024-06-26 | Stop reason: HOSPADM

## 2024-06-26 RX ORDER — FAMOTIDINE 10 MG/ML
20 INJECTION INTRAVENOUS ONCE AS NEEDED
Status: DISCONTINUED | OUTPATIENT
Start: 2024-06-26 | End: 2024-06-26 | Stop reason: HOSPADM

## 2024-06-26 RX ORDER — ALBUTEROL SULFATE 0.83 MG/ML
3 SOLUTION RESPIRATORY (INHALATION) AS NEEDED
Status: DISCONTINUED | OUTPATIENT
Start: 2024-06-26 | End: 2024-06-26 | Stop reason: HOSPADM

## 2024-06-26 RX ORDER — SODIUM CHLORIDE 9 MG/ML
1000 INJECTION, SOLUTION INTRAVENOUS AS NEEDED
Status: DISCONTINUED | OUTPATIENT
Start: 2024-06-26 | End: 2024-06-26 | Stop reason: HOSPADM

## 2024-06-26 RX ORDER — DIPHENHYDRAMINE HYDROCHLORIDE 50 MG/ML
50 INJECTION INTRAMUSCULAR; INTRAVENOUS AS NEEDED
OUTPATIENT
Start: 2024-06-26

## 2024-06-26 ASSESSMENT — PAIN SCALES - GENERAL: PAINLEVEL: 6

## 2024-06-26 NOTE — PROGRESS NOTES
LOCATION:  [facility] at [facility].     HEMATOLOGY & ONCOLOGY PROBLEMS:  1.  Localized gastric adenocarcinoma.       a.  Neoadjuvant chemotherapy with FLOT from Oct to Dec 2023.       b.  S/p total gastrectomy in January 2024.  Postoperative pathology (pT3,pN3).       c.  Adjuvant chemotherapy with FLOT with FLOT x 4 cycles from March to May 2024.    CHIEF COMPLAINT:    The patient is in the clinic today for follow-up of GE junction adenocarcinoma and for management of therapy related effects.                   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  She was having problem with gradually worsening dysphagia with further GI work-up revealing gastric mass with the biopsy confirming  poorly differentiated adenocarcinoma in July 2023.  Prior to that she had a barium esophagogram which was essentially unremarkable.  Patient does have a previous history of GERD and had one time was treated for H. pylori gastritis.  EUS done by Dr. London on 7/17/2023 showed malignancy starting near the GE junction and measuring 2.2 x 1.5 cm towards the cardia along  the lesser curvature / anterior wall with malignant appearing features.  There is loss of interface between the mass and liver along a 7 mm region, concerning for probable  early hepatic involvement.  Enlarged lymph nodes were noted in the diaphragmatic region, and gastrohepatic ligament.  CT chest abdomen pelvis with contrast on 8/9/2023 showed thickening of the distal esophagus extending into the anterior portion of the  gastric cardia.  There is a small amount of liver present directly adjacent to the area of the tumor but there is no altered enhancement pattern in the liver parenchyma.  Slightly inferior to the cardia there  were lymph nodes visible in the adjacent mesentery that were not visible previously measuring up to 6 mm in short axis. PET/CT on 8/31/2023 showed a hypermetabolic focus at the GE junction  with  extension into the gastric cardia, no evidence of hypermetabolic regional or distant metastatic disease. Brain MRI negative for metastasis.  She was treated with neoadjuvant chemotherapy with FLOT protocol from Oct to Dec 2023.  She had a total gastrectomy in 2024.  Postop pathology results showed G3, poorly differentiated cancer with partial treatment response, positive LVI/PNI, 7/25 lymph nodes positive (pT3 pN3).  After reevaluation she was treated with adjuvant chemotherapy with FLOT x 4 cycles from March to May 2024.     INTERVAL HISTORY:  Persistent issues with generalized weakness and fatigue.  Despite multiple problems she was able to complete the adjuvant planned chemotherapy.  She had a PEG tube replaced about a month ago.  Since yesterday she has noted slight discomfort and discharge at the site.    PAST MEDICAL HISTORY:   1.  GE junction adenocarcinoma as detailed above.   2.  Hypertension   3.  Hyperlipidemia   4.  Anxiety/depression   5.  History of complete hysterectomy, ankle surgery, cholecystectomy and 2  procedures     SOCIAL HISTORY:    and lives in [facility].  Non-smoker and nonalcoholic.  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  Born and raised in [place].     FAMILY HISTORY:    Father  at age 76 from myocardial infarction mother  from stroke at age 78.  She had 8 siblings.  1 brother  from MI another committed suicide.  A sister also  from myocardial infarction.   She had 2 children.  Her son  from AML.  No other specific history of bleeding, clotting or malignant disorder in the family.     REVIEW OF SYSTEMS:  Pertinent finding as per the history above.  All other systems have been reviewed and generally negative and noncontributory.     PHYSICAL EXAMINATION:    Detailed physical examination not done     ALLERGY & MEDICATIONS:  Allergies and latest outpatient medications list were reviewed in the EMR.    LAB RESULTS:  CBC     RADIOLOGY  RESULT:  PET/CT Esophageal Initial [Aug 31 2023  1:12PM]  Impression:  1. Hypermetabolic focus localized at gastroesophageal junction with xtension to the gastric cardia compatible with biopsy-proven gastric adenocarcinoma.  2. No evidence of hypermetabolic regional or distant metastatic disease.  3. Status post cholecystectomy with demonstration of localized pneumobilia.     MRI Brain w/wo Contrast [Aug 29 2023 12:28PM]   Impression:  There is no evidence of mass, infarction or hemorrhage.     PATHOLOGY RESULTS:  Surgical Pathology Exam: [id number]   FINAL DIAGNOSIS   A. Liver, segment 4 lesion, biopsy:  - Bile duct adenoma.     B. 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  - Resection margins are negative for neoplasia.  - Six of twenty-nine lymph nodes involved by metastatic carcinoma (6/29).  - Background stomach with reactive gastropathy, intestinal metaplasia, and dieulafoy lesion with accompanying hemorrhage.  - Omentum with no significant pathologic findings.     C. Lymph nodes, D2, excision:  - One of six lymph nodes involved by metastatic carcinoma (1/6).     D. Final esophageal margin, resection:  - Segment of esophagus, negative for neoplasia.   Electronically signed by Sachi Eubanks MD on 1/18/2024 at 1350   Case Summary Report   ESOPHAGUS    8th Edition - Protocol posted: 6/22/2022ESOPHAGUS - B  SPECIMEN   Procedure Esophagogastrectomy   TUMOR   Tumor Site Esophagogastric junction (EGJ)   Relationship of Tumor to Esophagogastric Junction Tumor midpoint is located at the esophagogastric junction   Histologic Type Adenocarcinoma   Histologic Grade G3, poorly differentiated, undifferentiated   Tumor Size Greatest Dimension (Centimeters): 2.3 (grossly) cm   Tumor Extent Invades adventitia   Treatment Effect Present, with residual cancer showing evident tumor regression, but more than single cells  or rare small groups of cancer cells (partial response, score 2)   Lymphovascular Invasion Present   Perineural Invasion Present   MARGINS   Margin Status for Invasive Carcinoma All margins negative for invasive carcinoma   Closest Margin(s) to Invasive Carcinoma Deep   Distance from Invasive Carcinoma to Closest Margin 1.2 cm   Margin Status for Dysplasia and Intestinal Metaplasia All margins negative for dysplasia   REGIONAL LYMPH NODES   Regional Lymph Node Status Tumor present in regional lymph node(s)   Number of Lymph Nodes with Tumor 7   Number of Lymph Nodes Examined 35   PATHOLOGIC STAGE CLASSIFICATION (pTNM, AJCC 8th Edition)   TNM Descriptors y (post-treatment)   pT Category pT3   pN Category pN3         ASSESSMENT AND PLAN:   1.  GE junction poorly differentiated adenocarcinoma.  Please refer to the details as outlined above.  In summary patient with few month history  of gradually worsening dysphagia to both solid and liquid.  Initial barium esophagogram was normal but EGD showed a GE junction mass in July 2023. Biopsy results are confirmatory of poorly differentiated adenocarcinoma with normal mismatch repair gene  expression. EUS revealed a GE junction lesion with concern regarding direct extension to liver and enlarged pathological lymph nodes.  CT scan showed stomach involvement with local regional lymphadenopathy but no distance organ involvement.  A follow-up  PET scan results were confirmatory of increased metabolic activity starting at the GE junction with extension into cardia.  There was no finding of any hypermetabolic activity in the liver or any other local or distant metastatic lesions.  Brain MRI was  unremarkable.  After multidisciplinary evaluation of tumor is considered more gastric than GE junction and she has been advised evaluation for neoadjuvant chemotherapy followed by surgical evaluation.  She was treated with neoadjuvant chemotherapy with FLOT protocol from Oct to Dec 2023.   She had a total gastrectomy in Jan 2024.  Postop pathology results showed G3, poorly differentiated cancer with partial treatment response, positive LVI/PNI, 7/25 lymph nodes positive (pT3 pN3). After reevaluation she was treated with adjuvant chemotherapy with FLOT x 4 cycles from March to May 2024.    She has completed the planned adjuvant chemotherapy.  For now we will continue with the aggressive supportive care as already initiated.  Over the next few months once her overall condition started improving we will try to schedule her for follow-up PET scan, which will be a new baseline for her.  On a long-term basis, will continue monitor closely with regular physical examination, blood work, intermittent scans and endoscopies.    2.  Treatment induced diarrhea/nausea.  She will continue to be supported with aggressive supportive care Imodium, Lomotil, Zofran, Compazine and intermittent hydration.    3.  Fluid electrolyte nutrition.  She will continue to follow with with our nutritionist.  She is still being supported with intermittent hydration as needed.    4.  PEG tube site discharge/irritation.  Will treat her with a course of Keflex.  She will continue with the local site care.  If her symptoms persist then we will request interventional radiology help for reevaluation.    5.  Follow-up.  Follow-up with me in 4 weeks.  Advised to contact us immediately if there are any new questions or concerns.    This note has been transcribed using Dragon voice recognition system and there is a possibility of unintentional typing misprints.

## 2024-06-26 NOTE — TELEPHONE ENCOUNTER
Patient asking for PT referral for strengthening to [facility], [facility] (close to home). Referral printed, signed by Dr. Torres and all information faxed to [facility] at [phone] (with indication it is for the [place] office).

## 2024-06-28 DIAGNOSIS — C16.0 GE JUNCTION CARCINOMA (MULTI): Primary | ICD-10-CM

## 2024-06-28 RX ORDER — CEPHALEXIN 500 MG/1
500 CAPSULE ORAL 2 TIMES DAILY
Qty: 20 CAPSULE | Refills: 0 | Status: SHIPPED | OUTPATIENT
Start: 2024-06-28 | End: 2024-07-08

## 2024-07-05 ENCOUNTER — TELEPHONE (OUTPATIENT)
Dept: HEMATOLOGY/ONCOLOGY | Facility: CLINIC | Age: 59
End: 2024-07-05
Payer: MEDICAID

## 2024-07-05 DIAGNOSIS — C16.0 GE JUNCTION CARCINOMA (MULTI): Primary | ICD-10-CM

## 2024-07-05 RX ORDER — OMEPRAZOLE 20 MG/1
20 CAPSULE, DELAYED RELEASE ORAL
Qty: 60 CAPSULE | Refills: 3 | Status: SHIPPED | OUTPATIENT
Start: 2024-07-05

## 2024-07-05 NOTE — TELEPHONE ENCOUNTER
Patient with complaints of increased acid reflux at night x 1 week; sleeps sitting up in recliner related to tube feedings, but still having significant reflux.  Dr. Torres ordered omeprazole BID and patient will let us know how that helps.

## 2024-07-26 DIAGNOSIS — C16.0 GE JUNCTION CARCINOMA (MULTI): Primary | ICD-10-CM

## 2024-07-26 RX ORDER — FAMOTIDINE 20 MG/1
20 TABLET, FILM COATED ORAL 2 TIMES DAILY
Qty: 60 TABLET | Refills: 3 | Status: SHIPPED | OUTPATIENT
Start: 2024-07-26

## 2024-07-26 NOTE — TELEPHONE ENCOUNTER
Patient having some issues with Omeprazole; Dr. Torres changed her to Famotidine -  aware and will let me know how this works. If it doesn't help Dr. Torres will switch her to Carafate.

## 2024-07-30 ENCOUNTER — APPOINTMENT (OUTPATIENT)
Dept: HEMATOLOGY/ONCOLOGY | Facility: CLINIC | Age: 59
End: 2024-07-30
Payer: MEDICAID

## 2024-07-30 ENCOUNTER — INFUSION (OUTPATIENT)
Dept: HEMATOLOGY/ONCOLOGY | Facility: CLINIC | Age: 59
End: 2024-07-30
Payer: MEDICAID

## 2024-07-30 ENCOUNTER — NUTRITION (OUTPATIENT)
Dept: RADIATION ONCOLOGY | Facility: CLINIC | Age: 59
End: 2024-07-30

## 2024-07-30 ENCOUNTER — OFFICE VISIT (OUTPATIENT)
Dept: HEMATOLOGY/ONCOLOGY | Facility: CLINIC | Age: 59
End: 2024-07-30
Payer: MEDICAID

## 2024-07-30 VITALS
SYSTOLIC BLOOD PRESSURE: 125 MMHG | HEIGHT: 61 IN | BODY MASS INDEX: 38.71 KG/M2 | OXYGEN SATURATION: 99 % | DIASTOLIC BLOOD PRESSURE: 79 MMHG | RESPIRATION RATE: 20 BRPM | WEIGHT: 205.03 LBS | HEART RATE: 75 BPM | TEMPERATURE: 96.8 F

## 2024-07-30 VITALS
OXYGEN SATURATION: 99 % | SYSTOLIC BLOOD PRESSURE: 148 MMHG | DIASTOLIC BLOOD PRESSURE: 81 MMHG | TEMPERATURE: 97 F | HEART RATE: 66 BPM | RESPIRATION RATE: 20 BRPM

## 2024-07-30 DIAGNOSIS — C16.0 ADENOCARCINOMA OF GASTROESOPHAGEAL JUNCTION (MULTI): Primary | ICD-10-CM

## 2024-07-30 DIAGNOSIS — C16.0 GE JUNCTION CARCINOMA (MULTI): ICD-10-CM

## 2024-07-30 DIAGNOSIS — C16.0 GE JUNCTION CARCINOMA (MULTI): Primary | ICD-10-CM

## 2024-07-30 DIAGNOSIS — C16.0 ADENOCARCINOMA OF GASTROESOPHAGEAL JUNCTION (MULTI): ICD-10-CM

## 2024-07-30 LAB
ALBUMIN SERPL BCP-MCNC: 3.8 G/DL (ref 3.4–5)
ALP SERPL-CCNC: 147 U/L (ref 33–110)
ALT SERPL W P-5'-P-CCNC: 16 U/L (ref 7–45)
ANION GAP SERPL CALC-SCNC: 12 MMOL/L (ref 10–20)
AST SERPL W P-5'-P-CCNC: 24 U/L (ref 9–39)
BASOPHILS # BLD AUTO: 0.05 X10*3/UL (ref 0–0.1)
BASOPHILS NFR BLD AUTO: 0.7 %
BILIRUB SERPL-MCNC: 0.4 MG/DL (ref 0–1.2)
BUN SERPL-MCNC: 12 MG/DL (ref 6–23)
CALCIUM SERPL-MCNC: 8.8 MG/DL (ref 8.6–10.3)
CHLORIDE SERPL-SCNC: 110 MMOL/L (ref 98–107)
CO2 SERPL-SCNC: 23 MMOL/L (ref 21–32)
CREAT SERPL-MCNC: 0.75 MG/DL (ref 0.5–1.05)
EGFRCR SERPLBLD CKD-EPI 2021: >90 ML/MIN/1.73M*2
EOSINOPHIL # BLD AUTO: 0.82 X10*3/UL (ref 0–0.7)
EOSINOPHIL NFR BLD AUTO: 11.1 %
ERYTHROCYTE [DISTWIDTH] IN BLOOD BY AUTOMATED COUNT: 15.9 % (ref 11.5–14.5)
FERRITIN SERPL-MCNC: 34 NG/ML (ref 8–150)
GLUCOSE SERPL-MCNC: 107 MG/DL (ref 74–99)
HCT VFR BLD AUTO: 40.1 % (ref 36–46)
HGB BLD-MCNC: 12.9 G/DL (ref 12–16)
IMM GRANULOCYTES # BLD AUTO: 0.02 X10*3/UL (ref 0–0.7)
IMM GRANULOCYTES NFR BLD AUTO: 0.3 % (ref 0–0.9)
LYMPHOCYTES # BLD AUTO: 1.74 X10*3/UL (ref 1.2–4.8)
LYMPHOCYTES NFR BLD AUTO: 23.6 %
MAGNESIUM SERPL-MCNC: 2 MG/DL (ref 1.6–2.4)
MCH RBC QN AUTO: 29.3 PG (ref 26–34)
MCHC RBC AUTO-ENTMCNC: 32.2 G/DL (ref 32–36)
MCV RBC AUTO: 91 FL (ref 80–100)
MONOCYTES # BLD AUTO: 0.55 X10*3/UL (ref 0.1–1)
MONOCYTES NFR BLD AUTO: 7.5 %
NEUTROPHILS # BLD AUTO: 4.19 X10*3/UL (ref 1.2–7.7)
NEUTROPHILS NFR BLD AUTO: 56.8 %
NRBC BLD-RTO: 0 /100 WBCS (ref 0–0)
PLATELET # BLD AUTO: 195 X10*3/UL (ref 150–450)
POTASSIUM SERPL-SCNC: 4.1 MMOL/L (ref 3.5–5.3)
PROT SERPL-MCNC: 6.3 G/DL (ref 6.4–8.2)
RBC # BLD AUTO: 4.41 X10*6/UL (ref 4–5.2)
SODIUM SERPL-SCNC: 141 MMOL/L (ref 136–145)
WBC # BLD AUTO: 7.4 X10*3/UL (ref 4.4–11.3)

## 2024-07-30 PROCEDURE — 3008F BODY MASS INDEX DOCD: CPT | Performed by: INTERNAL MEDICINE

## 2024-07-30 PROCEDURE — 2500000004 HC RX 250 GENERAL PHARMACY W/ HCPCS (ALT 636 FOR OP/ED): Performed by: INTERNAL MEDICINE

## 2024-07-30 PROCEDURE — 3078F DIAST BP <80 MM HG: CPT | Performed by: INTERNAL MEDICINE

## 2024-07-30 PROCEDURE — 85025 COMPLETE CBC W/AUTO DIFF WBC: CPT

## 2024-07-30 PROCEDURE — 82728 ASSAY OF FERRITIN: CPT | Mod: PARLAB

## 2024-07-30 PROCEDURE — 99214 OFFICE O/P EST MOD 30 MIN: CPT | Performed by: INTERNAL MEDICINE

## 2024-07-30 PROCEDURE — 99214 OFFICE O/P EST MOD 30 MIN: CPT | Mod: 25 | Performed by: INTERNAL MEDICINE

## 2024-07-30 PROCEDURE — 83735 ASSAY OF MAGNESIUM: CPT

## 2024-07-30 PROCEDURE — 3074F SYST BP LT 130 MM HG: CPT | Performed by: INTERNAL MEDICINE

## 2024-07-30 PROCEDURE — 80053 COMPREHEN METABOLIC PANEL: CPT

## 2024-07-30 PROCEDURE — 96360 HYDRATION IV INFUSION INIT: CPT | Mod: INF

## 2024-07-30 RX ORDER — HEPARIN SODIUM,PORCINE/PF 10 UNIT/ML
50 SYRINGE (ML) INTRAVENOUS AS NEEDED
OUTPATIENT
Start: 2024-07-30

## 2024-07-30 RX ORDER — DIPHENHYDRAMINE HYDROCHLORIDE 50 MG/ML
50 INJECTION INTRAMUSCULAR; INTRAVENOUS AS NEEDED
OUTPATIENT
Start: 2024-07-30

## 2024-07-30 RX ORDER — FAMOTIDINE 10 MG/ML
20 INJECTION INTRAVENOUS ONCE AS NEEDED
OUTPATIENT
Start: 2024-07-30

## 2024-07-30 RX ORDER — HEPARIN 100 UNIT/ML
500 SYRINGE INTRAVENOUS AS NEEDED
OUTPATIENT
Start: 2024-07-30

## 2024-07-30 RX ORDER — SODIUM CHLORIDE 9 MG/ML
1000 INJECTION, SOLUTION INTRAVENOUS AS NEEDED
Status: DISCONTINUED | OUTPATIENT
Start: 2024-07-30 | End: 2024-07-30 | Stop reason: HOSPADM

## 2024-07-30 RX ORDER — SODIUM CHLORIDE 9 MG/ML
1000 INJECTION, SOLUTION INTRAVENOUS AS NEEDED
OUTPATIENT
Start: 2024-07-30

## 2024-07-30 RX ORDER — SUCRALFATE 1 G/1
1 TABLET ORAL
Qty: 120 TABLET | Refills: 2 | Status: SHIPPED | OUTPATIENT
Start: 2024-07-30

## 2024-07-30 RX ORDER — HEPARIN 100 UNIT/ML
500 SYRINGE INTRAVENOUS AS NEEDED
Status: DISCONTINUED | OUTPATIENT
Start: 2024-07-30 | End: 2024-07-30 | Stop reason: HOSPADM

## 2024-07-30 RX ORDER — EPINEPHRINE 0.3 MG/.3ML
0.3 INJECTION SUBCUTANEOUS EVERY 5 MIN PRN
OUTPATIENT
Start: 2024-07-30

## 2024-07-30 RX ORDER — ALBUTEROL SULFATE 0.83 MG/ML
3 SOLUTION RESPIRATORY (INHALATION) AS NEEDED
OUTPATIENT
Start: 2024-07-30

## 2024-07-30 ASSESSMENT — PAIN SCALES - GENERAL: PAINLEVEL: 0-NO PAIN

## 2024-07-30 NOTE — PROGRESS NOTES
LOCATION:  [facility].     HEMATOLOGY & ONCOLOGY PROBLEMS:  1.  Localized gastric adenocarcinoma.       a.  Neoadjuvant chemotherapy with FLOT from Oct to Dec 2023.       b.  S/p total gastrectomy in January 2024.  Postoperative pathology (pT3,pN3).       c.  Adjuvant chemotherapy with FLOT with FLOT x 4 cycles from March to May 2024.    CHIEF COMPLAINT:    The patient is in the clinic today for follow-up of GE junction adenocarcinoma and for management of therapy related effects.                   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  She was having problem with gradually worsening dysphagia with further GI work-up revealing gastric mass with the biopsy confirming  poorly differentiated adenocarcinoma in July 2023.  Prior to that she had a barium esophagogram which was essentially unremarkable.  Patient does have a previous history of GERD and had one time was treated for H. pylori gastritis.  EUS done by Dr. London on 7/17/2023 showed malignancy starting near the GE junction and measuring 2.2 x 1.5 cm towards the cardia along  the lesser curvature / anterior wall with malignant appearing features.  There is loss of interface between the mass and liver along a 7 mm region, concerning for probable  early hepatic involvement.  Enlarged lymph nodes were noted in the diaphragmatic region, and gastrohepatic ligament.  CT chest abdomen pelvis with contrast on 8/9/2023 showed thickening of the distal esophagus extending into the anterior portion of the  gastric cardia.  There is a small amount of liver present directly adjacent to the area of the tumor but there is no altered enhancement pattern in the liver parenchyma.  Slightly inferior to the cardia there  were lymph nodes visible in the adjacent mesentery that were not visible previously measuring up to 6 mm in short axis. PET/CT on 8/31/2023 showed a hypermetabolic focus at the GE junction  with  extension into the gastric cardia, no evidence of hypermetabolic regional or distant metastatic disease. Brain MRI negative for metastasis.  She was treated with neoadjuvant chemotherapy with FLOT protocol from Oct to Dec 2023.  She had a total gastrectomy in 2024.  Postop pathology results showed G3, poorly differentiated cancer with partial treatment response, positive LVI/PNI, 7/25 lymph nodes positive (pT3 pN3).  After reevaluation she was treated with adjuvant chemotherapy with FLOT x 4 cycles from March to May 2024.     INTERVAL HISTORY:  Overall starting to feel slightly better.  Still has issues with generalized weakness and fatigue.  As stated in the previous notes, despite multiple problems she was able to complete the adjuvant planned chemotherapy.      PAST MEDICAL HISTORY:   1.  GE junction adenocarcinoma as detailed above.   2.  Hypertension   3.  Hyperlipidemia   4.  Anxiety/depression   5.  History of complete hysterectomy, ankle surgery, cholecystectomy and 2  procedures     SOCIAL HISTORY:    and lives in [facility].  Non-smoker and nonalcoholic.  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  Born and raised in [place].     FAMILY HISTORY:    Father  at age 76 from myocardial infarction mother  from stroke at age 78.  She had 8 siblings.  1 brother  from MI another committed suicide.  A sister also  from myocardial infarction.   She had 2 children.  Her son  from AML.  No other specific history of bleeding, clotting or malignant disorder in the family.     REVIEW OF SYSTEMS:  Pertinent finding as per the history above.  All other systems have been reviewed and generally negative and noncontributory.     PHYSICAL EXAMINATION:    Detailed physical examination not done     ALLERGY & MEDICATIONS:  Allergies and latest outpatient medications list were reviewed in the EMR.    LAB RESULTS:  CBC     RADIOLOGY RESULT:  PET/CT Esophageal Initial [Aug 31 2023   1:12PM]  Impression:  1. Hypermetabolic focus localized at gastroesophageal junction with xtension to the gastric cardia compatible with biopsy-proven gastric adenocarcinoma.  2. No evidence of hypermetabolic regional or distant metastatic disease.  3. Status post cholecystectomy with demonstration of localized pneumobilia.     MRI Brain w/wo Contrast [Aug 29 2023 12:28PM]   Impression:  There is no evidence of mass, infarction or hemorrhage.     PATHOLOGY RESULTS:  Surgical Pathology Exam: [id number]   FINAL DIAGNOSIS   A. Liver, segment 4 lesion, biopsy:  - Bile duct adenoma.     B. 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  - Resection margins are negative for neoplasia.  - Six of twenty-nine lymph nodes involved by metastatic carcinoma (6/29).  - Background stomach with reactive gastropathy, intestinal metaplasia, and dieulafoy lesion with accompanying hemorrhage.  - Omentum with no significant pathologic findings.     C. Lymph nodes, D2, excision:  - One of six lymph nodes involved by metastatic carcinoma (1/6).     D. Final esophageal margin, resection:  - Segment of esophagus, negative for neoplasia.   Electronically signed by Sachi Eubanks MD on 1/18/2024 at 1350   Case Summary Report   ESOPHAGUS    8th Edition - Protocol posted: 6/22/2022ESOPHAGUS - B  SPECIMEN   Procedure Esophagogastrectomy   TUMOR   Tumor Site Esophagogastric junction (EGJ)   Relationship of Tumor to Esophagogastric Junction Tumor midpoint is located at the esophagogastric junction   Histologic Type Adenocarcinoma   Histologic Grade G3, poorly differentiated, undifferentiated   Tumor Size Greatest Dimension (Centimeters): 2.3 (grossly) cm   Tumor Extent Invades adventitia   Treatment Effect Present, with residual cancer showing evident tumor regression, but more than single cells or rare small groups of cancer cells (partial  response, score 2)   Lymphovascular Invasion Present   Perineural Invasion Present   MARGINS   Margin Status for Invasive Carcinoma All margins negative for invasive carcinoma   Closest Margin(s) to Invasive Carcinoma Deep   Distance from Invasive Carcinoma to Closest Margin 1.2 cm   Margin Status for Dysplasia and Intestinal Metaplasia All margins negative for dysplasia   REGIONAL LYMPH NODES   Regional Lymph Node Status Tumor present in regional lymph node(s)   Number of Lymph Nodes with Tumor 7   Number of Lymph Nodes Examined 35   PATHOLOGIC STAGE CLASSIFICATION (pTNM, AJCC 8th Edition)   TNM Descriptors y (post-treatment)   pT Category pT3   pN Category pN3         ASSESSMENT AND PLAN:   1.  GE junction poorly differentiated adenocarcinoma.  Please refer to the details as outlined above.  In summary patient with few month history  of gradually worsening dysphagia to both solid and liquid.  Initial barium esophagogram was normal but EGD showed a GE junction mass in July 2023. Biopsy results are confirmatory of poorly differentiated adenocarcinoma with normal mismatch repair gene  expression. EUS revealed a GE junction lesion with concern regarding direct extension to liver and enlarged pathological lymph nodes.  CT scan showed stomach involvement with local regional lymphadenopathy but no distance organ involvement.  A follow-up  PET scan results were confirmatory of increased metabolic activity starting at the GE junction with extension into cardia.  There was no finding of any hypermetabolic activity in the liver or any other local or distant metastatic lesions.  Brain MRI was  unremarkable.  After multidisciplinary evaluation of tumor is considered more gastric than GE junction and she has been advised evaluation for neoadjuvant chemotherapy followed by surgical evaluation.  She was treated with neoadjuvant chemotherapy with FLOT protocol from Oct to Dec 2023.  She had a total gastrectomy in Jan 2024.  Postop  pathology results showed G3, poorly differentiated cancer with partial treatment response, positive LVI/PNI, 7/25 lymph nodes positive (pT3 pN3). After reevaluation she was treated with adjuvant chemotherapy with FLOT x 4 cycles from March to May 2024.    For now we will continue with the aggressive supportive care as already initiated.  Over the next few months, I will try to schedule her for follow-up PET scan, which will be a new baseline for her.  On a long-term basis, will continue monitor closely with regular physical examination, blood work, intermittent scans and endoscopies.    2.  Treatment induced diarrhea/nausea/heartburn.  She will continue to be supported with aggressive supportive care Imodium, Lomotil, Zofran, Compazine and intermittent hydration.  She did better with famotidine compared to her experience with omeprazole.  I will also try her on Carafate for better control of GERD symptoms.    3.  Fluid electrolyte nutrition.  She will continue to follow with with our nutritionist.  She is still being supported with intermittent hydration as needed.    4. Follow-up.  Follow-up with me in 6 weeks.  She will be scheduled for follow-up PET scan just prior to next visit.  Advised to contact us immediately if there are any new questions or concerns.    This note has been transcribed using Dragon voice recognition system and there is a possibility of unintentional typing misprints.

## 2024-08-01 NOTE — PROGRESS NOTES
"NUTRITION Follow-up NOTE  Reason for Visit:  Shruthi Ramos is a 58 y.o. 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 Currently undergoing neoadjuvant chemotherapy with FLOT protocol (Docetaxel, oxaliplatin, leucovorin, and 5-fluorouracil).    RD met with patient at hydration. Patient is doing remarkably well. Eating more by mouth, down to one tetra of tube feed per day. She is eating small meals throughout the day. Does admit that she does not experience hunger queues like she used to so she will miss meals if not paying attention, but  is helpful in reminding her. She reports PO water intake remains low, but she is consuming a lot of high fluid foods and eating ~8 popsicles per day.  Hydration moving forward will be once every 6 weeks per nurse partner. Does report acid reflux, prescribed carafate and pepcid (20mg) by MD this week. Sits up to sleep, compliant with recommendations for reflux.    Lab Results   Component Value Date/Time    GLUCOSE 107 (H) 07/30/2024 1155     07/30/2024 1155    K 4.1 07/30/2024 1155     (H) 07/30/2024 1155    CO2 23 07/30/2024 1155    ANIONGAP 12 07/30/2024 1155    BUN 12 07/30/2024 1155    CREATININE 0.75 07/30/2024 1155    EGFR >90 07/30/2024 1155    CALCIUM 8.8 07/30/2024 1155    ALBUMIN 3.8 07/30/2024 1155    ALKPHOS 147 (H) 07/30/2024 1155    PROT 6.3 (L) 07/30/2024 1155    AST 24 07/30/2024 1155    BILITOT 0.4 07/30/2024 1155    ALT 16 07/30/2024 1155         Nutrition Assessment     Anthropometrics:  Anthropometrics  Height: 154.9 cm (5' 0.98\")  Weight: 93kg  BMI (Calculated): 38.71    Wt Readings from Last 30 Encounters:   07/30/24 93 kg (205 lb 0.4 oz)   06/26/24 94 kg (207 lb 3.7 oz)   05/21/24 95.4 kg (210 lb 5.1 oz)   05/14/24 94.3 kg (208 lb)   04/30/24 95 kg (209 lb 7 oz)   04/16/24 96.6 kg (213 lb)   04/16/24 96.8 kg (213 lb 6.5 oz)   04/10/24 98.2 kg (216 lb 7.9 oz)   04/02/24 96 kg (211 lb 10.3 " oz)   03/28/24 94.7 kg (208 lb 12.4 oz)   03/26/24 94.7 kg (208 lb 12.4 oz)   03/19/24 98 kg (216 lb 0.8 oz)   03/14/24 97.9 kg (215 lb 13.3 oz)   03/13/24 97.3 kg (214 lb 9.9 oz)   03/12/24 96.8 kg (213 lb 6.5 oz)   02/23/24 97 kg (213 lb 13.5 oz)   02/13/24 97.9 kg (215 lb 12.8 oz)   01/31/24 98.2 kg (216 lb 7.9 oz)   01/30/24 98.2 kg (216 lb 7.9 oz)   01/10/24 99.6 kg (219 lb 9.3 oz)   01/05/24 100 kg (220 lb 8 oz)   12/20/23 97.5 kg (214 lb 15.2 oz)   12/19/23 97.5 kg (214 lb 15.2 oz)   12/11/23 103 kg (226 lb 13.7 oz)   11/27/23 102 kg (224 lb 3.3 oz)   11/28/23 102 kg (224 lb 13.9 oz)   11/21/23 100 kg (221 lb 5.5 oz)   11/17/23 102 kg (225 lb 1.4 oz)   11/06/23 102 kg (225 lb)   10/30/23 105 kg (230 lb 9.6 oz)     4.1% weight loss x 6 months- not significant    Food And Nutrient Intake:  Food and Nutrient History  Food and Nutrient History: Eating better as above per patient, but does not provide specific diet recall.  continues to cook for her and encourage PO intake. She struggles with fluid, encouraged her to flush via PEG more often if needed.   Energy Intake: Good > 75 %  Fluid Intake: less than 64oz/day  Food Allergy: NKFA  GI Symptoms: constipation (intermittent)  Oral Problems: dysphagia- improved    TwoCal, 1 tetra daily to provide 475kcals and 20g protein/day    Food Intake  Amount of Food: Did not provide    Food Preparation  Cooking: Patient, Spouse/Significant Other  Grocery Shopping: Patient, Spouse/Significant Other    Micronutrient Intake  Vitamin Intake: Multivitamin, B12, D    Medication and Complementary/Alternative Medicine Use  Prescription Medication Use: atrovastatin, metoprolol, ondansetron    Nutrition Focused Physical Exam (7/30/24):  Subcutaneous Fat Loss  Orbital Fat Pads: Well nourshed (slightly bulging fat pads)  Buccal Fat Pads: Well nourished (full, rounded cheeks)  Triceps: Well nourished (ample fat tissue)  Ribs: Well nourished (full chest, ribs do not  "protrude)  Muscle Wasting  Temporalis: Well nourished (well-defined muscle)  Pectoralis (Clavicular Region): Well nourished (clavicle not visible)  Deltoid/Trapezius: Well nourished (rounded appearance at arm, shoulder, neck)  Interosseous: Well nourished (muscle bulges)  Trapezius/Infraspinatus/Supraspinatus (Scapular Region): Well nourished (bones not prominent, muscle taut)  Quadriceps: Well nourished (well developed, well rounded)  Gastrocnemius: Well nourished (well developed bulbous muscle)  Edema  Edema: none  Physical Findings (Nutrition Deficiency/Toxicity)  Hair: Negative  Eyes: Negative  Mouth: Negative  Nails: Negative  Skin: Negative    Energy Needs   Calculated Energy Needs Using Equations  Height: 154.9 cm (5' 0.98\")  Daily Nutrient Recommendations (continue as previous):   kcals/day: 1731-8027   gms protein/day: 71-99   mL fluid/day: 1mL/kcal or per MD   kcals/kg/day: 25-30ABW   gm protein/kg/day: 1-1.4ABW     Nutrition Diagnosis   Malnutrition Diagnosis  Patient has Malnutrition Diagnosis: No  Nutrition Diagnosis  Patient has Nutrition Diagnosis: Yes  Diagnosis Status (1): Resolved  Nutrition Diagnosis 1: Swallowing difficulity  Related to (1): GE junction tumor  As Evidenced by (1): patient reports of dysphagia, especially with dry foods  Additional Nutrition Diagnosis: Diagnosis 2  Diagnosis Status (2): Ongoing- improving  Nutrition Diagnosis 2: Inadequate oral intake  Related to (2): catabolic disease and treatment  As Evidenced by (2): PO intake not consistently meeting 75% estimated nutrient needs, patient reports of continued struggles with PO intake    Nutrition Interventions/Recommendations (as previous)  Food and Nutrition Delivery  Meals & Snacks: Energy-modified diet  Goals: increased nutrient needs in the setting of cancer  Medical Food Supplement: as above  Goals: BID  Nutrition Education  Nutrition Education Content: Content related nutrition education  Goals: patient will understand " impact of diet on success during treatment, in the healing process, and for survivorship  Nutrition Counseling  Theoretical Basis/Approach: Nutrition counseling based on cognitive behavioral theory approach  Goals: contemplation-->action  Strategies: Nutrition counseling based on motivational interviewing strategy  Coordination of Nutrition Care by a Nutrition Professional  Collaboration and referral of nutrition care: Collaboration by nutrition professional with other providers    Nutrition Monitoring and Evaluation (as previous)  Food/Nutrient Related History Monitoring  Monitoring and Evaluation Plan: Energy intake  Energy Intake: Estimated energy intake  Criteria: meet needs calculated by RD  Knowledge/Beliefs/Attitudes  Monitoring and Evaluation Plan: Food and nutrition knowledge  Food and nutrition knowledge: Nutrition knowledge of individual client  Criteria: will continue to work with patient on diet for cancer    Readiness to Change : Good  Level of Understanding : Good  Anticipated Compliant : Good    Time Spent  Prep time on day of patient encounter: 15 minutes  Time spent directly with patient, family or caregiver: 15 minutes  Documentation Time: 15 minutes

## 2024-08-20 DIAGNOSIS — C16.0 ADENOCARCINOMA OF GASTROESOPHAGEAL JUNCTION (MULTI): Primary | ICD-10-CM

## 2024-08-23 ENCOUNTER — TELEPHONE (OUTPATIENT)
Dept: HEMATOLOGY/ONCOLOGY | Facility: CLINIC | Age: 59
End: 2024-08-23
Payer: MEDICAID

## 2024-08-23 NOTE — TELEPHONE ENCOUNTER
Pt called to inform staff that she continues to have heartburn with using Pepcid. She is unable to take Prilosec or Carafate due to nausea and itching. She noted heartburn at bedtime but is having more now during the day. She is not taking in lots of fluid while eating . She does take in 3 tube feeds via pump daily along with oral snacks. She does also note that after having taken her first bite of anything she is eating, she becomes queasy and vomits.  This passes after 5 min and may or may not happen again. Then she is able to eat. Dr. Torres aware and suggested trying some OTC meds to help coat her throat when she is eating. Also would like her to call her gastro surgeon to see if they may have any insight to why she is having issues with nausea and swallowing. Pt verbalizes understanding. Will call back with any other concerns and will continue to keep herself hydrated.

## 2024-09-06 DIAGNOSIS — C16.0 GE JUNCTION CARCINOMA (MULTI): Primary | ICD-10-CM

## 2024-09-09 ENCOUNTER — APPOINTMENT (OUTPATIENT)
Dept: RADIOLOGY | Facility: CLINIC | Age: 59
End: 2024-09-09
Payer: MEDICAID

## 2024-09-11 ENCOUNTER — INFUSION (OUTPATIENT)
Dept: HEMATOLOGY/ONCOLOGY | Facility: CLINIC | Age: 59
End: 2024-09-11
Payer: MEDICAID

## 2024-09-11 ENCOUNTER — HOSPITAL ENCOUNTER (OUTPATIENT)
Dept: RADIOLOGY | Facility: HOSPITAL | Age: 59
Discharge: HOME | End: 2024-09-11
Payer: MEDICAID

## 2024-09-11 DIAGNOSIS — C16.0 ADENOCARCINOMA OF GASTROESOPHAGEAL JUNCTION (MULTI): ICD-10-CM

## 2024-09-11 DIAGNOSIS — C16.0 GE JUNCTION CARCINOMA (MULTI): ICD-10-CM

## 2024-09-11 LAB
ALBUMIN SERPL BCP-MCNC: 3.7 G/DL (ref 3.4–5)
ALP SERPL-CCNC: 155 U/L (ref 33–136)
ALT SERPL W P-5'-P-CCNC: 16 U/L (ref 7–45)
ANION GAP SERPL CALC-SCNC: 12 MMOL/L (ref 10–20)
AST SERPL W P-5'-P-CCNC: 20 U/L (ref 9–39)
BASOPHILS # BLD AUTO: 0.05 X10*3/UL (ref 0–0.1)
BASOPHILS NFR BLD AUTO: 0.8 %
BILIRUB SERPL-MCNC: 0.9 MG/DL (ref 0–1.2)
BUN SERPL-MCNC: 14 MG/DL (ref 6–23)
CALCIUM SERPL-MCNC: 8.9 MG/DL (ref 8.6–10.3)
CHLORIDE SERPL-SCNC: 108 MMOL/L (ref 98–107)
CO2 SERPL-SCNC: 25 MMOL/L (ref 21–32)
CREAT SERPL-MCNC: 0.74 MG/DL (ref 0.5–1.05)
EGFRCR SERPLBLD CKD-EPI 2021: >90 ML/MIN/1.73M*2
EOSINOPHIL # BLD AUTO: 0.63 X10*3/UL (ref 0–0.7)
EOSINOPHIL NFR BLD AUTO: 10.7 %
ERYTHROCYTE [DISTWIDTH] IN BLOOD BY AUTOMATED COUNT: 15.4 % (ref 11.5–14.5)
GLUCOSE SERPL-MCNC: 82 MG/DL (ref 74–99)
HCT VFR BLD AUTO: 40.1 % (ref 36–46)
HGB BLD-MCNC: 13.2 G/DL (ref 12–16)
IMM GRANULOCYTES # BLD AUTO: 0.01 X10*3/UL (ref 0–0.7)
IMM GRANULOCYTES NFR BLD AUTO: 0.2 % (ref 0–0.9)
LYMPHOCYTES # BLD AUTO: 1.48 X10*3/UL (ref 1.2–4.8)
LYMPHOCYTES NFR BLD AUTO: 25 %
MAGNESIUM SERPL-MCNC: 2.1 MG/DL (ref 1.6–2.4)
MCH RBC QN AUTO: 28.9 PG (ref 26–34)
MCHC RBC AUTO-ENTMCNC: 32.9 G/DL (ref 32–36)
MCV RBC AUTO: 88 FL (ref 80–100)
MONOCYTES # BLD AUTO: 0.54 X10*3/UL (ref 0.1–1)
MONOCYTES NFR BLD AUTO: 9.1 %
NEUTROPHILS # BLD AUTO: 3.2 X10*3/UL (ref 1.2–7.7)
NEUTROPHILS NFR BLD AUTO: 54.2 %
NRBC BLD-RTO: 0 /100 WBCS (ref 0–0)
PLATELET # BLD AUTO: 204 X10*3/UL (ref 150–450)
POTASSIUM SERPL-SCNC: 3.7 MMOL/L (ref 3.5–5.3)
PROT SERPL-MCNC: 6.2 G/DL (ref 6.4–8.2)
RBC # BLD AUTO: 4.56 X10*6/UL (ref 4–5.2)
SODIUM SERPL-SCNC: 141 MMOL/L (ref 136–145)
WBC # BLD AUTO: 5.9 X10*3/UL (ref 4.4–11.3)

## 2024-09-11 PROCEDURE — 80053 COMPREHEN METABOLIC PANEL: CPT

## 2024-09-11 PROCEDURE — 85025 COMPLETE CBC W/AUTO DIFF WBC: CPT

## 2024-09-11 PROCEDURE — 2550000001 HC RX 255 CONTRASTS: Performed by: INTERNAL MEDICINE

## 2024-09-11 PROCEDURE — 74177 CT ABD & PELVIS W/CONTRAST: CPT | Performed by: RADIOLOGY

## 2024-09-11 PROCEDURE — 74177 CT ABD & PELVIS W/CONTRAST: CPT

## 2024-09-11 PROCEDURE — 96523 IRRIG DRUG DELIVERY DEVICE: CPT

## 2024-09-11 PROCEDURE — 83735 ASSAY OF MAGNESIUM: CPT

## 2024-09-11 PROCEDURE — 36591 DRAW BLOOD OFF VENOUS DEVICE: CPT

## 2024-09-11 PROCEDURE — 71260 CT THORAX DX C+: CPT | Performed by: RADIOLOGY

## 2024-09-11 RX ORDER — HEPARIN SODIUM,PORCINE/PF 10 UNIT/ML
50 SYRINGE (ML) INTRAVENOUS AS NEEDED
Status: CANCELLED | OUTPATIENT
Start: 2024-09-11

## 2024-09-11 RX ORDER — HEPARIN 100 UNIT/ML
500 SYRINGE INTRAVENOUS AS NEEDED
Status: CANCELLED | OUTPATIENT
Start: 2024-09-11

## 2024-09-12 ENCOUNTER — APPOINTMENT (OUTPATIENT)
Dept: HEMATOLOGY/ONCOLOGY | Facility: CLINIC | Age: 59
End: 2024-09-12
Payer: MEDICAID

## 2024-09-12 ENCOUNTER — INFUSION (OUTPATIENT)
Dept: HEMATOLOGY/ONCOLOGY | Facility: CLINIC | Age: 59
End: 2024-09-12
Payer: MEDICAID

## 2024-09-12 ENCOUNTER — OFFICE VISIT (OUTPATIENT)
Dept: HEMATOLOGY/ONCOLOGY | Facility: CLINIC | Age: 59
End: 2024-09-12
Payer: MEDICAID

## 2024-09-12 VITALS
HEART RATE: 63 BPM | OXYGEN SATURATION: 100 % | DIASTOLIC BLOOD PRESSURE: 79 MMHG | SYSTOLIC BLOOD PRESSURE: 148 MMHG | RESPIRATION RATE: 20 BRPM | TEMPERATURE: 98.1 F

## 2024-09-12 VITALS
RESPIRATION RATE: 20 BRPM | WEIGHT: 199.08 LBS | OXYGEN SATURATION: 99 % | BODY MASS INDEX: 37.59 KG/M2 | DIASTOLIC BLOOD PRESSURE: 74 MMHG | HEIGHT: 61 IN | TEMPERATURE: 97.9 F | SYSTOLIC BLOOD PRESSURE: 132 MMHG | HEART RATE: 79 BPM

## 2024-09-12 DIAGNOSIS — C16.0 GE JUNCTION CARCINOMA (MULTI): ICD-10-CM

## 2024-09-12 PROCEDURE — 3075F SYST BP GE 130 - 139MM HG: CPT | Performed by: INTERNAL MEDICINE

## 2024-09-12 PROCEDURE — 96360 HYDRATION IV INFUSION INIT: CPT | Mod: INF

## 2024-09-12 PROCEDURE — 99214 OFFICE O/P EST MOD 30 MIN: CPT | Performed by: INTERNAL MEDICINE

## 2024-09-12 PROCEDURE — 99214 OFFICE O/P EST MOD 30 MIN: CPT | Mod: 25 | Performed by: INTERNAL MEDICINE

## 2024-09-12 PROCEDURE — 2500000004 HC RX 250 GENERAL PHARMACY W/ HCPCS (ALT 636 FOR OP/ED): Performed by: INTERNAL MEDICINE

## 2024-09-12 PROCEDURE — 3008F BODY MASS INDEX DOCD: CPT | Performed by: INTERNAL MEDICINE

## 2024-09-12 PROCEDURE — 3078F DIAST BP <80 MM HG: CPT | Performed by: INTERNAL MEDICINE

## 2024-09-12 RX ORDER — FAMOTIDINE 10 MG/ML
20 INJECTION INTRAVENOUS ONCE AS NEEDED
OUTPATIENT
Start: 2024-09-12

## 2024-09-12 RX ORDER — HEPARIN 100 UNIT/ML
500 SYRINGE INTRAVENOUS AS NEEDED
OUTPATIENT
Start: 2024-09-12

## 2024-09-12 RX ORDER — DIPHENHYDRAMINE HYDROCHLORIDE 50 MG/ML
50 INJECTION INTRAMUSCULAR; INTRAVENOUS AS NEEDED
OUTPATIENT
Start: 2024-09-12

## 2024-09-12 RX ORDER — HEPARIN 100 UNIT/ML
500 SYRINGE INTRAVENOUS AS NEEDED
Status: DISCONTINUED | OUTPATIENT
Start: 2024-09-12 | End: 2024-09-12 | Stop reason: HOSPADM

## 2024-09-12 RX ORDER — SODIUM CHLORIDE 9 MG/ML
1000 INJECTION, SOLUTION INTRAVENOUS AS NEEDED
OUTPATIENT
Start: 2024-09-12

## 2024-09-12 RX ORDER — HEPARIN SODIUM,PORCINE/PF 10 UNIT/ML
50 SYRINGE (ML) INTRAVENOUS AS NEEDED
OUTPATIENT
Start: 2024-09-12

## 2024-09-12 RX ORDER — ALBUTEROL SULFATE 0.83 MG/ML
3 SOLUTION RESPIRATORY (INHALATION) AS NEEDED
OUTPATIENT
Start: 2024-09-12

## 2024-09-12 RX ORDER — SODIUM CHLORIDE 9 MG/ML
1000 INJECTION, SOLUTION INTRAVENOUS AS NEEDED
Status: DISCONTINUED | OUTPATIENT
Start: 2024-09-12 | End: 2024-09-12 | Stop reason: HOSPADM

## 2024-09-12 RX ORDER — EPINEPHRINE 0.3 MG/.3ML
0.3 INJECTION SUBCUTANEOUS EVERY 5 MIN PRN
OUTPATIENT
Start: 2024-09-12

## 2024-09-12 ASSESSMENT — PAIN SCALES - GENERAL: PAINLEVEL: 0-NO PAIN

## 2024-09-12 NOTE — PROGRESS NOTES
LOCATION:  [facility].     HEMATOLOGY & ONCOLOGY PROBLEMS:  1.  Localized gastric adenocarcinoma.       a.  Neoadjuvant chemotherapy with FLOT from Oct to Dec 2023.       b.  S/p total gastrectomy in January 2024.  Postoperative pathology (pT3,pN3).       c.  Adjuvant chemotherapy with FLOT with FLOT x 4 cycles from March to May 2024.    CHIEF COMPLAINT:    The patient is in the clinic today for follow-up of GE junction adenocarcinoma and for management of therapy related effects.                   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  She was having problem with gradually worsening dysphagia with further GI work-up revealing gastric mass with the biopsy confirming  poorly differentiated adenocarcinoma in July 2023.  Prior to that she had a barium esophagogram which was essentially unremarkable.  Patient does have a previous history of GERD and had one time was treated for H. pylori gastritis.  EUS done by Dr. London on 7/17/2023 showed malignancy starting near the GE junction and measuring 2.2 x 1.5 cm towards the cardia along  the lesser curvature / anterior wall with malignant appearing features.  There is loss of interface between the mass and liver along a 7 mm region, concerning for probable  early hepatic involvement.  Enlarged lymph nodes were noted in the diaphragmatic region, and gastrohepatic ligament.  CT chest abdomen pelvis with contrast on 8/9/2023 showed thickening of the distal esophagus extending into the anterior portion of the  gastric cardia.  There is a small amount of liver present directly adjacent to the area of the tumor but there is no altered enhancement pattern in the liver parenchyma.  Slightly inferior to the cardia there  were lymph nodes visible in the adjacent mesentery that were not visible previously measuring up to 6 mm in short axis. PET/CT on 8/31/2023 showed a hypermetabolic focus at the GE junction  with  extension into the gastric cardia, no evidence of hypermetabolic regional or distant metastatic disease. Brain MRI negative for metastasis.  She was treated with neoadjuvant chemotherapy with FLOT protocol from Oct to Dec 2023.  She had a total gastrectomy in 2024.  Postop pathology results showed G3, poorly differentiated cancer with partial treatment response, positive LVI/PNI, 7/25 lymph nodes positive (pT3 pN3).  After reevaluation she was treated with adjuvant chemotherapy with FLOT x 4 cycles from March to May 2024.     INTERVAL HISTORY:  Overall starting to feel slightly better.  Still has issues with generalized weakness and fatigue and intermittent diarrhea.  As stated in the previous notes, despite multiple problems she was able to complete the adjuvant planned chemotherapy.  Dysphagia is gradually improving but she is still using tube feed. She had F/U CT scans yesterday and those results are pending.    PAST MEDICAL HISTORY:   1.  GE junction adenocarcinoma as detailed above.   2.  Hypertension   3.  Hyperlipidemia   4.  Anxiety/depression   5.  History of complete hysterectomy, ankle surgery, cholecystectomy and 2  procedures     SOCIAL HISTORY:    and lives in [facility].  Non-smoker and nonalcoholic.  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  Born and raised in [place].     FAMILY HISTORY:    Father  at age 76 from myocardial infarction mother  from stroke at age 78.  She had 8 siblings.  1 brother  from MI another committed suicide.  A sister also  from myocardial infarction.   She had 2 children.  Her son  from AML.  No other specific history of bleeding, clotting or malignant disorder in the family.     REVIEW OF SYSTEMS:  Pertinent finding as per the history above.  All other systems have been reviewed and generally negative and noncontributory.     PHYSICAL EXAMINATION:    Detailed physical examination not done     ALLERGY & MEDICATIONS:  Allergies  and latest outpatient medications list were reviewed in the EMR.    LAB RESULTS:  Blood work from 09/11/2024 were unremarkable with Hb of 13.2 and Cr of 0.7. Mg was 2.10.     RADIOLOGY RESULT:  CT C/A/P [02/28/2024]  Impression:  1. There is a small bowel anastomosis in the left side of the abdomen. At this anastomosis, the bowel is dilated with a maximum diameter of 5.3 cm. This has enlarged since the prior examination. The cause of this focal dilatation is unclear. Near this, the small bowel appears  be coiled within an internal hernia near the insertion point of the feeding tube. There is no evidence for strangulation or obstruction. This does not appear to because of the focal small bowel dilatation.  2. Chest CT is unremarkable.     MRI Brain w/wo Contrast [Aug 29 2023 12:28PM]   Impression:  There is no evidence of mass, infarction or hemorrhage.     PATHOLOGY RESULTS:  Surgical Pathology Exam: [id number]   FINAL DIAGNOSIS   A. Liver, segment 4 lesion, biopsy:  - Bile duct adenoma.     B. 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  - Resection margins are negative for neoplasia.  - Six of twenty-nine lymph nodes involved by metastatic carcinoma (6/29).  - Background stomach with reactive gastropathy, intestinal metaplasia, and dieulafoy lesion with accompanying hemorrhage.  - Omentum with no significant pathologic findings.     C. Lymph nodes, D2, excision:  - One of six lymph nodes involved by metastatic carcinoma (1/6).     D. Final esophageal margin, resection:  - Segment of esophagus, negative for neoplasia.   Electronically signed by Sachi Eubanks MD on 1/18/2024 at 1350   Case Summary Report   ESOPHAGUS    8th Edition - Protocol posted: 6/22/2022ESOPHAGUS - B  SPECIMEN   Procedure Esophagogastrectomy   TUMOR   Tumor Site Esophagogastric junction (EGJ)   Relationship of Tumor to Esophagogastric  Junction Tumor midpoint is located at the esophagogastric junction   Histologic Type Adenocarcinoma   Histologic Grade G3, poorly differentiated, undifferentiated   Tumor Size Greatest Dimension (Centimeters): 2.3 (grossly) cm   Tumor Extent Invades adventitia   Treatment Effect Present, with residual cancer showing evident tumor regression, but more than single cells or rare small groups of cancer cells (partial response, score 2)   Lymphovascular Invasion Present   Perineural Invasion Present   MARGINS   Margin Status for Invasive Carcinoma All margins negative for invasive carcinoma   Closest Margin(s) to Invasive Carcinoma Deep   Distance from Invasive Carcinoma to Closest Margin 1.2 cm   Margin Status for Dysplasia and Intestinal Metaplasia All margins negative for dysplasia   REGIONAL LYMPH NODES   Regional Lymph Node Status Tumor present in regional lymph node(s)   Number of Lymph Nodes with Tumor 7   Number of Lymph Nodes Examined 35   PATHOLOGIC STAGE CLASSIFICATION (pTNM, AJCC 8th Edition)   TNM Descriptors y (post-treatment)   pT Category pT3   pN Category pN3         ASSESSMENT AND PLAN:   1.  GE junction poorly differentiated adenocarcinoma.  Please refer to the details as outlined above.  In summary patient with few month history  of gradually worsening dysphagia to both solid and liquid.  Initial barium esophagogram was normal but EGD showed a GE junction mass in July 2023. Biopsy results are confirmatory of poorly differentiated adenocarcinoma with normal mismatch repair gene  expression. EUS revealed a GE junction lesion with concern regarding direct extension to liver and enlarged pathological lymph nodes.  CT scan showed stomach involvement with local regional lymphadenopathy but no distance organ involvement.  A follow-up  PET scan results were confirmatory of increased metabolic activity starting at the GE junction with extension into cardia.  There was no finding of any hypermetabolic activity  in the liver or any other local or distant metastatic lesions.  Brain MRI was  unremarkable.  After multidisciplinary evaluation of tumor is considered more gastric than GE junction and she has been advised evaluation for neoadjuvant chemotherapy followed by surgical evaluation.  She was treated with neoadjuvant chemotherapy with FLOT protocol from Oct to Dec 2023.  She had a total gastrectomy in Jan 2024.  Postop pathology results showed G3, poorly differentiated cancer with partial treatment response, positive LVI/PNI, 7/25 lymph nodes positive (pT3 pN3). After reevaluation she was treated with adjuvant chemotherapy with FLOT x 4 cycles from March to May 2024.    For now we will continue with the aggressive supportive care as already initiated. We will follow up on the pending results of the CT scans from yesterday.   On a long-term basis, will continue monitor closely with regular physical examination, blood work, intermittent scans and endoscopies.    2.  Treatment induced diarrhea/nausea/heartburn.  Symptoms are gradually improving. She will continue to be supported with supportive care - Imodium,  Zofran and Pepcid / Prilosec as needed.     3.  Fluid electrolyte nutrition.  She will continue to follow with with our nutritionist.      4. Follow-up.  Follow-up with me in 8 weeks.  Advised to contact us immediately if there are any new questions or concerns.    This note has been transcribed using Dragon voice recognition system and there is a possibility of unintentional typing misprints.

## 2024-09-18 ENCOUNTER — TELEPHONE (OUTPATIENT)
Dept: HEMATOLOGY/ONCOLOGY | Facility: CLINIC | Age: 59
End: 2024-09-18
Payer: MEDICAID

## 2024-09-18 DIAGNOSIS — C16.0 GE JUNCTION CARCINOMA (MULTI): Primary | ICD-10-CM

## 2024-09-18 NOTE — TELEPHONE ENCOUNTER
Pt called to alert staff of her procedure that will be done tomorrow with Dr. London. Dr. Torres aware.

## 2024-09-19 ENCOUNTER — ANESTHESIA EVENT (OUTPATIENT)
Dept: GASTROENTEROLOGY | Facility: HOSPITAL | Age: 59
End: 2024-09-19
Payer: MEDICAID

## 2024-09-19 ENCOUNTER — HOSPITAL ENCOUNTER (OUTPATIENT)
Dept: GASTROENTEROLOGY | Facility: HOSPITAL | Age: 59
Setting detail: OUTPATIENT SURGERY
Discharge: HOME | End: 2024-09-19
Payer: MEDICAID

## 2024-09-19 ENCOUNTER — ANESTHESIA (OUTPATIENT)
Dept: GASTROENTEROLOGY | Facility: HOSPITAL | Age: 59
End: 2024-09-19
Payer: MEDICAID

## 2024-09-19 VITALS
WEIGHT: 199 LBS | HEIGHT: 61 IN | DIASTOLIC BLOOD PRESSURE: 72 MMHG | SYSTOLIC BLOOD PRESSURE: 132 MMHG | BODY MASS INDEX: 37.57 KG/M2 | RESPIRATION RATE: 16 BRPM | OXYGEN SATURATION: 98 % | TEMPERATURE: 97.5 F | HEART RATE: 55 BPM

## 2024-09-19 DIAGNOSIS — C16.0 GE JUNCTION CARCINOMA (MULTI): ICD-10-CM

## 2024-09-19 PROCEDURE — 43239 EGD BIOPSY SINGLE/MULTIPLE: CPT | Performed by: STUDENT IN AN ORGANIZED HEALTH CARE EDUCATION/TRAINING PROGRAM

## 2024-09-19 PROCEDURE — 2500000004 HC RX 250 GENERAL PHARMACY W/ HCPCS (ALT 636 FOR OP/ED): Performed by: ANESTHESIOLOGIST ASSISTANT

## 2024-09-19 PROCEDURE — 7100000010 HC PHASE TWO TIME - EACH INCREMENTAL 1 MINUTE

## 2024-09-19 PROCEDURE — 3700000002 HC GENERAL ANESTHESIA TIME - EACH INCREMENTAL 1 MINUTE

## 2024-09-19 PROCEDURE — 2500000004 HC RX 250 GENERAL PHARMACY W/ HCPCS (ALT 636 FOR OP/ED): Performed by: NURSE ANESTHETIST, CERTIFIED REGISTERED

## 2024-09-19 PROCEDURE — 3700000001 HC GENERAL ANESTHESIA TIME - INITIAL BASE CHARGE

## 2024-09-19 PROCEDURE — 7100000009 HC PHASE TWO TIME - INITIAL BASE CHARGE

## 2024-09-19 PROCEDURE — 2500000005 HC RX 250 GENERAL PHARMACY W/O HCPCS: Performed by: NURSE ANESTHETIST, CERTIFIED REGISTERED

## 2024-09-19 RX ORDER — SODIUM CHLORIDE, SODIUM LACTATE, POTASSIUM CHLORIDE, CALCIUM CHLORIDE 600; 310; 30; 20 MG/100ML; MG/100ML; MG/100ML; MG/100ML
20 INJECTION, SOLUTION INTRAVENOUS CONTINUOUS
Status: DISCONTINUED | OUTPATIENT
Start: 2024-09-19 | End: 2024-09-20 | Stop reason: HOSPADM

## 2024-09-19 RX ORDER — ONDANSETRON HYDROCHLORIDE 2 MG/ML
4 INJECTION, SOLUTION INTRAVENOUS ONCE AS NEEDED
Status: CANCELLED | OUTPATIENT
Start: 2024-09-19

## 2024-09-19 RX ORDER — LIDOCAINE HCL/PF 100 MG/5ML
SYRINGE (ML) INTRAVENOUS AS NEEDED
Status: DISCONTINUED | OUTPATIENT
Start: 2024-09-19 | End: 2024-09-19

## 2024-09-19 RX ORDER — PROPOFOL 10 MG/ML
INJECTION, EMULSION INTRAVENOUS AS NEEDED
Status: DISCONTINUED | OUTPATIENT
Start: 2024-09-19 | End: 2024-09-19

## 2024-09-19 SDOH — HEALTH STABILITY: MENTAL HEALTH: CURRENT SMOKER: 0

## 2024-09-19 ASSESSMENT — PAIN SCALES - GENERAL
PAINLEVEL_OUTOF10: 0 - NO PAIN

## 2024-09-19 ASSESSMENT — PAIN - FUNCTIONAL ASSESSMENT
PAIN_FUNCTIONAL_ASSESSMENT: 0-10

## 2024-09-19 NOTE — DISCHARGE INSTRUCTIONS
Patient Instructions after an Endoscopy      Post-procedure recommendations:  - The patient will be observed post-procedure, until all discharge criteria are met.   - Discharge the patient to home with family member/escort.  - Resume previous diet.  - Continue present medications.  - Observe patient's clinical course following today's procedure with therapeutic intervention.   - Watch for bleeding, perforation, and infection.   - Follow-up with referring physician.   - Return to primary care physician.  - The patient has a contact number available for  emergencies.  The signs and symptoms of potential delayed complications were discussed with the patient.  Return to normal activities tomorrow.  Written discharge instructions were provided to the patient.    The anesthetics, sedatives or narcotics which were given to you today will be acting in your body for the next 24 hours, so you might feel a little sleepy or groggy.  This feeling should slowly wear off. Carefully read and follow the instructions.     You received sedation today:  - Do not drive or operate any machinery or power tools of any kind.   - No alcoholic beverages today, not even beer or wine.  - Do not make any important decisions or sign any legal documents.  - No over the counter medications that contain alcohol or that may cause drowsiness.  - Do not make any important decisions or sign any legal documents.    While it is common to experience mild to moderate abdominal distention, gas, or belching after your procedure, if any of these symptoms occur following discharge from the GI Lab or within one week of having your procedure, call the [facility] to be advised whether a visit to your nearest Urgent Care or Emergency Department is indicated.  Take this paper with you if you go:  - If you develop an allergic reaction to the medications that were given during your procedure such as difficulty breathing, rash, hives, severe nausea,  vomiting or lightheadedness.  - If you experience chest pain, shortness of breath, severe abdominal pain, fevers and chills.  - If you develop signs and symptoms of bleeding such as blood in your spit, if your stools turn black, tarry, or bloody.  - If you have not urinated within 8 hours following your procedure.  - If your IV site becomes painful, red, inflamed, or looks infected.       If you experience any problems or have any questions following discharge from the GI Lab, please call: [phone]

## 2024-09-19 NOTE — H&P
Procedure H&P    Patient Profile-Procedures  Name Shruthi Gardiner  Date of Birth 1965  MRN [id number]  Address   [id number] [place] [id number] [facility] [address]    Primary Phone Number [phone]  Secondary Phone Number    Sebastian Quiroz    Procedure(s):  Procedures: EGD  Primary contact name and number   Extended Emergency Contact Information  Primary Emergency Contact: ELVIRA GARDINER  Address: [address] [place] [id number] [place]  Home Phone: [phone]  Work Phone: [phone]  Mobile Phone: [phone]  Relation: Spouse    General Health  Weight   Vitals:    09/19/24 1311   Weight: 90.3 kg (199 lb)     BMI Body mass index is 37.57 kg/m².    Allergies  Allergies   Allergen Reactions    Flu Vac-2017 [id number](Pf) Swelling    Morphine Nausea/vomiting    Sutures Other     Dissolving sutures do not dissolve and become infected    Carafate [Sucralfate] Rash       Past Medical History   Past Medical History:   Diagnosis Date    Arthritis     Depression     Esophageal cancer (Multi)     GE junction carcinoma    Fibromyalgia, primary     Hyperlipidemia     Hypertension     Osteoarthritis     PONV (postoperative nausea and vomiting)     Restless leg syndrome     Sleep apnea        Provider assessment  Diagnosis:  follow up of abnormal CT imaging  Medication Reviewed - yes  Prior to Admission medications    Medication Sig Start Date End Date Taking? Authorizing Provider   atorvastatin (Lipitor) 20 mg tablet Take 1 tablet (20 mg) by mouth once daily at bedtime.   Yes Historical Provider, MD   diphenoxylate-atropine (Lomotil) 2.5-0.025 mg tablet Take 1 tablet by mouth 4 times a day as needed for diarrhea. 5/7/24  Yes Ollie Torres MD   DULoxetine (Cymbalta) 60 mg DR capsule Take 1 capsule (60 mg) by mouth once daily in the morning. Take before meals. 9/2/23  Yes Historical Provider, MD   famotidine (Pepcid) 20 mg tablet Take 1 tablet (20 mg) by  mouth 2 times a day. 7/26/24  Yes Ollie Torres MD   loperamide (Anti-DiarrheaL, loperamide,) 1 mg/7.5 mL liquid Take 15 mL (2 mg) by mouth 4 times a day as needed for diarrhea. 5/6/24  Yes Ollie Torres MD   loperamide (Imodium) 2 mg capsule Take 2 capsules (4 mg) by mouth 4 times a day as needed for diarrhea. 4/10/24  Yes Ollie Torres MD   metoprolol succinate XL (Toprol-XL) 50 mg 24 hr tablet Take 1 tablet (50 mg) by mouth once daily in the morning. Take before meals.   Yes Historical Provider, MD   ondansetron (Zofran) 8 mg tablet Take 1 tablet (8 mg) by mouth every 8 hours if needed for nausea or vomiting.   Yes Historical Provider, MD   prochlorperazine (Compazine) 10 mg tablet Take 1 tablet (10 mg) by mouth every 6 hours if needed for nausea or vomiting. 3/13/24  Yes Ollie Torres MD   spironolactone (Aldactone) 25 mg tablet Take 1 tablet (25 mg) by mouth once daily in the morning. Take before meals.   Yes Historical Provider, MD       Physical Exam  Vitals:    09/19/24 1311   BP: 167/86   Pulse: 56   Resp: 18   Temp: 36 °C (96.8 °F)   SpO2: 99%        General: A&Ox3, NAD.  HEENT: AT/NC.   CV: RRR. No murmur.  Resp: CTA bilaterally. No wheezing, rhonchi or rales.   GI: Soft, NT/ND. BSx4.  Extrem: No edema. Pulses intact.  Neuro: No focal deficits.   Psych: Normal mood and affect.      Procedure Plan - pre-procedural (re)assesment completed by physician:  discharge/transfer patient when discharge criteria met    Anselmo London MD  9/19/2024 1:39 PM

## 2024-09-19 NOTE — ANESTHESIA POSTPROCEDURE EVALUATION
Patient: Shruthi Ramos    Procedure Summary       Date: 09/19/24 Room / Location: [facility]    Anesthesia Start: 1348 Anesthesia Stop:     Procedure: EGD Diagnosis: GE junction carcinoma (Multi)    Scheduled Providers: Anselmo London MD; Claudio Hammond MD; Mera Watters MD Responsible Provider: Mera Watters MD    Anesthesia Type: MAC ASA Status: 3            Anesthesia Type: MAC    Vitals Value Taken Time   /72 09/19/24 1410   Temp 36.4 09/19/24 1410   Pulse 76 09/19/24 1410   Resp 18 09/19/24 1410   SpO2 95% 09/19/24 1410       Anesthesia Post Evaluation    Patient location during evaluation: PACU  Patient participation: complete - patient participated  Level of consciousness: sleepy but conscious  Pain management: adequate  Airway patency: patent  Cardiovascular status: acceptable  Respiratory status: acceptable  Hydration status: acceptable  Postoperative Nausea and Vomiting: none        There were no known notable events for this encounter.

## 2024-09-19 NOTE — ANESTHESIA PREPROCEDURE EVALUATION
Patient: Shruthi Ramos    Procedure Information       Date/Time: 09/19/24 1400    Scheduled providers: Anselmo London MD; Claudio Hammond MD; Mera Watters MD    Procedure: EGD    Location: [facility]            Relevant Problems   Anesthesia   (+) PONV (postoperative nausea and vomiting)      Cardiac   (+) HTN (hypertension)   (+) Hypercholesteremia      Pulmonary   (+) YUN (dyspnea on exertion)   (+) LORENA (obstructive sleep apnea)      Neuro   (+) Anxiety   (+) Depression   (+) Moderate major depression (Multi)      GI   (+) Dysphagia   (+) GE junction carcinoma (Multi)      Liver   (+) GE junction carcinoma (Multi)      Endocrine   (+) Class 3 severe obesity due to excess calories in adult (Multi)      Musculoskeletal   (+) Fibromyalgia      ID   (+) Yeast infection       Clinical information reviewed:   Tobacco  Allergies  Meds   Med Hx  Surg Hx   Fam Hx  Soc Hx        NPO Detail:  NPO/Void Status  Date of Last Liquid: 09/19/24  Time of Last Liquid: 0900  Date of Last Solid: 09/19/24  Time of Last Solid: 0000         Physical Exam    Airway  Mallampati: I  TM distance: >3 FB  Neck ROM: full     Cardiovascular - normal exam     Dental   (+) upper dentures, lower dentures     Pulmonary - normal exam     Abdominal - normal exam             Anesthesia Plan    History of general anesthesia?: yes  History of complications of general anesthesia?: no    ASA 3     MAC     The patient is not a current smoker.    intravenous induction   Anesthetic plan and risks discussed with patient.    Plan discussed with CRNA and CAA.

## 2024-09-20 ASSESSMENT — PAIN SCALES - GENERAL: PAINLEVEL_OUTOF10: 0 - NO PAIN

## 2024-09-26 LAB
LABORATORY COMMENT REPORT: NORMAL
PATH REPORT.FINAL DX SPEC: NORMAL
PATH REPORT.GROSS SPEC: NORMAL
PATH REPORT.RELEVANT HX SPEC: NORMAL
PATH REPORT.TOTAL CANCER: NORMAL

## 2024-10-15 ENCOUNTER — TELEPHONE (OUTPATIENT)
Dept: HEMATOLOGY/ONCOLOGY | Facility: CLINIC | Age: 59
End: 2024-10-15
Payer: MEDICAID

## 2024-10-15 ENCOUNTER — NUTRITION (OUTPATIENT)
Dept: RADIATION ONCOLOGY | Facility: CLINIC | Age: 59
End: 2024-10-15
Payer: MEDICAID

## 2024-10-15 DIAGNOSIS — C16.0 GE JUNCTION CARCINOMA (MULTI): ICD-10-CM

## 2024-10-15 NOTE — PROGRESS NOTES
"NUTRITION COMMUNICATION NOTE    Shruthi Ramos     REASON FOR COMMUNICATION:   Visit Type: Clinical Nutrition, Oncology, Telephone Visit:  A telephone visit (audio only) between the patient (at the distant site) and the provider (at the originating site) was utilized to provide this telehealth service.    Verbal Consent for Encounter: Verbal consent was requested and obtained from patient on this date for a telehealth visit.     Patient contacted RD via phone. Her j-tube became partially dislodged (\"I could see part of the balloon\"). She states she \"shoved it back in\". Wondering what to do. Per patient report, she is maintaining weight, eating three meals per day and snacking as able. She is willing to drink ONS by mouth and hasn't used feeding tube in months. Okay from RD standpoint to remove tube. Will reach out to surgical team to arrange. Patient knows to call with questions or concerns, or in the setting of weight loss.              "

## 2024-10-15 NOTE — TELEPHONE ENCOUNTER
"Patient phoned that last night while cooking dinner she had abdominal pain and he J-tube \"balloon\" that's usually under the skin had popped out. She pushed it back in herself and was asking what do to. I advised to 1) to go ED or 2) phone Dr. Brooke's office as he placed the tube - let them know what happened and see if they can check placement or if she should go to ED.  She verbalized understanding.    "

## 2024-10-16 ENCOUNTER — HOSPITAL ENCOUNTER (OUTPATIENT)
Dept: RADIOLOGY | Facility: HOSPITAL | Age: 59
Discharge: HOME | End: 2024-10-16
Payer: MEDICAID

## 2024-10-16 DIAGNOSIS — C16.0 GE JUNCTION CARCINOMA (MULTI): ICD-10-CM

## 2024-10-16 PROCEDURE — 49440 PLACE GASTROSTOMY TUBE PERC: CPT

## 2024-10-16 NOTE — NURSING NOTE
Pt came in for PEG tube removal. Dr. Zheng deflated balloon in the recovery bay and pulled PEG tube. PEG tube intact. Sterile 4x4 applied to abdominal site covered by tape. No questions by patient or . Patient discharged.    Les Gallego RN

## 2024-11-04 ENCOUNTER — APPOINTMENT (OUTPATIENT)
Dept: HEMATOLOGY/ONCOLOGY | Facility: CLINIC | Age: 59
End: 2024-11-04
Payer: COMMERCIAL

## 2024-11-07 ENCOUNTER — INFUSION (OUTPATIENT)
Dept: HEMATOLOGY/ONCOLOGY | Facility: CLINIC | Age: 59
End: 2024-11-07
Payer: MEDICAID

## 2024-11-07 ENCOUNTER — OFFICE VISIT (OUTPATIENT)
Dept: HEMATOLOGY/ONCOLOGY | Facility: CLINIC | Age: 59
End: 2024-11-07
Payer: MEDICAID

## 2024-11-07 VITALS
HEART RATE: 83 BPM | BODY MASS INDEX: 36.59 KG/M2 | TEMPERATURE: 97.7 F | OXYGEN SATURATION: 99 % | SYSTOLIC BLOOD PRESSURE: 142 MMHG | RESPIRATION RATE: 20 BRPM | DIASTOLIC BLOOD PRESSURE: 85 MMHG | WEIGHT: 193.78 LBS

## 2024-11-07 DIAGNOSIS — C16.0 GE JUNCTION CARCINOMA (MULTI): ICD-10-CM

## 2024-11-07 DIAGNOSIS — E87.6 HYPOKALEMIA: ICD-10-CM

## 2024-11-07 DIAGNOSIS — C16.0 GE JUNCTION CARCINOMA (MULTI): Primary | ICD-10-CM

## 2024-11-07 LAB
ALBUMIN SERPL BCP-MCNC: 3.9 G/DL (ref 3.4–5)
ALP SERPL-CCNC: 171 U/L (ref 33–136)
ALT SERPL W P-5'-P-CCNC: 13 U/L (ref 7–45)
ANION GAP SERPL CALC-SCNC: 13 MMOL/L (ref 10–20)
AST SERPL W P-5'-P-CCNC: 18 U/L (ref 9–39)
BASOPHILS # BLD AUTO: 0.07 X10*3/UL (ref 0–0.1)
BASOPHILS NFR BLD AUTO: 0.7 %
BILIRUB SERPL-MCNC: 0.6 MG/DL (ref 0–1.2)
BUN SERPL-MCNC: 14 MG/DL (ref 6–23)
CALCIUM SERPL-MCNC: 8.8 MG/DL (ref 8.6–10.3)
CHLORIDE SERPL-SCNC: 108 MMOL/L (ref 98–107)
CO2 SERPL-SCNC: 25 MMOL/L (ref 21–32)
CREAT SERPL-MCNC: 0.7 MG/DL (ref 0.5–1.05)
EGFRCR SERPLBLD CKD-EPI 2021: >90 ML/MIN/1.73M*2
EOSINOPHIL # BLD AUTO: 0.93 X10*3/UL (ref 0–0.7)
EOSINOPHIL NFR BLD AUTO: 9 %
ERYTHROCYTE [DISTWIDTH] IN BLOOD BY AUTOMATED COUNT: 15.9 % (ref 11.5–14.5)
GLUCOSE SERPL-MCNC: 103 MG/DL (ref 74–99)
HCT VFR BLD AUTO: 40.6 % (ref 36–46)
HGB BLD-MCNC: 13.2 G/DL (ref 12–16)
IMM GRANULOCYTES # BLD AUTO: 0.03 X10*3/UL (ref 0–0.7)
IMM GRANULOCYTES NFR BLD AUTO: 0.3 % (ref 0–0.9)
LYMPHOCYTES # BLD AUTO: 2.36 X10*3/UL (ref 1.2–4.8)
LYMPHOCYTES NFR BLD AUTO: 22.9 %
MAGNESIUM SERPL-MCNC: 2 MG/DL (ref 1.6–2.4)
MCH RBC QN AUTO: 29.8 PG (ref 26–34)
MCHC RBC AUTO-ENTMCNC: 32.5 G/DL (ref 32–36)
MCV RBC AUTO: 92 FL (ref 80–100)
MONOCYTES # BLD AUTO: 0.61 X10*3/UL (ref 0.1–1)
MONOCYTES NFR BLD AUTO: 5.9 %
NEUTROPHILS # BLD AUTO: 6.32 X10*3/UL (ref 1.2–7.7)
NEUTROPHILS NFR BLD AUTO: 61.2 %
NRBC BLD-RTO: 0 /100 WBCS (ref 0–0)
PLATELET # BLD AUTO: 197 X10*3/UL (ref 150–450)
POTASSIUM SERPL-SCNC: 3.5 MMOL/L (ref 3.5–5.3)
PROT SERPL-MCNC: 6.2 G/DL (ref 6.4–8.2)
RBC # BLD AUTO: 4.43 X10*6/UL (ref 4–5.2)
SODIUM SERPL-SCNC: 142 MMOL/L (ref 136–145)
WBC # BLD AUTO: 10.3 X10*3/UL (ref 4.4–11.3)

## 2024-11-07 PROCEDURE — 2500000004 HC RX 250 GENERAL PHARMACY W/ HCPCS (ALT 636 FOR OP/ED): Mod: SE | Performed by: INTERNAL MEDICINE

## 2024-11-07 PROCEDURE — 85025 COMPLETE CBC W/AUTO DIFF WBC: CPT

## 2024-11-07 PROCEDURE — 36591 DRAW BLOOD OFF VENOUS DEVICE: CPT

## 2024-11-07 PROCEDURE — 3079F DIAST BP 80-89 MM HG: CPT | Performed by: INTERNAL MEDICINE

## 2024-11-07 PROCEDURE — 96523 IRRIG DRUG DELIVERY DEVICE: CPT

## 2024-11-07 PROCEDURE — 99214 OFFICE O/P EST MOD 30 MIN: CPT | Performed by: INTERNAL MEDICINE

## 2024-11-07 PROCEDURE — 3077F SYST BP >= 140 MM HG: CPT | Performed by: INTERNAL MEDICINE

## 2024-11-07 PROCEDURE — 83735 ASSAY OF MAGNESIUM: CPT

## 2024-11-07 PROCEDURE — 84075 ASSAY ALKALINE PHOSPHATASE: CPT

## 2024-11-07 RX ORDER — HEPARIN SODIUM,PORCINE/PF 10 UNIT/ML
50 SYRINGE (ML) INTRAVENOUS AS NEEDED
OUTPATIENT
Start: 2024-11-07

## 2024-11-07 RX ORDER — HEPARIN 100 UNIT/ML
500 SYRINGE INTRAVENOUS AS NEEDED
OUTPATIENT
Start: 2024-11-07

## 2024-11-07 RX ORDER — HEPARIN SODIUM,PORCINE/PF 10 UNIT/ML
50 SYRINGE (ML) INTRAVENOUS AS NEEDED
Status: CANCELLED | OUTPATIENT
Start: 2024-11-07

## 2024-11-07 RX ORDER — HEPARIN 100 UNIT/ML
500 SYRINGE INTRAVENOUS AS NEEDED
Status: DISCONTINUED | OUTPATIENT
Start: 2024-11-07 | End: 2024-11-07 | Stop reason: HOSPADM

## 2024-11-07 ASSESSMENT — PAIN SCALES - GENERAL: PAINLEVEL_OUTOF10: 0-NO PAIN

## 2024-11-07 NOTE — PROGRESS NOTES
LOCATION:  [facility].     HEMATOLOGY & ONCOLOGY PROBLEMS:  1.  Localized gastric adenocarcinoma.       a.  Neoadjuvant chemotherapy with FLOT from Oct to Dec 2023.       b.  S/p total gastrectomy in January 2024.  Postoperative pathology (pT3,pN3).       c.  Adjuvant chemotherapy with FLOT with FLOT x 4 cycles from March to May 2024.    CHIEF COMPLAINT:    The patient is in the clinic today for follow-up of GE junction adenocarcinoma and for management of therapy related effects.                   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  She was having problem with gradually worsening dysphagia with further GI work-up revealing gastric mass with the biopsy confirming  poorly differentiated adenocarcinoma in July 2023.  Prior to that she had a barium esophagogram which was essentially unremarkable.  Patient does have a previous history of GERD and had one time was treated for H. pylori gastritis.  EUS done by Dr. London on 7/17/2023 showed malignancy starting near the GE junction and measuring 2.2 x 1.5 cm towards the cardia along  the lesser curvature / anterior wall with malignant appearing features.  There is loss of interface between the mass and liver along a 7 mm region, concerning for probable  early hepatic involvement.  Enlarged lymph nodes were noted in the diaphragmatic region, and gastrohepatic ligament.  CT chest abdomen pelvis with contrast on 8/9/2023 showed thickening of the distal esophagus extending into the anterior portion of the  gastric cardia.  There is a small amount of liver present directly adjacent to the area of the tumor but there is no altered enhancement pattern in the liver parenchyma.  Slightly inferior to the cardia there  were lymph nodes visible in the adjacent mesentery that were not visible previously measuring up to 6 mm in short axis. PET/CT on 8/31/2023 showed a hypermetabolic focus at the GE junction  with  extension into the gastric cardia, no evidence of hypermetabolic regional or distant metastatic disease. Brain MRI negative for metastasis.  She was treated with neoadjuvant chemotherapy with FLOT protocol from Oct to Dec 2023.  She had a total gastrectomy in 2024.  Postop pathology results showed G3, poorly differentiated cancer with partial treatment response, positive LVI/PNI, 7/25 lymph nodes positive (pT3 pN3).  After reevaluation she was treated with adjuvant chemotherapy with FLOT x 4 cycles from March to May 2024.     INTERVAL HISTORY:  Overall starting to feel slightly better.  Still has issues with generalized weakness and fatigue and intermittent diarrhea but symptoms are markedly improved.  As stated in the previous notes, despite multiple problems she was able to complete the adjuvant planned chemotherapy.  Dysphagia is has improved and feeding tube has been discontinued. F/U CT scans have been unremarkable.     PAST MEDICAL HISTORY:   1.  GE junction adenocarcinoma as detailed above.   2.  Hypertension   3.  Hyperlipidemia   4.  Anxiety/depression   5.  History of complete hysterectomy, ankle surgery, cholecystectomy and 2  procedures     SOCIAL HISTORY:    and lives in [facility].  Non-smoker and nonalcoholic.  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  Born and raised in [place].     FAMILY HISTORY:    Father  at age 76 from myocardial infarction mother  from stroke at age 78.  She had 8 siblings.  1 brother  from MI another committed suicide.  A sister also  from myocardial infarction.   She had 2 children.  Her son  from AML.  No other specific history of bleeding, clotting or malignant disorder in the family.     REVIEW OF SYSTEMS:  Pertinent finding as per the history above.  All other systems have been reviewed and generally negative and noncontributory.     PHYSICAL EXAMINATION:    Detailed physical examination not done     ALLERGY &  MEDICATIONS:  Allergies and latest outpatient medications list were reviewed in the EMR.    LAB RESULTS:  Blood work from today is unremarkable with Hb of 13.2 and Cr of 0.7. Mg of 2.0.     RADIOLOGY RESULT:  CT C/A/P [09/14/2024]  Impression:  Gastric cancer restaging scan.  1. New equivocal low-density focal thickening disc proximal to the anastomosis. While this may relate to collapsed esophagus, locoregional disease can not be entirely excluded. Recommend  correlation with direct visualization. Otherwise, no evidence of locoregional disease or metastatic disease in the chest abdomen or pelvis.  2. Interval development of intrahepatic and extrahepatic pneumobilia, similar finding was seen on CT chest abdomen pelvis dated 01/05/2024 and may represent  sphincter of Oddi dysfunction.  3. Postsurgical changes of total gastrectomy without evidence of complication.  4. Left mid abdomen percutaneous feeding tube terminates within the jejunal loops in the left mid abdomen.  5. Additional stable chronic findings as above.     MRI Brain w/wo Contrast [Aug 29 2023 12:28PM]   Impression:  There is no evidence of mass, infarction or hemorrhage.     PATHOLOGY RESULTS:  Surgical Pathology Exam: [id number]   FINAL DIAGNOSIS   A. Liver, segment 4 lesion, biopsy:  - Bile duct adenoma.     B. 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  - Resection margins are negative for neoplasia.  - Six of twenty-nine lymph nodes involved by metastatic carcinoma (6/29).  - Background stomach with reactive gastropathy, intestinal metaplasia, and dieulafoy lesion with accompanying hemorrhage.  - Omentum with no significant pathologic findings.     C. Lymph nodes, D2, excision:  - One of six lymph nodes involved by metastatic carcinoma (1/6).     D. Final esophageal margin, resection:  - Segment of esophagus, negative for neoplasia.    Electronically signed by Sachi Eubanks MD on 1/18/2024 at 1350   Case Summary Report   ESOPHAGUS    8th Edition - Protocol posted: 6/22/2022ESOPHAGUS - B  SPECIMEN   Procedure Esophagogastrectomy   TUMOR   Tumor Site Esophagogastric junction (EGJ)   Relationship of Tumor to Esophagogastric Junction Tumor midpoint is located at the esophagogastric junction   Histologic Type Adenocarcinoma   Histologic Grade G3, poorly differentiated, undifferentiated   Tumor Size Greatest Dimension (Centimeters): 2.3 (grossly) cm   Tumor Extent Invades adventitia   Treatment Effect Present, with residual cancer showing evident tumor regression, but more than single cells or rare small groups of cancer cells (partial response, score 2)   Lymphovascular Invasion Present   Perineural Invasion Present   MARGINS   Margin Status for Invasive Carcinoma All margins negative for invasive carcinoma   Closest Margin(s) to Invasive Carcinoma Deep   Distance from Invasive Carcinoma to Closest Margin 1.2 cm   Margin Status for Dysplasia and Intestinal Metaplasia All margins negative for dysplasia   REGIONAL LYMPH NODES   Regional Lymph Node Status Tumor present in regional lymph node(s)   Number of Lymph Nodes with Tumor 7   Number of Lymph Nodes Examined 35   PATHOLOGIC STAGE CLASSIFICATION (pTNM, AJCC 8th Edition)   TNM Descriptors y (post-treatment)   pT Category pT3   pN Category pN3         ASSESSMENT AND PLAN:   1.  GE junction poorly differentiated adenocarcinoma.  Please refer to the details as outlined above.  In summary patient with few month history  of gradually worsening dysphagia to both solid and liquid.  Initial barium esophagogram was normal but EGD showed a GE junction mass in July 2023. Biopsy results are confirmatory of poorly differentiated adenocarcinoma with normal mismatch repair gene  expression. EUS revealed a GE junction lesion with concern regarding direct extension to liver and enlarged pathological lymph nodes.  CT  scan showed stomach involvement with local regional lymphadenopathy but no distance organ involvement.  A follow-up  PET scan results were confirmatory of increased metabolic activity starting at the GE junction with extension into cardia.  There was no finding of any hypermetabolic activity in the liver or any other local or distant metastatic lesions.  Brain MRI was  unremarkable.  After multidisciplinary evaluation of tumor is considered more gastric than GE junction and she has been advised evaluation for neoadjuvant chemotherapy followed by surgical evaluation.  She was treated with neoadjuvant chemotherapy with FLOT protocol from Oct to Dec 2023.  She had a total gastrectomy in Jan 2024.  Postop pathology results showed G3, poorly differentiated cancer with partial treatment response, positive LVI/PNI, 7/25 lymph nodes positive (pT3 pN3). After reevaluation she was treated with adjuvant chemotherapy with FLOT x 4 cycles from March to May 2024.    For now we will continue with the aggressive supportive care as already initiated. Latest CT scans from Sep 2024 were unremarkable.   On a long-term basis, will continue monitor closely with regular physical examination, blood work, intermittent scans and endoscopies.    2.  Treatment induced diarrhea/nausea/heartburn.  Symptoms are gradually improving. She will continue to be supported with supportive care - Imodium,  Zofran and Pepcid  as needed. Feeding tube has been discontinued.    3.  Follow-up.  Follow-up with me in 12 weeks.  She will be scheduled for F/U CT scan just prior to next visit. Advised to contact us immediately if there are any new questions or concerns.    This note has been transcribed using Dragon voice recognition system and there is a possibility of unintentional typing misprints.

## 2024-11-25 ENCOUNTER — TELEPHONE (OUTPATIENT)
Dept: HEMATOLOGY/ONCOLOGY | Facility: CLINIC | Age: 59
End: 2024-11-25
Payer: MEDICAID

## 2024-11-25 DIAGNOSIS — C16.0 GE JUNCTION CARCINOMA (MULTI): ICD-10-CM

## 2024-11-25 RX ORDER — FAMOTIDINE 20 MG/1
20 TABLET, FILM COATED ORAL 2 TIMES DAILY
Qty: 60 TABLET | Refills: 11 | Status: SHIPPED | OUTPATIENT
Start: 2024-11-25

## 2024-11-25 NOTE — TELEPHONE ENCOUNTER
Patient left a message for refill of her Famotidine; already received fax from pharmacy this AM and request routed to Dr. Torres to fill.

## 2024-12-03 ENCOUNTER — TELEPHONE (OUTPATIENT)
Dept: HEMATOLOGY/ONCOLOGY | Facility: CLINIC | Age: 59
End: 2024-12-03
Payer: MEDICAID

## 2024-12-19 ENCOUNTER — INFUSION (OUTPATIENT)
Dept: HEMATOLOGY/ONCOLOGY | Facility: CLINIC | Age: 59
End: 2024-12-19
Payer: MEDICAID

## 2024-12-19 DIAGNOSIS — C16.0 GE JUNCTION CARCINOMA (MULTI): ICD-10-CM

## 2024-12-19 PROCEDURE — 2500000004 HC RX 250 GENERAL PHARMACY W/ HCPCS (ALT 636 FOR OP/ED): Mod: SE | Performed by: INTERNAL MEDICINE

## 2024-12-19 PROCEDURE — 96523 IRRIG DRUG DELIVERY DEVICE: CPT

## 2024-12-19 RX ORDER — HEPARIN 100 UNIT/ML
500 SYRINGE INTRAVENOUS AS NEEDED
Status: DISCONTINUED | OUTPATIENT
Start: 2024-12-19 | End: 2024-12-19 | Stop reason: HOSPADM

## 2024-12-19 RX ORDER — HEPARIN 100 UNIT/ML
500 SYRINGE INTRAVENOUS AS NEEDED
OUTPATIENT
Start: 2024-12-19

## 2024-12-19 RX ORDER — HEPARIN SODIUM,PORCINE/PF 10 UNIT/ML
50 SYRINGE (ML) INTRAVENOUS AS NEEDED
OUTPATIENT
Start: 2024-12-19

## 2025-02-04 ENCOUNTER — TELEPHONE (OUTPATIENT)
Dept: HEMATOLOGY/ONCOLOGY | Facility: CLINIC | Age: 60
End: 2025-02-04
Payer: MEDICAID

## 2025-02-04 DIAGNOSIS — C16.0 GE JUNCTION CARCINOMA (MULTI): ICD-10-CM

## 2025-02-04 NOTE — TELEPHONE ENCOUNTER
Pt made aware that her lab appointment needs to moved in order to have her results completed prior to CT scan. Pt is agreeable to come to clinic for IVAD flush and lab draw at 0830 on 2/7 prior to CT scan rather than at 1000. Staff aware

## 2025-02-07 ENCOUNTER — INFUSION (OUTPATIENT)
Dept: HEMATOLOGY/ONCOLOGY | Facility: CLINIC | Age: 60
End: 2025-02-07
Payer: MEDICAID

## 2025-02-07 ENCOUNTER — HOSPITAL ENCOUNTER (OUTPATIENT)
Dept: RADIOLOGY | Facility: HOSPITAL | Age: 60
Discharge: HOME | End: 2025-02-07
Payer: MEDICAID

## 2025-02-07 DIAGNOSIS — C16.0 GE JUNCTION CARCINOMA (MULTI): ICD-10-CM

## 2025-02-07 LAB
ALBUMIN SERPL BCP-MCNC: 3.9 G/DL (ref 3.4–5)
ALP SERPL-CCNC: 173 U/L (ref 33–136)
ALT SERPL W P-5'-P-CCNC: 13 U/L (ref 7–45)
ANION GAP SERPL CALC-SCNC: 13 MMOL/L (ref 10–20)
AST SERPL W P-5'-P-CCNC: 18 U/L (ref 9–39)
BASOPHILS # BLD AUTO: 0.05 X10*3/UL (ref 0–0.1)
BASOPHILS NFR BLD AUTO: 0.7 %
BILIRUB SERPL-MCNC: 0.9 MG/DL (ref 0–1.2)
BUN SERPL-MCNC: 12 MG/DL (ref 6–23)
CALCIUM SERPL-MCNC: 9 MG/DL (ref 8.6–10.3)
CHLORIDE SERPL-SCNC: 110 MMOL/L (ref 98–107)
CO2 SERPL-SCNC: 25 MMOL/L (ref 21–32)
CREAT SERPL-MCNC: 0.74 MG/DL (ref 0.5–1.05)
EGFRCR SERPLBLD CKD-EPI 2021: >90 ML/MIN/1.73M*2
EOSINOPHIL # BLD AUTO: 0.89 X10*3/UL (ref 0–0.7)
EOSINOPHIL NFR BLD AUTO: 12.2 %
ERYTHROCYTE [DISTWIDTH] IN BLOOD BY AUTOMATED COUNT: 14.3 % (ref 11.5–14.5)
GLUCOSE SERPL-MCNC: 136 MG/DL (ref 74–99)
HCT VFR BLD AUTO: 42.9 % (ref 36–46)
HGB BLD-MCNC: 14.2 G/DL (ref 12–16)
IMM GRANULOCYTES # BLD AUTO: 0.02 X10*3/UL (ref 0–0.7)
IMM GRANULOCYTES NFR BLD AUTO: 0.3 % (ref 0–0.9)
LYMPHOCYTES # BLD AUTO: 1.75 X10*3/UL (ref 1.2–4.8)
LYMPHOCYTES NFR BLD AUTO: 23.9 %
MAGNESIUM SERPL-MCNC: 1.98 MG/DL (ref 1.6–2.4)
MCH RBC QN AUTO: 30.1 PG (ref 26–34)
MCHC RBC AUTO-ENTMCNC: 33.1 G/DL (ref 32–36)
MCV RBC AUTO: 91 FL (ref 80–100)
MONOCYTES # BLD AUTO: 0.47 X10*3/UL (ref 0.1–1)
MONOCYTES NFR BLD AUTO: 6.4 %
NEUTROPHILS # BLD AUTO: 4.14 X10*3/UL (ref 1.2–7.7)
NEUTROPHILS NFR BLD AUTO: 56.5 %
NRBC BLD-RTO: 0 /100 WBCS (ref 0–0)
PLATELET # BLD AUTO: 182 X10*3/UL (ref 150–450)
POTASSIUM SERPL-SCNC: 3.5 MMOL/L (ref 3.5–5.3)
PROT SERPL-MCNC: 6.4 G/DL (ref 6.4–8.2)
RBC # BLD AUTO: 4.72 X10*6/UL (ref 4–5.2)
SODIUM SERPL-SCNC: 144 MMOL/L (ref 136–145)
WBC # BLD AUTO: 7.3 X10*3/UL (ref 4.4–11.3)

## 2025-02-07 PROCEDURE — 85025 COMPLETE CBC W/AUTO DIFF WBC: CPT

## 2025-02-07 PROCEDURE — 83735 ASSAY OF MAGNESIUM: CPT

## 2025-02-07 PROCEDURE — 36593 DECLOT VASCULAR DEVICE: CPT

## 2025-02-07 PROCEDURE — 80053 COMPREHEN METABOLIC PANEL: CPT

## 2025-02-07 PROCEDURE — 2550000001 HC RX 255 CONTRASTS: Performed by: INTERNAL MEDICINE

## 2025-02-07 PROCEDURE — 71260 CT THORAX DX C+: CPT

## 2025-02-07 RX ORDER — HEPARIN 100 UNIT/ML
500 SYRINGE INTRAVENOUS AS NEEDED
OUTPATIENT
Start: 2025-02-07

## 2025-02-07 RX ORDER — HEPARIN SODIUM,PORCINE/PF 10 UNIT/ML
50 SYRINGE (ML) INTRAVENOUS AS NEEDED
OUTPATIENT
Start: 2025-02-07

## 2025-02-07 RX ADMIN — IOHEXOL 75 ML: 350 INJECTION, SOLUTION INTRAVENOUS at 12:06

## 2025-02-11 ENCOUNTER — OFFICE VISIT (OUTPATIENT)
Dept: HEMATOLOGY/ONCOLOGY | Facility: CLINIC | Age: 60
End: 2025-02-11
Payer: MEDICAID

## 2025-02-11 VITALS
TEMPERATURE: 97.3 F | HEART RATE: 58 BPM | DIASTOLIC BLOOD PRESSURE: 76 MMHG | OXYGEN SATURATION: 99 % | SYSTOLIC BLOOD PRESSURE: 141 MMHG | RESPIRATION RATE: 20 BRPM | WEIGHT: 178.57 LBS | BODY MASS INDEX: 33.71 KG/M2

## 2025-02-11 DIAGNOSIS — C16.0 GE JUNCTION CARCINOMA (MULTI): ICD-10-CM

## 2025-02-11 PROCEDURE — 99214 OFFICE O/P EST MOD 30 MIN: CPT | Performed by: INTERNAL MEDICINE

## 2025-02-11 PROCEDURE — 3077F SYST BP >= 140 MM HG: CPT | Performed by: INTERNAL MEDICINE

## 2025-02-11 PROCEDURE — 3078F DIAST BP <80 MM HG: CPT | Performed by: INTERNAL MEDICINE

## 2025-02-11 ASSESSMENT — PAIN SCALES - GENERAL: PAINLEVEL_OUTOF10: 0-NO PAIN

## 2025-02-11 NOTE — PROGRESS NOTES
LOCATION:  [facility].     HEMATOLOGY & ONCOLOGY PROBLEMS:  1.  Localized gastric adenocarcinoma.       a.  Neoadjuvant chemotherapy with FLOT from Oct to Dec 2023.       b.  S/p total gastrectomy in January 2024.  Postoperative pathology (pT3,pN3).       c.  Adjuvant chemotherapy with FLOT with FLOT x 4 cycles from March to May 2024.    CHIEF COMPLAINT:    The patient is in the clinic today for follow-up of GE junction adenocarcinoma and for management of therapy related effects.                   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  She was having problem with gradually worsening dysphagia with further GI work-up revealing gastric mass with the biopsy confirming  poorly differentiated adenocarcinoma in July 2023.  Prior to that she had a barium esophagogram which was essentially unremarkable.  Patient does have a previous history of GERD and had one time was treated for H. pylori gastritis.  EUS done by Dr. London on 7/17/2023 showed malignancy starting near the GE junction and measuring 2.2 x 1.5 cm towards the cardia along  the lesser curvature / anterior wall with malignant appearing features.  There is loss of interface between the mass and liver along a 7 mm region, concerning for probable  early hepatic involvement.  Enlarged lymph nodes were noted in the diaphragmatic region, and gastrohepatic ligament.  CT chest abdomen pelvis with contrast on 8/9/2023 showed thickening of the distal esophagus extending into the anterior portion of the  gastric cardia.  There is a small amount of liver present directly adjacent to the area of the tumor but there is no altered enhancement pattern in the liver parenchyma.  Slightly inferior to the cardia there  were lymph nodes visible in the adjacent mesentery that were not visible previously measuring up to 6 mm in short axis. PET/CT on 8/31/2023 showed a hypermetabolic focus at the GE junction  with  extension into the gastric cardia, no evidence of hypermetabolic regional or distant metastatic disease. Brain MRI negative for metastasis.  She was treated with neoadjuvant chemotherapy with FLOT protocol from Oct to Dec 2023.  She had a total gastrectomy in 2024.  Postop pathology results showed G3, poorly differentiated cancer with partial treatment response, positive LVI/PNI, 7/25 lymph nodes positive (pT3 pN3).  After reevaluation she was treated with adjuvant chemotherapy with FLOT x 4 cycles from March to May 2024.     INTERVAL HISTORY:  Overall starting to feel slightly better.  As stated in the previous notes, despite multiple problems she was able to complete the adjuvant planned chemotherapy.  F/U CT scans have been unremarkable.     PAST MEDICAL HISTORY:   1.  GE junction adenocarcinoma as detailed above.   2.  Hypertension   3.  Hyperlipidemia   4.  Anxiety/depression   5.  History of complete hysterectomy, ankle surgery, cholecystectomy and 2  procedures     SOCIAL HISTORY:    and lives in [facility].  Non-smoker and nonalcoholic.  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  Born and raised in [place].     FAMILY HISTORY:    Father  at age 76 from myocardial infarction mother  from stroke at age 78.  She had 8 siblings.  1 brother  from MI another committed suicide.  A sister also  from myocardial infarction.   She had 2 children.  Her son  from AML.  No other specific history of bleeding, clotting or malignant disorder in the family.     REVIEW OF SYSTEMS:  Pertinent finding as per the history above.  All other systems have been reviewed and generally negative and noncontributory.     PHYSICAL EXAMINATION:    Detailed physical examination not done     ALLERGY & MEDICATIONS:  Allergies and latest outpatient medications list were reviewed in the EMR.    LAB RESULTS:  Blood work from 2025 was unremarkable other than alkaline phosphatase of 173.  Last 3  sets of blood work were reviewed and the trend was noted.     RADIOLOGY RESULT:  CT C/A/P [2/7/2025]  Impression:  Gastric cancer restaging scan as compared to the prior CT dated 09/11/2024:  1. Postsurgical changes status post total gastrectomy without evidence to suggest locoregional disease recurrence. No evidence of metastatic disease within the chest, abdomen, or pelvis.  2. Similar chronic and ancillary findings as described above.     MRI Brain w/wo Contrast [Aug 29 2023 12:28PM]   Impression:  There is no evidence of mass, infarction or hemorrhage.     PATHOLOGY RESULTS:  Surgical Pathology Exam: [id number]   FINAL DIAGNOSIS   A. Liver, segment 4 lesion, biopsy:  - Bile duct adenoma.     B. 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  - Resection margins are negative for neoplasia.  - Six of twenty-nine lymph nodes involved by metastatic carcinoma (6/29).  - Background stomach with reactive gastropathy, intestinal metaplasia, and dieulafoy lesion with accompanying hemorrhage.  - Omentum with no significant pathologic findings.     C. Lymph nodes, D2, excision:  - One of six lymph nodes involved by metastatic carcinoma (1/6).     D. Final esophageal margin, resection:  - Segment of esophagus, negative for neoplasia.   Electronically signed by Sachi Eubanks MD on 1/18/2024 at 1350   Case Summary Report   ESOPHAGUS    8th Edition - Protocol posted: 6/22/2022ESOPHAGUS - B  SPECIMEN   Procedure Esophagogastrectomy   TUMOR   Tumor Site Esophagogastric junction (EGJ)   Relationship of Tumor to Esophagogastric Junction Tumor midpoint is located at the esophagogastric junction   Histologic Type Adenocarcinoma   Histologic Grade G3, poorly differentiated, undifferentiated   Tumor Size Greatest Dimension (Centimeters): 2.3 (grossly) cm   Tumor Extent Invades adventitia   Treatment Effect Present, with residual  cancer showing evident tumor regression, but more than single cells or rare small groups of cancer cells (partial response, score 2)   Lymphovascular Invasion Present   Perineural Invasion Present   MARGINS   Margin Status for Invasive Carcinoma All margins negative for invasive carcinoma   Closest Margin(s) to Invasive Carcinoma Deep   Distance from Invasive Carcinoma to Closest Margin 1.2 cm   Margin Status for Dysplasia and Intestinal Metaplasia All margins negative for dysplasia   REGIONAL LYMPH NODES   Regional Lymph Node Status Tumor present in regional lymph node(s)   Number of Lymph Nodes with Tumor 7   Number of Lymph Nodes Examined 35   PATHOLOGIC STAGE CLASSIFICATION (pTNM, AJCC 8th Edition)   TNM Descriptors y (post-treatment)   pT Category pT3   pN Category pN3         ASSESSMENT AND PLAN:   1.  GE junction poorly differentiated adenocarcinoma.  Please refer to the details as outlined above.  In summary patient with few month history  of gradually worsening dysphagia to both solid and liquid.  Initial barium esophagogram was normal but EGD showed a GE junction mass in July 2023. Biopsy results are confirmatory of poorly differentiated adenocarcinoma with normal mismatch repair gene  expression. EUS revealed a GE junction lesion with concern regarding direct extension to liver and enlarged pathological lymph nodes.  CT scan showed stomach involvement with local regional lymphadenopathy but no distance organ involvement.  A follow-up  PET scan results were confirmatory of increased metabolic activity starting at the GE junction with extension into cardia.  There was no finding of any hypermetabolic activity in the liver or any other local or distant metastatic lesions.  Brain MRI was  unremarkable.  After multidisciplinary evaluation of tumor is considered more gastric than GE junction and she has been advised evaluation for neoadjuvant chemotherapy followed by surgical evaluation.  She was treated with  neoadjuvant chemotherapy with FLOT protocol from Oct to Dec 2023.  She had a total gastrectomy in Jan 2024.  Postop pathology results showed G3, poorly differentiated cancer with partial treatment response, positive LVI/PNI, 7/25 lymph nodes positive (pT3 pN3). After reevaluation she was treated with adjuvant chemotherapy with FLOT x 4 cycles from March to May 2024.    Clinically she is feeling much better and follow-up latest CT scans from February 2024 were unremarkable.   On a long-term basis, will continue monitor closely with regular physical examination, blood work, intermittent scans and endoscopies.    2.  Treatment induced diarrhea/nausea/heartburn.  Symptoms have markedly improved. She will continue to be supported with supportive care - Imodium,  Zofran and Pepcid  as needed.     3.  Follow-up.  Follow-up with me in 12 weeks.   Port flushes at 6 weeks interval.  Advised to contact us immediately if there are any new questions or concerns.    This note has been transcribed using Dragon voice recognition system and there is a possibility of unintentional typing misprints.

## 2025-03-21 ENCOUNTER — INFUSION (OUTPATIENT)
Dept: HEMATOLOGY/ONCOLOGY | Facility: CLINIC | Age: 60
End: 2025-03-21
Payer: MEDICAID

## 2025-03-21 DIAGNOSIS — C16.0 GE JUNCTION CARCINOMA (MULTI): ICD-10-CM

## 2025-03-21 PROCEDURE — 96523 IRRIG DRUG DELIVERY DEVICE: CPT

## 2025-03-21 PROCEDURE — 2500000004 HC RX 250 GENERAL PHARMACY W/ HCPCS (ALT 636 FOR OP/ED): Mod: SE | Performed by: INTERNAL MEDICINE

## 2025-03-21 RX ORDER — HEPARIN SODIUM,PORCINE/PF 10 UNIT/ML
50 SYRINGE (ML) INTRAVENOUS AS NEEDED
OUTPATIENT
Start: 2025-03-21

## 2025-03-21 RX ORDER — HEPARIN 100 UNIT/ML
500 SYRINGE INTRAVENOUS AS NEEDED
OUTPATIENT
Start: 2025-03-21

## 2025-03-21 RX ORDER — HEPARIN 100 UNIT/ML
500 SYRINGE INTRAVENOUS AS NEEDED
Status: DISCONTINUED | OUTPATIENT
Start: 2025-03-21 | End: 2025-03-21 | Stop reason: HOSPADM

## 2025-03-21 RX ADMIN — HEPARIN 500 UNITS: 100 SYRINGE at 11:05

## 2025-04-28 ENCOUNTER — TELEPHONE (OUTPATIENT)
Dept: HEMATOLOGY/ONCOLOGY | Facility: CLINIC | Age: 60
End: 2025-04-28
Payer: MEDICAID

## 2025-04-28 NOTE — TELEPHONE ENCOUNTER
Reason for Conversation  knot in stomach (Pt called-States that she can feel a new knot in her stomach where she had her surgery. Please call [phone])    Background   knot in stomach (Pt called-States that she can feel a new knot in her stomach where she had her surgery. Please call [phone])    Disposition   Per Dr. Torres he received a message from surgeon Dr. Chavarria that he's aware of this and working on getting patient in to be seen. I did leave Ariane a message asking her to call me back to let me know if she had reached out to Dr. Chavarria's office also. Awaiting return phone call.   Patient phoned back, area is to the side of her incision site from her gastrectomy, has grown from the size of a pea to half a golf ball in the last few weeks. I messaged Dr. Chavarria (surgeon) directly at request of DR. Torres and he said he will see her in clinic this week. Patient is aware.

## 2025-04-28 NOTE — TELEPHONE ENCOUNTER
Reason for Conversation  knot in stomach (Pt called-States that she can feel a new knot in her stomach where she had her surgery. Please call [phone])    Background       Disposition   No disposition on file.

## 2025-05-05 NOTE — PROGRESS NOTES
ASSESSMENT AND PLAN  Shruthi Ramos is a 59 y.o. female who is recovering well following total gastrectomy in January 2024. On final pathology she had a G3 poorly differentiated cancer with partial treatment response, +LVI/PNI, 7/35 lymph nodes positive, pT3N3.  She also completed perioperative FLOT chemotherapy.     She has a palpable nodule in the upper part of her incision that looked like fat necrosis on CT in February. I feel it today and it feels to be the same size as on that scan, but it is firm and concerning to her. We will refer her to Dr Zheng at [place] for US guided biopsy of this nodule to determine what to do next.    Zack Chavarria MD  Assistant Professor of Surgery  Division of Surgical Oncology  [phone]  [email or web address]      SUBJECTIVE  Shruthi Ramos is a 59 y.o. female who presents for a postoperative clinic visit.  Referring provider: Ollie Torres  Diagnosis: gastric cancer  Surgery: total gastrectomy with J tube on 1/10/24  Postoperative issues: wound infection, pneumatosis with tube feeds that resolved with TPN, bowel rest, and abx.    She had some issues after surgery regarding her J-tube but ultimately was able to get sufficient nutrition to complete her postoperative chemotherapy.  Imaging since then has been unremarkable as far as recurrence or metastatic disease.    She has a nodule in her upper midline incision that she says felt like a pea 2 months ago but now feels like a half golf ball.    Physical exam  1cm firm mobile nodule above fascia in upper aspect of abdominal incision scar    Surgical Pathology  FINAL DIAGNOSIS   A. Liver, segment 4 lesion, biopsy:  - Bile duct adenoma.     B. 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  - Resection margins are negative for neoplasia.  - Six of twenty-nine lymph nodes involved by metastatic carcinoma  (6/29).  - Background stomach with reactive gastropathy, intestinal metaplasia, and dieulafoy lesion with accompanying hemorrhage.  - Omentum with no significant pathologic findings.     C. Lymph nodes, D2, excision:  - One of six lymph nodes involved by metastatic carcinoma (1/6).     D. Final esophageal margin, resection:  - Segment of esophagus, negative for neoplasia.   Electronically signed by Sachi Eubanks MD on 1/18/2024 at 1350        By the signature on this report, the individual or group listed as making the Final Interpretation/Diagnosis certifies that they have reviewed this case.    Case Summary Report   ESOPHAGUS   8th Edition - Protocol posted: 6/22/2022ESOPHAGUS - B  SPECIMEN   Procedure  Esophagogastrectomy   TUMOR   Tumor Site  Esophagogastric junction (EGJ)   Relationship of Tumor to Esophagogastric Junction  Tumor midpoint is located at the esophagogastric junction   Histologic Type  Adenocarcinoma   Histologic Grade  G3, poorly differentiated, undifferentiated   Tumor Size  Greatest Dimension (Centimeters): 2.3 (grossly) cm   Tumor Extent  Invades adventitia   Treatment Effect  Present, with residual cancer showing evident tumor regression, but more than single cells or rare small groups of cancer cells (partial response, score 2)   Lymphovascular Invasion  Present   Perineural Invasion  Present   MARGINS   Margin Status for Invasive Carcinoma  All margins negative for invasive carcinoma   Closest Margin(s) to Invasive Carcinoma  Deep   Distance from Invasive Carcinoma to Closest Margin  1.2 cm   Margin Status for Dysplasia and Intestinal Metaplasia  All margins negative for dysplasia   REGIONAL LYMPH NODES   Regional Lymph Node Status  Tumor present in regional lymph node(s)   Number of Lymph Nodes with Tumor  7   Number of Lymph Nodes Examined  35   PATHOLOGIC STAGE CLASSIFICATION (pTNM, AJCC 8th Edition)   Reporting of pT, pN, and (when applicable) pM categories is based on information  available to the pathologist at the time the report is issued. As per the AJCC (Chapter 1, 8th Ed.) it is the managing physician’s responsibility to establish the final pathologic stage based upon all pertinent information, including but potentially not limited to this pathology report.   TNM Descriptors  y (post-treatment)   pT Category  pT3   pN Category  pN3

## 2025-05-06 ENCOUNTER — OFFICE VISIT (OUTPATIENT)
Dept: SURGICAL ONCOLOGY | Facility: CLINIC | Age: 60
End: 2025-05-06
Payer: MEDICAID

## 2025-05-06 VITALS
SYSTOLIC BLOOD PRESSURE: 142 MMHG | BODY MASS INDEX: 32.66 KG/M2 | WEIGHT: 173 LBS | RESPIRATION RATE: 14 BRPM | TEMPERATURE: 97.5 F | OXYGEN SATURATION: 98 % | DIASTOLIC BLOOD PRESSURE: 93 MMHG | HEART RATE: 57 BPM

## 2025-05-06 DIAGNOSIS — R22.2 ABDOMINAL WALL LUMP: Primary | ICD-10-CM

## 2025-05-06 PROCEDURE — 3077F SYST BP >= 140 MM HG: CPT | Performed by: STUDENT IN AN ORGANIZED HEALTH CARE EDUCATION/TRAINING PROGRAM

## 2025-05-06 PROCEDURE — 99214 OFFICE O/P EST MOD 30 MIN: CPT | Performed by: STUDENT IN AN ORGANIZED HEALTH CARE EDUCATION/TRAINING PROGRAM

## 2025-05-06 PROCEDURE — 3080F DIAST BP >= 90 MM HG: CPT | Performed by: STUDENT IN AN ORGANIZED HEALTH CARE EDUCATION/TRAINING PROGRAM

## 2025-05-06 PROCEDURE — 1036F TOBACCO NON-USER: CPT | Performed by: STUDENT IN AN ORGANIZED HEALTH CARE EDUCATION/TRAINING PROGRAM

## 2025-05-06 ASSESSMENT — PAIN SCALES - GENERAL: PAINLEVEL_OUTOF10: 0-NO PAIN

## 2025-05-06 NOTE — LETTER
May 6, 2025     Ollie Torres MD  [address]  [facility] [address]    Patient: Shruthi Ramos   YOB: 1965   Date of Visit: 5/6/2025       Dear Dr. Ollie Torres MD:    Thank you for referring Shruthi Ramos to me for evaluation. Below are my notes for this consultation.  If you have questions, please do not hesitate to call me. I look forward to following your patient along with you.       Sincerely,     Zack Chavarria MD      CC: Neo Zheng MD  ______________________________________________________________________________________    ASSESSMENT AND PLAN  Shruthi Ramos is a 59 y.o. female who is recovering well following total gastrectomy in January 2024. On final pathology she had a G3 poorly differentiated cancer with partial treatment response, +LVI/PNI, 7/35 lymph nodes positive, pT3N3.  She also completed perioperative FLOT chemotherapy.     She has a palpable nodule in the upper part of her incision that looked like fat necrosis on CT in February. I feel it today and it feels to be the same size as on that scan, but it is firm and concerning to her. We will refer her to Dr Zheng at [place] for US guided biopsy of this nodule to determine what to do next.    Zack Chavarria MD  Assistant Professor of Surgery  Division of Surgical Oncology  [phone]  [email or web address]      SUBJECTIVE  Shruthi Ramos is a 59 y.o. female who presents for a postoperative clinic visit.  Referring provider: Ollie Torres  Diagnosis: gastric cancer  Surgery: total gastrectomy with J tube on 1/10/24  Postoperative issues: wound infection, pneumatosis with tube feeds that resolved with TPN, bowel rest, and abx.    She had some issues after surgery regarding her J-tube but ultimately was able to get sufficient nutrition to complete her postoperative chemotherapy.  Imaging since then has been unremarkable as far as recurrence or metastatic disease.    She has a nodule in her  upper midline incision that she says felt like a pea 2 months ago but now feels like a half golf ball.    Physical exam  1cm firm mobile nodule above fascia in upper aspect of abdominal incision scar    Surgical Pathology  FINAL DIAGNOSIS   A. Liver, segment 4 lesion, biopsy:  - Bile duct adenoma.     B. 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  - Resection margins are negative for neoplasia.  - Six of twenty-nine lymph nodes involved by metastatic carcinoma (6/29).  - Background stomach with reactive gastropathy, intestinal metaplasia, and dieulafoy lesion with accompanying hemorrhage.  - Omentum with no significant pathologic findings.     C. Lymph nodes, D2, excision:  - One of six lymph nodes involved by metastatic carcinoma (1/6).     D. Final esophageal margin, resection:  - Segment of esophagus, negative for neoplasia.   Electronically signed by Sachi Eubanks MD on 1/18/2024 at 1350        By the signature on this report, the individual or group listed as making the Final Interpretation/Diagnosis certifies that they have reviewed this case.    Case Summary Report   ESOPHAGUS   8th Edition - Protocol posted: 6/22/2022ESOPHAGUS - B  SPECIMEN   Procedure  Esophagogastrectomy   TUMOR   Tumor Site  Esophagogastric junction (EGJ)   Relationship of Tumor to Esophagogastric Junction  Tumor midpoint is located at the esophagogastric junction   Histologic Type  Adenocarcinoma   Histologic Grade  G3, poorly differentiated, undifferentiated   Tumor Size  Greatest Dimension (Centimeters): 2.3 (grossly) cm   Tumor Extent  Invades adventitia   Treatment Effect  Present, with residual cancer showing evident tumor regression, but more than single cells or rare small groups of cancer cells (partial response, score 2)   Lymphovascular Invasion  Present   Perineural Invasion  Present   MARGINS   Margin Status for  Invasive Carcinoma  All margins negative for invasive carcinoma   Closest Margin(s) to Invasive Carcinoma  Deep   Distance from Invasive Carcinoma to Closest Margin  1.2 cm   Margin Status for Dysplasia and Intestinal Metaplasia  All margins negative for dysplasia   REGIONAL LYMPH NODES   Regional Lymph Node Status  Tumor present in regional lymph node(s)   Number of Lymph Nodes with Tumor  7   Number of Lymph Nodes Examined  35   PATHOLOGIC STAGE CLASSIFICATION (pTNM, AJCC 8th Edition)   Reporting of pT, pN, and (when applicable) pM categories is based on information available to the pathologist at the time the report is issued. As per the AJCC (Chapter 1, 8th Ed.) it is the managing physician’s responsibility to establish the final pathologic stage based upon all pertinent information, including but potentially not limited to this pathology report.   TNM Descriptors  y (post-treatment)   pT Category  pT3   pN Category  pN3

## 2025-05-08 ENCOUNTER — APPOINTMENT (OUTPATIENT)
Dept: SURGICAL ONCOLOGY | Facility: HOSPITAL | Age: 60
End: 2025-05-08
Payer: MEDICAID

## 2025-05-15 ENCOUNTER — OFFICE VISIT (OUTPATIENT)
Dept: HEMATOLOGY/ONCOLOGY | Facility: CLINIC | Age: 60
End: 2025-05-15
Payer: MEDICAID

## 2025-05-15 ENCOUNTER — INFUSION (OUTPATIENT)
Dept: HEMATOLOGY/ONCOLOGY | Facility: CLINIC | Age: 60
End: 2025-05-15
Payer: MEDICAID

## 2025-05-15 VITALS
SYSTOLIC BLOOD PRESSURE: 167 MMHG | RESPIRATION RATE: 20 BRPM | DIASTOLIC BLOOD PRESSURE: 89 MMHG | TEMPERATURE: 96.3 F | WEIGHT: 174.16 LBS | HEART RATE: 54 BPM | BODY MASS INDEX: 32.88 KG/M2 | OXYGEN SATURATION: 99 %

## 2025-05-15 DIAGNOSIS — E86.0 DEHYDRATION: ICD-10-CM

## 2025-05-15 DIAGNOSIS — C16.0 ADENOCARCINOMA OF GASTROESOPHAGEAL JUNCTION (MULTI): ICD-10-CM

## 2025-05-15 DIAGNOSIS — R19.7 DIARRHEA, UNSPECIFIED TYPE: ICD-10-CM

## 2025-05-15 DIAGNOSIS — E87.6 HYPOKALEMIA: ICD-10-CM

## 2025-05-15 DIAGNOSIS — C16.0 GE JUNCTION CARCINOMA: ICD-10-CM

## 2025-05-15 DIAGNOSIS — C16.0 ADENOCARCINOMA OF GASTROESOPHAGEAL JUNCTION (MULTI): Primary | ICD-10-CM

## 2025-05-15 LAB
ALBUMIN SERPL BCP-MCNC: 3.8 G/DL (ref 3.4–5)
ALP SERPL-CCNC: 160 U/L (ref 33–136)
ALT SERPL W P-5'-P-CCNC: 14 U/L (ref 7–45)
ANION GAP SERPL CALC-SCNC: 14 MMOL/L (ref 10–20)
AST SERPL W P-5'-P-CCNC: 18 U/L (ref 9–39)
BASOPHILS # BLD AUTO: 0.08 X10*3/UL (ref 0–0.1)
BASOPHILS NFR BLD AUTO: 0.7 %
BILIRUB SERPL-MCNC: 0.6 MG/DL (ref 0–1.2)
BUN SERPL-MCNC: 19 MG/DL (ref 6–23)
CALCIUM SERPL-MCNC: 8.4 MG/DL (ref 8.6–10.3)
CHLORIDE SERPL-SCNC: 108 MMOL/L (ref 98–107)
CO2 SERPL-SCNC: 26 MMOL/L (ref 21–32)
CREAT SERPL-MCNC: 0.69 MG/DL (ref 0.5–1.05)
EGFRCR SERPLBLD CKD-EPI 2021: >90 ML/MIN/1.73M*2
EOSINOPHIL # BLD AUTO: 1.41 X10*3/UL (ref 0–0.7)
EOSINOPHIL NFR BLD AUTO: 11.8 %
ERYTHROCYTE [DISTWIDTH] IN BLOOD BY AUTOMATED COUNT: 14.4 % (ref 11.5–14.5)
GLUCOSE SERPL-MCNC: 91 MG/DL (ref 74–99)
HCT VFR BLD AUTO: 41.4 % (ref 36–46)
HGB BLD-MCNC: 13.3 G/DL (ref 12–16)
IMM GRANULOCYTES # BLD AUTO: 0.02 X10*3/UL (ref 0–0.7)
IMM GRANULOCYTES NFR BLD AUTO: 0.2 % (ref 0–0.9)
LYMPHOCYTES # BLD AUTO: 2.11 X10*3/UL (ref 1.2–4.8)
LYMPHOCYTES NFR BLD AUTO: 17.7 %
MAGNESIUM SERPL-MCNC: 2.03 MG/DL (ref 1.6–2.4)
MCH RBC QN AUTO: 30.4 PG (ref 26–34)
MCHC RBC AUTO-ENTMCNC: 32.1 G/DL (ref 32–36)
MCV RBC AUTO: 95 FL (ref 80–100)
MONOCYTES # BLD AUTO: 0.74 X10*3/UL (ref 0.1–1)
MONOCYTES NFR BLD AUTO: 6.2 %
NEUTROPHILS # BLD AUTO: 7.57 X10*3/UL (ref 1.2–7.7)
NEUTROPHILS NFR BLD AUTO: 63.4 %
NRBC BLD-RTO: 0 /100 WBCS (ref 0–0)
PLATELET # BLD AUTO: 211 X10*3/UL (ref 150–450)
POTASSIUM SERPL-SCNC: 3.6 MMOL/L (ref 3.5–5.3)
PROT SERPL-MCNC: 6.1 G/DL (ref 6.4–8.2)
RBC # BLD AUTO: 4.38 X10*6/UL (ref 4–5.2)
SODIUM SERPL-SCNC: 144 MMOL/L (ref 136–145)
WBC # BLD AUTO: 11.9 X10*3/UL (ref 4.4–11.3)

## 2025-05-15 PROCEDURE — 3077F SYST BP >= 140 MM HG: CPT | Performed by: INTERNAL MEDICINE

## 2025-05-15 PROCEDURE — 83735 ASSAY OF MAGNESIUM: CPT

## 2025-05-15 PROCEDURE — 84075 ASSAY ALKALINE PHOSPHATASE: CPT

## 2025-05-15 PROCEDURE — 2500000004 HC RX 250 GENERAL PHARMACY W/ HCPCS (ALT 636 FOR OP/ED): Mod: JZ,SE | Performed by: INTERNAL MEDICINE

## 2025-05-15 PROCEDURE — 3079F DIAST BP 80-89 MM HG: CPT | Performed by: INTERNAL MEDICINE

## 2025-05-15 PROCEDURE — 96523 IRRIG DRUG DELIVERY DEVICE: CPT

## 2025-05-15 PROCEDURE — 99214 OFFICE O/P EST MOD 30 MIN: CPT | Mod: 25 | Performed by: INTERNAL MEDICINE

## 2025-05-15 PROCEDURE — 99214 OFFICE O/P EST MOD 30 MIN: CPT | Performed by: INTERNAL MEDICINE

## 2025-05-15 PROCEDURE — 85025 COMPLETE CBC W/AUTO DIFF WBC: CPT

## 2025-05-15 PROCEDURE — 36593 DECLOT VASCULAR DEVICE: CPT

## 2025-05-15 PROCEDURE — 36591 DRAW BLOOD OFF VENOUS DEVICE: CPT

## 2025-05-15 RX ORDER — HEPARIN 100 UNIT/ML
500 SYRINGE INTRAVENOUS AS NEEDED
Status: DISCONTINUED | OUTPATIENT
Start: 2025-05-15 | End: 2025-05-15 | Stop reason: HOSPADM

## 2025-05-15 RX ORDER — HEPARIN 100 UNIT/ML
500 SYRINGE INTRAVENOUS AS NEEDED
OUTPATIENT
Start: 2025-05-15

## 2025-05-15 RX ORDER — HEPARIN SODIUM,PORCINE/PF 10 UNIT/ML
50 SYRINGE (ML) INTRAVENOUS AS NEEDED
OUTPATIENT
Start: 2025-05-15

## 2025-05-15 RX ADMIN — ALTEPLASE 2 MG: 2.2 INJECTION, POWDER, LYOPHILIZED, FOR SOLUTION INTRAVENOUS at 12:40

## 2025-05-15 RX ADMIN — HEPARIN 500 UNITS: 100 SYRINGE at 13:46

## 2025-05-15 ASSESSMENT — PAIN SCALES - GENERAL: PAINLEVEL_OUTOF10: 0-NO PAIN

## 2025-05-15 NOTE — PROGRESS NOTES
LOCATION:  [facility].     HEMATOLOGY & ONCOLOGY PROBLEMS:  1.  Localized gastric adenocarcinoma.       a.  Neoadjuvant chemotherapy with FLOT from Oct to Dec 2023.       b.  S/p total gastrectomy in January 2024.  Postoperative pathology (pT3,pN3).       c.  Adjuvant chemotherapy with FLOT with FLOT x 4 cycles from March to May 2024.    CHIEF COMPLAINT:    The patient is in the clinic today for follow-up of GE junction adenocarcinoma and for management of therapy related effects.                   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  She was having problem with gradually worsening dysphagia with further GI work-up revealing gastric mass with the biopsy confirming  poorly differentiated adenocarcinoma in July 2023.  Prior to that she had a barium esophagogram which was essentially unremarkable.  Patient does have a previous history of GERD and had one time was treated for H. pylori gastritis.  EUS done by Dr. London on 7/17/2023 showed malignancy starting near the GE junction and measuring 2.2 x 1.5 cm towards the cardia along  the lesser curvature / anterior wall with malignant appearing features.  There is loss of interface between the mass and liver along a 7 mm region, concerning for probable  early hepatic involvement.  Enlarged lymph nodes were noted in the diaphragmatic region, and gastrohepatic ligament.  CT chest abdomen pelvis with contrast on 8/9/2023 showed thickening of the distal esophagus extending into the anterior portion of the  gastric cardia.  There is a small amount of liver present directly adjacent to the area of the tumor but there is no altered enhancement pattern in the liver parenchyma.  Slightly inferior to the cardia there  were lymph nodes visible in the adjacent mesentery that were not visible previously measuring up to 6 mm in short axis. PET/CT on 8/31/2023 showed a hypermetabolic focus at the GE junction  with  extension into the gastric cardia, no evidence of hypermetabolic regional or distant metastatic disease. Brain MRI negative for metastasis.  She was treated with neoadjuvant chemotherapy with FLOT protocol from Oct to Dec 2023.  She had a total gastrectomy in 2024.  Postop pathology results showed G3, poorly differentiated cancer with partial treatment response, positive LVI/PNI, 7/25 lymph nodes positive (pT3 pN3).  After reevaluation she was treated with adjuvant chemotherapy with FLOT x 4 cycles from March to May 2024.     INTERVAL HISTORY:  Overall feeling better.  Lately she has noted pea-sized nodule in the epigastric area.  Lesion is slightly tender.  She was evaluated by Dr. Rodriguez in surgery clinic and has been advised biopsy of the lesion by interventional radiology.  No other history of anorexia, weight loss, weakness, fatigue etc.    PAST MEDICAL HISTORY:   1.  GE junction adenocarcinoma as detailed above.   2.  Hypertension   3.  Hyperlipidemia   4.  Anxiety/depression   5.  History of complete hysterectomy, ankle surgery, cholecystectomy and 2  procedures     SOCIAL HISTORY:    and lives in [facility].  Non-smoker and nonalcoholic.  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 Born and raised in [place].     FAMILY HISTORY:    Father  at age 76 from myocardial infarction mother  from stroke at age 78.  She had 8 siblings.  1 brother  from MI another committed suicide.  A sister also  from myocardial infarction.   She had 2 children.  Her son  from AML.  No other specific history of bleeding, clotting or malignant disorder in the family.     REVIEW OF SYSTEMS:  Pertinent finding as per the history above.  All other systems have been reviewed and generally negative and noncontributory.     PHYSICAL EXAMINATION:    Detailed physical examination not done     ALLERGY & MEDICATIONS:  Allergies and latest outpatient medications list were reviewed in the EMR.    LAB  RESULTS:  Blood work from 2/7/2025 was unremarkable other than alkaline phosphatase of 173.  Last 3 sets of blood work were reviewed and the trend was noted.     RADIOLOGY RESULT:  CT C/A/P [2/7/2025]  Impression:  Gastric cancer restaging scan as compared to the prior CT dated 09/11/2024:  1. Postsurgical changes status post total gastrectomy without evidence to suggest locoregional disease recurrence. No evidence of metastatic disease within the chest, abdomen, or pelvis.  2. Similar chronic and ancillary findings as described above.     MRI Brain w/wo Contrast [Aug 29 2023 12:28PM]   Impression:  There is no evidence of mass, infarction or hemorrhage.     PATHOLOGY RESULTS:  Surgical Pathology Exam: [id number]   FINAL DIAGNOSIS   A. Liver, segment 4 lesion, biopsy:  - Bile duct adenoma.     B. 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  - Resection margins are negative for neoplasia.  - Six of twenty-nine lymph nodes involved by metastatic carcinoma (6/29).  - Background stomach with reactive gastropathy, intestinal metaplasia, and dieulafoy lesion with accompanying hemorrhage.  - Omentum with no significant pathologic findings.     C. Lymph nodes, D2, excision:  - One of six lymph nodes involved by metastatic carcinoma (1/6).     D. Final esophageal margin, resection:  - Segment of esophagus, negative for neoplasia.   Electronically signed by Sachi Eubanks MD on 1/18/2024 at 1350   Case Summary Report   ESOPHAGUS    8th Edition - Protocol posted: 6/22/2022ESOPHAGUS - B  SPECIMEN   Procedure Esophagogastrectomy   TUMOR   Tumor Site Esophagogastric junction (EGJ)   Relationship of Tumor to Esophagogastric Junction Tumor midpoint is located at the esophagogastric junction   Histologic Type Adenocarcinoma   Histologic Grade G3, poorly differentiated, undifferentiated   Tumor Size Greatest Dimension  (Centimeters): 2.3 (grossly) cm   Tumor Extent Invades adventitia   Treatment Effect Present, with residual cancer showing evident tumor regression, but more than single cells or rare small groups of cancer cells (partial response, score 2)   Lymphovascular Invasion Present   Perineural Invasion Present   MARGINS   Margin Status for Invasive Carcinoma All margins negative for invasive carcinoma   Closest Margin(s) to Invasive Carcinoma Deep   Distance from Invasive Carcinoma to Closest Margin 1.2 cm   Margin Status for Dysplasia and Intestinal Metaplasia All margins negative for dysplasia   REGIONAL LYMPH NODES   Regional Lymph Node Status Tumor present in regional lymph node(s)   Number of Lymph Nodes with Tumor 7   Number of Lymph Nodes Examined 35   PATHOLOGIC STAGE CLASSIFICATION (pTNM, AJCC 8th Edition)   TNM Descriptors y (post-treatment)   pT Category pT3   pN Category pN3         ASSESSMENT AND PLAN:   1.  GE junction poorly differentiated adenocarcinoma.  Please refer to the details as outlined above.  In summary patient with few month history  of gradually worsening dysphagia to both solid and liquid.  Initial barium esophagogram was normal but EGD showed a GE junction mass in July 2023. Biopsy results are confirmatory of poorly differentiated adenocarcinoma with normal mismatch repair gene  expression. EUS revealed a GE junction lesion with concern regarding direct extension to liver and enlarged pathological lymph nodes.  CT scan showed stomach involvement with local regional lymphadenopathy but no distance organ involvement.  A follow-up  PET scan results were confirmatory of increased metabolic activity starting at the GE junction with extension into cardia.  There was no finding of any hypermetabolic activity in the liver or any other local or distant metastatic lesions.  Brain MRI was  unremarkable.  After multidisciplinary evaluation of tumor is considered more gastric than GE junction and she has  been advised evaluation for neoadjuvant chemotherapy followed by surgical evaluation.  She was treated with neoadjuvant chemotherapy with FLOT protocol from Oct to Dec 2023.  She had a total gastrectomy in Jan 2024.  Postop pathology results showed G3, poorly differentiated cancer with partial treatment response, positive LVI/PNI, 7/25 lymph nodes positive (pT3 pN3). After reevaluation she was treated with adjuvant chemotherapy with FLOT x 4 cycles from March to May 2024.    Clinically she is feeling much better and follow-up latest CT scans from Feb 2024 were unremarkable.   On a long-term basis, will continue monitor closely with regular physical examination, blood work, intermittent scans and endoscopies.    2.  Treatment induced diarrhea/nausea/heartburn.  Symptoms have markedly improved. She will continue to be supported with supportive care - Imodium,  Zofran and Pepcid  as needed.     3.  Follow-up.  Follow-up with me in 12 weeks.   Port flushes at 6 weeks interval.  She will be scheduled for F/U CT scans prior to next OV.  In the meantime we will also follow-up on the biopsy results of the epigastric nodule.  Advised to contact us immediately if there are any new questions or concerns.    This note has been transcribed using Dragon voice recognition system and there is a possibility of unintentional typing misprints.

## 2025-05-16 ENCOUNTER — HOSPITAL ENCOUNTER (OUTPATIENT)
Dept: RADIOLOGY | Facility: HOSPITAL | Age: 60
Discharge: HOME | End: 2025-05-16
Payer: MEDICAID

## 2025-05-16 VITALS
HEART RATE: 57 BPM | BODY MASS INDEX: 32.88 KG/M2 | WEIGHT: 174.16 LBS | HEIGHT: 61 IN | TEMPERATURE: 97.9 F | DIASTOLIC BLOOD PRESSURE: 80 MMHG | SYSTOLIC BLOOD PRESSURE: 135 MMHG | OXYGEN SATURATION: 100 % | RESPIRATION RATE: 18 BRPM

## 2025-05-16 DIAGNOSIS — R22.2 ABDOMINAL WALL LUMP: ICD-10-CM

## 2025-05-16 PROCEDURE — 2500000004 HC RX 250 GENERAL PHARMACY W/ HCPCS (ALT 636 FOR OP/ED): Mod: JZ | Performed by: RADIOLOGY

## 2025-05-16 PROCEDURE — 2720000007 HC OR 272 NO HCPCS

## 2025-05-16 PROCEDURE — 76942 ECHO GUIDE FOR BIOPSY: CPT

## 2025-05-16 RX ORDER — LIDOCAINE HYDROCHLORIDE 10 MG/ML
INJECTION, SOLUTION EPIDURAL; INFILTRATION; INTRACAUDAL; PERINEURAL
Status: COMPLETED | OUTPATIENT
Start: 2025-05-16 | End: 2025-05-16

## 2025-05-16 RX ADMIN — LIDOCAINE HYDROCHLORIDE 5 ML: 10 INJECTION, SOLUTION EPIDURAL; INFILTRATION; INTRACAUDAL; PERINEURAL at 15:02

## 2025-05-16 ASSESSMENT — PAIN - FUNCTIONAL ASSESSMENT
PAIN_FUNCTIONAL_ASSESSMENT: 0-10
PAIN_FUNCTIONAL_ASSESSMENT: 0-10

## 2025-05-16 ASSESSMENT — PAIN SCALES - GENERAL
PAINLEVEL_OUTOF10: 0 - NO PAIN
PAINLEVEL_OUTOF10: 0 - NO PAIN

## 2025-05-16 NOTE — PROCEDURES
"Interventional Radiology Brief Postprocedure Note    Attending: Neo Zheng MD    Assistant:   Staff Role   Delma Walker Radiology Technologist   Les Gallego, RN Radiology Nurse   Neo Zheng MD Radiologist       Diagnosis:   1. Abdominal wall lump  US guided percutaneous abdominal retroperitoneum biopsy    US guided percutaneous abdominal retroperitoneum biopsy    Surgical Pathology Exam    Surgical Pathology Exam          Description of procedure: abdominal wall nodule 18 G core x 3    Timeout:  Yes    Procedure Area: Procedure Area     Anesthesia:   Local/Topical    Complications: None    Estimated Blood Loss: minimal    Medications (Filter: Administrations occurring from 1446 to 1508 on 05/16/25) As of 05/16/25 1508      lidocaine PF (Xylocaine) 10 mg/mL (1 %) injection (mL) Total volume:  5 mL      Date/Time Rate/Dose/Volume Action       05/16/25  1502 5 mL Given                   ID Type Source Tests Collected by Time   1 : \"abdominal lump\" Tissue SOFT TISSUE BIOPSY SURGICAL PATHOLOGY EXAM Neo Zheng MD 5/16/2025 1505         See detailed result report with images in PACS.    The patient tolerated the procedure well without incident or complication and is in stable condition.     "

## 2025-05-21 ENCOUNTER — APPOINTMENT (OUTPATIENT)
Dept: HEMATOLOGY/ONCOLOGY | Facility: CLINIC | Age: 60
End: 2025-05-21
Payer: MEDICAID

## 2025-05-21 LAB
LAB AP ASR DISCLAIMER: NORMAL
LABORATORY COMMENT REPORT: NORMAL
PATH REPORT.FINAL DX SPEC: NORMAL
PATH REPORT.GROSS SPEC: NORMAL
PATH REPORT.RELEVANT HX SPEC: NORMAL
PATH REPORT.TOTAL CANCER: NORMAL

## 2025-06-26 ENCOUNTER — INFUSION (OUTPATIENT)
Dept: HEMATOLOGY/ONCOLOGY | Facility: CLINIC | Age: 60
End: 2025-06-26
Payer: MEDICAID

## 2025-06-26 DIAGNOSIS — C16.0 GE JUNCTION CARCINOMA: ICD-10-CM

## 2025-06-26 PROCEDURE — 96523 IRRIG DRUG DELIVERY DEVICE: CPT

## 2025-06-26 PROCEDURE — 2500000004 HC RX 250 GENERAL PHARMACY W/ HCPCS (ALT 636 FOR OP/ED): Mod: JZ,SE | Performed by: INTERNAL MEDICINE

## 2025-06-26 RX ORDER — HEPARIN 100 UNIT/ML
500 SYRINGE INTRAVENOUS AS NEEDED
Status: DISCONTINUED | OUTPATIENT
Start: 2025-06-26 | End: 2025-06-26 | Stop reason: HOSPADM

## 2025-06-26 RX ORDER — HEPARIN 100 UNIT/ML
500 SYRINGE INTRAVENOUS AS NEEDED
OUTPATIENT
Start: 2025-06-26

## 2025-06-26 RX ORDER — HEPARIN SODIUM,PORCINE/PF 10 UNIT/ML
50 SYRINGE (ML) INTRAVENOUS AS NEEDED
Status: DISCONTINUED | OUTPATIENT
Start: 2025-06-26 | End: 2025-06-26 | Stop reason: HOSPADM

## 2025-06-26 RX ORDER — HEPARIN SODIUM,PORCINE/PF 10 UNIT/ML
50 SYRINGE (ML) INTRAVENOUS AS NEEDED
OUTPATIENT
Start: 2025-06-26

## 2025-06-26 RX ADMIN — HEPARIN 500 UNITS: 100 SYRINGE at 10:19

## 2025-08-05 ENCOUNTER — TELEPHONE (OUTPATIENT)
Dept: HEMATOLOGY/ONCOLOGY | Facility: CLINIC | Age: 60
End: 2025-08-05
Payer: MEDICAID

## 2025-08-05 NOTE — TELEPHONE ENCOUNTER
Reason for Conversation  lab orders    Background   Patient called needs lab orders put in. She is having a Cat Scan on 8/15/25 and labs from her port is scheduled here on 8/13/25. Please put in creatinine along with regular lab work for cat scan and upcoming doctor appointment.     Disposition   No disposition on file.

## 2025-08-07 ENCOUNTER — APPOINTMENT (OUTPATIENT)
Dept: HEMATOLOGY/ONCOLOGY | Facility: CLINIC | Age: 60
End: 2025-08-07
Payer: MEDICAID

## 2025-08-13 ENCOUNTER — INFUSION (OUTPATIENT)
Dept: HEMATOLOGY/ONCOLOGY | Facility: CLINIC | Age: 60
End: 2025-08-13
Payer: MEDICAID

## 2025-08-13 DIAGNOSIS — C16.0 GE JUNCTION CARCINOMA: ICD-10-CM

## 2025-08-13 DIAGNOSIS — R19.7 DIARRHEA, UNSPECIFIED TYPE: ICD-10-CM

## 2025-08-13 DIAGNOSIS — C16.0 ADENOCARCINOMA OF GASTROESOPHAGEAL JUNCTION (MULTI): ICD-10-CM

## 2025-08-13 DIAGNOSIS — E87.6 HYPOKALEMIA: ICD-10-CM

## 2025-08-13 DIAGNOSIS — E86.0 DEHYDRATION: ICD-10-CM

## 2025-08-13 LAB
ALBUMIN SERPL BCP-MCNC: 4 G/DL (ref 3.4–5)
ALP SERPL-CCNC: 130 U/L (ref 33–136)
ALT SERPL W P-5'-P-CCNC: 16 U/L (ref 7–45)
ANION GAP SERPL CALC-SCNC: 11 MMOL/L (ref 10–20)
AST SERPL W P-5'-P-CCNC: 22 U/L (ref 9–39)
BASOPHILS # BLD AUTO: 0.06 X10*3/UL (ref 0–0.1)
BASOPHILS NFR BLD AUTO: 0.9 %
BILIRUB SERPL-MCNC: 0.7 MG/DL (ref 0–1.2)
BUN SERPL-MCNC: 13 MG/DL (ref 6–23)
CALCIUM SERPL-MCNC: 8.7 MG/DL (ref 8.6–10.3)
CHLORIDE SERPL-SCNC: 109 MMOL/L (ref 98–107)
CO2 SERPL-SCNC: 26 MMOL/L (ref 21–32)
CREAT SERPL-MCNC: 0.7 MG/DL (ref 0.5–1.05)
EGFRCR SERPLBLD CKD-EPI 2021: >90 ML/MIN/1.73M*2
EOSINOPHIL # BLD AUTO: 0.88 X10*3/UL (ref 0–0.7)
EOSINOPHIL NFR BLD AUTO: 12.8 %
ERYTHROCYTE [DISTWIDTH] IN BLOOD BY AUTOMATED COUNT: 14.1 % (ref 11.5–14.5)
GLUCOSE SERPL-MCNC: 89 MG/DL (ref 74–99)
HCT VFR BLD AUTO: 40.7 % (ref 36–46)
HGB BLD-MCNC: 13 G/DL (ref 12–16)
IMM GRANULOCYTES # BLD AUTO: 0.01 X10*3/UL (ref 0–0.7)
IMM GRANULOCYTES NFR BLD AUTO: 0.1 % (ref 0–0.9)
LYMPHOCYTES # BLD AUTO: 2.1 X10*3/UL (ref 1.2–4.8)
LYMPHOCYTES NFR BLD AUTO: 30.7 %
MCH RBC QN AUTO: 30.7 PG (ref 26–34)
MCHC RBC AUTO-ENTMCNC: 31.9 G/DL (ref 32–36)
MCV RBC AUTO: 96 FL (ref 80–100)
MONOCYTES # BLD AUTO: 0.54 X10*3/UL (ref 0.1–1)
MONOCYTES NFR BLD AUTO: 7.9 %
NEUTROPHILS # BLD AUTO: 3.26 X10*3/UL (ref 1.2–7.7)
NEUTROPHILS NFR BLD AUTO: 47.6 %
NRBC BLD-RTO: 0 /100 WBCS (ref 0–0)
PLATELET # BLD AUTO: 183 X10*3/UL (ref 150–450)
POTASSIUM SERPL-SCNC: 3.6 MMOL/L (ref 3.5–5.3)
PROT SERPL-MCNC: 6.2 G/DL (ref 6.4–8.2)
RBC # BLD AUTO: 4.23 X10*6/UL (ref 4–5.2)
SODIUM SERPL-SCNC: 142 MMOL/L (ref 136–145)
WBC # BLD AUTO: 6.9 X10*3/UL (ref 4.4–11.3)

## 2025-08-13 PROCEDURE — 96523 IRRIG DRUG DELIVERY DEVICE: CPT

## 2025-08-13 PROCEDURE — 2500000004 HC RX 250 GENERAL PHARMACY W/ HCPCS (ALT 636 FOR OP/ED): Mod: JZ,SE | Performed by: INTERNAL MEDICINE

## 2025-08-13 PROCEDURE — 85025 COMPLETE CBC W/AUTO DIFF WBC: CPT

## 2025-08-13 PROCEDURE — 84075 ASSAY ALKALINE PHOSPHATASE: CPT

## 2025-08-13 PROCEDURE — 36591 DRAW BLOOD OFF VENOUS DEVICE: CPT

## 2025-08-13 RX ORDER — HEPARIN 100 UNIT/ML
500 SYRINGE INTRAVENOUS AS NEEDED
Status: CANCELLED | OUTPATIENT
Start: 2025-08-13

## 2025-08-13 RX ORDER — HEPARIN SODIUM,PORCINE/PF 10 UNIT/ML
50 SYRINGE (ML) INTRAVENOUS AS NEEDED
Status: CANCELLED | OUTPATIENT
Start: 2025-08-13

## 2025-08-13 RX ORDER — HEPARIN 100 UNIT/ML
500 SYRINGE INTRAVENOUS AS NEEDED
Status: DISCONTINUED | OUTPATIENT
Start: 2025-08-13 | End: 2025-08-13 | Stop reason: HOSPADM

## 2025-08-13 RX ORDER — HEPARIN SODIUM,PORCINE/PF 10 UNIT/ML
50 SYRINGE (ML) INTRAVENOUS AS NEEDED
Status: DISCONTINUED | OUTPATIENT
Start: 2025-08-13 | End: 2025-08-13 | Stop reason: HOSPADM

## 2025-08-13 RX ADMIN — HEPARIN 500 UNITS: 100 SYRINGE at 15:19

## 2025-08-15 ENCOUNTER — APPOINTMENT (OUTPATIENT)
Dept: HEMATOLOGY/ONCOLOGY | Facility: CLINIC | Age: 60
End: 2025-08-15
Payer: MEDICAID

## 2025-08-15 ENCOUNTER — INFUSION (OUTPATIENT)
Dept: HEMATOLOGY/ONCOLOGY | Facility: CLINIC | Age: 60
End: 2025-08-15
Payer: MEDICAID

## 2025-08-15 ENCOUNTER — HOSPITAL ENCOUNTER (OUTPATIENT)
Dept: RADIOLOGY | Facility: HOSPITAL | Age: 60
Discharge: HOME | End: 2025-08-15
Payer: MEDICAID

## 2025-08-15 ENCOUNTER — APPOINTMENT (OUTPATIENT)
Dept: RADIOLOGY | Facility: HOSPITAL | Age: 60
End: 2025-08-15
Payer: MEDICAID

## 2025-08-15 DIAGNOSIS — C16.0 GE JUNCTION CARCINOMA: ICD-10-CM

## 2025-08-15 DIAGNOSIS — E87.6 HYPOKALEMIA: ICD-10-CM

## 2025-08-15 DIAGNOSIS — R19.7 DIARRHEA, UNSPECIFIED TYPE: ICD-10-CM

## 2025-08-15 DIAGNOSIS — C16.0 ADENOCARCINOMA OF GASTROESOPHAGEAL JUNCTION (MULTI): ICD-10-CM

## 2025-08-15 DIAGNOSIS — E86.0 DEHYDRATION: ICD-10-CM

## 2025-08-15 PROCEDURE — 96523 IRRIG DRUG DELIVERY DEVICE: CPT

## 2025-08-15 PROCEDURE — 74177 CT ABD & PELVIS W/CONTRAST: CPT

## 2025-08-15 PROCEDURE — 2550000001 HC RX 255 CONTRASTS: Performed by: INTERNAL MEDICINE

## 2025-08-15 RX ORDER — HEPARIN 100 UNIT/ML
500 SYRINGE INTRAVENOUS AS NEEDED
Status: DISCONTINUED | OUTPATIENT
Start: 2025-08-15 | End: 2025-08-15 | Stop reason: HOSPADM

## 2025-08-15 RX ORDER — HEPARIN 100 UNIT/ML
500 SYRINGE INTRAVENOUS AS NEEDED
OUTPATIENT
Start: 2025-08-15

## 2025-08-15 RX ORDER — HEPARIN SODIUM,PORCINE/PF 10 UNIT/ML
50 SYRINGE (ML) INTRAVENOUS AS NEEDED
OUTPATIENT
Start: 2025-08-15

## 2025-08-15 RX ORDER — HEPARIN SODIUM,PORCINE/PF 10 UNIT/ML
50 SYRINGE (ML) INTRAVENOUS AS NEEDED
Status: DISCONTINUED | OUTPATIENT
Start: 2025-08-15 | End: 2025-08-15 | Stop reason: HOSPADM

## 2025-08-15 RX ADMIN — IOHEXOL 75 ML: 350 INJECTION, SOLUTION INTRAVENOUS at 12:56

## 2025-08-22 ENCOUNTER — OFFICE VISIT (OUTPATIENT)
Dept: HEMATOLOGY/ONCOLOGY | Facility: CLINIC | Age: 60
End: 2025-08-22
Payer: MEDICAID

## 2025-08-22 VITALS
HEIGHT: 61 IN | TEMPERATURE: 97.7 F | BODY MASS INDEX: 30.8 KG/M2 | SYSTOLIC BLOOD PRESSURE: 127 MMHG | HEART RATE: 60 BPM | DIASTOLIC BLOOD PRESSURE: 80 MMHG | WEIGHT: 163.14 LBS | OXYGEN SATURATION: 100 % | RESPIRATION RATE: 20 BRPM

## 2025-08-22 DIAGNOSIS — C16.0 GE JUNCTION CARCINOMA: ICD-10-CM

## 2025-08-22 DIAGNOSIS — C16.0 ADENOCARCINOMA OF GASTROESOPHAGEAL JUNCTION (MULTI): ICD-10-CM

## 2025-08-22 DIAGNOSIS — E86.0 DEHYDRATION: ICD-10-CM

## 2025-08-22 DIAGNOSIS — R19.7 DIARRHEA, UNSPECIFIED TYPE: ICD-10-CM

## 2025-08-22 DIAGNOSIS — E87.6 HYPOKALEMIA: ICD-10-CM

## 2025-08-22 PROCEDURE — 99214 OFFICE O/P EST MOD 30 MIN: CPT | Performed by: INTERNAL MEDICINE

## 2025-08-22 PROCEDURE — 3008F BODY MASS INDEX DOCD: CPT | Performed by: INTERNAL MEDICINE

## 2025-08-22 PROCEDURE — 3079F DIAST BP 80-89 MM HG: CPT | Performed by: INTERNAL MEDICINE

## 2025-08-22 PROCEDURE — 3074F SYST BP LT 130 MM HG: CPT | Performed by: INTERNAL MEDICINE

## 2025-08-22 ASSESSMENT — PAIN SCALES - GENERAL: PAINLEVEL_OUTOF10: 0-NO PAIN

## (undated) DEVICE — DRESSING, ADHESIVE, ISLAND, TELFA, 4 X 14 IN

## (undated) DEVICE — SUTURE, VICRYL, 3-0, 27 IN, CT-2, UNDYED

## (undated) DEVICE — GOWN, SURGICAL, SMARTGOWN, XLARGE, STERILE

## (undated) DEVICE — COVER, CART, 45 X 27 X 48 IN, CLEAR

## (undated) DEVICE — DRAPE, INSTRUMENT, W/POUCH, STERI DRAPE, 7 X 11 IN, DISPOSABLE, STERILE

## (undated) DEVICE — DRAIN, PENROSE, 0.25 X 18 IN, LATEX, STERILE

## (undated) DEVICE — STAPLER,  LINEAR RELOAD, 60MM, WHITE, DISP

## (undated) DEVICE — PEG KIT, ENDOVIVE, STD, PULL, 20FR, W/ENFIT

## (undated) DEVICE — EVACUATOR, WOUND, SUCTION, CLOSED, JACKSON-PRATT, 100 CC, SILICONE

## (undated) DEVICE — ASSEMBLY, ANVIL EEA ORVIL 25MM DST

## (undated) DEVICE — SPONGE, DISSECTOR, PEANUT, 3/8, STERILE 5 FOAM HOLDER"

## (undated) DEVICE — STAPLER, SKIN PROXIMATE, 35 WIDE

## (undated) DEVICE — BLANKET, HYPERTHERMIA, LOWER BODY, BAIR HUGGER, ADULT

## (undated) DEVICE — TOWEL, SURGICAL, NEURO, O/R, 16 X 26, BLUE, STERILE

## (undated) DEVICE — SUTURE, SILK, 3-0, 30 IN, MULTIPACK, BLACK

## (undated) DEVICE — LIGASURE, MARYLAND JAW, OPEN DELIVERY

## (undated) DEVICE — CATHETER TRAY, SURESTEP, 16FR, URINE METER W/STATLOCK

## (undated) DEVICE — TRAY, MINOR, SINGLE BASIN, STERILE

## (undated) DEVICE — DRAPE COVER, C ARM, FLOUROSCAN IMAGING SYS

## (undated) DEVICE — SUTURE, VICRYL, 3-0, 27 IN, SH

## (undated) DEVICE — Device

## (undated) DEVICE — SUTURE, VICRYL, 4-0, 18 IN, UNDYED BR PS-2

## (undated) DEVICE — ANTIFOG, SOLUTION, FOG-OUT

## (undated) DEVICE — DRESSING, SPONGE, GAUZE, CURITY, 4 X 4 IN, STERILE

## (undated) DEVICE — MANIFOLD, 4 PORT NEPTUNE STANDARD

## (undated) DEVICE — SUTURE, VICRYL PLUS, 4-0, 27 IN, PS-2

## (undated) DEVICE — CATHETER, DRAINAGE, NASOGASTRIC, DOUBLE LUMEN, FUNNEL END, SUMP, SALEM, W/ANTI-RELAX VALVE, 16 FR, 48 IN, PVC, STERILE

## (undated) DEVICE — STAPLER, ECHELON 3000, 60MM COMPACT

## (undated) DEVICE — DRESSING, ABDOMINAL, TENDERSORB, 8 X 10 IN, STERILE

## (undated) DEVICE — SUTURE, SILK, 2-0, 18 IN, BLACK

## (undated) DEVICE — SUTURE, PROLENE, 5-0, 36 IN, C-1, CV-11, BLUE

## (undated) DEVICE — SUTURE, PDSII, 1, TP-1, VIL, MONO, 48LP

## (undated) DEVICE — SUTURE, SILK, 2-0, TIES, 12-30 IN, BLACK

## (undated) DEVICE — COUNTER, NEEDLE, FOAM BLOCK, POP-N-COUNT, W/BLADEGUARD, W/ADHESIVE 40 COUNT, RED

## (undated) DEVICE — PUMP, STRYKERFLOW 2 & HANDPIECE W/10FT. IRRIGATION TUBING

## (undated) DEVICE — STAPLER, LINEAR ENDO RELOAD, 60MM, BLUE, DISP

## (undated) DEVICE — SUTURE, PROLENE, 2-0, 36 IN, SH, DA, BLUE

## (undated) DEVICE — SUTURE, SILK, 3-0, 18 IN SH/CR, BLACK

## (undated) DEVICE — DRAPE, C-ARM IMAGE

## (undated) DEVICE — SUTURE, MONOCRYL, 4-0, 18 IN, PS2, UNDYED

## (undated) DEVICE — COVER, TABLE, UHC

## (undated) DEVICE — GOWN, ASTOUND, XL

## (undated) DEVICE — SUTURE, VICRYL, 3-0, 27 IN, SH, VIOLET

## (undated) DEVICE — SUTURE, VICRYL, 0, 18 IN, TIE, VIOLET

## (undated) DEVICE — SPONGE, HEMOSTATIC, CELLULOSE, SURGICEL, 2 X 14 IN

## (undated) DEVICE — DRESSING, GAUZE, DRAIN SPONGE, 6 PLY, EXCILON, 4 X 4 IN, STERILE

## (undated) DEVICE — DRESSING, NON-ADHERENT, TELFA, OUCHLESS, 3 X 8 IN, STERILE

## (undated) DEVICE — SUTURE, SILK, 2-0, 30 IN, SH, BLACK

## (undated) DEVICE — DRAPE, SHEET, ENDOSCOPY, GENERAL, FENESTRATED, ARMBOARD COVER, 98 X 123.5 IN, DISPOSABLE, LF, STERILE

## (undated) DEVICE — SUTURE, ETHILON, 2-0, 18 IN, FS, BLACK, BX/36

## (undated) DEVICE — SUTURE, SILK, 3-0, 18 IN, MULTIPACK, BLACK

## (undated) DEVICE — ELECTRODE, ELECTROSURGICAL, BLADE, INSULATED, ENT/IMA, STERILE

## (undated) DEVICE — SUTURE, VICRYL, 3-0,18 IN, SH, UNDYED

## (undated) DEVICE — ADHESIVE, SKIN, LIQUIBAND EXCEED

## (undated) DEVICE — HOLSTER, ELECTROSURGERY ACCESSORY, STERILE

## (undated) DEVICE — DRAPE, INCISE, ANTIMICROBIAL, IOBAN 2, STERI DRAPE, 23 X 33 IN, DISPOSABLE, STERILE